# Patient Record
Sex: FEMALE | Race: WHITE | NOT HISPANIC OR LATINO | URBAN - METROPOLITAN AREA
[De-identification: names, ages, dates, MRNs, and addresses within clinical notes are randomized per-mention and may not be internally consistent; named-entity substitution may affect disease eponyms.]

---

## 2017-04-07 ENCOUNTER — OUTPATIENT (OUTPATIENT)
Dept: OUTPATIENT SERVICES | Facility: HOSPITAL | Age: 72
LOS: 1 days | Discharge: HOME | End: 2017-04-07

## 2017-06-27 DIAGNOSIS — E78.5 HYPERLIPIDEMIA, UNSPECIFIED: ICD-10-CM

## 2017-06-27 DIAGNOSIS — R94.31 ABNORMAL ELECTROCARDIOGRAM [ECG] [EKG]: ICD-10-CM

## 2017-06-27 DIAGNOSIS — I25.10 ATHEROSCLEROTIC HEART DISEASE OF NATIVE CORONARY ARTERY WITHOUT ANGINA PECTORIS: ICD-10-CM

## 2017-10-03 ENCOUNTER — OUTPATIENT (OUTPATIENT)
Dept: OUTPATIENT SERVICES | Facility: HOSPITAL | Age: 72
LOS: 1 days | Discharge: HOME | End: 2017-10-03

## 2017-10-03 DIAGNOSIS — R94.31 ABNORMAL ELECTROCARDIOGRAM [ECG] [EKG]: ICD-10-CM

## 2017-10-03 DIAGNOSIS — I25.10 ATHEROSCLEROTIC HEART DISEASE OF NATIVE CORONARY ARTERY WITHOUT ANGINA PECTORIS: ICD-10-CM

## 2017-10-03 DIAGNOSIS — R45.89 OTHER SYMPTOMS AND SIGNS INVOLVING EMOTIONAL STATE: ICD-10-CM

## 2017-10-03 DIAGNOSIS — E78.5 HYPERLIPIDEMIA, UNSPECIFIED: ICD-10-CM

## 2018-06-05 ENCOUNTER — OUTPATIENT (OUTPATIENT)
Dept: OUTPATIENT SERVICES | Facility: HOSPITAL | Age: 73
LOS: 1 days | Discharge: HOME | End: 2018-06-05

## 2018-06-05 DIAGNOSIS — E78.5 HYPERLIPIDEMIA, UNSPECIFIED: ICD-10-CM

## 2018-06-05 DIAGNOSIS — E11.9 TYPE 2 DIABETES MELLITUS WITHOUT COMPLICATIONS: ICD-10-CM

## 2018-06-05 DIAGNOSIS — I25.10 ATHEROSCLEROTIC HEART DISEASE OF NATIVE CORONARY ARTERY WITHOUT ANGINA PECTORIS: ICD-10-CM

## 2018-06-05 DIAGNOSIS — R94.31 ABNORMAL ELECTROCARDIOGRAM [ECG] [EKG]: ICD-10-CM

## 2018-10-30 ENCOUNTER — OUTPATIENT (OUTPATIENT)
Dept: OUTPATIENT SERVICES | Facility: HOSPITAL | Age: 73
LOS: 1 days | Discharge: HOME | End: 2018-10-30

## 2018-10-30 DIAGNOSIS — R05 COUGH: ICD-10-CM

## 2018-10-30 DIAGNOSIS — I25.10 ATHEROSCLEROTIC HEART DISEASE OF NATIVE CORONARY ARTERY WITHOUT ANGINA PECTORIS: ICD-10-CM

## 2018-10-30 DIAGNOSIS — R94.31 ABNORMAL ELECTROCARDIOGRAM [ECG] [EKG]: ICD-10-CM

## 2018-10-30 DIAGNOSIS — E78.5 HYPERLIPIDEMIA, UNSPECIFIED: ICD-10-CM

## 2018-11-12 NOTE — ASU PATIENT PROFILE, ADULT - PMH
CAD (coronary artery disease)    GERD (gastroesophageal reflux disease)    Hyperlipidemia    MI (myocardial infarction)

## 2018-11-13 ENCOUNTER — INPATIENT (INPATIENT)
Facility: HOSPITAL | Age: 73
LOS: 0 days | Discharge: HOME | End: 2018-11-14
Attending: INTERNAL MEDICINE | Admitting: INTERNAL MEDICINE
Payer: MEDICARE

## 2018-11-13 ENCOUNTER — OUTPATIENT (OUTPATIENT)
Dept: OUTPATIENT SERVICES | Facility: HOSPITAL | Age: 73
LOS: 1 days | Discharge: HOME | End: 2018-11-13

## 2018-11-13 VITALS
WEIGHT: 115.08 LBS | DIASTOLIC BLOOD PRESSURE: 58 MMHG | RESPIRATION RATE: 16 BRPM | HEART RATE: 78 BPM | SYSTOLIC BLOOD PRESSURE: 140 MMHG

## 2018-11-13 RX ORDER — SIMVASTATIN 20 MG/1
40 TABLET, FILM COATED ORAL AT BEDTIME
Qty: 0 | Refills: 0 | Status: DISCONTINUED | OUTPATIENT
Start: 2018-11-13 | End: 2018-11-14

## 2018-11-13 RX ORDER — CLOPIDOGREL BISULFATE 75 MG/1
75 TABLET, FILM COATED ORAL DAILY
Qty: 0 | Refills: 0 | Status: DISCONTINUED | OUTPATIENT
Start: 2018-11-14 | End: 2018-11-14

## 2018-11-13 RX ORDER — ENOXAPARIN SODIUM 100 MG/ML
40 INJECTION SUBCUTANEOUS DAILY
Qty: 0 | Refills: 0 | Status: DISCONTINUED | OUTPATIENT
Start: 2018-11-13 | End: 2018-11-13

## 2018-11-13 RX ORDER — SODIUM CHLORIDE 9 MG/ML
1000 INJECTION INTRAMUSCULAR; INTRAVENOUS; SUBCUTANEOUS
Qty: 0 | Refills: 0 | Status: DISCONTINUED | OUTPATIENT
Start: 2018-11-13 | End: 2018-11-14

## 2018-11-13 RX ORDER — FOLIC ACID 0.8 MG
1 TABLET ORAL DAILY
Qty: 0 | Refills: 0 | Status: DISCONTINUED | OUTPATIENT
Start: 2018-11-13 | End: 2018-11-14

## 2018-11-13 RX ORDER — ENOXAPARIN SODIUM 100 MG/ML
40 INJECTION SUBCUTANEOUS DAILY
Qty: 0 | Refills: 0 | Status: DISCONTINUED | OUTPATIENT
Start: 2018-11-14 | End: 2018-11-14

## 2018-11-13 RX ORDER — ASPIRIN/CALCIUM CARB/MAGNESIUM 324 MG
81 TABLET ORAL DAILY
Qty: 0 | Refills: 0 | Status: DISCONTINUED | OUTPATIENT
Start: 2018-11-14 | End: 2018-11-14

## 2018-11-13 RX ORDER — FAMOTIDINE 10 MG/ML
20 INJECTION INTRAVENOUS DAILY
Qty: 0 | Refills: 0 | Status: DISCONTINUED | OUTPATIENT
Start: 2018-11-13 | End: 2018-11-14

## 2018-11-13 RX ORDER — ACETAMINOPHEN 500 MG
650 TABLET ORAL ONCE
Qty: 0 | Refills: 0 | Status: COMPLETED | OUTPATIENT
Start: 2018-11-13 | End: 2018-11-13

## 2018-11-13 RX ORDER — METOPROLOL TARTRATE 50 MG
25 TABLET ORAL DAILY
Qty: 0 | Refills: 0 | Status: DISCONTINUED | OUTPATIENT
Start: 2018-11-13 | End: 2018-11-14

## 2018-11-13 RX ORDER — PANTOPRAZOLE SODIUM 20 MG/1
40 TABLET, DELAYED RELEASE ORAL
Qty: 0 | Refills: 0 | Status: DISCONTINUED | OUTPATIENT
Start: 2018-11-13 | End: 2018-11-14

## 2018-11-13 RX ORDER — ALPRAZOLAM 0.25 MG
1 TABLET ORAL
Qty: 0 | Refills: 0 | Status: DISCONTINUED | OUTPATIENT
Start: 2018-11-13 | End: 2018-11-14

## 2018-11-13 RX ADMIN — PANTOPRAZOLE SODIUM 40 MILLIGRAM(S): 20 TABLET, DELAYED RELEASE ORAL at 18:17

## 2018-11-13 RX ADMIN — Medication 650 MILLIGRAM(S): at 19:28

## 2018-11-13 RX ADMIN — FAMOTIDINE 20 MILLIGRAM(S): 10 INJECTION INTRAVENOUS at 18:17

## 2018-11-13 RX ADMIN — Medication 25 MILLIGRAM(S): at 18:17

## 2018-11-13 RX ADMIN — SIMVASTATIN 40 MILLIGRAM(S): 20 TABLET, FILM COATED ORAL at 22:19

## 2018-11-13 RX ADMIN — SODIUM CHLORIDE 100 MILLILITER(S): 9 INJECTION INTRAMUSCULAR; INTRAVENOUS; SUBCUTANEOUS at 18:19

## 2018-11-13 RX ADMIN — Medication 650 MILLIGRAM(S): at 19:58

## 2018-11-13 RX ADMIN — Medication 1 MILLIGRAM(S): at 18:17

## 2018-11-13 NOTE — H&P CARDIOLOGY - EXTREMITIES COMMENTS
-fem stop on R groin, pt complaining of some tenderness at that site -fem stop on R groin, pt complaining of some tenderness at that site  -rheumatoid nodules on both upper extremities in the hands  -ulnar deviation

## 2018-11-13 NOTE — H&P CARDIOLOGY - HISTORY OF PRESENT ILLNESS
73yF PMHx of CAD s/p PCi to RCA, htn, gerd, dld presents fopr elective cardiac cathaterization s/p positive nuclear stress test performed in Dr. Suárez's office. 73yF PMHx of CAD s/p PCI x1 (16 yrs ago), HTN, GERD, DLD, RA (gets remicade infusion every 8 wks, on methotrexate), presenting after a positive outpatient stress test. Pt's  states that for the past couple of months he noticed that she was getting more and more tired, especially on exertion, which would go away when she would lie down. However, pt denies any over chest pressure/pain, no palpitations, no radiation into the neck or jawline. No associated nausea/vomiting. She has a LHC done today via the right groin and it was found that she had EF 55%, ostial 90% lesion, RCA 70% lesion, and LAD 70%. An area of defect noticed on echo matched with the RCA lesion, so she had GIANNA to pRCA. Her LAD lesion will also need to be fixed, but it was not done today due to her comorbidities. She is being admitted for observation.     Cardiologist: Dr Oseguera  Rheum: Dr Sally Franco

## 2018-11-13 NOTE — H&P CARDIOLOGY - PMH
CAD (coronary artery disease)    GERD (gastroesophageal reflux disease)    Hyperlipidemia    MI (myocardial infarction)    Rheumatoid arthritis

## 2018-11-13 NOTE — CHART NOTE - NSCHARTNOTEFT_GEN_A_CORE
PRE-OP DIAGNOSIS: CAD , + NM stress test in infero septal , stable angina class lll    PROCEDURE: [facility] with coronary angiography    Physician: Dr Suárez  Assistant: DR krish Martinez    ANESTHESIA TYPE:  [  ]General Anesthesia  [ x ] Sedation  [  ] Local/Regional    ESTIMATED BLOOD LOSS:    10   mL    CONDITION  [  ] Critical  [  ] Serious  [  ]Fair  [  x]Good      SPECIMENS REMOVED (IF APPLICABLE):      IV CONTRAST:     140        mL    FINDINGS    Left Heart Catheterization:  LVEF%:  55 %  LVEDP: low  [ ] Normal Coronary Arteries  [ ] Luminal Irregularities  [ ] Non-obstructive CAD    ACCESS:    [ ] right radial artery  [x ] right femoral artery : close with angioseal    LEFT HEART CATHETERIZATION                                    Left main  no disease    LAD:  prox/mid 70% LAD lesion at the bifurcation with D1                       Diag: small vessel , ostial 90 %  lesion    Left Circumflex: mild diffuse atherosclerosis    Right Coronary Artery: 70% lesion prox RCA before the mid RCA stent , patent stent  RPDA no disease      INTERVENTION  IMPLANTS: GIANNA was inserted in prox RCA        POST-OP DIAGNOSIS    CAD with patent prior stent , pt had 70% lesion in prox RCA that correlates with NM stress test findings ( infero septal).  a GIANNA was placed with excellent result.  pt has a 70% prox/mid LAD lesion , needs to be fixed , however as a staged PCI, given her mutiple comorbidities( pt has RA on methothrexate and prednisone).    PLAN OF CARE  [ ] D/C Home today  [ ]  D/C in AM  [ ] Return to In-patient bed  [x ] Admit for observation  [ ] Return for staged procedure:  [ ] CT Surgery consult called  [ ]  Continue DAPT, B-blocker & Statin therapy : pt should take ASA/PLAvix

## 2018-11-13 NOTE — H&P CARDIOLOGY - FAMILY HISTORY
Mother  Still living? Unknown  Family history of heart attack, Age at diagnosis: Age Unknown     Father  Still living? Unknown  Family history of heart disease, Age at diagnosis: Age Unknown

## 2018-11-13 NOTE — H&P CARDIOLOGY - COMMENTS
1. CAD s/p PCI: C w/ GIANNA to pRCA, entry via R groin  -c/w asa, plavix, statin, toprol  -pt will need second intervention in the future to fix the LAD lesion  -monitor for any signs of infection, bleeding  -c/w IVF x10hrs, trend bmp for monitor for any GIDEON  2. HTN: c/w toprol  3. GERD: c/w protonix, pepcid  4. RA: gets remicade infusions q8wks, methotrexate weekly, c/w folic acid  5. DVT PPx: lovenox

## 2018-11-14 ENCOUNTER — TRANSCRIPTION ENCOUNTER (OUTPATIENT)
Age: 73
End: 2018-11-14

## 2018-11-14 VITALS
DIASTOLIC BLOOD PRESSURE: 53 MMHG | SYSTOLIC BLOOD PRESSURE: 101 MMHG | HEART RATE: 62 BPM | TEMPERATURE: 98 F | RESPIRATION RATE: 18 BRPM

## 2018-11-14 LAB
ANION GAP SERPL CALC-SCNC: 12 MMOL/L — SIGNIFICANT CHANGE UP (ref 7–14)
ANION GAP SERPL CALC-SCNC: 15 MMOL/L — HIGH (ref 7–14)
BASOPHILS # BLD AUTO: 0.03 K/UL — SIGNIFICANT CHANGE UP (ref 0–0.2)
BASOPHILS NFR BLD AUTO: 0.4 % — SIGNIFICANT CHANGE UP (ref 0–1)
BUN SERPL-MCNC: 10 MG/DL — SIGNIFICANT CHANGE UP (ref 10–20)
BUN SERPL-MCNC: 10 MG/DL — SIGNIFICANT CHANGE UP (ref 10–20)
CALCIUM SERPL-MCNC: 8.3 MG/DL — LOW (ref 8.5–10.1)
CALCIUM SERPL-MCNC: 8.7 MG/DL — SIGNIFICANT CHANGE UP (ref 8.5–10.1)
CHLORIDE SERPL-SCNC: 103 MMOL/L — SIGNIFICANT CHANGE UP (ref 98–110)
CHLORIDE SERPL-SCNC: 105 MMOL/L — SIGNIFICANT CHANGE UP (ref 98–110)
CO2 SERPL-SCNC: 22 MMOL/L — SIGNIFICANT CHANGE UP (ref 17–32)
CO2 SERPL-SCNC: 23 MMOL/L — SIGNIFICANT CHANGE UP (ref 17–32)
CREAT SERPL-MCNC: 0.6 MG/DL — LOW (ref 0.7–1.5)
CREAT SERPL-MCNC: 0.7 MG/DL — SIGNIFICANT CHANGE UP (ref 0.7–1.5)
EOSINOPHIL # BLD AUTO: 0.21 K/UL — SIGNIFICANT CHANGE UP (ref 0–0.7)
EOSINOPHIL NFR BLD AUTO: 2.9 % — SIGNIFICANT CHANGE UP (ref 0–8)
GLUCOSE SERPL-MCNC: 79 MG/DL — SIGNIFICANT CHANGE UP (ref 70–99)
GLUCOSE SERPL-MCNC: 82 MG/DL — SIGNIFICANT CHANGE UP (ref 70–99)
HCT VFR BLD CALC: 31.7 % — LOW (ref 37–47)
HCT VFR BLD CALC: 31.9 % — LOW (ref 37–47)
HGB BLD-MCNC: 10.4 G/DL — LOW (ref 12–16)
HGB BLD-MCNC: 10.5 G/DL — LOW (ref 12–16)
IMM GRANULOCYTES NFR BLD AUTO: 0.3 % — SIGNIFICANT CHANGE UP (ref 0.1–0.3)
LYMPHOCYTES # BLD AUTO: 1.2 K/UL — SIGNIFICANT CHANGE UP (ref 1.2–3.4)
LYMPHOCYTES # BLD AUTO: 16.8 % — LOW (ref 20.5–51.1)
MCHC RBC-ENTMCNC: 31.2 PG — HIGH (ref 27–31)
MCHC RBC-ENTMCNC: 31.5 PG — HIGH (ref 27–31)
MCHC RBC-ENTMCNC: 32.8 G/DL — SIGNIFICANT CHANGE UP (ref 32–37)
MCHC RBC-ENTMCNC: 32.9 G/DL — SIGNIFICANT CHANGE UP (ref 32–37)
MCV RBC AUTO: 95.2 FL — SIGNIFICANT CHANGE UP (ref 81–99)
MCV RBC AUTO: 95.8 FL — SIGNIFICANT CHANGE UP (ref 81–99)
MONOCYTES # BLD AUTO: 0.68 K/UL — HIGH (ref 0.1–0.6)
MONOCYTES NFR BLD AUTO: 9.5 % — HIGH (ref 1.7–9.3)
NEUTROPHILS # BLD AUTO: 5.01 K/UL — SIGNIFICANT CHANGE UP (ref 1.4–6.5)
NEUTROPHILS NFR BLD AUTO: 70.1 % — SIGNIFICANT CHANGE UP (ref 42.2–75.2)
NRBC # BLD: 0 /100 WBCS — SIGNIFICANT CHANGE UP (ref 0–0)
NRBC # BLD: 0 /100 WBCS — SIGNIFICANT CHANGE UP (ref 0–0)
PLATELET # BLD AUTO: 197 K/UL — SIGNIFICANT CHANGE UP (ref 130–400)
PLATELET # BLD AUTO: 203 K/UL — SIGNIFICANT CHANGE UP (ref 130–400)
POTASSIUM SERPL-MCNC: 4 MMOL/L — SIGNIFICANT CHANGE UP (ref 3.5–5)
POTASSIUM SERPL-MCNC: 4.3 MMOL/L — SIGNIFICANT CHANGE UP (ref 3.5–5)
POTASSIUM SERPL-SCNC: 4 MMOL/L — SIGNIFICANT CHANGE UP (ref 3.5–5)
POTASSIUM SERPL-SCNC: 4.3 MMOL/L — SIGNIFICANT CHANGE UP (ref 3.5–5)
RBC # BLD: 3.33 M/UL — LOW (ref 4.2–5.4)
RBC # BLD: 3.33 M/UL — LOW (ref 4.2–5.4)
RBC # FLD: 13.2 % — SIGNIFICANT CHANGE UP (ref 11.5–14.5)
RBC # FLD: 13.2 % — SIGNIFICANT CHANGE UP (ref 11.5–14.5)
SODIUM SERPL-SCNC: 140 MMOL/L — SIGNIFICANT CHANGE UP (ref 135–146)
SODIUM SERPL-SCNC: 140 MMOL/L — SIGNIFICANT CHANGE UP (ref 135–146)
WBC # BLD: 7.15 K/UL — SIGNIFICANT CHANGE UP (ref 4.8–10.8)
WBC # BLD: 7.49 K/UL — SIGNIFICANT CHANGE UP (ref 4.8–10.8)
WBC # FLD AUTO: 7.15 K/UL — SIGNIFICANT CHANGE UP (ref 4.8–10.8)
WBC # FLD AUTO: 7.49 K/UL — SIGNIFICANT CHANGE UP (ref 4.8–10.8)

## 2018-11-14 PROCEDURE — 93926 LOWER EXTREMITY STUDY: CPT | Mod: 26

## 2018-11-14 RX ORDER — ATORVASTATIN CALCIUM 80 MG/1
1 TABLET, FILM COATED ORAL
Qty: 30 | Refills: 0
Start: 2018-11-14 | End: 2018-12-13

## 2018-11-14 RX ORDER — CLOPIDOGREL BISULFATE 75 MG/1
1 TABLET, FILM COATED ORAL
Qty: 0 | Refills: 0 | COMMUNITY

## 2018-11-14 RX ORDER — ASPIRIN/CALCIUM CARB/MAGNESIUM 324 MG
1 TABLET ORAL
Qty: 30 | Refills: 0
Start: 2018-11-14 | End: 2018-12-13

## 2018-11-14 RX ORDER — ACETAMINOPHEN 500 MG
650 TABLET ORAL EVERY 6 HOURS
Qty: 0 | Refills: 0 | Status: DISCONTINUED | OUTPATIENT
Start: 2018-11-14 | End: 2018-11-14

## 2018-11-14 RX ORDER — SIMVASTATIN 20 MG/1
1 TABLET, FILM COATED ORAL
Qty: 0 | Refills: 0 | COMMUNITY

## 2018-11-14 RX ORDER — CLOPIDOGREL BISULFATE 75 MG/1
1 TABLET, FILM COATED ORAL
Qty: 30 | Refills: 0
Start: 2018-11-14 | End: 2018-12-13

## 2018-11-14 RX ADMIN — Medication 650 MILLIGRAM(S): at 18:44

## 2018-11-14 RX ADMIN — Medication 81 MILLIGRAM(S): at 11:51

## 2018-11-14 RX ADMIN — CLOPIDOGREL BISULFATE 75 MILLIGRAM(S): 75 TABLET, FILM COATED ORAL at 11:51

## 2018-11-14 RX ADMIN — ENOXAPARIN SODIUM 40 MILLIGRAM(S): 100 INJECTION SUBCUTANEOUS at 11:51

## 2018-11-14 RX ADMIN — Medication 650 MILLIGRAM(S): at 09:18

## 2018-11-14 RX ADMIN — Medication 650 MILLIGRAM(S): at 18:14

## 2018-11-14 RX ADMIN — Medication 650 MILLIGRAM(S): at 08:48

## 2018-11-14 RX ADMIN — FAMOTIDINE 20 MILLIGRAM(S): 10 INJECTION INTRAVENOUS at 11:50

## 2018-11-14 RX ADMIN — PANTOPRAZOLE SODIUM 40 MILLIGRAM(S): 20 TABLET, DELAYED RELEASE ORAL at 06:48

## 2018-11-14 RX ADMIN — Medication 25 MILLIGRAM(S): at 06:48

## 2018-11-14 RX ADMIN — Medication 1 MILLIGRAM(S): at 11:50

## 2018-11-14 NOTE — PROGRESS NOTE ADULT - ASSESSMENT
agree with above fu vascular  Will assess for psuedo.   HGB is stable 10.6 and 10.4 on 2 subsequnt draws .  No further draws needed.

## 2018-11-14 NOTE — DISCHARGE NOTE ADULT - PATIENT PORTAL LINK FT
You can access the [facility] Patient Portal, offered by [facility], by registering with the following website: [email or web address]

## 2018-11-14 NOTE — DISCHARGE NOTE ADULT - PLAN OF CARE
Optimized, stable You were admitted for elective cardiac catheretization with Dr. Suárez where a stent was placed. Continue DAPT( Aspirin 81mg Daily, Plavix 75 mg PO Daily),  B-Blocker, Statin Therapy. Patient given 30 day supply of (Aspirin 81 mg PO daily + Plavix 75 PO daily) mg to be take at home. Pt given instructions by cardiology on importance of taking antiplatelet medication or risk acute stent thrombosis/death. Pt agreeing to take antiplatelet medications. Post cath instructions, access site care and activity restrictions reviewed with patient by cardiology.    patient to return to hospital if experience chest pain, shortness breath, dizziness and site bleeding. Aggressive risk factor modification, diet counseling, smoking cessation discussed with patient.

## 2018-11-14 NOTE — PROGRESS NOTE ADULT - SUBJECTIVE AND OBJECTIVE BOX
ABHINAV DREW  73y Female    CHIEF COMPLAINT:    Patient is a 73y old  Female who presents with a chief complaint of s/p Cardiac Cath (14 Nov 2018 14:32)      INTERVAL HPI/OVERNIGHT EVENTS:    Patient seen and examined.    ROS: All other systems are negative.    Vital Signs:    T(F): 98.5 (11-14-18 @ 14:29), Max: 99.5 (11-13-18 @ 20:22)  HR: 62 (11-14-18 @ 14:29) (62 - 152)  BP: 101/53 (11-14-18 @ 14:29) (101/53 - 137/53)  RR: 18 (11-14-18 @ 14:29) (18 - 19)  SpO2: 100% (11-14-18 @ 07:07) (96% - 100%)  I&O's Summary    13 Nov 2018 07:01  -  14 Nov 2018 07:00  --------------------------------------------------------  IN: 0 mL / OUT: 1900 mL / NET: -1900 mL    14 Nov 2018 07:01  -  14 Nov 2018 16:08  --------------------------------------------------------  IN: 0 mL / OUT: 800 mL / NET: -800 mL      Daily     Daily   CAPILLARY BLOOD GLUCOSE          PHYSICAL EXAM:    GENERAL:  NAD  SKIN: No rashes or lesions  HENT: Atrumatic. Normocephalic. PERRL. Moist membranes.  NECK: Supple, No JVD. No lymphadenopathy.  PULMONARY: CTA B/L. No wheezing. No rales  CVS: Normal S1, S2. Rate and Rythm are regular. No murmurs.  ABDOMEN/GI: Soft, Nontender, Nondistended; BS present  EXTREMITIES: Peripheral pulses intact. No edema B/L LE.  NEUROLOGIC:  No motor or sensory deficit.  PSYCH: Alert & oriented x 3    Consultant(s) Notes Reviewed:  [x ] YES  [ ] NO  Care Discussed with Consultants/Other Providers [ x] YES  [ ] NO    EKG reviewed  Telemetry reviewed    LABS:                        10.5   7.15  )-----------( 203      ( 14 Nov 2018 07:24 )             31.9     11-14    140  |  105  |  10  ----------------------------<  82  4.3   |  23  |  0.7    Ca    8.7      14 Nov 2018 07:24                RADIOLOGY & ADDITIONAL TESTS:      Imaging or report Personally Reviewed:  [ ] YES  [ ] NO    Medications:  Standing  aspirin  chewable 81 milliGRAM(s) Oral daily  clopidogrel Tablet 75 milliGRAM(s) Oral daily  enoxaparin Injectable 40 milliGRAM(s) SubCutaneous daily  famotidine    Tablet 20 milliGRAM(s) Oral daily  folic acid 1 milliGRAM(s) Oral daily  metoprolol succinate ER 25 milliGRAM(s) Oral daily  pantoprazole    Tablet 40 milliGRAM(s) Oral before breakfast  simvastatin 40 milliGRAM(s) Oral at bedtime  sodium chloride 0.9%. 1000 milliLiter(s) IV Continuous <Continuous>    PRN Meds  acetaminophen   Tablet .. 650 milliGRAM(s) Oral every 6 hours PRN  ALPRAZolam 1 milliGRAM(s) Oral two times a day PRN      Case discussed with resident    Care discussed with pt/family ABHINAV DREW  73y Female    CHIEF COMPLAINT:    Patient is a 73y old  Female who presents with a chief complaint of s/p Cardiac Cath (14 Nov 2018 14:32)      INTERVAL HPI/OVERNIGHT EVENTS:    Patient seen and examined. No cp. No sob. C/O some discomfort in Rt. groin. No difficulty in walking.    ROS: All other systems are negative.    Vital Signs:    T(F): 98.5 (11-14-18 @ 14:29), Max: 99.5 (11-13-18 @ 20:22)  HR: 62 (11-14-18 @ 14:29) (62 - 152)  BP: 101/53 (11-14-18 @ 14:29) (101/53 - 137/53)  RR: 18 (11-14-18 @ 14:29) (18 - 19)  SpO2: 100% (11-14-18 @ 07:07) (96% - 100%)  I&O's Summary    13 Nov 2018 07:01  -  14 Nov 2018 07:00  --------------------------------------------------------  IN: 0 mL / OUT: 1900 mL / NET: -1900 mL    14 Nov 2018 07:01  -  14 Nov 2018 16:08  --------------------------------------------------------  IN: 0 mL / OUT: 800 mL / NET: -800 mL      Daily     Daily   CAPILLARY BLOOD GLUCOSE          PHYSICAL EXAM:    GENERAL:  NAD  SKIN: No rashes or lesions  HENT: Atrumatic. Normocephalic. PERRL. Moist membranes.  NECK: Supple, No JVD. No lymphadenopathy.  PULMONARY: CTA B/L. No wheezing. No rales  CVS: Normal S1, S2. Rate and Rythm are regular. No murmurs.  ABDOMEN/GI: Soft, Nontender, Nondistended; BS present  EXTREMITIES: Peripheral pulses intact. No edema B/L LE.  NEUROLOGIC:  No motor or sensory deficit.  PSYCH: Alert & oriented x 3    Consultant(s) Notes Reviewed:  [x ] YES  [ ] NO  Care Discussed with Consultants/Other Providers [ x] YES  [ ] NO    EKG reviewed  Telemetry reviewed    LABS:                        10.5   7.15  )-----------( 203      ( 14 Nov 2018 07:24 )             31.9   Hemoglobin: 10.5 g/dL (11-14 @ 07:24)  Hemoglobin: 10.4 g/dL (11-14 @ 01:11)    11-14    140  |  105  |  10  ----------------------------<  82  4.3   |  23  |  0.7    Ca    8.7      14 Nov 2018 07:24                RADIOLOGY & ADDITIONAL TESTS:      Imaging or report Personally Reviewed:  [ ] YES  [ ] NO    Medications:  Standing  aspirin  chewable 81 milliGRAM(s) Oral daily  clopidogrel Tablet 75 milliGRAM(s) Oral daily  enoxaparin Injectable 40 milliGRAM(s) SubCutaneous daily  famotidine    Tablet 20 milliGRAM(s) Oral daily  folic acid 1 milliGRAM(s) Oral daily  metoprolol succinate ER 25 milliGRAM(s) Oral daily  pantoprazole    Tablet 40 milliGRAM(s) Oral before breakfast  simvastatin 40 milliGRAM(s) Oral at bedtime  sodium chloride 0.9%. 1000 milliLiter(s) IV Continuous <Continuous>    PRN Meds  acetaminophen   Tablet .. 650 milliGRAM(s) Oral every 6 hours PRN  ALPRAZolam 1 milliGRAM(s) Oral two times a day PRN      Case discussed with resident    Care discussed with pt/family

## 2018-11-14 NOTE — DISCHARGE NOTE ADULT - INSTRUCTIONS
DASH diet - please restrict sodium to less than 2g a day, restrict fats and cholesterol in your diet.

## 2018-11-14 NOTE — DISCHARGE NOTE ADULT - CARE PROVIDER_API CALL
Willis Suárez), Cardiovascular Disease; Interventional Cardiology  [address] [place] [address]  Phone: [phone]  Fax: [phone]

## 2018-11-14 NOTE — PROGRESS NOTE ADULT - SUBJECTIVE AND OBJECTIVE BOX
CARDIAC CATH NP NOTE    S/P PCI of pRCA  via right Femoral access.      Patient tolerated procedure well  Denies chest pain, shortness of breath or dizziness post cath  no TELE events overnight     ALLERGIES    MEDICATIONS  acetaminophen   Tablet .. 650 milliGRAM(s) Oral every 6 hours PRN  ALPRAZolam 1 milliGRAM(s) Oral two times a day PRN  aspirin  chewable 81 milliGRAM(s) Oral daily  clopidogrel Tablet 75 milliGRAM(s) Oral daily  enoxaparin Injectable 40 milliGRAM(s) SubCutaneous daily  famotidine    Tablet 20 milliGRAM(s) Oral daily  folic acid 1 milliGRAM(s) Oral daily  metoprolol succinate ER 25 milliGRAM(s) Oral daily  pantoprazole    Tablet 40 milliGRAM(s) Oral before breakfast  simvastatin 40 milliGRAM(s) Oral at bedtime  sodium chloride 0.9%. 1000 milliLiter(s) IV Continuous <Continuous>    OBJECTIVE    VITAL SIGNS  Vital Signs Last 24 Hrs  T(C): 36.7 (2018 05:44), Max: 37.5 (2018 20:22)  T(F): 98 (2018 05:44), Max: 99.5 (2018 20:22)  HR: 64 (2018 05:44) (62 - 152)  BP: 113/55 (2018 05:44) (104/62 - 137/53)  BP(mean): --  RR: 18 (2018 05:44) (18 - 19)  SpO2: 100% (2018 07:07) (96% - 100%)      PHYSICAL EXAM    PHYSICAL EXAM:             CONSTITUTIONAL: Well-developed; well-nourished; in no acute distress.   	SKIN: warm, dry  	HEAD: Normocephalic; atraumatic.  	EYES: PERRL, EOMI, no conjunctival erythema.  	ENT: No nasal discharge, airway clear, mucous membranes moist.  	NECK: Supple; non tender.  	CARD: +S1, S2 no murmurs, gallops, or rubs. Regular rate and rhythm.    	RESP: No wheezes, rales or rhonchi. CTA B/L  	ABD: soft ntnd, + BS.  	EXT: moves all extremities,  no clubbing, cyanosis or edema.   	NEURO: Alert and oriented x3, no focal deficits.          PSYCH: Cooperative, appropriate.          EXTREMITY:             Right Groin:  Dressing removed, hematoma noted and marked, pain upon palpation.             VASCULAR:  +2 Rad / +2PTs / + 2DPs    EKG                                                                                                     no changes    LABORATORY VALUES                        10.5   7.15  )-----------( 203      ( 2018 07:24 )             31.9   Complete Blood Count + Automated Diff in AM (18 @ 07:24)    WBC Count: 7.15 K/uL    RBC Count: 3.33 M/uL    Hemoglobin: 10.5 g/dL    Hematocrit: 31.9 %    Mean Cell Volume: 95.8 fL    Mean Cell Hemoglobin: 31.5 pg    Mean Cell Hemoglobin Conc: 32.9 g/dL    Red Cell Distrib Width: 13.2 %    Platelet Count - Automated: 203 K/uL    Auto Neutrophil #: 5.01 K/uL    Auto Lymphocyte #: 1.20 K/uL    Auto Monocyte #: 0.68 K/uL    Auto Eosinophil #: 0.21 K/uL    Auto Basophil #: 0.03 K/uL    Auto Neutrophil %: 70.1: Differential percentages must be correlated with absolute numbers for  clinical significance. %    Auto Lymphocyte %: 16.8 %    Auto Monocyte %: 9.5 %    Auto Eosinophil %: 2.9 %    Auto Basophil %: 0.4 %    Auto Immature Granulocyte %: 0.3 %    Basic Metabolic Panel in AM (18 @ 07:24)    Sodium, Serum: 140 mmol/L    Potassium, Serum: 4.3 mmol/L    Chloride, Serum: 105 mmol/L    Carbon Dioxide, Serum: 23 mmol/L    Anion Gap, Serum: 12 mmol/L    Blood Urea Nitrogen, Serum: 10 mg/dL    Creatinine, Serum: 0.7 mg/dL    Glucose, Serum: 82 mg/dL    Calcium, Total Serum: 8.7 mg/dL    eGFR if Non : 87: Interpretative comment  The units for eGFR are ml/min/1.73m2 (normalized body surface area). The  eGFR is calculated from a serum creatinine using the CKD-EPI equation.  Other variables required for calculation are race, age and sex. Among  patients with chronic kidney disease (CKD), the eGFR is useful in  determining the stage of disease according to KDOQI CKD classification.  All eGFR results are reported numerically with the following  interpretation.          GFR                    With                 Without     (ml/min/1.73 m2)    Kidney Damage       Kidney Damage        >= 90                    Stage 1                     Normal        60-89                    Stage 2                     Decreased GFR        30-59     Stage 3                     Stage 3        15-29                    Stage 4                     Stage 4        < 15                      Stage 5                     Stage 5  Each stage of CKD assumes that the associated GFR level has been in  effect for at least 3 months. Determination of stages one and two (with  eGFR > 59 ml/min/m2) requires estimation of kidney damage for at least 3  months as defined by structural or functional abnormalities.  Limitations: All estimates of GFR will be less accurate for patients at  extremes of muscle mass (including but not limited to frail elderly,  critically ill, or cancer patients), those with unusual diets, and those  with conditions associated with reduced secretion or extrarenal  elimination of creatinine. The eGFR equation is not recommended for use  in patients with unstable creatinine levels. mL/min/1.73M2    eGFR if African American: 101 mL/min/1.73M2      ASSESSMENT & PLAN    A/P:  I discussed the case with Interventional Cardiologist Dr. Kwon & recommend the followin.  CAD/PCI of p RCA          	     Continue DAPT( Aspirin 81mg Daily, Plavix 75 mg PO Daily),  B-Blocker, Statin Therapy                   Patient given 30 day supply of (Aspirin 81 mg PO daily + Plavix 75 PO daily) mg to be take at home.                   Pt given instructions on importance of taking antiplatelet medication or risk acute stent thrombosis/death.                   Pt agreeing to take antiplatelet medications.                    Post cath instructions, access site care and activity restrictions reviewed with patient.                    Discussed with patient to return to hospital if experience chest pain, shortness breath, dizziness and site bleeding.                   Aggressive risk factor modification, diet counseling, smoking cessation discussed with patient.                       F/U right groin arterial duplex result.                    Hold Lovenox until results of duplex available.                   Maintain bedrest for today.                   Maintain Shah to gravity and d/c when patient will be ready to ambulate.                    F/U with Dr. Kwon.

## 2018-11-14 NOTE — DISCHARGE NOTE ADULT - CARE PLAN
Principal Discharge DX:	Coronary artery disease of native artery of native heart with stable angina pectoris  Goal:	Optimized, stable  Assessment and plan of treatment:	You were admitted for elective cardiac catheretization with Dr. Suárez where a stent was placed. Continue DAPT( Aspirin 81mg Daily, Plavix 75 mg PO Daily),  B-Blocker, Statin Therapy. Patient given 30 day supply of (Aspirin 81 mg PO daily + Plavix 75 PO daily) mg to be take at home. Pt given instructions by cardiology on importance of taking antiplatelet medication or risk acute stent thrombosis/death. Pt agreeing to take antiplatelet medications. Post cath instructions, access site care and activity restrictions reviewed with patient by cardiology.    patient to return to hospital if experience chest pain, shortness breath, dizziness and site bleeding. Aggressive risk factor modification, diet counseling, smoking cessation discussed with patient.

## 2018-11-14 NOTE — PROGRESS NOTE ADULT - ASSESSMENT
73yF PMHx of CAD s/p PCI x1 (16 yrs ago), HTN, GERD, DLD, RA (gets remicade infusion every 8 wks, on methotrexate), presenting after a positive outpatient stress test. She had a LHC done yesterdday and it was found that she had EF 55%, ostial 90% lesion, RCA 70% lesion, and LAD 70%. An area of defect noticed on echo matched with the RCA lesion, so she had GIANNA to pRCA. Her LAD lesion will also need to be fixed. As pt developed groin hematoma post cath, she was admitted for observation.    1.	2 vessel CAD S/P RCA stent  2.	HTN/DL  3.	RA           PLAN:    · 73yF PMHx of CAD s/p PCI x1 (16 yrs ago), HTN, GERD, DLD, RA (gets remicade infusion every 8 wks, on methotrexate), presenting after a positive outpatient stress test. She had a LHC done yesterdday and it was found that she had EF 55%, ostial 90% lesion, RCA 70% lesion, and LAD 70%. An area of defect noticed on echo matched with the RCA lesion, so she had GIANNA to pRCA. Her LAD lesion will also need to be fixed. As pt developed groin hematoma post cath, she was admitted for observation.    1.	2 vessel CAD S/P RCA stent  2.	HTN/DL  3.	RA  4.	Rt. groin hematoma           PLAN:    ·	Card F/U note  ·	Prelim report of Rt. groin doppler shows no pseudoaneurysm  ·	Cont DAPT and metoprolol  ·	D/C simvastatin. Start Lipitor 80 mg po qhs.  ·	If the official report of arteriolar doppler is negative for aneurysm, can D/C the pt home  ·	    * Med rec reviewed. Plan of care D/W the pt and her  on bedside. Time spent 36 minutes.

## 2018-11-14 NOTE — PROGRESS NOTE ADULT - SUBJECTIVE AND OBJECTIVE BOX
ABHINAV RDEW 73y Female  MRN#: [id number]   CODE STATUS:________      SUBJECTIVE  Patient is a 73y old Female who presents with a chief complaint of Currently admitted to medicine with the primary diagnosis of Hospital course has been complicated by _______.   Today is hospital day 1d, and this morning she is _________ and reports ________ overnight events.     Present Today:           Shah Catheter ()No/ ()Yes? Indication:          Central Line ()No/ ()Yes? Indication:          IV Fluids ()No/ ()Yes? Type:  Rate:  Indication:      OBJECTIVE  PAST MEDICAL & SURGICAL HISTORY  Rheumatoid arthritis  GERD (gastroesophageal reflux disease)  MI (myocardial infarction)  Hyperlipidemia  CAD (coronary artery disease)    ALLERGIES:  Zocor (Joint Pain)    MEDICATIONS:  STANDING MEDICATIONS  aspirin  chewable 81 milliGRAM(s) Oral daily  clopidogrel Tablet 75 milliGRAM(s) Oral daily  enoxaparin Injectable 40 milliGRAM(s) SubCutaneous daily  famotidine    Tablet 20 milliGRAM(s) Oral daily  folic acid 1 milliGRAM(s) Oral daily  metoprolol succinate ER 25 milliGRAM(s) Oral daily  pantoprazole    Tablet 40 milliGRAM(s) Oral before breakfast  simvastatin 40 milliGRAM(s) Oral at bedtime  sodium chloride 0.9%. 1000 milliLiter(s) IV Continuous <Continuous>    PRN MEDICATIONS  acetaminophen   Tablet .. 650 milliGRAM(s) Oral every 6 hours PRN  ALPRAZolam 1 milliGRAM(s) Oral two times a day PRN      VITAL SIGNS: Last 24 Hours  T(C): 36.7 (14 Nov 2018 05:44), Max: 37.5 (13 Nov 2018 20:22)  T(F): 98 (14 Nov 2018 05:44), Max: 99.5 (13 Nov 2018 20:22)  HR: 64 (14 Nov 2018 05:44) (62 - 152)  BP: 113/55 (14 Nov 2018 05:44) (104/62 - 140/58)  BP(mean): --  RR: 18 (14 Nov 2018 05:44) (16 - 19)  SpO2: 100% (14 Nov 2018 07:07) (96% - 100%)    LABS:                        10.5   7.15  )-----------( 203      ( 14 Nov 2018 07:24 )             31.9     11-14    140  |  105  |  10  ----------------------------<  82  4.3   |  23  |  0.7    Ca    8.7      14 Nov 2018 07:24                    RADIOLOGY:      PHYSICAL EXAM:    GENERAL: NAD, well-developed, AAOx3  HEENT:  Atraumatic, Normocephalic. EOMI, PERRLA, conjunctiva and sclera clear, No JVD  PULMONARY: Clear to auscultation bilaterally; No wheeze  CARDIOVASCULAR: Regular rate and rhythm; No murmurs, rubs, or gallops  GASTROINTESTINAL: Soft, Nontender, Nondistended; Bowel sounds present  MUSCULOSKELETAL:  2+ Peripheral Pulses, No clubbing, cyanosis, or edema  NEUROLOGY: non-focal  SKIN: No rashes or lesions      ADMISSION SUMMARY  Patient is a 73y old Female who presents with a chief complaint of Currently admitted to medicine with the primary diagnosis of Hospital course has been complicated by hematoma.   PMHx of CAD s/p PCI x1 (16 yrs ago), HTN, GERD, DLD, RA (gets remicade infusion every 8 wks, on methotrexate), presenting after a positive outpatient stress test. Pt's  states that for the past couple of months he noticed that she was getting more and more tired, especially on exertion, which would go away when she would lie down. However, pt denies any over chest pressure/pain, no palpitations, no radiation into the neck or jawline. No associated nausea/vomiting. She has a LHC done today via the right groin and it was found that she had EF 55%, ostial 90% lesion, RCA 70% lesion, and LAD 70%. An area of defect noticed on echo matched with the RCA lesion, so she had GIANNA to pRCA. Her LAD lesion will also need to be fixed, but it was not done today due to her comorbidities. She is being admitted for observation of a R groin HEMATOMA that developed. She had out-pt H/H 12, now fallen to 10. Needs     Cardiologist: Dr Oseguera  Rheum: Dr Sally Franco    ASSESSMENT & PLAN    1. 43175/R06.00      2.    3. Rheumatoid arthritis  GERD (gastroesophageal reflux disease)  MI (myocardial infarction)  Hyperlipidemia  CAD (coronary artery disease)        Present today:  ( ) Congestive Heart Failure, Yes? ( )Acute / ( )Acute on Chronic / ( )Chronic  :  ( )Systolic / ( )Diastolic               Plan:  ( ) Complicated Pneumonia, Type?  ( )Parapneumonic effusion / ( )Abscess / ( ) Multilobar / ( )Other               Plan:  ( ) Morbid Obesity, Yes? BMI:               Plan:  ( ) Functional Quadriplegia               Plan:  ( ) Encephalopathy               Plan:    ( ) Discussion with patient and/or family regarding goals of care  ( ) Discussed Case and Plan with Medical Attending, Name:      # Planned Disposition: ________ ABHINAV DREW 73y Female  MRN#: [id number]   CODE STATUS:____full____      SUBJECTIVE  Patient is a 73y old Female who presents with a chief complaint of Currently admitted to medicine with the primary diagnosis of Hospital course has been complicated by hematoma.   Today is hospital day 1d, and this morning she is feeling well and reports no overnight events.     Here for duplex and monitoring of hematoma in R groin.       OBJECTIVE  PAST MEDICAL & SURGICAL HISTORY  Rheumatoid arthritis  GERD (gastroesophageal reflux disease)  MI (myocardial infarction)  Hyperlipidemia  CAD (coronary artery disease)    ALLERGIES:  Zocor (Joint Pain)    MEDICATIONS:  STANDING MEDICATIONS  aspirin  chewable 81 milliGRAM(s) Oral daily  clopidogrel Tablet 75 milliGRAM(s) Oral daily  enoxaparin Injectable 40 milliGRAM(s) SubCutaneous daily  famotidine    Tablet 20 milliGRAM(s) Oral daily  folic acid 1 milliGRAM(s) Oral daily  metoprolol succinate ER 25 milliGRAM(s) Oral daily  pantoprazole    Tablet 40 milliGRAM(s) Oral before breakfast  simvastatin 40 milliGRAM(s) Oral at bedtime  sodium chloride 0.9%. 1000 milliLiter(s) IV Continuous <Continuous>    PRN MEDICATIONS  acetaminophen   Tablet .. 650 milliGRAM(s) Oral every 6 hours PRN  ALPRAZolam 1 milliGRAM(s) Oral two times a day PRN      VITAL SIGNS: Last 24 Hours  T(C): 36.7 (14 Nov 2018 05:44), Max: 37.5 (13 Nov 2018 20:22)  T(F): 98 (14 Nov 2018 05:44), Max: 99.5 (13 Nov 2018 20:22)  HR: 64 (14 Nov 2018 05:44) (62 - 152)  BP: 113/55 (14 Nov 2018 05:44) (104/62 - 140/58)  BP(mean): --  RR: 18 (14 Nov 2018 05:44) (16 - 19)  SpO2: 100% (14 Nov 2018 07:07) (96% - 100%)    LABS:                        10.5   7.15  )-----------( 203      ( 14 Nov 2018 07:24 )             31.9     11-14    140  |  105  |  10  ----------------------------<  82  4.3   |  23  |  0.7    Ca    8.7      14 Nov 2018 07:24    RADIOLOGY:      PHYSICAL EXAM:    GENERAL: NAD, well-developed, AAOx3  HEENT:  Atraumatic, Normocephalic. EOMI, PERRLA, conjunctiva and sclera clear, No JVD  PULMONARY: Clear to auscultation bilaterally; No wheeze  CARDIOVASCULAR: Regular rate and rhythm; No murmurs, rubs, or gallops  GASTROINTESTINAL: Soft, Nontender, Nondistended; Bowel sounds present  MUSCULOSKELETAL:  2+ Peripheral Pulses, No clubbing, cyanosis, or edema  NEUROLOGY: non-focal  SKIN: No rashes or lesions      ADMISSION SUMMARY  Patient is a 73y old Female who presents with a chief complaint of Currently admitted to medicine with the primary diagnosis of Hospital course has been complicated by hematoma.   PMHx of CAD s/p PCI x1 (16 yrs ago), HTN, GERD, DLD, RA (gets remicade infusion every 8 wks, on methotrexate), presenting after a positive outpatient stress test. Pt's  states that for the past couple of months he noticed that she was getting more and more tired, especially on exertion, which would go away when she would lie down. However, pt denies any over chest pressure/pain, no palpitations, no radiation into the neck or jawline. No associated nausea/vomiting. She has a LHC done today via the right groin and it was found that she had EF 55%, ostial 90% lesion, RCA 70% lesion, and LAD 70%. An area of defect noticed on echo matched with the RCA lesion, so she had GIANNA to pRCA. Her LAD lesion will also need to be fixed, but it was not done today due to her comorbidities. She is being admitted for observation of a R groin HEMATOMA that developed. She had out-pt H/H 12, now fallen to 10. Needs duplex as per cardio.    Cardiologist: Dr Oseguera  Rheum: Dr Sally Franco    ASSESSMENT & PLAN  #CAD s/p PCI  - LHC w/ GIANNA to pRCA, entry via R groin  - c/w asa, plavix, statin, toprol  - pt will need second intervention in the future to fix the LAD lesion  - c/w IVF x10hrs, trend bmp for monitor for any GIDEON  - monitor R groin lesion  - f/u R groin duplex  - f/u 4PM H/H, stable x2 @ 10.4  - d/c hall TOV    #HTN  - c/w toprol  #GERD  - c/w protonix, pepcid  # RA  - gets remicade infusions q8wks, methotrexate weekly, c/w folic acid    #MISC  - DVT PPx: lovenox.  - GI: PPI  - diet: DASH  - activity as tolerated  - from home  - full code    ( ) Discussion with patient and/or family regarding goals of care  (x) Discussed Case and Plan with Medical Attending, Name: Shanizeke      # Planned Disposition: ________ ABHINAV DREW 73y Female  MRN#: [id number]   CODE STATUS:____full____      SUBJECTIVE  Patient is a 73y old Female who presents with a chief complaint of Currently admitted to medicine with the primary diagnosis of Hospital course has been complicated by hematoma.   Today is hospital day 1d, and this morning she is feeling well and reports no overnight events.     Here for duplex and monitoring of "hematoma" in R groin.       OBJECTIVE  PAST MEDICAL & SURGICAL HISTORY  Rheumatoid arthritis  GERD (gastroesophageal reflux disease)  MI (myocardial infarction)  Hyperlipidemia  CAD (coronary artery disease)    ALLERGIES:  Zocor (Joint Pain)    MEDICATIONS:  STANDING MEDICATIONS  aspirin  chewable 81 milliGRAM(s) Oral daily  clopidogrel Tablet 75 milliGRAM(s) Oral daily  enoxaparin Injectable 40 milliGRAM(s) SubCutaneous daily  famotidine    Tablet 20 milliGRAM(s) Oral daily  folic acid 1 milliGRAM(s) Oral daily  metoprolol succinate ER 25 milliGRAM(s) Oral daily  pantoprazole    Tablet 40 milliGRAM(s) Oral before breakfast  simvastatin 40 milliGRAM(s) Oral at bedtime  sodium chloride 0.9%. 1000 milliLiter(s) IV Continuous <Continuous>    PRN MEDICATIONS  acetaminophen   Tablet .. 650 milliGRAM(s) Oral every 6 hours PRN  ALPRAZolam 1 milliGRAM(s) Oral two times a day PRN      VITAL SIGNS: Last 24 Hours  T(C): 36.7 (14 Nov 2018 05:44), Max: 37.5 (13 Nov 2018 20:22)  T(F): 98 (14 Nov 2018 05:44), Max: 99.5 (13 Nov 2018 20:22)  HR: 64 (14 Nov 2018 05:44) (62 - 152)  BP: 113/55 (14 Nov 2018 05:44) (104/62 - 140/58)  BP(mean): --  RR: 18 (14 Nov 2018 05:44) (16 - 19)  SpO2: 100% (14 Nov 2018 07:07) (96% - 100%)    LABS:                        10.5   7.15  )-----------( 203      ( 14 Nov 2018 07:24 )             31.9     11-14    140  |  105  |  10  ----------------------------<  82  4.3   |  23  |  0.7    Ca    8.7      14 Nov 2018 07:24    RADIOLOGY:      PHYSICAL EXAM:    GENERAL: NAD, well-developed, AAOx3  HEENT:  Atraumatic, Normocephalic. EOMI, PERRLA, conjunctiva and sclera clear, No JVD  PULMONARY: Clear to auscultation bilaterally; No wheeze  CARDIOVASCULAR: Regular rate and rhythm; No murmurs, rubs, or gallops  GASTROINTESTINAL: Soft, Nontender, Nondistended; Bowel sounds present  MUSCULOSKELETAL:  2+ Peripheral Pulses, No clubbing, cyanosis, or edema  NEUROLOGY: non-focal  SKIN: No rashes or lesions      ADMISSION SUMMARY  Patient is a 73y old Female who presents with a chief complaint of Currently admitted to medicine with the primary diagnosis of Hospital course has been complicated by hematoma.   PMHx of CAD s/p PCI x1 (16 yrs ago), HTN, GERD, DLD, RA (gets remicade infusion every 8 wks, on methotrexate), presenting after a positive outpatient stress test. Pt's  states that for the past couple of months he noticed that she was getting more and more tired, especially on exertion, which would go away when she would lie down. However, pt denies any over chest pressure/pain, no palpitations, no radiation into the neck or jawline. No associated nausea/vomiting. She has a LHC done today via the right groin and it was found that she had EF 55%, ostial 90% lesion, RCA 70% lesion, and LAD 70%. An area of defect noticed on echo matched with the RCA lesion, so she had GIANNA to pRCA. Her LAD lesion will also need to be fixed, but it was not done today due to her comorbidities. She is being admitted for observation of a R groin HEMATOMA that developed. She had out-pt H/H 12, now fallen to 10. Needs duplex as per cardio.    Cardiologist: Dr Oseguera  Rheum: Dr Sally Franco    ASSESSMENT & PLAN  #CAD s/p PCI  - LHC w/ GIANNA to pRCA, entry via R groin  - c/w asa, plavix, statin, toprol  - pt will need second intervention in the future to fix the LAD lesion  - c/w IVF x10hrs, trend bmp for monitor for any GIDEON  - monitor R groin lesion  - f/u R groin duplex  - f/u 4PM H/H, stable x2 @ 10.4  - d/c hall TOV    #HTN  - c/w toprol  #GERD  - c/w protonix, pepcid  # RA  - gets remicade infusions q8wks, methotrexate weekly, c/w folic acid    #MISC  - DVT PPx: lovenox.  - GI: PPI  - diet: DASH  - activity as tolerated  - from home  - full code    ( ) Discussion with patient and/or family regarding goals of care  (x) Discussed Case and Plan with Medical Attending, Name: Ana      # Planned Disposition: ________ ABHINAV DREW 73y Female  MRN#: [id number]   CODE STATUS:____full____      SUBJECTIVE  Patient is a 73y old Female who presents with a chief complaint of Currently admitted to medicine with the primary diagnosis of Hospital course has been complicated by hematoma.   Today is hospital day 1d, and this morning she is feeling well and reports no overnight events.     Here for duplex and monitoring of "hematoma" in R groin.       OBJECTIVE  PAST MEDICAL & SURGICAL HISTORY  Rheumatoid arthritis  GERD (gastroesophageal reflux disease)  MI (myocardial infarction)  Hyperlipidemia  CAD (coronary artery disease)    ALLERGIES:  Zocor (Joint Pain)    MEDICATIONS:  STANDING MEDICATIONS  aspirin  chewable 81 milliGRAM(s) Oral daily  clopidogrel Tablet 75 milliGRAM(s) Oral daily  enoxaparin Injectable 40 milliGRAM(s) SubCutaneous daily  famotidine    Tablet 20 milliGRAM(s) Oral daily  folic acid 1 milliGRAM(s) Oral daily  metoprolol succinate ER 25 milliGRAM(s) Oral daily  pantoprazole    Tablet 40 milliGRAM(s) Oral before breakfast  simvastatin 40 milliGRAM(s) Oral at bedtime  sodium chloride 0.9%. 1000 milliLiter(s) IV Continuous <Continuous>    PRN MEDICATIONS  acetaminophen   Tablet .. 650 milliGRAM(s) Oral every 6 hours PRN  ALPRAZolam 1 milliGRAM(s) Oral two times a day PRN      VITAL SIGNS: Last 24 Hours  T(C): 36.7 (14 Nov 2018 05:44), Max: 37.5 (13 Nov 2018 20:22)  T(F): 98 (14 Nov 2018 05:44), Max: 99.5 (13 Nov 2018 20:22)  HR: 64 (14 Nov 2018 05:44) (62 - 152)  BP: 113/55 (14 Nov 2018 05:44) (104/62 - 140/58)  BP(mean): --  RR: 18 (14 Nov 2018 05:44) (16 - 19)  SpO2: 100% (14 Nov 2018 07:07) (96% - 100%)    LABS:                        10.5   7.15  )-----------( 203      ( 14 Nov 2018 07:24 )             31.9     11-14    140  |  105  |  10  ----------------------------<  82  4.3   |  23  |  0.7    Ca    8.7      14 Nov 2018 07:24    RADIOLOGY:      PHYSICAL EXAM:    GENERAL: NAD, elderly lady lying comfortably in bed, AAOx3  HEENT:  Atraumatic, Normocephalic. EOMI, PERRLA, conjunctiva and sclera clear, no conjunctival pallor, No JVD  PULMONARY: Clear to auscultation bilaterally; No wheeze or crackles  CARDIOVASCULAR: Regular rate and rhythm; No murmurs; no events on monitor  GASTROINTESTINAL: Soft, Nontender, Nondistended; Bowel sounds present  MUSCULOSKELETAL:  2+ Peripheral Pulses, No peripheral edema; rheumatoid changes in hands noted  NEUROLOGY: non-focal  SKIN: large inner R thigh patch of eccymosis, soft, mildly tender to palpation, incision site of cath clean - no active bleeding or signs of mass/hematoma under it, within borders drawn before admit      ADMISSION SUMMARY  Patient is a 73y old Female who presents with a chief complaint of Currently admitted to medicine with the primary diagnosis of Hospital course has been complicated by hematoma.   PMHx of CAD s/p PCI x1 (16 yrs ago), HTN, GERD, DLD, RA (gets remicade infusion every 8 wks, on methotrexate), presenting after a positive outpatient stress test. Pt's  states that for the past couple of months he noticed that she was getting more and more tired, especially on exertion, which would go away when she would lie down. However, pt denies any over chest pressure/pain, no palpitations, no radiation into the neck or jawline. No associated nausea/vomiting. She has a LHC done today via the right groin and it was found that she had EF 55%, ostial 90% lesion, RCA 70% lesion, and LAD 70%. An area of defect noticed on echo matched with the RCA lesion, so she had GIANNA to pRCA. Her LAD lesion will also need to be fixed, but it was not done today due to her comorbidities. She is being admitted for observation of a R groin HEMATOMA that developed. She had out-pt H/H 12, now fallen to 10. Needs duplex as per cardio.    Cardiologist: Dr Oseguera  Rheum: Dr Sally Franco    ASSESSMENT & PLAN  #CAD s/p PCI  - LHC w/ GIANNA to pRCA, entry via R groin  - c/w asa, plavix, statin, toprol  - pt will need second intervention in the future to fix the LAD lesion  - c/w IVF x10hrs, trend bmp for monitor for any GIDEON  - monitor R groin lesion  - f/u R groin duplex  - f/u 4PM H/H, stable x2 @ 10.4  - d/c DAMIR hall    #HTN  - c/w toprol  #GERD  - c/w protonix, pepcid  # RA  - gets remicade infusions q8wks, methotrexate weekly, c/w folic acid    #MISC  - DVT PPx: lovenox.  - GI: PPI  - diet: DASH  - activity as tolerated  - from home  - full code    (x) Discussion with patient and/or family regarding goals of care  (x) Discussed Case and Plan with Medical Attending, Name: Ana

## 2018-11-14 NOTE — DISCHARGE NOTE ADULT - HOSPITAL COURSE
Patient is a 73y old Female who presents with a chief complaint of Currently admitted to medicine with the primary diagnosis of Hospital course has been complicated by hematoma.   PMHx of CAD s/p PCI x1 (16 yrs ago), HTN, GERD, DLD, RA (gets remicade infusion every 8 wks, on methotrexate), presenting after a positive outpatient stress test. Pt's  states that for the past couple of months he noticed that she was getting more and more tired, especially on exertion, which would go away when she would lie down. However, pt denies any over chest pressure/pain, no palpitations, no radiation into the neck or jawline. No associated nausea/vomiting. She has a LHC done today via the right groin and it was found that she had EF 55%, ostial 90% lesion, RCA 70% lesion, and LAD 70%. An area of defect noticed on echo matched with the RCA lesion, so she had GIANNA to pRCA. Her LAD lesion will also need to be fixed, but it was not done today due to her comorbidities. She is being admitted for observation of a R groin HEMATOMA that developed. She had out-pt H/H 12, now fallen to 10. Needs duplex as per cardio. Vascular duplex done.    Cardiologist: Dr Oseguera  Rheum: Dr Sally Franco

## 2018-11-14 NOTE — DISCHARGE NOTE ADULT - ADDITIONAL INSTRUCTIONS
Please continue to take your medications as prescribed. See Dr. Suárez in 1-2 weeks of discharge or sooner if develop worsening symptoms.

## 2018-11-14 NOTE — DISCHARGE NOTE ADULT - MEDICATION SUMMARY - MEDICATIONS TO TAKE
I will START or STAY ON the medications listed below when I get home from the hospital:    aspirin 81 mg oral tablet, chewable  -- 1 tab(s) by mouth once a day  -- Indication: For CAD (coronary artery disease)    atorvastatin 80 mg oral tablet  -- 1 tab(s) by mouth once a day (at bedtime)   -- Avoid grapefruit and grapefruit juice while taking this medication.  Do not take this drug if you are pregnant.  It is very important that you take or use this exactly as directed.  Do not skip doses or discontinue unless directed by your doctor.  Obtain medical advice before taking any non-prescription drugs as some may affect the action of this medication.  Take with food or milk.    -- Indication: For CAD (coronary artery disease)    methotrexate 2.5 mg oral tablet  -- 6 tab(s) by mouth once a week  -- Indication: For Rheumatoid arthritis    Plavix 75 mg oral tablet  -- 1 tab(s) by mouth once a day  -- Indication: For CAD (coronary artery disease)    Xanax 1 mg oral tablet  -- 1 tab(s) by mouth 3 times a day, As Needed  -- Indication: For Anxiety    Toprol-XL 25 mg oral tablet, extended release  -- 1 tab(s) by mouth once a day  -- Indication: For CAD (coronary artery disease)    famotidine 40 mg oral tablet  -- 1 tab(s) by mouth once a day (at bedtime)  -- Indication: For GERD (gastroesophageal reflux disease)    Remicade 100 mg intravenous injection  -- Indication: For Rheumatoid arthritis    pantoprazole 40 mg oral delayed release tablet  -- 1 tab(s) by mouth once a day  -- Indication: For GERD (gastroesophageal reflux disease)    folic acid 1 mg oral tablet  -- 1 tab(s) by mouth once a day  -- Indication: For Supplement    Vitamin D2 50,000 intl units (1.25 mg) oral capsule  -- 1 cap(s) by mouth once a week  -- Indication: For Supplement

## 2018-11-27 DIAGNOSIS — I25.118 ATHEROSCLEROTIC HEART DISEASE OF NATIVE CORONARY ARTERY WITH OTHER FORMS OF ANGINA PECTORIS: ICD-10-CM

## 2018-11-27 DIAGNOSIS — Z79.899 OTHER LONG TERM (CURRENT) DRUG THERAPY: ICD-10-CM

## 2018-11-27 DIAGNOSIS — F17.210 NICOTINE DEPENDENCE, CIGARETTES, UNCOMPLICATED: ICD-10-CM

## 2018-11-27 DIAGNOSIS — Z79.02 LONG TERM (CURRENT) USE OF ANTITHROMBOTICS/ANTIPLATELETS: ICD-10-CM

## 2018-11-27 DIAGNOSIS — I25.2 OLD MYOCARDIAL INFARCTION: ICD-10-CM
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
## 2018-11-27 DIAGNOSIS — E78.5 HYPERLIPIDEMIA, UNSPECIFIED: ICD-10-CM

## 2018-11-27 DIAGNOSIS — K21.9 GASTRO-ESOPHAGEAL REFLUX DISEASE WITHOUT ESOPHAGITIS: ICD-10-CM

## 2018-11-27 DIAGNOSIS — I10 ESSENTIAL (PRIMARY) HYPERTENSION: ICD-10-CM

## 2018-11-27 DIAGNOSIS — L76.32 POSTPROCEDURAL HEMATOMA OF SKIN AND SUBCUTANEOUS TISSUE FOLLOWING OTHER PROCEDURE: ICD-10-CM

## 2018-11-27 DIAGNOSIS — M06.9 RHEUMATOID ARTHRITIS, UNSPECIFIED: ICD-10-CM

## 2018-12-18 PROBLEM — I25.10 ATHEROSCLEROTIC HEART DISEASE OF NATIVE CORONARY ARTERY WITHOUT ANGINA PECTORIS: Chronic | Status: ACTIVE | Noted: 2018-11-12

## 2018-12-18 PROBLEM — K21.9 GASTRO-ESOPHAGEAL REFLUX DISEASE WITHOUT ESOPHAGITIS: Chronic | Status: ACTIVE | Noted: 2018-11-12

## 2018-12-18 PROBLEM — I21.9 ACUTE MYOCARDIAL INFARCTION, UNSPECIFIED: Chronic | Status: ACTIVE | Noted: 2018-11-12

## 2018-12-18 PROBLEM — E78.5 HYPERLIPIDEMIA, UNSPECIFIED: Chronic | Status: ACTIVE | Noted: 2018-11-12

## 2018-12-18 PROBLEM — M06.9 RHEUMATOID ARTHRITIS, UNSPECIFIED: Chronic | Status: ACTIVE | Noted: 2018-11-13

## 2018-12-18 PROBLEM — Z00.00 ENCOUNTER FOR PREVENTIVE HEALTH EXAMINATION: Status: ACTIVE | Noted: 2018-12-18

## 2018-12-27 ENCOUNTER — APPOINTMENT (OUTPATIENT)
Dept: OBGYN | Facility: CLINIC | Age: 73
End: 2018-12-27
Payer: MEDICARE

## 2018-12-27 PROCEDURE — G0101: CPT

## 2018-12-31 ENCOUNTER — OUTPATIENT (OUTPATIENT)
Dept: OUTPATIENT SERVICES | Facility: HOSPITAL | Age: 73
LOS: 1 days | Discharge: HOME | End: 2018-12-31

## 2018-12-31 DIAGNOSIS — Z12.31 ENCOUNTER FOR SCREENING MAMMOGRAM FOR MALIGNANT NEOPLASM OF BREAST: ICD-10-CM

## 2019-01-01 ENCOUNTER — TRANSCRIPTION ENCOUNTER (OUTPATIENT)
Age: 74
End: 2019-01-01

## 2019-01-01 ENCOUNTER — OUTPATIENT (OUTPATIENT)
Dept: OUTPATIENT SERVICES | Facility: HOSPITAL | Age: 74
LOS: 1 days | Discharge: HOME | End: 2019-01-01

## 2019-01-01 ENCOUNTER — INPATIENT (INPATIENT)
Facility: HOSPITAL | Age: 74
LOS: 0 days | Discharge: HOME | End: 2019-08-01
Attending: INTERNAL MEDICINE | Admitting: INTERNAL MEDICINE
Payer: MEDICARE

## 2019-01-01 ENCOUNTER — EMERGENCY (EMERGENCY)
Facility: HOSPITAL | Age: 74
LOS: 0 days | Discharge: HOME | End: 2019-07-12
Attending: EMERGENCY MEDICINE | Admitting: EMERGENCY MEDICINE
Payer: MEDICARE

## 2019-01-01 ENCOUNTER — INPATIENT (INPATIENT)
Facility: HOSPITAL | Age: 74
LOS: 2 days | Discharge: HOME | End: 2019-05-13
Attending: HOSPITALIST | Admitting: HOSPITALIST
Payer: MEDICARE

## 2019-01-01 ENCOUNTER — INPATIENT (INPATIENT)
Facility: HOSPITAL | Age: 74
LOS: 1 days | Discharge: HOME | End: 2019-06-28
Attending: INTERNAL MEDICINE | Admitting: INTERNAL MEDICINE
Payer: MEDICARE

## 2019-01-01 VITALS
HEART RATE: 80 BPM | SYSTOLIC BLOOD PRESSURE: 123 MMHG | DIASTOLIC BLOOD PRESSURE: 72 MMHG | RESPIRATION RATE: 18 BRPM | TEMPERATURE: 97 F

## 2019-01-01 VITALS
RESPIRATION RATE: 20 BRPM | WEIGHT: 108.91 LBS | SYSTOLIC BLOOD PRESSURE: 141 MMHG | OXYGEN SATURATION: 97 % | HEART RATE: 82 BPM | DIASTOLIC BLOOD PRESSURE: 72 MMHG | TEMPERATURE: 98 F

## 2019-01-01 VITALS
DIASTOLIC BLOOD PRESSURE: 67 MMHG | HEART RATE: 59 BPM | TEMPERATURE: 97 F | RESPIRATION RATE: 18 BRPM | SYSTOLIC BLOOD PRESSURE: 147 MMHG | WEIGHT: 115.08 LBS

## 2019-01-01 VITALS
RESPIRATION RATE: 18 BRPM | WEIGHT: 114.86 LBS | DIASTOLIC BLOOD PRESSURE: 79 MMHG | SYSTOLIC BLOOD PRESSURE: 168 MMHG | HEIGHT: 62 IN | HEART RATE: 88 BPM | TEMPERATURE: 97 F

## 2019-01-01 VITALS
OXYGEN SATURATION: 95 % | SYSTOLIC BLOOD PRESSURE: 136 MMHG | TEMPERATURE: 97 F | RESPIRATION RATE: 18 BRPM | HEART RATE: 73 BPM | DIASTOLIC BLOOD PRESSURE: 65 MMHG

## 2019-01-01 VITALS
RESPIRATION RATE: 20 BRPM | HEART RATE: 74 BPM | DIASTOLIC BLOOD PRESSURE: 65 MMHG | SYSTOLIC BLOOD PRESSURE: 138 MMHG | OXYGEN SATURATION: 96 % | TEMPERATURE: 97 F

## 2019-01-01 VITALS
SYSTOLIC BLOOD PRESSURE: 172 MMHG | RESPIRATION RATE: 16 BRPM | HEART RATE: 97 BPM | DIASTOLIC BLOOD PRESSURE: 92 MMHG | WEIGHT: 194.01 LBS | TEMPERATURE: 96 F

## 2019-01-01 VITALS
HEART RATE: 76 BPM | TEMPERATURE: 98 F | SYSTOLIC BLOOD PRESSURE: 100 MMHG | DIASTOLIC BLOOD PRESSURE: 53 MMHG | RESPIRATION RATE: 18 BRPM

## 2019-01-01 DIAGNOSIS — I25.2 OLD MYOCARDIAL INFARCTION: ICD-10-CM

## 2019-01-01 DIAGNOSIS — E78.5 HYPERLIPIDEMIA, UNSPECIFIED: ICD-10-CM

## 2019-01-01 DIAGNOSIS — I25.10 ATHEROSCLEROTIC HEART DISEASE OF NATIVE CORONARY ARTERY WITHOUT ANGINA PECTORIS: ICD-10-CM

## 2019-01-01 DIAGNOSIS — J44.0 CHRONIC OBSTRUCTIVE PULMONARY DISEASE WITH (ACUTE) LOWER RESPIRATORY INFECTION: ICD-10-CM

## 2019-01-01 DIAGNOSIS — M06.9 RHEUMATOID ARTHRITIS, UNSPECIFIED: ICD-10-CM

## 2019-01-01 DIAGNOSIS — I25.119 ATHEROSCLEROTIC HEART DISEASE OF NATIVE CORONARY ARTERY WITH UNSPECIFIED ANGINA PECTORIS: ICD-10-CM

## 2019-01-01 DIAGNOSIS — R20.2 PARESTHESIA OF SKIN: ICD-10-CM

## 2019-01-01 DIAGNOSIS — R51 HEADACHE: ICD-10-CM

## 2019-01-01 DIAGNOSIS — Z98.890 OTHER SPECIFIED POSTPROCEDURAL STATES: Chronic | ICD-10-CM

## 2019-01-01 DIAGNOSIS — Z88.8 ALLERGY STATUS TO OTHER DRUGS, MEDICAMENTS AND BIOLOGICAL SUBSTANCES STATUS: ICD-10-CM

## 2019-01-01 DIAGNOSIS — K21.9 GASTRO-ESOPHAGEAL REFLUX DISEASE WITHOUT ESOPHAGITIS: ICD-10-CM

## 2019-01-01 DIAGNOSIS — I25.118 ATHEROSCLEROTIC HEART DISEASE OF NATIVE CORONARY ARTERY WITH OTHER FORMS OF ANGINA PECTORIS: ICD-10-CM

## 2019-01-01 DIAGNOSIS — R94.31 ABNORMAL ELECTROCARDIOGRAM [ECG] [EKG]: ICD-10-CM

## 2019-01-01 DIAGNOSIS — I20.8 OTHER FORMS OF ANGINA PECTORIS: ICD-10-CM

## 2019-01-01 DIAGNOSIS — J43.9 EMPHYSEMA, UNSPECIFIED: ICD-10-CM

## 2019-01-01 DIAGNOSIS — E53.8 DEFICIENCY OF OTHER SPECIFIED B GROUP VITAMINS: ICD-10-CM

## 2019-01-01 DIAGNOSIS — I25.110 ATHEROSCLEROTIC HEART DISEASE OF NATIVE CORONARY ARTERY WITH UNSTABLE ANGINA PECTORIS: ICD-10-CM

## 2019-01-01 DIAGNOSIS — R05 COUGH: ICD-10-CM

## 2019-01-01 DIAGNOSIS — Z87.891 PERSONAL HISTORY OF NICOTINE DEPENDENCE: ICD-10-CM

## 2019-01-01 DIAGNOSIS — Z79.82 LONG TERM (CURRENT) USE OF ASPIRIN: ICD-10-CM

## 2019-01-01 DIAGNOSIS — Z95.5 PRESENCE OF CORONARY ANGIOPLASTY IMPLANT AND GRAFT: ICD-10-CM
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
## 2019-01-01 DIAGNOSIS — T82.855A STENOSIS OF CORONARY ARTERY STENT, INITIAL ENCOUNTER: ICD-10-CM

## 2019-01-01 DIAGNOSIS — I65.29 OCCLUSION AND STENOSIS OF UNSPECIFIED CAROTID ARTERY: ICD-10-CM

## 2019-01-01 DIAGNOSIS — Z98.61 CORONARY ANGIOPLASTY STATUS: ICD-10-CM

## 2019-01-01 DIAGNOSIS — F17.210 NICOTINE DEPENDENCE, CIGARETTES, UNCOMPLICATED: ICD-10-CM

## 2019-01-01 DIAGNOSIS — Z88.8 ALLERGY STATUS TO OTHER DRUGS, MEDICAMENTS AND BIOLOGICAL SUBSTANCES: ICD-10-CM
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
## 2019-01-01 DIAGNOSIS — J15.6 PNEUMONIA DUE TO OTHER GRAM-NEGATIVE BACTERIA: ICD-10-CM

## 2019-01-01 DIAGNOSIS — Z82.49 FAMILY HISTORY OF ISCHEMIC HEART DISEASE AND OTHER DISEASES OF THE CIRCULATORY SYSTEM: ICD-10-CM

## 2019-01-01 DIAGNOSIS — J44.1 CHRONIC OBSTRUCTIVE PULMONARY DISEASE WITH (ACUTE) EXACERBATION: ICD-10-CM

## 2019-01-01 DIAGNOSIS — E78.00 PURE HYPERCHOLESTEROLEMIA, UNSPECIFIED: ICD-10-CM

## 2019-01-01 DIAGNOSIS — Z88.1 ALLERGY STATUS TO OTHER ANTIBIOTIC AGENTS STATUS: ICD-10-CM

## 2019-01-01 DIAGNOSIS — I10 ESSENTIAL (PRIMARY) HYPERTENSION: ICD-10-CM

## 2019-01-01 DIAGNOSIS — R07.9 CHEST PAIN, UNSPECIFIED: ICD-10-CM

## 2019-01-01 DIAGNOSIS — J44.9 CHRONIC OBSTRUCTIVE PULMONARY DISEASE, UNSPECIFIED: ICD-10-CM

## 2019-01-01 LAB
A1AT SERPL-MCNC: 146 MG/DL — SIGNIFICANT CHANGE UP (ref 90–200)
ALBUMIN SERPL ELPH-MCNC: 3.6 G/DL — SIGNIFICANT CHANGE UP (ref 3.5–5.2)
ALBUMIN SERPL ELPH-MCNC: 3.6 G/DL — SIGNIFICANT CHANGE UP (ref 3.5–5.2)
ALBUMIN SERPL ELPH-MCNC: 3.7 G/DL — SIGNIFICANT CHANGE UP (ref 3.5–5.2)
ALBUMIN SERPL ELPH-MCNC: 4.1 G/DL — SIGNIFICANT CHANGE UP (ref 3.5–5.2)
ALBUMIN SERPL ELPH-MCNC: 4.1 G/DL — SIGNIFICANT CHANGE UP (ref 3.5–5.2)
ALP SERPL-CCNC: 85 U/L — SIGNIFICANT CHANGE UP (ref 30–115)
ALP SERPL-CCNC: 87 U/L — SIGNIFICANT CHANGE UP (ref 30–115)
ALP SERPL-CCNC: 91 U/L — SIGNIFICANT CHANGE UP (ref 30–115)
ALP SERPL-CCNC: 93 U/L — SIGNIFICANT CHANGE UP (ref 30–115)
ALP SERPL-CCNC: 97 U/L — SIGNIFICANT CHANGE UP (ref 30–115)
ALT FLD-CCNC: 7 U/L — SIGNIFICANT CHANGE UP (ref 0–41)
ALT FLD-CCNC: 7 U/L — SIGNIFICANT CHANGE UP (ref 0–41)
ALT FLD-CCNC: 8 U/L — SIGNIFICANT CHANGE UP (ref 0–41)
ALT FLD-CCNC: 9 U/L — SIGNIFICANT CHANGE UP (ref 0–41)
ALT FLD-CCNC: 9 U/L — SIGNIFICANT CHANGE UP (ref 0–41)
ANION GAP SERPL CALC-SCNC: 10 MMOL/L — SIGNIFICANT CHANGE UP (ref 7–14)
ANION GAP SERPL CALC-SCNC: 11 MMOL/L — SIGNIFICANT CHANGE UP (ref 7–14)
ANION GAP SERPL CALC-SCNC: 11 MMOL/L — SIGNIFICANT CHANGE UP (ref 7–14)
ANION GAP SERPL CALC-SCNC: 12 MMOL/L — SIGNIFICANT CHANGE UP (ref 7–14)
ANION GAP SERPL CALC-SCNC: 13 MMOL/L — SIGNIFICANT CHANGE UP (ref 7–14)
ANION GAP SERPL CALC-SCNC: 13 MMOL/L — SIGNIFICANT CHANGE UP (ref 7–14)
ANION GAP SERPL CALC-SCNC: 14 MMOL/L — SIGNIFICANT CHANGE UP (ref 7–14)
ANION GAP SERPL CALC-SCNC: 14 MMOL/L — SIGNIFICANT CHANGE UP (ref 7–14)
ANION GAP SERPL CALC-SCNC: 15 MMOL/L — HIGH (ref 7–14)
APPEARANCE UR: ABNORMAL
AST SERPL-CCNC: 15 U/L — SIGNIFICANT CHANGE UP (ref 0–41)
AST SERPL-CCNC: 16 U/L — SIGNIFICANT CHANGE UP (ref 0–41)
AST SERPL-CCNC: 17 U/L — SIGNIFICANT CHANGE UP (ref 0–41)
AST SERPL-CCNC: 20 U/L — SIGNIFICANT CHANGE UP (ref 0–41)
AST SERPL-CCNC: 21 U/L — SIGNIFICANT CHANGE UP (ref 0–41)
BASE EXCESS BLDV CALC-SCNC: 2.2 MMOL/L — HIGH (ref -2–2)
BASOPHILS # BLD AUTO: 0.03 K/UL — SIGNIFICANT CHANGE UP (ref 0–0.2)
BASOPHILS # BLD AUTO: 0.04 K/UL — SIGNIFICANT CHANGE UP (ref 0–0.2)
BASOPHILS # BLD AUTO: 0.04 K/UL — SIGNIFICANT CHANGE UP (ref 0–0.2)
BASOPHILS # BLD AUTO: 0.05 K/UL — SIGNIFICANT CHANGE UP (ref 0–0.2)
BASOPHILS # BLD AUTO: 0.05 K/UL — SIGNIFICANT CHANGE UP (ref 0–0.2)
BASOPHILS NFR BLD AUTO: 0.3 % — SIGNIFICANT CHANGE UP (ref 0–1)
BASOPHILS NFR BLD AUTO: 0.4 % — SIGNIFICANT CHANGE UP (ref 0–1)
BASOPHILS NFR BLD AUTO: 0.5 % — SIGNIFICANT CHANGE UP (ref 0–1)
BASOPHILS NFR BLD AUTO: 0.5 % — SIGNIFICANT CHANGE UP (ref 0–1)
BASOPHILS NFR BLD AUTO: 0.6 % — SIGNIFICANT CHANGE UP (ref 0–1)
BILIRUB SERPL-MCNC: 0.3 MG/DL — SIGNIFICANT CHANGE UP (ref 0.2–1.2)
BILIRUB SERPL-MCNC: 0.4 MG/DL — SIGNIFICANT CHANGE UP (ref 0.2–1.2)
BILIRUB SERPL-MCNC: 0.4 MG/DL — SIGNIFICANT CHANGE UP (ref 0.2–1.2)
BILIRUB UR-MCNC: NEGATIVE — SIGNIFICANT CHANGE UP
BUN SERPL-MCNC: 10 MG/DL — SIGNIFICANT CHANGE UP (ref 10–20)
BUN SERPL-MCNC: 11 MG/DL — SIGNIFICANT CHANGE UP (ref 10–20)
BUN SERPL-MCNC: 11 MG/DL — SIGNIFICANT CHANGE UP (ref 10–20)
BUN SERPL-MCNC: 12 MG/DL — SIGNIFICANT CHANGE UP (ref 10–20)
BUN SERPL-MCNC: 14 MG/DL — SIGNIFICANT CHANGE UP (ref 10–20)
BUN SERPL-MCNC: 8 MG/DL — LOW (ref 10–20)
BUN SERPL-MCNC: 9 MG/DL — LOW (ref 10–20)
CA-I SERPL-SCNC: 1.22 MMOL/L — SIGNIFICANT CHANGE UP (ref 1.12–1.3)
CALCIUM SERPL-MCNC: 8.5 MG/DL — SIGNIFICANT CHANGE UP (ref 8.5–10.1)
CALCIUM SERPL-MCNC: 8.6 MG/DL — SIGNIFICANT CHANGE UP (ref 8.5–10.1)
CALCIUM SERPL-MCNC: 8.7 MG/DL — SIGNIFICANT CHANGE UP (ref 8.5–10.1)
CALCIUM SERPL-MCNC: 8.8 MG/DL — SIGNIFICANT CHANGE UP (ref 8.5–10.1)
CALCIUM SERPL-MCNC: 8.9 MG/DL — SIGNIFICANT CHANGE UP (ref 8.5–10.1)
CALCIUM SERPL-MCNC: 9 MG/DL — SIGNIFICANT CHANGE UP (ref 8.5–10.1)
CALCIUM SERPL-MCNC: 9.2 MG/DL — SIGNIFICANT CHANGE UP (ref 8.5–10.1)
CALCIUM SERPL-MCNC: 9.4 MG/DL — SIGNIFICANT CHANGE UP (ref 8.5–10.1)
CALCIUM SERPL-MCNC: 9.5 MG/DL — SIGNIFICANT CHANGE UP (ref 8.5–10.1)
CHLORIDE SERPL-SCNC: 100 MMOL/L — SIGNIFICANT CHANGE UP (ref 98–110)
CHLORIDE SERPL-SCNC: 101 MMOL/L — SIGNIFICANT CHANGE UP (ref 98–110)
CHLORIDE SERPL-SCNC: 103 MMOL/L — SIGNIFICANT CHANGE UP (ref 98–110)
CHLORIDE SERPL-SCNC: 103 MMOL/L — SIGNIFICANT CHANGE UP (ref 98–110)
CHLORIDE SERPL-SCNC: 104 MMOL/L — SIGNIFICANT CHANGE UP (ref 98–110)
CHLORIDE SERPL-SCNC: 104 MMOL/L — SIGNIFICANT CHANGE UP (ref 98–110)
CHLORIDE SERPL-SCNC: 105 MMOL/L — SIGNIFICANT CHANGE UP (ref 98–110)
CHLORIDE SERPL-SCNC: 105 MMOL/L — SIGNIFICANT CHANGE UP (ref 98–110)
CHLORIDE SERPL-SCNC: 108 MMOL/L — SIGNIFICANT CHANGE UP (ref 98–110)
CK MB CFR SERPL CALC: 1.8 NG/ML — SIGNIFICANT CHANGE UP (ref 0.6–6.3)
CK SERPL-CCNC: 65 U/L — SIGNIFICANT CHANGE UP (ref 0–225)
CO2 SERPL-SCNC: 21 MMOL/L — SIGNIFICANT CHANGE UP (ref 17–32)
CO2 SERPL-SCNC: 24 MMOL/L — SIGNIFICANT CHANGE UP (ref 17–32)
CO2 SERPL-SCNC: 25 MMOL/L — SIGNIFICANT CHANGE UP (ref 17–32)
CO2 SERPL-SCNC: 26 MMOL/L — SIGNIFICANT CHANGE UP (ref 17–32)
CO2 SERPL-SCNC: 26 MMOL/L — SIGNIFICANT CHANGE UP (ref 17–32)
COLOR SPEC: YELLOW — SIGNIFICANT CHANGE UP
CREAT SERPL-MCNC: 0.6 MG/DL — LOW (ref 0.7–1.5)
CREAT SERPL-MCNC: 0.7 MG/DL — SIGNIFICANT CHANGE UP (ref 0.7–1.5)
CREAT SERPL-MCNC: 0.8 MG/DL — SIGNIFICANT CHANGE UP (ref 0.7–1.5)
CREAT SERPL-MCNC: 0.8 MG/DL — SIGNIFICANT CHANGE UP (ref 0.7–1.5)
DIFF PNL FLD: NEGATIVE — SIGNIFICANT CHANGE UP
EOSINOPHIL # BLD AUTO: 0.05 K/UL — SIGNIFICANT CHANGE UP (ref 0–0.7)
EOSINOPHIL # BLD AUTO: 0.23 K/UL — SIGNIFICANT CHANGE UP (ref 0–0.7)
EOSINOPHIL # BLD AUTO: 0.39 K/UL — SIGNIFICANT CHANGE UP (ref 0–0.7)
EOSINOPHIL # BLD AUTO: 0.4 K/UL — SIGNIFICANT CHANGE UP (ref 0–0.7)
EOSINOPHIL # BLD AUTO: 0.4 K/UL — SIGNIFICANT CHANGE UP (ref 0–0.7)
EOSINOPHIL NFR BLD AUTO: 0.5 % — SIGNIFICANT CHANGE UP (ref 0–8)
EOSINOPHIL NFR BLD AUTO: 2.4 % — SIGNIFICANT CHANGE UP (ref 0–8)
EOSINOPHIL NFR BLD AUTO: 4.1 % — SIGNIFICANT CHANGE UP (ref 0–8)
EOSINOPHIL NFR BLD AUTO: 4.9 % — SIGNIFICANT CHANGE UP (ref 0–8)
EOSINOPHIL NFR BLD AUTO: 5 % — SIGNIFICANT CHANGE UP (ref 0–8)
FLU A RESULT: NEGATIVE — SIGNIFICANT CHANGE UP
FLU A RESULT: NEGATIVE — SIGNIFICANT CHANGE UP
FLUAV AG NPH QL: NEGATIVE — SIGNIFICANT CHANGE UP
FLUBV AG NPH QL: NEGATIVE — SIGNIFICANT CHANGE UP
GAS PNL BLDV: 139 MMOL/L — SIGNIFICANT CHANGE UP (ref 136–145)
GAS PNL BLDV: SIGNIFICANT CHANGE UP
GLUCOSE SERPL-MCNC: 110 MG/DL — HIGH (ref 70–99)
GLUCOSE SERPL-MCNC: 79 MG/DL — SIGNIFICANT CHANGE UP (ref 70–99)
GLUCOSE SERPL-MCNC: 84 MG/DL — SIGNIFICANT CHANGE UP (ref 70–99)
GLUCOSE SERPL-MCNC: 84 MG/DL — SIGNIFICANT CHANGE UP (ref 70–99)
GLUCOSE SERPL-MCNC: 88 MG/DL — SIGNIFICANT CHANGE UP (ref 70–99)
GLUCOSE SERPL-MCNC: 88 MG/DL — SIGNIFICANT CHANGE UP (ref 70–99)
GLUCOSE SERPL-MCNC: 89 MG/DL — SIGNIFICANT CHANGE UP (ref 70–99)
GLUCOSE SERPL-MCNC: 94 MG/DL — SIGNIFICANT CHANGE UP (ref 70–99)
GLUCOSE SERPL-MCNC: 98 MG/DL — SIGNIFICANT CHANGE UP (ref 70–99)
GLUCOSE UR QL: NEGATIVE MG/DL — SIGNIFICANT CHANGE UP
HCO3 BLDV-SCNC: 26 MMOL/L — SIGNIFICANT CHANGE UP (ref 22–29)
HCT VFR BLD CALC: 34.2 % — LOW (ref 37–47)
HCT VFR BLD CALC: 34.3 % — LOW (ref 37–47)
HCT VFR BLD CALC: 34.6 % — LOW (ref 37–47)
HCT VFR BLD CALC: 35.1 % — LOW (ref 37–47)
HCT VFR BLD CALC: 35.5 % — LOW (ref 37–47)
HCT VFR BLD CALC: 35.5 % — LOW (ref 37–47)
HCT VFR BLD CALC: 36.3 % — LOW (ref 37–47)
HCT VFR BLD CALC: 36.9 % — LOW (ref 37–47)
HCT VFR BLD CALC: 37.2 % — SIGNIFICANT CHANGE UP (ref 37–47)
HCT VFR BLD CALC: 37.2 % — SIGNIFICANT CHANGE UP (ref 37–47)
HCT VFR BLDA CALC: 37.9 % — SIGNIFICANT CHANGE UP (ref 34–44)
HCV AB S/CO SERPL IA: 0.11 S/CO — SIGNIFICANT CHANGE UP (ref 0–0.99)
HCV AB SERPL-IMP: SIGNIFICANT CHANGE UP
HGB BLD CALC-MCNC: 12.4 G/DL — LOW (ref 14–18)
HGB BLD-MCNC: 11.2 G/DL — LOW (ref 12–16)
HGB BLD-MCNC: 11.5 G/DL — LOW (ref 12–16)
HGB BLD-MCNC: 11.6 G/DL — LOW (ref 12–16)
HGB BLD-MCNC: 11.6 G/DL — LOW (ref 12–16)
HGB BLD-MCNC: 11.7 G/DL — LOW (ref 12–16)
HGB BLD-MCNC: 11.9 G/DL — LOW (ref 12–16)
HGB BLD-MCNC: 12.1 G/DL — SIGNIFICANT CHANGE UP (ref 12–16)
HGB BLD-MCNC: 12.2 G/DL — SIGNIFICANT CHANGE UP (ref 12–16)
HGB BLD-MCNC: 12.2 G/DL — SIGNIFICANT CHANGE UP (ref 12–16)
HGB BLD-MCNC: 12.3 G/DL — SIGNIFICANT CHANGE UP (ref 12–16)
IMM GRANULOCYTES NFR BLD AUTO: 0.4 % — HIGH (ref 0.1–0.3)
IMM GRANULOCYTES NFR BLD AUTO: 0.4 % — HIGH (ref 0.1–0.3)
IMM GRANULOCYTES NFR BLD AUTO: 0.5 % — HIGH (ref 0.1–0.3)
INR BLD: 1.04 RATIO — SIGNIFICANT CHANGE UP (ref 0.65–1.3)
KETONES UR-MCNC: NEGATIVE — SIGNIFICANT CHANGE UP
LACTATE BLDV-MCNC: 0.5 MMOL/L — SIGNIFICANT CHANGE UP (ref 0.5–1.6)
LACTATE SERPL-SCNC: 1.1 MMOL/L — SIGNIFICANT CHANGE UP (ref 0.5–2.2)
LACTATE SERPL-SCNC: 1.4 MMOL/L — SIGNIFICANT CHANGE UP (ref 0.5–2.2)
LEUKOCYTE ESTERASE UR-ACNC: NEGATIVE — SIGNIFICANT CHANGE UP
LYMPHOCYTES # BLD AUTO: 0.61 K/UL — LOW (ref 1.2–3.4)
LYMPHOCYTES # BLD AUTO: 1.03 K/UL — LOW (ref 1.2–3.4)
LYMPHOCYTES # BLD AUTO: 1.07 K/UL — LOW (ref 1.2–3.4)
LYMPHOCYTES # BLD AUTO: 1.26 K/UL — SIGNIFICANT CHANGE UP (ref 1.2–3.4)
LYMPHOCYTES # BLD AUTO: 1.68 K/UL — SIGNIFICANT CHANGE UP (ref 1.2–3.4)
LYMPHOCYTES # BLD AUTO: 11.1 % — LOW (ref 20.5–51.1)
LYMPHOCYTES # BLD AUTO: 12.8 % — LOW (ref 20.5–51.1)
LYMPHOCYTES # BLD AUTO: 13.2 % — LOW (ref 20.5–51.1)
LYMPHOCYTES # BLD AUTO: 21 % — SIGNIFICANT CHANGE UP (ref 20.5–51.1)
LYMPHOCYTES # BLD AUTO: 6.2 % — LOW (ref 20.5–51.1)
MAGNESIUM SERPL-MCNC: 2 MG/DL — SIGNIFICANT CHANGE UP (ref 1.8–2.4)
MAGNESIUM SERPL-MCNC: 2 MG/DL — SIGNIFICANT CHANGE UP (ref 1.8–2.4)
MAGNESIUM SERPL-MCNC: 2.1 MG/DL — SIGNIFICANT CHANGE UP (ref 1.8–2.4)
MAGNESIUM SERPL-MCNC: 2.2 MG/DL — SIGNIFICANT CHANGE UP (ref 1.8–2.4)
MCHC RBC-ENTMCNC: 31.1 PG — HIGH (ref 27–31)
MCHC RBC-ENTMCNC: 31.4 PG — HIGH (ref 27–31)
MCHC RBC-ENTMCNC: 31.4 PG — HIGH (ref 27–31)
MCHC RBC-ENTMCNC: 31.6 PG — HIGH (ref 27–31)
MCHC RBC-ENTMCNC: 31.6 PG — HIGH (ref 27–31)
MCHC RBC-ENTMCNC: 31.7 PG — HIGH (ref 27–31)
MCHC RBC-ENTMCNC: 31.7 PG — HIGH (ref 27–31)
MCHC RBC-ENTMCNC: 31.8 PG — HIGH (ref 27–31)
MCHC RBC-ENTMCNC: 31.9 PG — HIGH (ref 27–31)
MCHC RBC-ENTMCNC: 32 PG — HIGH (ref 27–31)
MCHC RBC-ENTMCNC: 32.7 G/DL — SIGNIFICANT CHANGE UP (ref 32–37)
MCHC RBC-ENTMCNC: 32.8 G/DL — SIGNIFICANT CHANGE UP (ref 32–37)
MCHC RBC-ENTMCNC: 33 G/DL — SIGNIFICANT CHANGE UP (ref 32–37)
MCHC RBC-ENTMCNC: 33 G/DL — SIGNIFICANT CHANGE UP (ref 32–37)
MCHC RBC-ENTMCNC: 33.1 G/DL — SIGNIFICANT CHANGE UP (ref 32–37)
MCHC RBC-ENTMCNC: 33.1 G/DL — SIGNIFICANT CHANGE UP (ref 32–37)
MCHC RBC-ENTMCNC: 33.3 G/DL — SIGNIFICANT CHANGE UP (ref 32–37)
MCHC RBC-ENTMCNC: 33.5 G/DL — SIGNIFICANT CHANGE UP (ref 32–37)
MCV RBC AUTO: 94.2 FL — SIGNIFICANT CHANGE UP (ref 81–99)
MCV RBC AUTO: 94.5 FL — SIGNIFICANT CHANGE UP (ref 81–99)
MCV RBC AUTO: 94.8 FL — SIGNIFICANT CHANGE UP (ref 81–99)
MCV RBC AUTO: 95 FL — SIGNIFICANT CHANGE UP (ref 81–99)
MCV RBC AUTO: 95.1 FL — SIGNIFICANT CHANGE UP (ref 81–99)
MCV RBC AUTO: 95.6 FL — SIGNIFICANT CHANGE UP (ref 81–99)
MCV RBC AUTO: 95.6 FL — SIGNIFICANT CHANGE UP (ref 81–99)
MCV RBC AUTO: 95.9 FL — SIGNIFICANT CHANGE UP (ref 81–99)
MCV RBC AUTO: 96 FL — SIGNIFICANT CHANGE UP (ref 81–99)
MCV RBC AUTO: 96.7 FL — SIGNIFICANT CHANGE UP (ref 81–99)
MONOCYTES # BLD AUTO: 0.35 K/UL — SIGNIFICANT CHANGE UP (ref 0.1–0.6)
MONOCYTES # BLD AUTO: 0.65 K/UL — HIGH (ref 0.1–0.6)
MONOCYTES # BLD AUTO: 0.84 K/UL — HIGH (ref 0.1–0.6)
MONOCYTES # BLD AUTO: 0.89 K/UL — HIGH (ref 0.1–0.6)
MONOCYTES # BLD AUTO: 1 K/UL — HIGH (ref 0.1–0.6)
MONOCYTES NFR BLD AUTO: 12.5 % — HIGH (ref 1.7–9.3)
MONOCYTES NFR BLD AUTO: 3.6 % — SIGNIFICANT CHANGE UP (ref 1.7–9.3)
MONOCYTES NFR BLD AUTO: 8.1 % — SIGNIFICANT CHANGE UP (ref 1.7–9.3)
MONOCYTES NFR BLD AUTO: 8.8 % — SIGNIFICANT CHANGE UP (ref 1.7–9.3)
MONOCYTES NFR BLD AUTO: 9.2 % — SIGNIFICANT CHANGE UP (ref 1.7–9.3)
MRSA PCR RESULT.: NEGATIVE — SIGNIFICANT CHANGE UP
NEUTROPHILS # BLD AUTO: 4.85 K/UL — SIGNIFICANT CHANGE UP (ref 1.4–6.5)
NEUTROPHILS # BLD AUTO: 5.91 K/UL — SIGNIFICANT CHANGE UP (ref 1.4–6.5)
NEUTROPHILS # BLD AUTO: 7.16 K/UL — HIGH (ref 1.4–6.5)
NEUTROPHILS # BLD AUTO: 7.22 K/UL — HIGH (ref 1.4–6.5)
NEUTROPHILS # BLD AUTO: 8.74 K/UL — HIGH (ref 1.4–6.5)
NEUTROPHILS NFR BLD AUTO: 60.5 % — SIGNIFICANT CHANGE UP (ref 42.2–75.2)
NEUTROPHILS NFR BLD AUTO: 73.2 % — SIGNIFICANT CHANGE UP (ref 42.2–75.2)
NEUTROPHILS NFR BLD AUTO: 74.6 % — SIGNIFICANT CHANGE UP (ref 42.2–75.2)
NEUTROPHILS NFR BLD AUTO: 74.8 % — SIGNIFICANT CHANGE UP (ref 42.2–75.2)
NEUTROPHILS NFR BLD AUTO: 88.9 % — HIGH (ref 42.2–75.2)
NITRITE UR-MCNC: NEGATIVE — SIGNIFICANT CHANGE UP
NRBC # BLD: 0 /100 WBCS — SIGNIFICANT CHANGE UP (ref 0–0)
NT-PROBNP SERPL-SCNC: 327 PG/ML — HIGH (ref 0–300)
PCO2 BLDV: 39 MMHG — LOW (ref 41–51)
PH BLDV: 7.44 — HIGH (ref 7.26–7.43)
PH UR: 7 — SIGNIFICANT CHANGE UP (ref 5–8)
PLATELET # BLD AUTO: 223 K/UL — SIGNIFICANT CHANGE UP (ref 130–400)
PLATELET # BLD AUTO: 226 K/UL — SIGNIFICANT CHANGE UP (ref 130–400)
PLATELET # BLD AUTO: 234 K/UL — SIGNIFICANT CHANGE UP (ref 130–400)
PLATELET # BLD AUTO: 235 K/UL — SIGNIFICANT CHANGE UP (ref 130–400)
PLATELET # BLD AUTO: 237 K/UL — SIGNIFICANT CHANGE UP (ref 130–400)
PLATELET # BLD AUTO: 240 K/UL — SIGNIFICANT CHANGE UP (ref 130–400)
PLATELET # BLD AUTO: 249 K/UL — SIGNIFICANT CHANGE UP (ref 130–400)
PLATELET # BLD AUTO: 252 K/UL — SIGNIFICANT CHANGE UP (ref 130–400)
PLATELET # BLD AUTO: 254 K/UL — SIGNIFICANT CHANGE UP (ref 130–400)
PLATELET # BLD AUTO: 262 K/UL — SIGNIFICANT CHANGE UP (ref 130–400)
PO2 BLDV: 54 MMHG — HIGH (ref 20–40)
POTASSIUM BLDV-SCNC: 3.9 MMOL/L — SIGNIFICANT CHANGE UP (ref 3.3–5.6)
POTASSIUM SERPL-MCNC: 3.7 MMOL/L — SIGNIFICANT CHANGE UP (ref 3.5–5)
POTASSIUM SERPL-MCNC: 3.7 MMOL/L — SIGNIFICANT CHANGE UP (ref 3.5–5)
POTASSIUM SERPL-MCNC: 3.8 MMOL/L — SIGNIFICANT CHANGE UP (ref 3.5–5)
POTASSIUM SERPL-MCNC: 3.9 MMOL/L — SIGNIFICANT CHANGE UP (ref 3.5–5)
POTASSIUM SERPL-MCNC: 4 MMOL/L — SIGNIFICANT CHANGE UP (ref 3.5–5)
POTASSIUM SERPL-MCNC: 4.1 MMOL/L — SIGNIFICANT CHANGE UP (ref 3.5–5)
POTASSIUM SERPL-MCNC: 4.3 MMOL/L — SIGNIFICANT CHANGE UP (ref 3.5–5)
POTASSIUM SERPL-MCNC: 4.7 MMOL/L — SIGNIFICANT CHANGE UP (ref 3.5–5)
POTASSIUM SERPL-MCNC: 4.7 MMOL/L — SIGNIFICANT CHANGE UP (ref 3.5–5)
POTASSIUM SERPL-SCNC: 3.7 MMOL/L — SIGNIFICANT CHANGE UP (ref 3.5–5)
POTASSIUM SERPL-SCNC: 3.7 MMOL/L — SIGNIFICANT CHANGE UP (ref 3.5–5)
POTASSIUM SERPL-SCNC: 3.8 MMOL/L — SIGNIFICANT CHANGE UP (ref 3.5–5)
POTASSIUM SERPL-SCNC: 3.9 MMOL/L — SIGNIFICANT CHANGE UP (ref 3.5–5)
POTASSIUM SERPL-SCNC: 4 MMOL/L — SIGNIFICANT CHANGE UP (ref 3.5–5)
POTASSIUM SERPL-SCNC: 4.1 MMOL/L — SIGNIFICANT CHANGE UP (ref 3.5–5)
POTASSIUM SERPL-SCNC: 4.3 MMOL/L — SIGNIFICANT CHANGE UP (ref 3.5–5)
POTASSIUM SERPL-SCNC: 4.7 MMOL/L — SIGNIFICANT CHANGE UP (ref 3.5–5)
POTASSIUM SERPL-SCNC: 4.7 MMOL/L — SIGNIFICANT CHANGE UP (ref 3.5–5)
PROT SERPL-MCNC: 6.2 G/DL — SIGNIFICANT CHANGE UP (ref 6–8)
PROT SERPL-MCNC: 6.2 G/DL — SIGNIFICANT CHANGE UP (ref 6–8)
PROT SERPL-MCNC: 6.7 G/DL — SIGNIFICANT CHANGE UP (ref 6–8)
PROT SERPL-MCNC: 7 G/DL — SIGNIFICANT CHANGE UP (ref 6–8)
PROT SERPL-MCNC: 7.2 G/DL — SIGNIFICANT CHANGE UP (ref 6–8)
PROT UR-MCNC: NEGATIVE MG/DL — SIGNIFICANT CHANGE UP
PROTHROM AB SERPL-ACNC: 11.9 SEC — SIGNIFICANT CHANGE UP (ref 9.95–12.87)
RAPID RVP RESULT: SIGNIFICANT CHANGE UP
RBC # BLD: 3.6 M/UL — LOW (ref 4.2–5.4)
RBC # BLD: 3.62 M/UL — LOW (ref 4.2–5.4)
RBC # BLD: 3.66 M/UL — LOW (ref 4.2–5.4)
RBC # BLD: 3.67 M/UL — LOW (ref 4.2–5.4)
RBC # BLD: 3.67 M/UL — LOW (ref 4.2–5.4)
RBC # BLD: 3.77 M/UL — LOW (ref 4.2–5.4)
RBC # BLD: 3.78 M/UL — LOW (ref 4.2–5.4)
RBC # BLD: 3.88 M/UL — LOW (ref 4.2–5.4)
RBC # BLD: 3.88 M/UL — LOW (ref 4.2–5.4)
RBC # BLD: 3.89 M/UL — LOW (ref 4.2–5.4)
RBC # FLD: 13.2 % — SIGNIFICANT CHANGE UP (ref 11.5–14.5)
RBC # FLD: 13.3 % — SIGNIFICANT CHANGE UP (ref 11.5–14.5)
RBC # FLD: 13.3 % — SIGNIFICANT CHANGE UP (ref 11.5–14.5)
RBC # FLD: 13.4 % — SIGNIFICANT CHANGE UP (ref 11.5–14.5)
RBC # FLD: 13.8 % — SIGNIFICANT CHANGE UP (ref 11.5–14.5)
RBC # FLD: 13.8 % — SIGNIFICANT CHANGE UP (ref 11.5–14.5)
RBC # FLD: 13.9 % — SIGNIFICANT CHANGE UP (ref 11.5–14.5)
RBC # FLD: 14.2 % — SIGNIFICANT CHANGE UP (ref 11.5–14.5)
RBC # FLD: 14.2 % — SIGNIFICANT CHANGE UP (ref 11.5–14.5)
RBC # FLD: 14.4 % — SIGNIFICANT CHANGE UP (ref 11.5–14.5)
RSV RESULT: NEGATIVE — SIGNIFICANT CHANGE UP
RSV RNA RESP QL NAA+PROBE: NEGATIVE — SIGNIFICANT CHANGE UP
SAO2 % BLDV: 88 % — SIGNIFICANT CHANGE UP
SODIUM SERPL-SCNC: 139 MMOL/L — SIGNIFICANT CHANGE UP (ref 135–146)
SODIUM SERPL-SCNC: 140 MMOL/L — SIGNIFICANT CHANGE UP (ref 135–146)
SODIUM SERPL-SCNC: 142 MMOL/L — SIGNIFICANT CHANGE UP (ref 135–146)
SODIUM SERPL-SCNC: 142 MMOL/L — SIGNIFICANT CHANGE UP (ref 135–146)
SODIUM SERPL-SCNC: 143 MMOL/L — SIGNIFICANT CHANGE UP (ref 135–146)
SODIUM SERPL-SCNC: 143 MMOL/L — SIGNIFICANT CHANGE UP (ref 135–146)
SP GR SPEC: <=1.005 — SIGNIFICANT CHANGE UP (ref 1.01–1.03)
TROPONIN T SERPL-MCNC: <0.01 NG/ML — SIGNIFICANT CHANGE UP
UROBILINOGEN FLD QL: 0.2 MG/DL — SIGNIFICANT CHANGE UP (ref 0.2–0.2)
WBC # BLD: 10.93 K/UL — HIGH (ref 4.8–10.8)
WBC # BLD: 13.6 K/UL — HIGH (ref 4.8–10.8)
WBC # BLD: 8.01 K/UL — SIGNIFICANT CHANGE UP (ref 4.8–10.8)
WBC # BLD: 8.06 K/UL — SIGNIFICANT CHANGE UP (ref 4.8–10.8)
WBC # BLD: 8.14 K/UL — SIGNIFICANT CHANGE UP (ref 4.8–10.8)
WBC # BLD: 9.25 K/UL — SIGNIFICANT CHANGE UP (ref 4.8–10.8)
WBC # BLD: 9.57 K/UL — SIGNIFICANT CHANGE UP (ref 4.8–10.8)
WBC # BLD: 9.6 K/UL — SIGNIFICANT CHANGE UP (ref 4.8–10.8)
WBC # BLD: 9.68 K/UL — SIGNIFICANT CHANGE UP (ref 4.8–10.8)
WBC # BLD: 9.83 K/UL — SIGNIFICANT CHANGE UP (ref 4.8–10.8)
WBC # FLD AUTO: 10.93 K/UL — HIGH (ref 4.8–10.8)
WBC # FLD AUTO: 13.6 K/UL — HIGH (ref 4.8–10.8)
WBC # FLD AUTO: 8.01 K/UL — SIGNIFICANT CHANGE UP (ref 4.8–10.8)
WBC # FLD AUTO: 8.06 K/UL — SIGNIFICANT CHANGE UP (ref 4.8–10.8)
WBC # FLD AUTO: 8.14 K/UL — SIGNIFICANT CHANGE UP (ref 4.8–10.8)
WBC # FLD AUTO: 9.25 K/UL — SIGNIFICANT CHANGE UP (ref 4.8–10.8)
WBC # FLD AUTO: 9.57 K/UL — SIGNIFICANT CHANGE UP (ref 4.8–10.8)
WBC # FLD AUTO: 9.6 K/UL — SIGNIFICANT CHANGE UP (ref 4.8–10.8)
WBC # FLD AUTO: 9.68 K/UL — SIGNIFICANT CHANGE UP (ref 4.8–10.8)
WBC # FLD AUTO: 9.83 K/UL — SIGNIFICANT CHANGE UP (ref 4.8–10.8)

## 2019-01-01 PROCEDURE — 93010 ELECTROCARDIOGRAM REPORT: CPT

## 2019-01-01 PROCEDURE — 99285 EMERGENCY DEPT VISIT HI MDM: CPT

## 2019-01-01 PROCEDURE — 99223 1ST HOSP IP/OBS HIGH 75: CPT | Mod: AI

## 2019-01-01 PROCEDURE — 71046 X-RAY EXAM CHEST 2 VIEWS: CPT | Mod: 26

## 2019-01-01 PROCEDURE — 99217: CPT | Mod: GC

## 2019-01-01 PROCEDURE — 99239 HOSP IP/OBS DSCHRG MGMT >30: CPT

## 2019-01-01 PROCEDURE — 99218: CPT | Mod: GC

## 2019-01-01 PROCEDURE — 71250 CT THORAX DX C-: CPT | Mod: 26

## 2019-01-01 PROCEDURE — 93010 ELECTROCARDIOGRAM REPORT: CPT | Mod: 77

## 2019-01-01 RX ORDER — ATORVASTATIN CALCIUM 80 MG/1
80 TABLET, FILM COATED ORAL AT BEDTIME
Refills: 0 | Status: DISCONTINUED | OUTPATIENT
Start: 2019-01-01 | End: 2019-01-01

## 2019-01-01 RX ORDER — LACTULOSE 10 G/15ML
10 SOLUTION ORAL DAILY
Refills: 0 | Status: DISCONTINUED | OUTPATIENT
Start: 2019-01-01 | End: 2019-01-01

## 2019-01-01 RX ORDER — PANTOPRAZOLE SODIUM 20 MG/1
40 TABLET, DELAYED RELEASE ORAL
Refills: 0 | Status: DISCONTINUED | OUTPATIENT
Start: 2019-01-01 | End: 2019-01-01

## 2019-01-01 RX ORDER — ACETAMINOPHEN 500 MG
650 TABLET ORAL EVERY 6 HOURS
Refills: 0 | Status: DISCONTINUED | OUTPATIENT
Start: 2019-01-01 | End: 2019-01-01

## 2019-01-01 RX ORDER — ATORVASTATIN CALCIUM 80 MG/1
1 TABLET, FILM COATED ORAL
Qty: 30 | Refills: 0
Start: 2019-01-01 | End: 2019-01-01

## 2019-01-01 RX ORDER — AZITHROMYCIN 500 MG/1
500 TABLET, FILM COATED ORAL EVERY 24 HOURS
Refills: 0 | Status: DISCONTINUED | OUTPATIENT
Start: 2019-01-01 | End: 2019-01-01

## 2019-01-01 RX ORDER — ERGOCALCIFEROL 1.25 MG/1
50000 CAPSULE ORAL
Refills: 0 | Status: DISCONTINUED | OUTPATIENT
Start: 2019-01-01 | End: 2019-01-01

## 2019-01-01 RX ORDER — ALBUTEROL 90 UG/1
2.5 AEROSOL, METERED ORAL ONCE
Refills: 0 | Status: COMPLETED | OUTPATIENT
Start: 2019-01-01 | End: 2019-01-01

## 2019-01-01 RX ORDER — AZITHROMYCIN 500 MG/1
TABLET, FILM COATED ORAL
Refills: 0 | Status: DISCONTINUED | OUTPATIENT
Start: 2019-01-01 | End: 2019-01-01

## 2019-01-01 RX ORDER — ALPRAZOLAM 0.25 MG
1 TABLET ORAL THREE TIMES A DAY
Refills: 0 | Status: DISCONTINUED | OUTPATIENT
Start: 2019-01-01 | End: 2019-01-01

## 2019-01-01 RX ORDER — ACETAMINOPHEN 500 MG
650 TABLET ORAL ONCE
Refills: 0 | Status: COMPLETED | OUTPATIENT
Start: 2019-01-01 | End: 2019-01-01

## 2019-01-01 RX ORDER — ENOXAPARIN SODIUM 100 MG/ML
40 INJECTION SUBCUTANEOUS AT BEDTIME
Refills: 0 | Status: DISCONTINUED | OUTPATIENT
Start: 2019-01-01 | End: 2019-01-01

## 2019-01-01 RX ORDER — BUDESONIDE AND FORMOTEROL FUMARATE DIHYDRATE 160; 4.5 UG/1; UG/1
2 AEROSOL RESPIRATORY (INHALATION)
Qty: 1 | Refills: 0
Start: 2019-01-01 | End: 2019-01-01

## 2019-01-01 RX ORDER — SIMVASTATIN 20 MG/1
1 TABLET, FILM COATED ORAL
Qty: 30 | Refills: 0
Start: 2019-01-01 | End: 2019-01-01

## 2019-01-01 RX ORDER — TICAGRELOR 90 MG/1
1 TABLET ORAL
Qty: 60 | Refills: 0
Start: 2019-01-01 | End: 2019-01-01

## 2019-01-01 RX ORDER — CLOPIDOGREL BISULFATE 75 MG/1
75 TABLET, FILM COATED ORAL DAILY
Refills: 0 | Status: DISCONTINUED | OUTPATIENT
Start: 2019-01-01 | End: 2019-01-01

## 2019-01-01 RX ORDER — PANTOPRAZOLE SODIUM 20 MG/1
40 TABLET, DELAYED RELEASE ORAL ONCE
Refills: 0 | Status: COMPLETED | OUTPATIENT
Start: 2019-01-01 | End: 2019-01-01

## 2019-01-01 RX ORDER — IPRATROPIUM/ALBUTEROL SULFATE 18-103MCG
3 AEROSOL WITH ADAPTER (GRAM) INHALATION
Qty: 500 | Refills: 0
Start: 2019-01-01 | End: 2019-01-01

## 2019-01-01 RX ORDER — AZITHROMYCIN 500 MG/1
500 TABLET, FILM COATED ORAL ONCE
Refills: 0 | Status: COMPLETED | OUTPATIENT
Start: 2019-01-01 | End: 2019-01-01

## 2019-01-01 RX ORDER — METHOTREXATE 2.5 MG/1
6 TABLET ORAL
Qty: 0 | Refills: 0 | DISCHARGE

## 2019-01-01 RX ORDER — ENOXAPARIN SODIUM 100 MG/ML
40 INJECTION SUBCUTANEOUS EVERY 24 HOURS
Refills: 0 | Status: DISCONTINUED | OUTPATIENT
Start: 2019-01-01 | End: 2019-01-01

## 2019-01-01 RX ORDER — FAMOTIDINE 10 MG/ML
20 INJECTION INTRAVENOUS DAILY
Refills: 0 | Status: DISCONTINUED | OUTPATIENT
Start: 2019-01-01 | End: 2019-01-01

## 2019-01-01 RX ORDER — CEFTRIAXONE 500 MG/1
1 INJECTION, POWDER, FOR SOLUTION INTRAMUSCULAR; INTRAVENOUS EVERY 24 HOURS
Refills: 0 | Status: DISCONTINUED | OUTPATIENT
Start: 2019-01-01 | End: 2019-01-01

## 2019-01-01 RX ORDER — FOLIC ACID 0.8 MG
1 TABLET ORAL DAILY
Refills: 0 | Status: DISCONTINUED | OUTPATIENT
Start: 2019-01-01 | End: 2019-01-01

## 2019-01-01 RX ORDER — ATORVASTATIN CALCIUM 80 MG/1
1 TABLET, FILM COATED ORAL
Qty: 0 | Refills: 0 | DISCHARGE
Start: 2019-01-01

## 2019-01-01 RX ORDER — ASPIRIN/CALCIUM CARB/MAGNESIUM 324 MG
81 TABLET ORAL ONCE
Refills: 0 | Status: COMPLETED | OUTPATIENT
Start: 2019-01-01 | End: 2019-01-01

## 2019-01-01 RX ORDER — ASPIRIN/CALCIUM CARB/MAGNESIUM 324 MG
325 TABLET ORAL ONCE
Refills: 0 | Status: COMPLETED | OUTPATIENT
Start: 2019-01-01 | End: 2019-01-01

## 2019-01-01 RX ORDER — TICAGRELOR 90 MG/1
90 TABLET ORAL EVERY 12 HOURS
Refills: 0 | Status: DISCONTINUED | OUTPATIENT
Start: 2019-01-01 | End: 2019-01-01

## 2019-01-01 RX ORDER — TICAGRELOR 90 MG/1
180 TABLET ORAL EVERY 12 HOURS
Refills: 0 | Status: DISCONTINUED | OUTPATIENT
Start: 2019-01-01 | End: 2019-01-01

## 2019-01-01 RX ORDER — TICAGRELOR 90 MG/1
90 TABLET ORAL ONCE
Refills: 0 | Status: COMPLETED | OUTPATIENT
Start: 2019-01-01 | End: 2019-01-01

## 2019-01-01 RX ORDER — BUDESONIDE AND FORMOTEROL FUMARATE DIHYDRATE 160; 4.5 UG/1; UG/1
2 AEROSOL RESPIRATORY (INHALATION)
Refills: 0 | Status: DISCONTINUED | OUTPATIENT
Start: 2019-01-01 | End: 2019-01-01

## 2019-01-01 RX ORDER — METOPROLOL TARTRATE 50 MG
25 TABLET ORAL DAILY
Refills: 0 | Status: DISCONTINUED | OUTPATIENT
Start: 2019-01-01 | End: 2019-01-01

## 2019-01-01 RX ORDER — ASPIRIN/CALCIUM CARB/MAGNESIUM 324 MG
1 TABLET ORAL
Qty: 0 | Refills: 0 | DISCHARGE
Start: 2019-01-01

## 2019-01-01 RX ORDER — TIOTROPIUM BROMIDE 18 UG/1
1 CAPSULE ORAL; RESPIRATORY (INHALATION)
Qty: 30 | Refills: 0
Start: 2019-01-01 | End: 2019-01-01

## 2019-01-01 RX ORDER — ENOXAPARIN SODIUM 100 MG/ML
40 INJECTION SUBCUTANEOUS DAILY
Refills: 0 | Status: DISCONTINUED | OUTPATIENT
Start: 2019-01-01 | End: 2019-01-01

## 2019-01-01 RX ORDER — IPRATROPIUM/ALBUTEROL SULFATE 18-103MCG
3 AEROSOL WITH ADAPTER (GRAM) INHALATION EVERY 6 HOURS
Refills: 0 | Status: DISCONTINUED | OUTPATIENT
Start: 2019-01-01 | End: 2019-01-01

## 2019-01-01 RX ORDER — FOLIC ACID 0.8 MG
1 TABLET ORAL ONCE
Refills: 0 | Status: COMPLETED | OUTPATIENT
Start: 2019-01-01 | End: 2019-01-01

## 2019-01-01 RX ORDER — ASPIRIN/CALCIUM CARB/MAGNESIUM 324 MG
81 TABLET ORAL DAILY
Refills: 0 | Status: DISCONTINUED | OUTPATIENT
Start: 2019-01-01 | End: 2019-01-01

## 2019-01-01 RX ORDER — METOPROLOL TARTRATE 50 MG
25 TABLET ORAL ONCE
Refills: 0 | Status: COMPLETED | OUTPATIENT
Start: 2019-01-01 | End: 2019-01-01

## 2019-01-01 RX ORDER — TIOTROPIUM BROMIDE 18 UG/1
1 CAPSULE ORAL; RESPIRATORY (INHALATION) DAILY
Refills: 0 | Status: DISCONTINUED | OUTPATIENT
Start: 2019-01-01 | End: 2019-01-01

## 2019-01-01 RX ORDER — SODIUM CHLORIDE 9 MG/ML
450 INJECTION INTRAMUSCULAR; INTRAVENOUS; SUBCUTANEOUS
Refills: 0 | Status: DISCONTINUED | OUTPATIENT
Start: 2019-01-01 | End: 2019-01-01

## 2019-01-01 RX ORDER — SODIUM CHLORIDE 9 MG/ML
1000 INJECTION INTRAMUSCULAR; INTRAVENOUS; SUBCUTANEOUS
Refills: 0 | Status: DISCONTINUED | OUTPATIENT
Start: 2019-01-01 | End: 2019-01-01

## 2019-01-01 RX ORDER — AZITHROMYCIN 500 MG/1
250 TABLET, FILM COATED ORAL DAILY
Refills: 0 | Status: DISCONTINUED | OUTPATIENT
Start: 2019-01-01 | End: 2019-01-01

## 2019-01-01 RX ORDER — ASPIRIN/CALCIUM CARB/MAGNESIUM 324 MG
324 TABLET ORAL ONCE
Refills: 0 | Status: COMPLETED | OUTPATIENT
Start: 2019-01-01 | End: 2019-01-01

## 2019-01-01 RX ORDER — CEFTRIAXONE 500 MG/1
1 INJECTION, POWDER, FOR SOLUTION INTRAMUSCULAR; INTRAVENOUS ONCE
Refills: 0 | Status: COMPLETED | OUTPATIENT
Start: 2019-01-01 | End: 2019-01-01

## 2019-01-01 RX ORDER — TICAGRELOR 90 MG/1
180 TABLET ORAL ONCE
Refills: 0 | Status: COMPLETED | OUTPATIENT
Start: 2019-01-01 | End: 2019-01-01

## 2019-01-01 RX ORDER — CEFTRIAXONE 500 MG/1
INJECTION, POWDER, FOR SOLUTION INTRAMUSCULAR; INTRAVENOUS
Refills: 0 | Status: DISCONTINUED | OUTPATIENT
Start: 2019-01-01 | End: 2019-01-01

## 2019-01-01 RX ORDER — AZITHROMYCIN 500 MG/1
1 TABLET, FILM COATED ORAL
Qty: 1 | Refills: 0
Start: 2019-01-01 | End: 2019-01-01

## 2019-01-01 RX ORDER — IPRATROPIUM/ALBUTEROL SULFATE 18-103MCG
3 AEROSOL WITH ADAPTER (GRAM) INHALATION ONCE
Refills: 0 | Status: COMPLETED | OUTPATIENT
Start: 2019-01-01 | End: 2019-01-01

## 2019-01-01 RX ORDER — CLOPIDOGREL BISULFATE 75 MG/1
1 TABLET, FILM COATED ORAL
Qty: 0 | Refills: 0 | DISCHARGE
Start: 2019-01-01

## 2019-01-01 RX ORDER — CHLORHEXIDINE GLUCONATE 213 G/1000ML
1 SOLUTION TOPICAL
Refills: 0 | Status: DISCONTINUED | OUTPATIENT
Start: 2019-01-01 | End: 2019-01-01

## 2019-01-01 RX ADMIN — ALBUTEROL 2.5 MILLIGRAM(S): 90 AEROSOL, METERED ORAL at 21:13

## 2019-01-01 RX ADMIN — TICAGRELOR 90 MILLIGRAM(S): 90 TABLET ORAL at 21:02

## 2019-01-01 RX ADMIN — AZITHROMYCIN 255 MILLIGRAM(S): 500 TABLET, FILM COATED ORAL at 17:08

## 2019-01-01 RX ADMIN — Medication 25 MILLIGRAM(S): at 05:24

## 2019-01-01 RX ADMIN — Medication 650 MILLIGRAM(S): at 04:26

## 2019-01-01 RX ADMIN — SODIUM CHLORIDE 75 MILLILITER(S): 9 INJECTION INTRAMUSCULAR; INTRAVENOUS; SUBCUTANEOUS at 17:20

## 2019-01-01 RX ADMIN — Medication 650 MILLIGRAM(S): at 18:59

## 2019-01-01 RX ADMIN — Medication 1 MILLIGRAM(S): at 11:25

## 2019-01-01 RX ADMIN — Medication 3 MILLILITER(S): at 14:21

## 2019-01-01 RX ADMIN — Medication 325 MILLIGRAM(S): at 17:08

## 2019-01-01 RX ADMIN — ATORVASTATIN CALCIUM 80 MILLIGRAM(S): 80 TABLET, FILM COATED ORAL at 21:36

## 2019-01-01 RX ADMIN — Medication 325 MILLIGRAM(S): at 11:28

## 2019-01-01 RX ADMIN — Medication 3 MILLILITER(S): at 09:50

## 2019-01-01 RX ADMIN — TICAGRELOR 180 MILLIGRAM(S): 90 TABLET ORAL at 12:21

## 2019-01-01 RX ADMIN — CLOPIDOGREL BISULFATE 75 MILLIGRAM(S): 75 TABLET, FILM COATED ORAL at 12:24

## 2019-01-01 RX ADMIN — ENOXAPARIN SODIUM 40 MILLIGRAM(S): 100 INJECTION SUBCUTANEOUS at 18:25

## 2019-01-01 RX ADMIN — BUDESONIDE AND FORMOTEROL FUMARATE DIHYDRATE 2 PUFF(S): 160; 4.5 AEROSOL RESPIRATORY (INHALATION) at 08:28

## 2019-01-01 RX ADMIN — AZITHROMYCIN 250 MILLIGRAM(S): 500 TABLET, FILM COATED ORAL at 14:15

## 2019-01-01 RX ADMIN — Medication 81 MILLIGRAM(S): at 11:25

## 2019-01-01 RX ADMIN — Medication 81 MILLIGRAM(S): at 12:40

## 2019-01-01 RX ADMIN — TICAGRELOR 90 MILLIGRAM(S): 90 TABLET ORAL at 06:15

## 2019-01-01 RX ADMIN — PANTOPRAZOLE SODIUM 40 MILLIGRAM(S): 20 TABLET, DELAYED RELEASE ORAL at 06:48

## 2019-01-01 RX ADMIN — ATORVASTATIN CALCIUM 80 MILLIGRAM(S): 80 TABLET, FILM COATED ORAL at 21:14

## 2019-01-01 RX ADMIN — ATORVASTATIN CALCIUM 80 MILLIGRAM(S): 80 TABLET, FILM COATED ORAL at 21:17

## 2019-01-01 RX ADMIN — PANTOPRAZOLE SODIUM 40 MILLIGRAM(S): 20 TABLET, DELAYED RELEASE ORAL at 06:19

## 2019-01-01 RX ADMIN — PANTOPRAZOLE SODIUM 40 MILLIGRAM(S): 20 TABLET, DELAYED RELEASE ORAL at 12:23

## 2019-01-01 RX ADMIN — Medication 81 MILLIGRAM(S): at 11:47

## 2019-01-01 RX ADMIN — Medication 650 MILLIGRAM(S): at 19:36

## 2019-01-01 RX ADMIN — PANTOPRAZOLE SODIUM 40 MILLIGRAM(S): 20 TABLET, DELAYED RELEASE ORAL at 06:14

## 2019-01-01 RX ADMIN — Medication 650 MILLIGRAM(S): at 10:44

## 2019-01-01 RX ADMIN — Medication 81 MILLIGRAM(S): at 12:21

## 2019-01-01 RX ADMIN — CEFTRIAXONE 100 GRAM(S): 500 INJECTION, POWDER, FOR SOLUTION INTRAMUSCULAR; INTRAVENOUS at 17:24

## 2019-01-01 RX ADMIN — TICAGRELOR 90 MILLIGRAM(S): 90 TABLET ORAL at 12:19

## 2019-01-01 RX ADMIN — CLOPIDOGREL BISULFATE 75 MILLIGRAM(S): 75 TABLET, FILM COATED ORAL at 18:29

## 2019-01-01 RX ADMIN — Medication 81 MILLIGRAM(S): at 18:30

## 2019-01-01 RX ADMIN — Medication 81 MILLIGRAM(S): at 10:47

## 2019-01-01 RX ADMIN — Medication 650 MILLIGRAM(S): at 19:26

## 2019-01-01 RX ADMIN — Medication 650 MILLIGRAM(S): at 13:25

## 2019-01-01 RX ADMIN — Medication 3 MILLILITER(S): at 13:20

## 2019-01-01 RX ADMIN — PANTOPRAZOLE SODIUM 40 MILLIGRAM(S): 20 TABLET, DELAYED RELEASE ORAL at 05:33

## 2019-01-01 RX ADMIN — Medication 3 MILLILITER(S): at 07:36

## 2019-01-01 RX ADMIN — TIOTROPIUM BROMIDE 1 CAPSULE(S): 18 CAPSULE ORAL; RESPIRATORY (INHALATION) at 07:37

## 2019-01-01 RX ADMIN — CLOPIDOGREL BISULFATE 75 MILLIGRAM(S): 75 TABLET, FILM COATED ORAL at 18:30

## 2019-01-01 RX ADMIN — Medication 25 MILLIGRAM(S): at 12:23

## 2019-01-01 RX ADMIN — ENOXAPARIN SODIUM 40 MILLIGRAM(S): 100 INJECTION SUBCUTANEOUS at 17:39

## 2019-01-01 RX ADMIN — Medication 650 MILLIGRAM(S): at 19:56

## 2019-01-01 RX ADMIN — Medication 3 MILLILITER(S): at 20:38

## 2019-01-01 RX ADMIN — CLOPIDOGREL BISULFATE 75 MILLIGRAM(S): 75 TABLET, FILM COATED ORAL at 12:40

## 2019-01-01 RX ADMIN — CLOPIDOGREL BISULFATE 75 MILLIGRAM(S): 75 TABLET, FILM COATED ORAL at 11:47

## 2019-01-01 RX ADMIN — Medication 1 MILLIGRAM(S): at 12:21

## 2019-01-01 RX ADMIN — Medication 650 MILLIGRAM(S): at 12:55

## 2019-01-01 RX ADMIN — Medication 650 MILLIGRAM(S): at 10:14

## 2019-01-01 RX ADMIN — Medication 81 MILLIGRAM(S): at 12:24

## 2019-01-01 RX ADMIN — CLOPIDOGREL BISULFATE 75 MILLIGRAM(S): 75 TABLET, FILM COATED ORAL at 12:19

## 2019-01-01 RX ADMIN — Medication 25 MILLIGRAM(S): at 06:48

## 2019-01-01 RX ADMIN — Medication 1 MILLIGRAM(S): at 12:19

## 2019-01-01 RX ADMIN — CHLORHEXIDINE GLUCONATE 1 APPLICATION(S): 213 SOLUTION TOPICAL at 06:17

## 2019-01-01 RX ADMIN — Medication 10 MILLIGRAM(S): at 12:22

## 2019-01-01 RX ADMIN — AZITHROMYCIN 255 MILLIGRAM(S): 500 TABLET, FILM COATED ORAL at 17:38

## 2019-01-01 RX ADMIN — PANTOPRAZOLE SODIUM 40 MILLIGRAM(S): 20 TABLET, DELAYED RELEASE ORAL at 06:17

## 2019-01-01 RX ADMIN — Medication 324 MILLIGRAM(S): at 15:29

## 2019-01-01 RX ADMIN — Medication 10 MILLIGRAM(S): at 06:15

## 2019-01-01 RX ADMIN — CEFTRIAXONE 100 GRAM(S): 500 INJECTION, POWDER, FOR SOLUTION INTRAMUSCULAR; INTRAVENOUS at 17:38

## 2019-01-01 RX ADMIN — Medication 25 MILLIGRAM(S): at 06:17

## 2019-01-01 RX ADMIN — ATORVASTATIN CALCIUM 80 MILLIGRAM(S): 80 TABLET, FILM COATED ORAL at 22:14

## 2019-01-01 RX ADMIN — Medication 10 MILLIGRAM(S): at 06:48

## 2019-01-01 RX ADMIN — Medication 25 MILLIGRAM(S): at 06:14

## 2019-01-01 RX ADMIN — Medication 81 MILLIGRAM(S): at 12:19

## 2019-01-01 RX ADMIN — ATORVASTATIN CALCIUM 80 MILLIGRAM(S): 80 TABLET, FILM COATED ORAL at 23:34

## 2019-01-01 RX ADMIN — AZITHROMYCIN 255 MILLIGRAM(S): 500 TABLET, FILM COATED ORAL at 17:24

## 2019-01-01 RX ADMIN — Medication 1 MILLIGRAM(S): at 12:20

## 2019-01-01 RX ADMIN — TICAGRELOR 90 MILLIGRAM(S): 90 TABLET ORAL at 21:49

## 2019-01-01 RX ADMIN — FAMOTIDINE 20 MILLIGRAM(S): 10 INJECTION INTRAVENOUS at 11:25

## 2019-01-01 RX ADMIN — BUDESONIDE AND FORMOTEROL FUMARATE DIHYDRATE 2 PUFF(S): 160; 4.5 AEROSOL RESPIRATORY (INHALATION) at 21:17

## 2019-01-01 RX ADMIN — CLOPIDOGREL BISULFATE 75 MILLIGRAM(S): 75 TABLET, FILM COATED ORAL at 10:47

## 2019-01-01 RX ADMIN — Medication 25 MILLIGRAM(S): at 18:29

## 2019-01-01 RX ADMIN — Medication 3 MILLILITER(S): at 20:14

## 2019-01-01 RX ADMIN — CHLORHEXIDINE GLUCONATE 1 APPLICATION(S): 213 SOLUTION TOPICAL at 05:24

## 2019-01-01 RX ADMIN — ENOXAPARIN SODIUM 40 MILLIGRAM(S): 100 INJECTION SUBCUTANEOUS at 17:57

## 2019-01-01 RX ADMIN — Medication 25 MILLIGRAM(S): at 05:44

## 2019-01-01 RX ADMIN — Medication 650 MILLIGRAM(S): at 04:56

## 2019-01-01 RX ADMIN — PANTOPRAZOLE SODIUM 40 MILLIGRAM(S): 20 TABLET, DELAYED RELEASE ORAL at 05:24

## 2019-01-01 RX ADMIN — BUDESONIDE AND FORMOTEROL FUMARATE DIHYDRATE 2 PUFF(S): 160; 4.5 AEROSOL RESPIRATORY (INHALATION) at 21:15

## 2019-01-01 RX ADMIN — Medication 650 MILLIGRAM(S): at 19:29

## 2019-01-01 RX ADMIN — Medication 3 MILLILITER(S): at 14:38

## 2019-01-01 RX ADMIN — CHLORHEXIDINE GLUCONATE 1 APPLICATION(S): 213 SOLUTION TOPICAL at 05:33

## 2019-01-01 RX ADMIN — TICAGRELOR 90 MILLIGRAM(S): 90 TABLET ORAL at 05:53

## 2019-01-01 RX ADMIN — CEFTRIAXONE 100 GRAM(S): 500 INJECTION, POWDER, FOR SOLUTION INTRAMUSCULAR; INTRAVENOUS at 18:24

## 2019-01-01 RX ADMIN — AZITHROMYCIN 500 MILLIGRAM(S): 500 TABLET, FILM COATED ORAL at 18:30

## 2019-01-01 RX ADMIN — CLOPIDOGREL BISULFATE 75 MILLIGRAM(S): 75 TABLET, FILM COATED ORAL at 21:49

## 2019-01-01 RX ADMIN — Medication 1 MILLIGRAM(S): at 12:40

## 2019-01-01 RX ADMIN — Medication 25 MILLIGRAM(S): at 05:33

## 2019-01-01 RX ADMIN — Medication 1 MILLIGRAM(S): at 18:29

## 2019-01-01 RX ADMIN — Medication 1 MILLIGRAM(S): at 11:47

## 2019-01-01 RX ADMIN — Medication 650 MILLIGRAM(S): at 20:20

## 2019-01-01 RX ADMIN — ENOXAPARIN SODIUM 40 MILLIGRAM(S): 100 INJECTION SUBCUTANEOUS at 21:14

## 2019-01-01 RX ADMIN — Medication 10 MILLIGRAM(S): at 05:44

## 2019-01-01 RX ADMIN — LACTULOSE 10 GRAM(S): 10 SOLUTION ORAL at 05:47

## 2019-01-31 ENCOUNTER — EMERGENCY (EMERGENCY)
Facility: HOSPITAL | Age: 74
LOS: 0 days | Discharge: HOME | End: 2019-01-31
Admitting: HOSPITALIST

## 2019-01-31 ENCOUNTER — INPATIENT (INPATIENT)
Facility: HOSPITAL | Age: 74
LOS: 0 days | Discharge: HOME | End: 2019-02-01
Attending: HOSPITALIST | Admitting: HOSPITALIST

## 2019-01-31 VITALS
DIASTOLIC BLOOD PRESSURE: 84 MMHG | SYSTOLIC BLOOD PRESSURE: 165 MMHG | RESPIRATION RATE: 20 BRPM | TEMPERATURE: 98 F | HEART RATE: 74 BPM | OXYGEN SATURATION: 95 %

## 2019-01-31 DIAGNOSIS — M06.9 RHEUMATOID ARTHRITIS, UNSPECIFIED: ICD-10-CM

## 2019-01-31 DIAGNOSIS — I25.10 ATHEROSCLEROTIC HEART DISEASE OF NATIVE CORONARY ARTERY WITHOUT ANGINA PECTORIS: ICD-10-CM

## 2019-01-31 DIAGNOSIS — L03.211 CELLULITIS OF FACE: ICD-10-CM

## 2019-01-31 DIAGNOSIS — R53.1 WEAKNESS: ICD-10-CM

## 2019-01-31 DIAGNOSIS — E78.5 HYPERLIPIDEMIA, UNSPECIFIED: ICD-10-CM

## 2019-01-31 DIAGNOSIS — K21.9 GASTRO-ESOPHAGEAL REFLUX DISEASE WITHOUT ESOPHAGITIS: ICD-10-CM

## 2019-01-31 DIAGNOSIS — Z95.5 PRESENCE OF CORONARY ANGIOPLASTY IMPLANT AND GRAFT: ICD-10-CM

## 2019-01-31 LAB
ALBUMIN SERPL ELPH-MCNC: 4.6 G/DL — SIGNIFICANT CHANGE UP (ref 3.5–5.2)
ALP SERPL-CCNC: 92 U/L — SIGNIFICANT CHANGE UP (ref 30–115)
ALT FLD-CCNC: 9 U/L — SIGNIFICANT CHANGE UP (ref 0–41)
ANION GAP SERPL CALC-SCNC: 16 MMOL/L — HIGH (ref 7–14)
AST SERPL-CCNC: 17 U/L — SIGNIFICANT CHANGE UP (ref 0–41)
BASOPHILS # BLD AUTO: 0.03 K/UL — SIGNIFICANT CHANGE UP (ref 0–0.2)
BASOPHILS NFR BLD AUTO: 0.4 % — SIGNIFICANT CHANGE UP (ref 0–1)
BILIRUB SERPL-MCNC: <0.2 MG/DL — SIGNIFICANT CHANGE UP (ref 0.2–1.2)
BUN SERPL-MCNC: 13 MG/DL — SIGNIFICANT CHANGE UP (ref 10–20)
CALCIUM SERPL-MCNC: 9.4 MG/DL — SIGNIFICANT CHANGE UP (ref 8.5–10.1)
CHLORIDE SERPL-SCNC: 98 MMOL/L — SIGNIFICANT CHANGE UP (ref 98–110)
CO2 SERPL-SCNC: 26 MMOL/L — SIGNIFICANT CHANGE UP (ref 17–32)
CREAT SERPL-MCNC: 0.7 MG/DL — SIGNIFICANT CHANGE UP (ref 0.7–1.5)
EOSINOPHIL # BLD AUTO: 0.25 K/UL — SIGNIFICANT CHANGE UP (ref 0–0.7)
EOSINOPHIL NFR BLD AUTO: 3.1 % — SIGNIFICANT CHANGE UP (ref 0–8)
GLUCOSE SERPL-MCNC: 73 MG/DL — SIGNIFICANT CHANGE UP (ref 70–99)
HCT VFR BLD CALC: 38.5 % — SIGNIFICANT CHANGE UP (ref 37–47)
HGB BLD-MCNC: 13 G/DL — SIGNIFICANT CHANGE UP (ref 12–16)
IMM GRANULOCYTES NFR BLD AUTO: 0.4 % — HIGH (ref 0.1–0.3)
LACTATE SERPL-SCNC: 0.8 MMOL/L — SIGNIFICANT CHANGE UP (ref 0.5–2.2)
LYMPHOCYTES # BLD AUTO: 1.75 K/UL — SIGNIFICANT CHANGE UP (ref 1.2–3.4)
LYMPHOCYTES # BLD AUTO: 21.8 % — SIGNIFICANT CHANGE UP (ref 20.5–51.1)
MCHC RBC-ENTMCNC: 32.5 PG — HIGH (ref 27–31)
MCHC RBC-ENTMCNC: 33.8 G/DL — SIGNIFICANT CHANGE UP (ref 32–37)
MCV RBC AUTO: 96.3 FL — SIGNIFICANT CHANGE UP (ref 81–99)
MONOCYTES # BLD AUTO: 0.76 K/UL — HIGH (ref 0.1–0.6)
MONOCYTES NFR BLD AUTO: 9.5 % — HIGH (ref 1.7–9.3)
NEUTROPHILS # BLD AUTO: 5.22 K/UL — SIGNIFICANT CHANGE UP (ref 1.4–6.5)
NEUTROPHILS NFR BLD AUTO: 64.8 % — SIGNIFICANT CHANGE UP (ref 42.2–75.2)
NRBC # BLD: 0 /100 WBCS — SIGNIFICANT CHANGE UP (ref 0–0)
PLATELET # BLD AUTO: 222 K/UL — SIGNIFICANT CHANGE UP (ref 130–400)
POTASSIUM SERPL-MCNC: 3.7 MMOL/L — SIGNIFICANT CHANGE UP (ref 3.5–5)
POTASSIUM SERPL-SCNC: 3.7 MMOL/L — SIGNIFICANT CHANGE UP (ref 3.5–5)
PROT SERPL-MCNC: 7.6 G/DL — SIGNIFICANT CHANGE UP (ref 6–8)
RBC # BLD: 4 M/UL — LOW (ref 4.2–5.4)
RBC # FLD: 13.6 % — SIGNIFICANT CHANGE UP (ref 11.5–14.5)
SODIUM SERPL-SCNC: 140 MMOL/L — SIGNIFICANT CHANGE UP (ref 135–146)
WBC # BLD: 8.04 K/UL — SIGNIFICANT CHANGE UP (ref 4.8–10.8)
WBC # FLD AUTO: 8.04 K/UL — SIGNIFICANT CHANGE UP (ref 4.8–10.8)

## 2019-01-31 RX ORDER — SODIUM CHLORIDE 9 MG/ML
1000 INJECTION, SOLUTION INTRAVENOUS ONCE
Qty: 0 | Refills: 0 | Status: COMPLETED | OUTPATIENT
Start: 2019-01-31 | End: 2019-01-31

## 2019-01-31 RX ORDER — AMPICILLIN SODIUM AND SULBACTAM SODIUM 250; 125 MG/ML; MG/ML
3 INJECTION, POWDER, FOR SUSPENSION INTRAMUSCULAR; INTRAVENOUS EVERY 6 HOURS
Qty: 0 | Refills: 0 | Status: DISCONTINUED | OUTPATIENT
Start: 2019-01-31 | End: 2019-02-01

## 2019-01-31 RX ADMIN — SODIUM CHLORIDE 1000 MILLILITER(S): 9 INJECTION, SOLUTION INTRAVENOUS at 20:49

## 2019-01-31 RX ADMIN — AMPICILLIN SODIUM AND SULBACTAM SODIUM 200 GRAM(S): 250; 125 INJECTION, POWDER, FOR SUSPENSION INTRAMUSCULAR; INTRAVENOUS at 22:00

## 2019-01-31 NOTE — ED PROVIDER NOTE - ATTENDING CONTRIBUTION TO CARE
This is a 73yF RA on remicaid p/w R facial cellulitis, sent by rheumatologist for IV abx.  Pt has hx similar cellulitis, starting at R nasal philtrum, that extended further on face with PO abx and md was concerned that she would need IV abx as well this time given her immunomodulation.  Pt denies fevers but admits to myalgias, generalized weakness, chills and generally feeling unwell.    PMH: RA  Meds: includes remicaid  All to zocor  nonsmoker, lives at home, has 4 children    VS notable for /84  CONSTITUTIONAL: well developed; well nourished; well appearing in no acute distress  HEAD: normocephalic; atraumatic  EYES: no conjunctival injection, no scleral icterus  ENT: no nasal discharge or epistaxis, erythematous skin at base of R nares and philtrum of lip w/ dry/irritated skin  M: MMM, no drooling or stridor  NECK: supple; non tender. + full passive ROM in all directions  CARD: S1, S2 normal; no murmurs, gallops, or rubs. Regular rate and rhythm  RESP: no wheezes, rales or rhonchi. Good air movement bilaterally without significant accessory muscle use  ABD: soft; non-distended; non-tender. No rebound, no guarding, no pulsatile abdominal mass  EXT: moving all extremities spontaneously, normal ROM. No clubbing, cyanosis or edema  SKIN: warm and dry, no lesions noted  NEURO: alert, oriented, CN II-XII grossly intact, motor and sensory grossly intact, speech nonslurred, no focal deficits. GCS 15  PSYCH: calm, cooperative, appropriate, good eye contact, logical thought process, no apparent danger to self or others    facial cellulitis in the setting of immunomodulation, w/ hx of sig facial cellulitis that started with similar presentation  (pt likely getting cellulitis of her R nares because she keeps rubbing her nose, likely causing microabrasions that are higher risk for infection)  labs  IV abx  adm

## 2019-01-31 NOTE — ED PROVIDER NOTE - PHYSICAL EXAMINATION
CONSTITUTIONAL: WA / WN / NAD  HEAD: NCAT + eryethma above upper lip   EYES: PERRL; EOMI;   ENT: Normal pharynx; mucous membranes pink/moist, no erythema.  NECK: Supple; no meningeal signs  CARD: RRR; nl S1/S2; no M/R/G.  RESP: Respiratory rate and effort are normal; breath sounds clear and equal bilaterally.  ABD: Soft, NT ND   MSK/EXT: No gross deformities; full range of motion.  NEURO: AAOx3  PSYCH: Memory Intact, Normal Affect

## 2019-01-31 NOTE — ED ADULT NURSE NOTE - OBJECTIVE STATEMENT
pt is a 72 yo female co weakness and cellulitis per Rheumatologist. pt states has had redness to nostrils for 3 days a/w burning worsened with touch, states has had a runny nose for 2 weeks. c/o generalized weakness/pain/aches/ha for 2 days, and bloody nose 2 days ago with large clots. denies n/v/d/diz/cp/fever/trauma. lung sounds clear,

## 2019-01-31 NOTE — ED PROVIDER NOTE - OBJECTIVE STATEMENT
73 year old female with a pmh of rheumatoid arthritis on ramicaid, cad s/p stent , gerd, hld, presents here for a cellulitis. Patient states she' has cellulitis of her face in the past. Patient noticed for several days she has been feeling weak with chills and noticed the redness of her face. Patient went to see her rheumatologist yesterday and was prescribed amoxicillin 1g bid. Patient states rheumatologist recommended she be admitted for IV antibiotics. Patient denies cough n/v abdominal pain.

## 2019-01-31 NOTE — ED PROVIDER NOTE - MEDICAL DECISION MAKING DETAILS
73yF RA on remicaid p/w facial cellulitis. Pt well appearing, comfortable, hemodynamically stable (actually hypertensive) and in no resp distress.  Given immunomodulation w/ hx of extensive facial cellulitis that started similarly, will adm for IV abx.

## 2019-01-31 NOTE — ED ADULT TRIAGE NOTE - CHIEF COMPLAINT QUOTE
cellulitis of her nose going on for a couple of days also complains of weakness and body aches, hx of RA

## 2019-01-31 NOTE — ED ADULT NURSE NOTE - ED STAT RN HANDOFF DETAILS
pt resting comfortably, denies discomfort, no distress noted at this time. IV intact , safety maintained.

## 2019-01-31 NOTE — ED PROVIDER NOTE - NS ED ROS FT
Constitutional: See HPI.  Eyes: No visual changes  ENMT: + rhinorrhea   Cardiac: No cp  Respiratory: No cough   GI: No nausea, vomiting, diarrhea or abdominal pain.  MS: No myalgia, muscle weakness, joint pain or back pain.  Neuro: No headache   Skin: see hpi

## 2019-01-31 NOTE — ED PROVIDER NOTE - CARE PLAN
Principal Discharge DX:	Facial cellulitis  Secondary Diagnosis:	Rheumatoid arthritis, involving unspecified site, unspecified rheumatoid factor presence

## 2019-02-01 ENCOUNTER — TRANSCRIPTION ENCOUNTER (OUTPATIENT)
Age: 74
End: 2019-02-01

## 2019-02-01 VITALS
HEART RATE: 76 BPM | TEMPERATURE: 97 F | SYSTOLIC BLOOD PRESSURE: 165 MMHG | RESPIRATION RATE: 17 BRPM | DIASTOLIC BLOOD PRESSURE: 75 MMHG

## 2019-02-01 DIAGNOSIS — Z98.890 OTHER SPECIFIED POSTPROCEDURAL STATES: Chronic | ICD-10-CM

## 2019-02-01 LAB
ANION GAP SERPL CALC-SCNC: 15 MMOL/L — HIGH (ref 7–14)
BUN SERPL-MCNC: 10 MG/DL — SIGNIFICANT CHANGE UP (ref 10–20)
CALCIUM SERPL-MCNC: 8.7 MG/DL — SIGNIFICANT CHANGE UP (ref 8.5–10.1)
CHLORIDE SERPL-SCNC: 102 MMOL/L — SIGNIFICANT CHANGE UP (ref 98–110)
CO2 SERPL-SCNC: 24 MMOL/L — SIGNIFICANT CHANGE UP (ref 17–32)
CREAT SERPL-MCNC: 0.6 MG/DL — LOW (ref 0.7–1.5)
GLUCOSE SERPL-MCNC: 74 MG/DL — SIGNIFICANT CHANGE UP (ref 70–99)
HCT VFR BLD CALC: 35.4 % — LOW (ref 37–47)
HGB BLD-MCNC: 11.8 G/DL — LOW (ref 12–16)
MCHC RBC-ENTMCNC: 31.9 PG — HIGH (ref 27–31)
MCHC RBC-ENTMCNC: 33.3 G/DL — SIGNIFICANT CHANGE UP (ref 32–37)
MCV RBC AUTO: 95.7 FL — SIGNIFICANT CHANGE UP (ref 81–99)
NRBC # BLD: 0 /100 WBCS — SIGNIFICANT CHANGE UP (ref 0–0)
PLATELET # BLD AUTO: 197 K/UL — SIGNIFICANT CHANGE UP (ref 130–400)
POTASSIUM SERPL-MCNC: 4 MMOL/L — SIGNIFICANT CHANGE UP (ref 3.5–5)
POTASSIUM SERPL-SCNC: 4 MMOL/L — SIGNIFICANT CHANGE UP (ref 3.5–5)
RBC # BLD: 3.7 M/UL — LOW (ref 4.2–5.4)
RBC # FLD: 13.5 % — SIGNIFICANT CHANGE UP (ref 11.5–14.5)
SODIUM SERPL-SCNC: 141 MMOL/L — SIGNIFICANT CHANGE UP (ref 135–146)
WBC # BLD: 7.58 K/UL — SIGNIFICANT CHANGE UP (ref 4.8–10.8)
WBC # FLD AUTO: 7.58 K/UL — SIGNIFICANT CHANGE UP (ref 4.8–10.8)

## 2019-02-01 RX ORDER — INFLIXIMAB-DYYB 120 MG/ML
0 INJECTION SUBCUTANEOUS
Qty: 0 | Refills: 0 | COMMUNITY

## 2019-02-01 RX ORDER — METOPROLOL TARTRATE 50 MG
25 TABLET ORAL DAILY
Qty: 0 | Refills: 0 | Status: DISCONTINUED | OUTPATIENT
Start: 2019-02-01 | End: 2019-02-01

## 2019-02-01 RX ORDER — ALPRAZOLAM 0.25 MG
1 TABLET ORAL THREE TIMES A DAY
Qty: 0 | Refills: 0 | Status: DISCONTINUED | OUTPATIENT
Start: 2019-02-01 | End: 2019-02-01

## 2019-02-01 RX ORDER — FAMOTIDINE 10 MG/ML
40 INJECTION INTRAVENOUS AT BEDTIME
Qty: 0 | Refills: 0 | Status: DISCONTINUED | OUTPATIENT
Start: 2019-02-01 | End: 2019-02-01

## 2019-02-01 RX ORDER — CHLORHEXIDINE GLUCONATE 213 G/1000ML
1 SOLUTION TOPICAL
Qty: 0 | Refills: 0 | Status: DISCONTINUED | OUTPATIENT
Start: 2019-02-01 | End: 2019-02-01

## 2019-02-01 RX ORDER — ATORVASTATIN CALCIUM 80 MG/1
80 TABLET, FILM COATED ORAL AT BEDTIME
Qty: 0 | Refills: 0 | Status: DISCONTINUED | OUTPATIENT
Start: 2019-02-01 | End: 2019-02-01

## 2019-02-01 RX ORDER — PANTOPRAZOLE SODIUM 20 MG/1
40 TABLET, DELAYED RELEASE ORAL
Qty: 0 | Refills: 0 | Status: DISCONTINUED | OUTPATIENT
Start: 2019-02-01 | End: 2019-02-01

## 2019-02-01 RX ORDER — METHOTREXATE 2.5 MG/1
15 TABLET ORAL
Qty: 0 | Refills: 0 | Status: CANCELLED | OUTPATIENT
Start: 2019-02-03 | End: 2019-02-01

## 2019-02-01 RX ORDER — FOLIC ACID 0.8 MG
1 TABLET ORAL DAILY
Qty: 0 | Refills: 0 | Status: DISCONTINUED | OUTPATIENT
Start: 2019-02-01 | End: 2019-02-01

## 2019-02-01 RX ORDER — ASPIRIN/CALCIUM CARB/MAGNESIUM 324 MG
81 TABLET ORAL DAILY
Qty: 0 | Refills: 0 | Status: DISCONTINUED | OUTPATIENT
Start: 2019-02-01 | End: 2019-02-01

## 2019-02-01 RX ORDER — ENOXAPARIN SODIUM 100 MG/ML
40 INJECTION SUBCUTANEOUS EVERY 24 HOURS
Qty: 0 | Refills: 0 | Status: DISCONTINUED | OUTPATIENT
Start: 2019-02-01 | End: 2019-02-01

## 2019-02-01 RX ORDER — ACETAMINOPHEN 500 MG
650 TABLET ORAL ONCE
Qty: 0 | Refills: 0 | Status: COMPLETED | OUTPATIENT
Start: 2019-02-01 | End: 2019-02-01

## 2019-02-01 RX ORDER — CLOPIDOGREL BISULFATE 75 MG/1
75 TABLET, FILM COATED ORAL DAILY
Qty: 0 | Refills: 0 | Status: DISCONTINUED | OUTPATIENT
Start: 2019-02-01 | End: 2019-02-01

## 2019-02-01 RX ADMIN — Medication 650 MILLIGRAM(S): at 16:40

## 2019-02-01 RX ADMIN — Medication 650 MILLIGRAM(S): at 16:03

## 2019-02-01 RX ADMIN — Medication 81 MILLIGRAM(S): at 11:24

## 2019-02-01 RX ADMIN — Medication 1 MILLIGRAM(S): at 11:24

## 2019-02-01 RX ADMIN — CLOPIDOGREL BISULFATE 75 MILLIGRAM(S): 75 TABLET, FILM COATED ORAL at 11:24

## 2019-02-01 RX ADMIN — PANTOPRAZOLE SODIUM 40 MILLIGRAM(S): 20 TABLET, DELAYED RELEASE ORAL at 05:17

## 2019-02-01 RX ADMIN — AMPICILLIN SODIUM AND SULBACTAM SODIUM 200 GRAM(S): 250; 125 INJECTION, POWDER, FOR SUSPENSION INTRAMUSCULAR; INTRAVENOUS at 05:13

## 2019-02-01 RX ADMIN — Medication 100 MILLIGRAM(S): at 05:48

## 2019-02-01 RX ADMIN — ENOXAPARIN SODIUM 40 MILLIGRAM(S): 100 INJECTION SUBCUTANEOUS at 05:13

## 2019-02-01 RX ADMIN — Medication 25 MILLIGRAM(S): at 05:13

## 2019-02-01 NOTE — DISCHARGE NOTE ADULT - CARE PLAN
Principal Discharge DX:	Facial cellulitis  Goal:	Medical management  Assessment and plan of treatment:	You were hospitalized for a skin infection of your upper lip. You were treated with IV antibiotics, and transitioned to oral antibiotics. Please continue to take your oral antibiotics: Augmentin 875mg twice a day for 7 days, and follow up with your primary care doctor within 1 week of discharge.  Secondary Diagnosis:	Rheumatoid arthritis  Goal:	Medical Management  Assessment and plan of treatment:	You are currently taking remicade for your rheumatoid arthritis which his an immunosuppressant medication. Please stop taking this medication until you have completed your antibiotic course, and follow up with your rheumatologist before re-starting this medication.

## 2019-02-01 NOTE — DISCHARGE NOTE ADULT - HOSPITAL COURSE
Ms. Goss is a 73y old Female  with PMHx of CAD s/p PCI x2 , HTN, GERD, DLD, RA (gets remicade infusion every 8 wks last in dec, on methotrexate), presenting with upper lip cellulitis since past 3 days . she mentions that she saw redness on upper lip which has progressed for past 3days along with subjective fever and chills nuit no drainage or skin ulcers. Pt saw her rheumatologist yesterday who prescribed her amoxicillin 1gm and today she requested her to be admitted for iv abx (Rheum: Dr Sally Franco). Upon admission, pt started on IV clindamycin, and pt to be discharged on oral augmentin 875mg  BID for 7 days. Pt to follow up with primary care doctor and rheumatologist within 1 week of discharge.

## 2019-02-01 NOTE — H&P ADULT - ATTENDING COMMENTS
Patient was evaluated and examined by bedside independently,  c/o pain above upper lip, with mild redness, tolerating diet well.  no fever  All labs, radiology studies, VS was reviewed  GENERAL: NAD, AAOX3, patient is laying comfortably in bed  HEENT: AT, NC, PERRLA, SUPPLE, NO JVD, NO CB  LUNG: CTA B/L  CVS: normal S1, S2, RRR, NO M/G/R  ABDOMEN: soft, bowel sounds present, normoactive in all 4 quadrants, non-tender, non-distended  EXT: no E/C/C, positive PP b/l extremities  NEURO: no acute focal neurological deficits  SKIN: mild redness above upper lip/ superficial nostrils area    I have reviewed medical plan outlined by Medical resident.  -acute cellulitis of nasal philtrum - started on Augmentin tx. for 7 days course, patient was observed over night and remains stable.   -patient anticipated for d/c home today  -h/o RA- stable, resumed on home regimen tx.  -for chronic medical conditions, resume on home regimen tx.  -possible folic acid deficiency- on daily folate tx.  -d/c plan: patient anticipated for d/c home today

## 2019-02-01 NOTE — DISCHARGE NOTE ADULT - MEDICATION SUMMARY - MEDICATIONS TO TAKE
I will START or STAY ON the medications listed below when I get home from the hospital:    aspirin 81 mg oral tablet, chewable  -- 1 tab(s) by mouth once a day  -- Indication: For Stroke prevention    atorvastatin 80 mg oral tablet  -- 1 tab(s) by mouth once a day (at bedtime)   -- Avoid grapefruit and grapefruit juice while taking this medication.  Do not take this drug if you are pregnant.  It is very important that you take or use this exactly as directed.  Do not skip doses or discontinue unless directed by your doctor.  Obtain medical advice before taking any non-prescription drugs as some may affect the action of this medication.  Take with food or milk.    -- Indication: For Hyperlipidemia    methotrexate 2.5 mg oral tablet  -- 6 tab(s) by mouth once a week  -- Indication: For Rheumatoid arthritis    Plavix 75 mg oral tablet  -- 1 tab(s) by mouth once a day  -- Indication: For Stroke prevention    Xanax 1 mg oral tablet  -- 1 tab(s) by mouth 3 times a day, As Needed  -- Indication: For Anxiety    Toprol-XL 25 mg oral tablet, extended release  -- 1 tab(s) by mouth once a day  -- Indication: For Hypertension    famotidine 40 mg oral tablet  -- 1 tab(s) by mouth once a day (at bedtime)  -- Indication: For GERD    Remicade 100 mg intravenous injection  -- Indication: For Rheumatoid arthritis, involving unspecified site, unspecified rheumatoid factor presence    pantoprazole 40 mg oral delayed release tablet  -- 1 tab(s) by mouth once a day  -- Indication: For GERD    folic acid 1 mg oral tablet  -- 1 tab(s) by mouth once a day  -- Indication: For Supplement    Vitamin D2 50,000 intl units (1.25 mg) oral capsule  -- 1 cap(s) by mouth once a week  -- Indication: For Supplement I will START or STAY ON the medications listed below when I get home from the hospital:    aspirin 81 mg oral tablet, chewable  -- 1 tab(s) by mouth once a day  -- Indication: For Stroke prevention    atorvastatin 80 mg oral tablet  -- 1 tab(s) by mouth once a day (at bedtime)   -- Avoid grapefruit and grapefruit juice while taking this medication.  Do not take this drug if you are pregnant.  It is very important that you take or use this exactly as directed.  Do not skip doses or discontinue unless directed by your doctor.  Obtain medical advice before taking any non-prescription drugs as some may affect the action of this medication.  Take with food or milk.    -- Indication: For Hyperlipidemia    methotrexate 2.5 mg oral tablet  -- 6 tab(s) by mouth once a week  -- Indication: For Rheumatoid arthritis    Plavix 75 mg oral tablet  -- 1 tab(s) by mouth once a day  -- Indication: For Stroke prevention    Xanax 1 mg oral tablet  -- 1 tab(s) by mouth 3 times a day, As Needed  -- Indication: For Anxiety    Toprol-XL 25 mg oral tablet, extended release  -- 1 tab(s) by mouth once a day  -- Indication: For Hypertension    famotidine 40 mg oral tablet  -- 1 tab(s) by mouth once a day (at bedtime)  -- Indication: For GERD    Augmentin 875 mg-125 mg oral tablet  -- 1 tab(s) by mouth 2 times a day for 7 days, and then STOP.   -- Finish all this medication unless otherwise directed by prescriber.  Take with food or milk.    -- Indication: For Cellulitis    pantoprazole 40 mg oral delayed release tablet  -- 1 tab(s) by mouth once a day  -- Indication: For GERD    folic acid 1 mg oral tablet  -- 1 tab(s) by mouth once a day  -- Indication: For Supplement    Vitamin D2 50,000 intl units (1.25 mg) oral capsule  -- 1 cap(s) by mouth once a week  -- Indication: For Supplement

## 2019-02-01 NOTE — DISCHARGE NOTE ADULT - ADDITIONAL INSTRUCTIONS
Please continue to take your oral antibiotics: Augmentin 875mg twice a day for 7 days, and follow up with your primary care doctor within 1 week of discharge. Please stop taking Remicade until you have completed your antibiotic course, and follow up with your rheumatologist before re-starting this medication.

## 2019-02-01 NOTE — PROGRESS NOTE ADULT - ASSESSMENT
73y old Female  with PMHx of CAD s/p PCI x2 , HTN, GERD, DLD, RA (gets remicade infusion every 8 wks last in dec, on methotrexate), presenting with upper lip cellulitis     #cellulitis   -c/w clindamycin iv --> pt medically stable for discharge; pt did not have adequate trial of oral antibiotics and thus will discharge on oral augmentin 875mg x 7 days  -discontinued remicade on discharge until pt completes antibiotic course --> pt to f/u with outpatient rheumatologist before re-starting  monitor for progression    #CAD s/p PCI  - c/w asa, plavix, statin, toprol    #HTN  - c/w toprol    #GERD  - c/w protonix, pepcid    # RA  - gets remicade infusions q8wks, methotrexate weekly, c/w folic acid    #MISC  - DVT PPx: lovenox.  - diet: DASH  - activity as tolerated  - from home  - full code

## 2019-02-01 NOTE — H&P ADULT - NSHPPHYSICALEXAM_GEN_ALL_CORE
T(C): 36.3 (02-01-19 @ 01:04), Max: 36.6 (01-31-19 @ 18:48)  HR: 74 (02-01-19 @ 01:04) (68 - 74)  BP: 141/70 (02-01-19 @ 01:04) (141/70 - 165/84)  RR: 17 (02-01-19 @ 01:04) (17 - 20)  SpO2: 95% (02-01-19 @ 00:11) (95% - 95%)      CONSTITUTIONAL: Well-developed; well-nourished; in no acute distress.  SKIN: +erythema of upper lip on rt side but no warmth  HEAD: Normocephalic; atraumatic.  NECK: Supple; non tender.  No lymphadenopathy.  CARD: S1, S2 normal; . Regular rate and rhythm.  RESP: No wheezes, rales or rhonchi.  ABD:  soft; non-distended; non-tender  EXT: Normal ROM. No clubbing, cyanosis or edema.  NEURO: Alert, oriented. Grossly unremarkable. No focal deficits.  PSYCH: Cooperative, appropriate.

## 2019-02-01 NOTE — DISCHARGE NOTE ADULT - CARE PROVIDERS DIRECT ADDRESSES
[email or web address]

## 2019-02-01 NOTE — H&P ADULT - HISTORY OF PRESENT ILLNESS
73y old Female  with PMHx of CAD s/p PCI x2 , HTN, GERD, DLD, RA (gets remicade infusion every 8 wks last in dec, on methotrexate), presenting with upper lip cellulitis since past 3 days . she mentions that she saw redness on upper lip which has progressed for past 3days along with subjective fever and chills nuit no drainage or skin ulcers. zoe saw her rheumatologist yesterday who prescribed her amoxicillin 1gm and today she requested her to be admitted for iv abx     Rheum: Dr Sally Franco

## 2019-02-01 NOTE — H&P ADULT - ASSESSMENT
73y old Female  with PMHx of CAD s/p PCI x2 , HTN, GERD, DLD, RA (gets remicade infusion every 8 wks last in dec, on methotrexate), presenting with upper lip cellulitis     #cellulitis   start clindamycin iv and can send home on oral   monitor for progression    #CAD s/p PCI  - c/w asa, plavix, statin, toprol    #HTN  - c/w toprol    #GERD  - c/w protonix, pepcid    # RA  - gets remicade infusions q8wks, methotrexate weekly, c/w folic acid    #MISC  - DVT PPx: lovenox.  - diet: DASH  - activity as tolerated  - from home  - full code

## 2019-02-01 NOTE — DISCHARGE NOTE ADULT - CARE PROVIDER_API CALL
Harrison De Los Santos)  Geriatric Medicine; Internal Medicine  [address] [place] [address]  Phone: [phone]  Fax: [phone] Harrison De Los Santos)  Geriatric Medicine; Internal Medicine  [address] [place] [address]  Phone: [phone]  Fax: [phone]    Sally Frnaco)  Medicine  [address] [place] [address]  Phone: [phone]  Fax: [phone]

## 2019-02-01 NOTE — DISCHARGE NOTE ADULT - MEDICATION SUMMARY - MEDICATIONS TO STOP TAKING
I will STOP taking the medications listed below when I get home from the hospital:  None I will STOP taking the medications listed below when I get home from the hospital:    Remicade 100 mg intravenous injection

## 2019-02-01 NOTE — DISCHARGE NOTE ADULT - PATIENT PORTAL LINK FT
You can access the [facility] Portal, offered by [facility], by registering with the following website: [email or web address]

## 2019-02-01 NOTE — DISCHARGE NOTE ADULT - PLAN OF CARE
Medical management You were hospitalized for a skin infection of your upper lip. You were treated with IV antibiotics, and transitioned to oral antibiotics. Please continue to take your oral antibiotics: Augmentin 875mg twice a day for 7 days, and follow up with your primary care doctor within 1 week of discharge. Medical Management You are currently taking remicade for your rheumatoid arthritis which his an immunosuppressant medication. Please stop taking this medication until you have completed your antibiotic course, and follow up with your rheumatologist before re-starting this medication.

## 2019-02-01 NOTE — H&P ADULT - NSHPLABSRESULTS_GEN_ALL_CORE
13.0   8.04  )-----------( 222      ( 31 Jan 2019 21:30 )             38.5       01-31    140  |  98  |  13  ----------------------------<  73  3.7   |  26  |  0.7    Ca    9.4      31 Jan 2019 21:30    TPro  7.6  /  Alb  4.6  /  TBili  <0.2  /  DBili  x   /  AST  17  /  ALT  9   /  AlkPhos  92  01-31                      Lactate Trend  01-31 @ 21:30 Lactate:0.8             CAPILLARY BLOOD GLUCOSE        < from: 12 Lead ECG (01.31.19 @ 21:06) >     Normal sinus rhythm  Normal ECG    < end of copied text >

## 2019-02-01 NOTE — PROGRESS NOTE ADULT - SUBJECTIVE AND OBJECTIVE BOX
SUBJECTIVE:    Patient is a 73y old Female who presents with a chief complaint of facial cellulitis (01 Feb 2019 10:17)    Currently admitted to medicine with the primary diagnosis of Facial cellulitis     Today is hospital day 1d. This morning she is resting comfortably in bed and reports no new issues or overnight events.     PAST MEDICAL & SURGICAL HISTORY  Rheumatoid arthritis  GERD (gastroesophageal reflux disease)  MI (myocardial infarction)  Hyperlipidemia  CAD (coronary artery disease)  History of surgery: stent placement    SOCIAL HISTORY:  Negative for smoking/alcohol/drug use.     ALLERGIES:  Zocor (Joint Pain)    MEDICATIONS:  STANDING MEDICATIONS  ampicillin/sulbactam  IVPB 3 Gram(s) IV Intermittent every 6 hours  aspirin  chewable 81 milliGRAM(s) Oral daily  atorvastatin 80 milliGRAM(s) Oral at bedtime  chlorhexidine 4% Liquid 1 Application(s) Topical <User Schedule>  clindamycin IVPB 600 milliGRAM(s) IV Intermittent every 8 hours  clopidogrel Tablet 75 milliGRAM(s) Oral daily  enoxaparin Injectable 40 milliGRAM(s) SubCutaneous every 24 hours  famotidine    Tablet 40 milliGRAM(s) Oral at bedtime  folic acid 1 milliGRAM(s) Oral daily  metoprolol succinate ER 25 milliGRAM(s) Oral daily  pantoprazole    Tablet 40 milliGRAM(s) Oral before breakfast    PRN MEDICATIONS  ALPRAZolam 1 milliGRAM(s) Oral three times a day PRN    VITALS:   T(F): 97  HR: 76  BP: 165/75  RR: 17  SpO2: 95%    LABS:                        11.8   7.58  )-----------( 197      ( 01 Feb 2019 05:26 )             35.4     02-01    141  |  102  |  10  ----------------------------<  74  4.0   |  24  |  0.6<L>    Ca    8.7      01 Feb 2019 05:26    TPro  7.6  /  Alb  4.6  /  TBili  <0.2  /  DBili  x   /  AST  17  /  ALT  9   /  AlkPhos  92  01-31          Lactate, Blood: 0.8 mmol/L (01-31-19 @ 21:30)          RADIOLOGY:    PHYSICAL EXAM:  GEN: No acute distress, lying comfortably in bed   HEENT: Upper lip erythematous without distinct demarcation, tender to palpation  LUNGS: Clear to auscultation bilaterally, no labored breathing   HEART: S1/S2 present. RRR.   ABD: Soft, non-tender, non-distended. Bowel sounds present.   EXT: Noncyanotic, nonedematous, 2+ peripheral pulses, skin intact   NEURO: AAOX3, CN II-XII grossly intact     Lines/Tubes </u  Shah cathter:

## 2019-02-06 DIAGNOSIS — E78.00 PURE HYPERCHOLESTEROLEMIA, UNSPECIFIED: ICD-10-CM

## 2019-02-06 DIAGNOSIS — I10 ESSENTIAL (PRIMARY) HYPERTENSION: ICD-10-CM

## 2019-02-06 DIAGNOSIS — I25.118 ATHEROSCLEROTIC HEART DISEASE OF NATIVE CORONARY ARTERY WITH OTHER FORMS OF ANGINA PECTORIS: ICD-10-CM

## 2019-02-06 DIAGNOSIS — I20.8 OTHER FORMS OF ANGINA PECTORIS: ICD-10-CM

## 2019-02-06 DIAGNOSIS — F17.210 NICOTINE DEPENDENCE, CIGARETTES, UNCOMPLICATED: ICD-10-CM

## 2019-02-11 DIAGNOSIS — L03.211 CELLULITIS OF FACE: ICD-10-CM

## 2019-02-11 DIAGNOSIS — Z95.5 PRESENCE OF CORONARY ANGIOPLASTY IMPLANT AND GRAFT: ICD-10-CM

## 2019-02-11 DIAGNOSIS — M06.9 RHEUMATOID ARTHRITIS, UNSPECIFIED: ICD-10-CM

## 2019-02-11 DIAGNOSIS — K13.0 DISEASES OF LIPS: ICD-10-CM

## 2019-02-11 DIAGNOSIS — I25.10 ATHEROSCLEROTIC HEART DISEASE OF NATIVE CORONARY ARTERY WITHOUT ANGINA PECTORIS: ICD-10-CM

## 2019-02-11 DIAGNOSIS — E53.8 DEFICIENCY OF OTHER SPECIFIED B GROUP VITAMINS: ICD-10-CM

## 2019-02-11 DIAGNOSIS — E78.5 HYPERLIPIDEMIA, UNSPECIFIED: ICD-10-CM

## 2019-02-11 DIAGNOSIS — K21.9 GASTRO-ESOPHAGEAL REFLUX DISEASE WITHOUT ESOPHAGITIS: ICD-10-CM

## 2019-05-10 NOTE — H&P ADULT - NSICDXFAMILYHX_GEN_ALL_CORE_FT
FAMILY HISTORY:  Father  Still living? Unknown  Family history of heart disease, Age at diagnosis: Age Unknown    Mother  Still living? Unknown  Family history of heart attack, Age at diagnosis: Age Unknown

## 2019-05-10 NOTE — H&P ADULT - ASSESSMENT
73y old Female  with PMHx of CAD s/p PCI x2 , HTN, GERD, DLD, RA (on methotrexate), last hospitalized 2/'19 with upper lip cellulitis and dc'ed with course of augmentin --  presents back to hospital for worsening cough over past few weeks.    Found to have R basilar opacity on XR and being admitted for pna.       PNA  -- o2 as needed  -- check bcx, sputum gs,  flu a/b (+droplet precaution until flu r/o)  -- iv abx:  rocephin + azithromycin      CAD: cw asa/plavix, toprol, statin  Rheumatoid arthritis: cw mtx  GERD: ppi  Hyperlipidemia: cw statin    CHG 4% bath daily and prn  DVT PPX  DISPO 73y old Female  with PMHx of CAD s/p PCI x2 , HTN, GERD, DLD, RA (on methotrexate), last hospitalized 2/'19 with upper lip cellulitis and dc'ed with course of augmentin --  presents back to hospital for worsening cough (productive, green sputum) over past few weeks a/w nausea/extremity paresthesias/chest pain occuring only during her coughing fits.     Found to have R basilar opacity on XR and being admitted for pna.       PNA  -- o2 as needed  -- check bcx, sputum gs,  flu a/b/ rvp  -- iv abx:  rocephin + azithromycin    CP  -- pleuritic and with coughing fits, unlikely cardiac in origin  -- trops: neg x 1, can trend one more time      CAD: cw asa/plavix, toprol, statin  Rheumatoid arthritis: cw mtx  GERD: ppi  Hyperlipidemia: cw statin    CHG 4% bath daily and prn  DVT PPX  DISPO 73y old Female  with PMHx of CAD s/p PCI x2 , HTN, GERD, DLD, RA (on methotrexate), last hospitalized 2/'19 with upper lip cellulitis and dc'ed with course of augmentin --  presents back to hospital for worsening cough (productive, green sputum) over past few weeks a/w nausea/extremity paresthesias/chest pain occuring only during her coughing fits.     Found to have R basilar opacity on XR and being admitted for pna.       Suspected Gram negative pneumonia   -- o2 as needed  -- check bcx, sputum gs,  flu a/b/ rvp  -- iv abx:  rocephin + azithromycin    CP  -- pleuritic and with coughing fits, unlikely cardiac in origin  -- trops: neg x 1, can trend one more time      CAD: cw asa/plavix, toprol, statin  Rheumatoid arthritis: cw mtx  GERD: ppi  Hyperlipidemia: cw statin    CHG 4% bath daily and prn  DVT PPX  DISPO

## 2019-05-10 NOTE — ED PROVIDER NOTE - CLINICAL SUMMARY MEDICAL DECISION MAKING FREE TEXT BOX
72 yo female h/wo ra on remicaid, p/w cough, near syncope and chest pain after cough. CXR with pna. Will give antibiotics and admit.  Also will trend troponins.

## 2019-05-10 NOTE — ED PROVIDER NOTE - OBJECTIVE STATEMENT
72 yo female h/o RA on remicaid has not had since Nov due to intermittent illnesses, smoker now quitting, CAD with stents complicated with staph infection, p/w coughing and near syncope today. Pt notes she has had a cough for weeks but much worse this week and to the point that she almost had syncopal episode this am.  Also felt b/l arm tingling this am with cough and some chest pain today.  +phlegm that clear.  Tried antihistamines with no relief. Called Betty who asked her to come in for eval.

## 2019-05-10 NOTE — ED PROVIDER NOTE - NS ED ROS FT
Review of Systems    Constitutional: (-) fever  Cardiovascular: (-) syncope  Respiratory: refer to hpi  Gastrointestinal: (-) vomiting, (-) diarrhea, (-) abdominal pain  Musculoskeletal: (-) neck pain, (-) back pain, (-) joint pain  Integumentary: (-) rash, (-) edema  Neurological: (-) headache, (-) altered mental status    Except as documented in the HPI, all other systems are negative.

## 2019-05-10 NOTE — ED ADULT NURSE NOTE - NSIMPLEMENTINTERV_GEN_ALL_ED
Implemented All Universal Safety Interventions:  [place] to call system. Call bell, personal items and telephone within reach. Instruct patient to call for assistance. Room bathroom lighting operational. Non-slip footwear when patient is off stretcher. Physically safe environment: no spills, clutter or unnecessary equipment. Stretcher in lowest position, wheels locked, appropriate side rails in place.

## 2019-05-10 NOTE — H&P ADULT - NSICDXPASTMEDICALHX_GEN_ALL_CORE_FT
PAST MEDICAL HISTORY:  CAD (coronary artery disease)     GERD (gastroesophageal reflux disease)     Hyperlipidemia     MI (myocardial infarction)     Rheumatoid arthritis

## 2019-05-10 NOTE — ED PROVIDER NOTE - CARE PLAN
Principal Discharge DX:	Cough  Secondary Diagnosis:	CAD (coronary artery disease)  Secondary Diagnosis:	Pneumonia

## 2019-05-10 NOTE — H&P ADULT - HISTORY OF PRESENT ILLNESS
73y old Female  with PMHx of CAD s/p PCI x2 , HTN, GERD, DLD, RA (on methotrexate), last hospitalized 2/'19 with upper lip cellulitis and dc'ed with course of augmentin --  presents back to hospital for worsening cough over past few weeks. 73y old Female  with PMHx of CAD s/p PCI x2 , HTN, GERD, DLD, RA (on methotrexate), last hospitalized 2/'19 with upper lip cellulitis and dc'ed with course of augmentin --  presents back to hospital for worsening cough over past few weeks.    Pt states she has been coughing for ~2 weeks.  She states her symptoms started initially with runny nose / body aches, and progressed to a worsening produtive cough (occasionally greenish sputum).  Over past week,  she has started to have coughing fits a/w posttussive nausea/tingling of extremities and chest pains.   Her symptoms now are a/w occasional chills (unknown if fevers).     Pt denies any chest pain when she's not coughing, and denies any exertional chest pain / le swelling.  Pt denies orthopnea,  but states if she lays down for prolonged time she feels like phglem gets stuck in her airways and she has to get up to cough it up.  Pt is worried her coughing fits will displace her previous cardiac stents.

## 2019-05-10 NOTE — H&P ADULT - ATTENDING COMMENTS
Patient with hx of RA, CAD s/p recent PCI, HTN, HLD, and former smoker presented with worsening coughing fits associated with presyncope. Patient reported 2 week hx of worsening cough and then today she experienced dizziness and nausea after a coughing fit. She had URI like symptoms prior to the worsening cough. Denies fevers but reports chills.   She reports having RA for 17 years, on Remicade and MTX, however has not received Remicade recently due to infections. She reports compliance with MTX. She believes her RA is poorly controlled. Denies dyspnea on exertion and prior to 2 weeks ago she was feeling fine.     PHYSICAL EXAM:  GENERAL: NAD, well-developed  HEAD:  Atraumatic, Normocephalic  EYES: conjunctiva and sclera clear  NECK: Supple, No JVD  CHEST/LUNG: Clear to auscultation bilaterally; No wheeze  HEART: Regular rate and rhythm; No murmurs, rubs, or gallops  ABDOMEN: Soft, Nontender, Nondistended; Bowel sounds present  EXTREMITIES:  2+ Peripheral Pulses, No clubbing, cyanosis, or edema  PSYCH: AAOx3  NEUROLOGY: non-focal  SKIN: No rashes or lesions     A/P: Patient with RA presented with severe coughing fits associated with presyncope. CXR showing possible pneumonia and fibrotic lung changes. She possibly has superimposed pneumonia after URI vs RA associated ILD.   - For now continue IV Abx, cough suppressant, nebs PRN   - Obtain HiRes CT Chest   - Pulmonary evaluation   - Obtain sputum culture    #Suspected Folate deficiency - c/w folic acid Patient with hx of RA, CAD s/p recent PCI, HTN, HLD, and former smoker presented with worsening coughing fits associated with presyncope. Patient reported 2 week hx of worsening cough and then today she experienced dizziness and nausea after a coughing fit. She had URI like symptoms prior to the worsening cough. Denies fevers but reports chills.   She reports having RA for 17 years, on Remicade and MTX, however has not received Remicade recently due to infections. She reports compliance with MTX. She believes her RA is poorly controlled. Denies dyspnea on exertion and prior to 2 weeks ago she was feeling fine.     PHYSICAL EXAM:  GENERAL: NAD, well-developed  HEAD:  Atraumatic, Normocephalic  EYES: conjunctiva and sclera clear  NECK: Supple, No JVD  CHEST/LUNG: Clear to auscultation bilaterally; No wheeze  HEART: Regular rate and rhythm; No murmurs, rubs, or gallops  ABDOMEN: Soft, Nontender, Nondistended; Bowel sounds present  EXTREMITIES:  2+ Peripheral Pulses, No clubbing, cyanosis, or edema  PSYCH: AAOx3  NEUROLOGY: non-focal  SKIN: No rashes or lesions     A/P: Patient with RA presented with severe coughing fits associated with presyncope. CXR showing possible pneumonia and fibrotic lung changes. She possibly has superimposed pneumonia after URI vs chronic bronchitis vs RA associated ILD.   - For now continue IV Abx, cough suppressant, nebs PRN   - Obtain HiRes CT Chest   - Pulmonary evaluation   - Obtain sputum culture    #Suspected Folate deficiency - c/w folic acid

## 2019-05-10 NOTE — H&P ADULT - NSHPPHYSICALEXAM_GEN_ALL_CORE
Constitutional: non-toxic,  not in acute distress  HEENT: eomi,  wnl  Respiratory:  diminished bs b/l  Cardiovascular:  s1, s2,  regular, no murmurs  Gastrointestinal:  nontender, nondistended, +bowel sounds  Extremities: no edema b/l le  Neurological: nonfocal; wnl, aaox3    ------------------------------------------------------------------     VITAL SIGNS   T(F): 98.3 (05-10 @ 16:22), Max: 98.3 (05-10 @ 16:22)  HR: 64 (05-10 @ 16:22)  BP: 128/60 (05-10 @ 16:22)  RR: 18 (05-10 @ 12:51)  SpO2: 95% (05-10 @ 12:51)

## 2019-05-10 NOTE — ED ADULT NURSE NOTE - OBJECTIVE STATEMENT
Patient c/o worsening cough over 6 weeks, she stated she was trying to self treat with allergy medications (Claritin Zyrtec and Benadryl) but non have worked. cough described as frequent and nonproductive, occasionally will have coughing fits lasting 2-3 minutes and causing her to become light headed. Patient denies CP Abdominal pain N/V and urinary symptoms

## 2019-05-11 NOTE — PROGRESS NOTE ADULT - ASSESSMENT
ABHINAV DREW 73y Female  MRN#: [id number]   CODE STATUS:________      SUBJECTIVE  Patient is a 73y old Female who presents with a chief complaint of COUGH/PNA (10 May 2019 16:34)  Currently admitted to medicine with the primary diagnosis of Cough  Today is hospital day 1d, and this morning she is _________ and reports ________ overnight events.     OBJECTIVE  PAST MEDICAL & SURGICAL HISTORY  Rheumatoid arthritis  GERD (gastroesophageal reflux disease)  MI (myocardial infarction)  Hyperlipidemia  CAD (coronary artery disease)  History of surgery: stent placement    ALLERGIES:  Zocor (Joint Pain)    MEDICATIONS:  STANDING MEDICATIONS  aspirin  chewable 81 milliGRAM(s) Oral daily  atorvastatin 80 milliGRAM(s) Oral at bedtime  azithromycin  IVPB      azithromycin  IVPB 500 milliGRAM(s) IV Intermittent every 24 hours  cefTRIAXone   IVPB 1 Gram(s) IV Intermittent every 24 hours  cefTRIAXone   IVPB      chlorhexidine 4% Liquid 1 Application(s) Topical <User Schedule>  clopidogrel Tablet 75 milliGRAM(s) Oral daily  enoxaparin Injectable 40 milliGRAM(s) SubCutaneous every 24 hours  folic acid 1 milliGRAM(s) Oral daily  metoprolol succinate ER 25 milliGRAM(s) Oral daily  pantoprazole    Tablet 40 milliGRAM(s) Oral before breakfast    PRN MEDICATIONS  ALPRAZolam 1 milliGRAM(s) Oral three times a day PRN      VITAL SIGNS: Last 24 Hours  T(C): 36.3 (11 May 2019 04:30), Max: 36.8 (10 May 2019 16:22)  T(F): 97.3 (11 May 2019 04:30), Max: 98.3 (10 May 2019 16:22)  HR: 84 (11 May 2019 04:30) (63 - 84)  BP: 132/63 (11 May 2019 04:30) (128/60 - 139/65)  BP(mean): --  RR: 18 (11 May 2019 04:30) (18 - 20)  SpO2: 95% (10 May 2019 20:15) (95% - 95%)    LABS:                        12.3   9.83  )-----------( 262      ( 10 May 2019 14:29 )             37.2     05-10    139  |  101  |  12  ----------------------------<  110<H>  4.7   |  24  |  0.7    Ca    9.5      10 May 2019 14:29    TPro  7.0  /  Alb  4.1  /  TBili  0.3  /  DBili  x   /  AST  21  /  ALT  9   /  AlkPhos  93  05-10    Troponin T, Serum: <0.01 ng/mL (05-10-19 @ 21:25)  Lactate, Blood: 1.1 mmol/L (05-10-19 @ 14:29)  Troponin T, Serum: <0.01 ng/mL (05-10-19 @ 14:29)      CARDIAC MARKERS ( 10 May 2019 21:25 )  x     / <0.01 ng/mL / x     / x     / x      CARDIAC MARKERS ( 10 May 2019 14:29 )  x     / <0.01 ng/mL / x     / x     / x        RADIOLOGY:    PHYSICAL EXAM:                Constitutional: non-toxic,  not in acute distress  	HEENT: eomi,  wnl  	Respiratory:  normal air entry bilateral   	Cardiovascular:  s1, s2,  regular, no murmurs  	Gastrointestinal:  nontender, nondistended,  	Extremities: no edema b/l le  	Neurological: nonfocal; wnl, aaox3      ASSESSMENT & PLAN  73y old Female  with PMHx of CAD s/p PCI x2 , HTN, GERD, DLD, RA (on methotrexate), last hospitalized 2/'19 with upper lip cellulitis and dc'ed with course of Augmentin, presents back to hospital for worsening cough (productive, green sputum) over past few weeks. Found to have R basilar opacity on XR and being admitted for pna.       # possible GN PNA  - check bcx, sputum gs,  flu a/b/ rvp  - iv abx:  rocephin + azithromycin        # CAD:   - cw asa/plavix, toprol, statin    # Rheumatoid arthritis:  - cw mtx    #GERD: ppi  Hyperlipidemia: cw statin    CHG 4% bath daily and prn  DVT PPX  DISPO ABHINAV DREW 73y Female  MRN#: [id number]   CODE STATUS: FULL CDOE       SUBJECTIVE  Patient is a 73y old Female who presents with a chief complaint of COUGH/PNA (10 May 2019 16:34)  Currently admitted to medicine with the primary diagnosis of PNA  Today is hospital day 1d, and this morning she is resting comfortably  and reports no overnight events.     OBJECTIVE  PAST MEDICAL & SURGICAL HISTORY  Rheumatoid arthritis  GERD (gastroesophageal reflux disease)  MI (myocardial infarction)  Hyperlipidemia  CAD (coronary artery disease)  History of surgery: stent placement    ALLERGIES:  Zocor (Joint Pain)    MEDICATIONS:  STANDING MEDICATIONS  aspirin  chewable 81 milliGRAM(s) Oral daily  atorvastatin 80 milliGRAM(s) Oral at bedtime  azithromycin  IVPB      azithromycin  IVPB 500 milliGRAM(s) IV Intermittent every 24 hours  cefTRIAXone   IVPB 1 Gram(s) IV Intermittent every 24 hours  cefTRIAXone   IVPB      chlorhexidine 4% Liquid 1 Application(s) Topical <User Schedule>  clopidogrel Tablet 75 milliGRAM(s) Oral daily  enoxaparin Injectable 40 milliGRAM(s) SubCutaneous every 24 hours  folic acid 1 milliGRAM(s) Oral daily  metoprolol succinate ER 25 milliGRAM(s) Oral daily  pantoprazole    Tablet 40 milliGRAM(s) Oral before breakfast    PRN MEDICATIONS  ALPRAZolam 1 milliGRAM(s) Oral three times a day PRN      VITAL SIGNS: Last 24 Hours  T(C): 36.3 (11 May 2019 04:30), Max: 36.8 (10 May 2019 16:22)  T(F): 97.3 (11 May 2019 04:30), Max: 98.3 (10 May 2019 16:22)  HR: 84 (11 May 2019 04:30) (63 - 84)  BP: 132/63 (11 May 2019 04:30) (128/60 - 139/65)  BP(mean): --  RR: 18 (11 May 2019 04:30) (18 - 20)  SpO2: 95% (10 May 2019 20:15) (95% - 95%)    LABS:                        12.3   9.83  )-----------( 262      ( 10 May 2019 14:29 )             37.2     05-10    139  |  101  |  12  ----------------------------<  110<H>  4.7   |  24  |  0.7    Ca    9.5      10 May 2019 14:29    TPro  7.0  /  Alb  4.1  /  TBili  0.3  /  DBili  x   /  AST  21  /  ALT  9   /  AlkPhos  93  05-10    Troponin T, Serum: <0.01 ng/mL (05-10-19 @ 21:25)  Lactate, Blood: 1.1 mmol/L (05-10-19 @ 14:29)  Troponin T, Serum: <0.01 ng/mL (05-10-19 @ 14:29)      CARDIAC MARKERS ( 10 May 2019 21:25 )  x     / <0.01 ng/mL / x     / x     / x      CARDIAC MARKERS ( 10 May 2019 14:29 )  x     / <0.01 ng/mL / x     / x     / x        RADIOLOGY:    PHYSICAL EXAM:                Constitutional: non-toxic,  not in acute distress  	HEENT: eomi,  wnl  	Respiratory:  normal air entry bilateral   	Cardiovascular:  s1, s2,  regular, no murmurs  	Gastrointestinal:  nontender, nondistended,  	Extremities: no edema b/l le  	Neurological: nonfocal; wnl, aaox3      ASSESSMENT & PLAN  73y old Female  with PMHx of CAD s/p PCI x2 , HTN, GERD, DLD, RA (on methotrexate), last hospitalized 2/'19 with upper lip cellulitis and dc'ed with course of Augmentin, presents back to hospital for worsening cough (productive, green sputum) over past few weeks. Found to have R basilar opacity on XR and being admitted for pna.       # possible GN PNA,   - check bcx, sputum gs,  flu a/b/ rvp  - iv abx:  rocephin + azithromycin    # CAD:   - cw asa/plavix, toprol, statin    # Rheumatoid arthritis:  - cw mtx    #GERD: ppi  Hyperlipidemia: cw statin    CHG 4% bath daily and prn  DVT PPX  DISPO ABHINAV DREW 73y Female  MRN#: [id number]   CODE STATUS: FULL CDOE       SUBJECTIVE  Patient is a 73y old Female who presents with a chief complaint of COUGH/PNA (10 May 2019 16:34)  Currently admitted to medicine with the primary diagnosis of PNA  Today is hospital day 1d, and this morning she is resting comfortably  and reports no overnight events.   Overall improving,reports improvement. no other new acute complaints    OBJECTIVE  PAST MEDICAL & SURGICAL HISTORY  Rheumatoid arthritis  GERD (gastroesophageal reflux disease)  MI (myocardial infarction)  Hyperlipidemia  CAD (coronary artery disease)  History of surgery: stent placement    ALLERGIES:  Zocor (Joint Pain)    MEDICATIONS:  STANDING MEDICATIONS  aspirin  chewable 81 milliGRAM(s) Oral daily  atorvastatin 80 milliGRAM(s) Oral at bedtime  azithromycin  IVPB      azithromycin  IVPB 500 milliGRAM(s) IV Intermittent every 24 hours  cefTRIAXone   IVPB 1 Gram(s) IV Intermittent every 24 hours  cefTRIAXone   IVPB      chlorhexidine 4% Liquid 1 Application(s) Topical <User Schedule>  clopidogrel Tablet 75 milliGRAM(s) Oral daily  enoxaparin Injectable 40 milliGRAM(s) SubCutaneous every 24 hours  folic acid 1 milliGRAM(s) Oral daily  metoprolol succinate ER 25 milliGRAM(s) Oral daily  pantoprazole    Tablet 40 milliGRAM(s) Oral before breakfast    PRN MEDICATIONS  ALPRAZolam 1 milliGRAM(s) Oral three times a day PRN      VITAL SIGNS: Last 24 Hours  T(C): 36.3 (11 May 2019 04:30), Max: 36.8 (10 May 2019 16:22)  T(F): 97.3 (11 May 2019 04:30), Max: 98.3 (10 May 2019 16:22)  HR: 84 (11 May 2019 04:30) (63 - 84)  BP: 132/63 (11 May 2019 04:30) (128/60 - 139/65)  BP(mean): --  RR: 18 (11 May 2019 04:30) (18 - 20)  SpO2: 95% (10 May 2019 20:15) (95% - 95%)    LABS:                        12.3   9.83  )-----------( 262      ( 10 May 2019 14:29 )             37.2     05-10    139  |  101  |  12  ----------------------------<  110<H>  4.7   |  24  |  0.7    Ca    9.5      10 May 2019 14:29    TPro  7.0  /  Alb  4.1  /  TBili  0.3  /  DBili  x   /  AST  21  /  ALT  9   /  AlkPhos  93  05-10    Troponin T, Serum: <0.01 ng/mL (05-10-19 @ 21:25)  Lactate, Blood: 1.1 mmol/L (05-10-19 @ 14:29)  Troponin T, Serum: <0.01 ng/mL (05-10-19 @ 14:29)      CARDIAC MARKERS ( 10 May 2019 21:25 )  x     / <0.01 ng/mL / x     / x     / x      CARDIAC MARKERS ( 10 May 2019 14:29 )  x     / <0.01 ng/mL / x     / x     / x        RADIOLOGY:    PHYSICAL EXAM:                Constitutional: non-toxic,  not in acute distress  	HEENT: eomi,  wnl  	Respiratory:  normal air entry bilateral   	Cardiovascular:  s1, s2,  regular, no murmurs  	Gastrointestinal:  nontender, nondistended,  	Extremities: no edema b/l le  	Neurological: nonfocal; wnl, aaox3      ASSESSMENT & PLAN  73y old Female  with PMHx of CAD s/p PCI x2 , HTN, GERD, DLD, RA (on methotrexate), last hospitalized 2/'19 with upper lip cellulitis and dc'ed with course of Augmentin, presents back to hospital for worsening cough (productive, green sputum) over past few weeks. Found to have R basilar opacity on XR and being admitted for pna.     # PNA, cant rule out GNR   - f/u blood and sputum cx  - FLu negative   - MRSA   - c/w current Abx as patient improving     # Lung fibrosis, (MTX vs RA)  - High resolution CT scan   - Pulm eval  - consider holding MTX and reassess as outpatient.     # CAD:   - cw asa/plavix, toprol, statin    # Rheumatoid arthritis:  - cw mtx    #GERD: ppi    #Hyperlipidemia: cw statin    CHG 4% bath daily and prn  DVT PPX  DISPO

## 2019-05-12 NOTE — PROGRESS NOTE ADULT - ASSESSMENT
72 y/o F, hx of RA, CAD s/p recent PCI, HTN, HLD, and former smoker presented with worsening coughing fits associated with presyncop x 2 weeks    She reports having RA for 17 years, on Remicade and MTX, however has not received Remicade recently due to infections. She reports compliance with MTX. She believes her RA is poorly controlled. Denies dyspnea on exertion and prior to 2 weeks ago she was feeling fine.       A/P:   #RA associated ILD / Emphysema   #Possible Pneumonia :  But CT shows no consolidation  PAtient overall improved. complete total 5 days of antibiotics just in case there may be any CAP  Pulm eval.     #RA:   home meds    #Suspected Folate deficiency - c/w folic acid . 72 y/o F, hx of RA, CAD s/p recent PCI, HTN, HLD, and former smoker presented with worsening coughing fits associated with presyncop x 2 weeks    She reports having RA for 17 years, on Remicade and MTX, however has not received Remicade recently due to infections. She reports compliance with MTX. She believes her RA is poorly controlled. Denies dyspnea on exertion and prior to 2 weeks ago she was feeling fine.       A/P:   #RA associated ILD / Emphysema   #Possible Pneumonia :  But CT shows no consolidation  PAtient overall improved. complete total 5 days of antibiotics just in case there may be any CAP  Pulm eval.     #RA:   may resume home meds if pulm agrees    #Suspected Folate deficiency - c/w folic acid . 74 y/o F, hx of RA, CAD s/p recent PCI, HTN, HLD, and former smoker presented with worsening coughing fits associated with presyncop x 2 weeks    She reports having RA for 17 years, on Remicade and MTX, however has not received Remicade recently due to infections. She reports compliance with MTX. She believes her RA is poorly controlled. Denies dyspnea on exertion and prior to 2 weeks ago she was feeling fine.     CT showed hitherto undiagnosed Emphysema      A/P:   #RA associated ILD / Emphysema (New diagnosis)   #Possible Pneumonia :  But CT shows no consolidation  PAtient overall improved. complete total 5 days of antibiotics just in case there may be any CAP  Pulm eval.   spiriva/symbicort  outpatient PFTs  Long term smoker. quit smoking 1 month ago.     #RA:   may resume home meds only if pulm agrees    #Suspected Folate deficiency - c/w folic acid .

## 2019-05-13 NOTE — CONSULT NOTE ADULT - SUBJECTIVE AND OBJECTIVE BOX
Patient is a 73y old  Female who presents with a chief complaint of COUGH/PNA (12 May 2019 18:04)      HPI:  73y old Female  with PMHx of CAD s/p PCI x2 , HTN, GERD, DLD, RA (on methotrexate), last hospitalized 2/'19 with upper lip cellulitis and dc'ed with course of augmentin --  presents back to hospital for worsening cough over past few weeks.    Pt states she has been coughing for ~2 weeks.  She states her symptoms started initially with runny nose / body aches, and progressed to a worsening produtive cough (occasionally greenish sputum).  Over past week,  she has started to have coughing fits a/w posttussive nausea/tingling of extremities and chest pains.   Her symptoms now are a/w occasional chills (unknown if fevers).     Pt denies any chest pain when she's not coughing, and denies any exertional chest pain / le swelling.  Pt denies orthopnea,  but states if she lays down for prolonged time she feels like phglem gets stuck in her airways and she has to get up to cough it up.  Pt is worried her coughing fits will displace her previous cardiac stents. (10 May 2019 16:34)      Allergies    Zocor (Joint Pain)    Intolerances        Home Medications:  famotidine 40 mg oral tablet: 1 tab(s) orally once a day (at bedtime) (01 Feb 2019 02:43)  folic acid 1 mg oral tablet: 1 tab(s) orally once a day (01 Feb 2019 02:43)  methotrexate 2.5 mg oral tablet: 6 tab(s) orally once a week (01 Feb 2019 02:43)  pantoprazole 40 mg oral delayed release tablet: 1 tab(s) orally once a day (01 Feb 2019 02:43)  Toprol-XL 25 mg oral tablet, extended release: 1 tab(s) orally once a day (01 Feb 2019 02:43)  Vitamin D2 50,000 intl units (1.25 mg) oral capsule: 1 cap(s) orally once a week (01 Feb 2019 02:43)  Xanax 1 mg oral tablet: 1 tab(s) orally 3 times a day, As Needed (01 Feb 2019 02:43)      SOCIAL HX:    Smoking            ETOH/illicit drugs             Occupation    PAST MEDICAL & SURGICAL HISTORY:  Rheumatoid arthritis  GERD (gastroesophageal reflux disease)  MI (myocardial infarction)  Hyperlipidemia  CAD (coronary artery disease)  History of surgery: stent placement      FAMILY HISTORY:  Family history of heart disease (Father)  Family history of heart attack (Mother)  .    No cardiovascular or pulmonary family history     Review of System:  See HPI    PHYSICAL EXAM  Vital Signs Last 24 Hrs  T(C): 35.6 (13 May 2019 05:00), Max: 36.6 (12 May 2019 20:24)  T(F): 96.1 (13 May 2019 05:00), Max: 97.8 (12 May 2019 20:24)  HR: 97 (13 May 2019 05:00) (65 - 97)  BP: 172/92 (13 May 2019 05:00) (141/67 - 172/92)  BP(mean): --  RR: 16 (13 May 2019 05:00) (16 - 18)  SpO2: 97% (12 May 2019 22:29) (97% - 97%)    General: In NAD  HEENT: RUSS             Lymphatic system: No LN  appreciated  Lungs: Carl BS  Cardiovascular: Regular  Gastrointestinal: Soft.  + BS   Musculoskeletal: No Clubbing.  Moves all extremities  Skin: Warm.  Intact  Neurological: No motor or sensory deficit       LABS:                          11.7   9.57  )-----------( 240      ( 12 May 2019 10:45 )             35.5                                               05-12    139  |  103  |  8<L>  ----------------------------<  84  4.3   |  25  |  0.7    Ca    8.7      12 May 2019 10:45  Mg     2.0     05-11    TPro  6.2  /  Alb  3.6  /  TBili  0.4  /  DBili  x   /  AST  16  /  ALT  7   /  AlkPhos  85  05-11                                                                                           LIVER FUNCTIONS - ( 11 May 2019 08:55 )  Alb: 3.6 g/dL / Pro: 6.2 g/dL / ALK PHOS: 85 U/L / ALT: 7 U/L / AST: 16 U/L / GGT: x                                                                                              Serum Pro-Brain Natriuretic Peptide: 571 pg/mL (05-10-19 @ 14:29)    < from: CT Chest No Cont (05.11.19 @ 17:02) >    IMPRESSION:    Extensive emphysema with paraseptal predominance, progressed since the   prior exam.    No evidence of pulmonary consolidation.        < end of copied text >        MEDICATIONS  (STANDING):  ALBUTerol/ipratropium for Nebulization 3 milliLiter(s) Nebulizer every 6 hours  aspirin  chewable 81 milliGRAM(s) Oral daily  atorvastatin 80 milliGRAM(s) Oral at bedtime  azithromycin  IVPB      azithromycin  IVPB 500 milliGRAM(s) IV Intermittent every 24 hours  buDESOnide 160 MICROgram(s)/formoterol 4.5 MICROgram(s) Inhaler 2 Puff(s) Inhalation two times a day  cefTRIAXone   IVPB 1 Gram(s) IV Intermittent every 24 hours  cefTRIAXone   IVPB      chlorhexidine 4% Liquid 1 Application(s) Topical <User Schedule>  clopidogrel Tablet 75 milliGRAM(s) Oral daily  enoxaparin Injectable 40 milliGRAM(s) SubCutaneous every 24 hours  folic acid 1 milliGRAM(s) Oral daily  metoprolol succinate ER 25 milliGRAM(s) Oral daily  pantoprazole    Tablet 40 milliGRAM(s) Oral before breakfast  tiotropium 18 MICROgram(s) Capsule 1 Capsule(s) Inhalation daily    MEDICATIONS  (PRN):  acetaminophen   Tablet .. 650 milliGRAM(s) Oral every 6 hours PRN Mild Pain (1 - 3)  ALPRAZolam 1 milliGRAM(s) Oral three times a day PRN ANXIETY  lactulose Syrup 10 Gram(s) Oral daily PRN Constipation Patient is a 73y old  Female who presents with a chief complaint of COUGH/PNA (12 May 2019 18:04)      HPI:    72 yo F active smoker >40PYs, CAD s/p PCI, RA on MTX and low louie prednisone presents to ED with rhinorrhea, producive cough, body aches, shortness of breath x 2 weeks. Admitted for suspected pna and started on abx.    No prior lung disease, says she has appointment with pulmonologist in 1 week. Feels improved overall and feels well enoug to go home.    Allergies    Zocor (Joint Pain)    Intolerances    Home Medications:  famotidine 40 mg oral tablet: 1 tab(s) orally once a day (at bedtime) (01 Feb 2019 02:43)  folic acid 1 mg oral tablet: 1 tab(s) orally once a day (01 Feb 2019 02:43)  methotrexate 2.5 mg oral tablet: 6 tab(s) orally once a week (01 Feb 2019 02:43)  pantoprazole 40 mg oral delayed release tablet: 1 tab(s) orally once a day (01 Feb 2019 02:43)  Toprol-XL 25 mg oral tablet, extended release: 1 tab(s) orally once a day (01 Feb 2019 02:43)  Vitamin D2 50,000 intl units (1.25 mg) oral capsule: 1 cap(s) orally once a week (01 Feb 2019 02:43)  Xanax 1 mg oral tablet: 1 tab(s) orally 3 times a day, As Needed (01 Feb 2019 02:43)      SOCIAL HX:    Smoking reports quit 1 month ago           ETOH/illicit drugs  no               PAST MEDICAL & SURGICAL HISTORY:  Rheumatoid arthritis  GERD (gastroesophageal reflux disease)  MI (myocardial infarction)  Hyperlipidemia  CAD (coronary artery disease)  History of surgery: stent placement      FAMILY HISTORY:  Family history of heart disease (Father)  Family history of heart attack (Mother)  .    No cardiovascular or pulmonary family history     Review of System:  See HPI    PHYSICAL EXAM  Vital Signs Last 24 Hrs  T(C): 35.6 (13 May 2019 05:00), Max: 36.6 (12 May 2019 20:24)  T(F): 96.1 (13 May 2019 05:00), Max: 97.8 (12 May 2019 20:24)  HR: 97 (13 May 2019 05:00) (65 - 97)  BP: 172/92 (13 May 2019 05:00) (141/67 - 172/92)  BP(mean): --  RR: 16 (13 May 2019 05:00) (16 - 18)  SpO2: 97% (12 May 2019 22:29) (97% - 97%)    General: In NAD  HEENT: RUSS             Lymphatic system: No LN  appreciated  Lungs: Carl BS CTA, no wheeze or crackles  Cardiovascular: Regular  Gastrointestinal: Soft.  + BS   Musculoskeletal: No Clubbing.  Moves all extremities  Skin: Warm.  Intact  Neurological: No motor or sensory deficit       LABS:                          11.7   9.57  )-----------( 240      ( 12 May 2019 10:45 )             35.5                                               05-12    139  |  103  |  8<L>  ----------------------------<  84  4.3   |  25  |  0.7    Ca    8.7      12 May 2019 10:45  Mg     2.0     05-11    TPro  6.2  /  Alb  3.6  /  TBili  0.4  /  DBili  x   /  AST  16  /  ALT  7   /  AlkPhos  85  05-11                                                                                           LIVER FUNCTIONS - ( 11 May 2019 08:55 )  Alb: 3.6 g/dL / Pro: 6.2 g/dL / ALK PHOS: 85 U/L / ALT: 7 U/L / AST: 16 U/L / GGT: x                                                                                              Serum Pro-Brain Natriuretic Peptide: 571 pg/mL (05-10-19 @ 14:29)    < from: CT Chest No Cont (05.11.19 @ 17:02) >    IMPRESSION:    Extensive emphysema with paraseptal predominance, progressed since the   prior exam.    No evidence of pulmonary consolidation.        < end of copied text >        MEDICATIONS  (STANDING):  ALBUTerol/ipratropium for Nebulization 3 milliLiter(s) Nebulizer every 6 hours  aspirin  chewable 81 milliGRAM(s) Oral daily  atorvastatin 80 milliGRAM(s) Oral at bedtime  azithromycin  IVPB      azithromycin  IVPB 500 milliGRAM(s) IV Intermittent every 24 hours  buDESOnide 160 MICROgram(s)/formoterol 4.5 MICROgram(s) Inhaler 2 Puff(s) Inhalation two times a day  cefTRIAXone   IVPB 1 Gram(s) IV Intermittent every 24 hours  cefTRIAXone   IVPB      chlorhexidine 4% Liquid 1 Application(s) Topical <User Schedule>  clopidogrel Tablet 75 milliGRAM(s) Oral daily  enoxaparin Injectable 40 milliGRAM(s) SubCutaneous every 24 hours  folic acid 1 milliGRAM(s) Oral daily  metoprolol succinate ER 25 milliGRAM(s) Oral daily  pantoprazole    Tablet 40 milliGRAM(s) Oral before breakfast  tiotropium 18 MICROgram(s) Capsule 1 Capsule(s) Inhalation daily    MEDICATIONS  (PRN):  acetaminophen   Tablet .. 650 milliGRAM(s) Oral every 6 hours PRN Mild Pain (1 - 3)  ALPRAZolam 1 milliGRAM(s) Oral three times a day PRN ANXIETY  lactulose Syrup 10 Gram(s) Oral daily PRN Constipation Patient is a 73y old  Female who presents with a chief complaint of COUGH/SOB (12 May 2019 18:04)      HPI:    74 yo F active smoker >40PYs, CAD s/p PCI, RA on MTX and low dose prednisone presents to ED with rhinorrhea, producive cough, body aches, shortness of breath x 2 weeks. Admitted for suspected pna and started on abx. No prior lung disease, says she has appointment with pulmonologist in 1 week. Feels improved overall and feels well enough to go home. s/p flu neg, reports weight loss, s/p chest CT    Allergies    Zocor (Joint Pain)    Intolerances    Home Medications:  famotidine 40 mg oral tablet: 1 tab(s) orally once a day (at bedtime) (01 Feb 2019 02:43)  folic acid 1 mg oral tablet: 1 tab(s) orally once a day (01 Feb 2019 02:43)  methotrexate 2.5 mg oral tablet: 6 tab(s) orally once a week (01 Feb 2019 02:43)  pantoprazole 40 mg oral delayed release tablet: 1 tab(s) orally once a day (01 Feb 2019 02:43)  Toprol-XL 25 mg oral tablet, extended release: 1 tab(s) orally once a day (01 Feb 2019 02:43)  Vitamin D2 50,000 intl units (1.25 mg) oral capsule: 1 cap(s) orally once a week (01 Feb 2019 02:43)  Xanax 1 mg oral tablet: 1 tab(s) orally 3 times a day, As Needed (01 Feb 2019 02:43)      SOCIAL HX:    Smoking reports quit 1 month ago           ETOH/illicit drugs  no               PAST MEDICAL & SURGICAL HISTORY:  Rheumatoid arthritis  GERD (gastroesophageal reflux disease)  MI (myocardial infarction)  Hyperlipidemia  CAD (coronary artery disease)  History of surgery: stent placement      FAMILY HISTORY:  Family history of heart disease (Father)  Family history of heart attack (Mother)  .    No cardiovascular or pulmonary family history     Review of System:  See HPI    PHYSICAL EXAM  Vital Signs Last 24 Hrs  T(C): 35.6 (13 May 2019 05:00), Max: 36.6 (12 May 2019 20:24)  T(F): 96.1 (13 May 2019 05:00), Max: 97.8 (12 May 2019 20:24)  HR: 97 (13 May 2019 05:00) (65 - 97)  BP: 172/92 (13 May 2019 05:00) (141/67 - 172/92)  RR: 16 (13 May 2019 05:00) (16 - 18)  SpO2: 97% (12 May 2019 22:29) (97% - 97%)    General: In NAD, CACHECTIC  HEENT: RUSS             Lymphatic system: No LN  appreciated  Lungs: Carl BS, dec bs diffusely  Cardiovascular: Regular  Gastrointestinal: Soft.  + BS   Musculoskeletal: No Clubbing.  Moves all extremities  Skin: Warm.  Intact  Neurological: No motor or sensory deficit       LABS:                          11.7   9.57  )-----------( 240      ( 12 May 2019 10:45 )             35.5                                               05-12    139  |  103  |  8<L>  ----------------------------<  84  4.3   |  25  |  0.7    Ca    8.7      12 May 2019 10:45  Mg     2.0     05-11    TPro  6.2  /  Alb  3.6  /  TBili  0.4  /  DBili  x   /  AST  16  /  ALT  7   /  AlkPhos  85  05-11                                                                                           LIVER FUNCTIONS - ( 11 May 2019 08:55 )  Alb: 3.6 g/dL / Pro: 6.2 g/dL / ALK PHOS: 85 U/L / ALT: 7 U/L / AST: 16 U/L / GGT: x                                                                                              Serum Pro-Brain Natriuretic Peptide: 571 pg/mL (05-10-19 @ 14:29)    < from: CT Chest No Cont (05.11.19 @ 17:02) >    IMPRESSION:    Extensive emphysema with paraseptal predominance, progressed since the   prior exam.    No evidence of pulmonary consolidation.        < end of copied text >        MEDICATIONS  (STANDING):  ALBUTerol/ipratropium for Nebulization 3 milliLiter(s) Nebulizer every 6 hours  aspirin  chewable 81 milliGRAM(s) Oral daily  atorvastatin 80 milliGRAM(s) Oral at bedtime  azithromycin  IVPB      azithromycin  IVPB 500 milliGRAM(s) IV Intermittent every 24 hours  buDESOnide 160 MICROgram(s)/formoterol 4.5 MICROgram(s) Inhaler 2 Puff(s) Inhalation two times a day  cefTRIAXone   IVPB 1 Gram(s) IV Intermittent every 24 hours  cefTRIAXone   IVPB      chlorhexidine 4% Liquid 1 Application(s) Topical <User Schedule>  clopidogrel Tablet 75 milliGRAM(s) Oral daily  enoxaparin Injectable 40 milliGRAM(s) SubCutaneous every 24 hours  folic acid 1 milliGRAM(s) Oral daily  metoprolol succinate ER 25 milliGRAM(s) Oral daily  pantoprazole    Tablet 40 milliGRAM(s) Oral before breakfast  tiotropium 18 MICROgram(s) Capsule 1 Capsule(s) Inhalation daily    MEDICATIONS  (PRN):  acetaminophen   Tablet .. 650 milliGRAM(s) Oral every 6 hours PRN Mild Pain (1 - 3)  ALPRAZolam 1 milliGRAM(s) Oral three times a day PRN ANXIETY  lactulose Syrup 10 Gram(s) Oral daily PRN Constipation

## 2019-05-13 NOTE — PROGRESS NOTE ADULT - ASSESSMENT
73y old Female  with PMHx of CAD s/p PCI x2 , HTN, GERD, DLD, RA (on methotrexate), last hospitalized 2/'19 with upper lip cellulitis and dc'ed with course of Augmentin, presents back to hospital for worsening cough (productive, green sputum) over past few weeks. Found to have R basilar opacity on XR and being admitted for pna.       Worsening Cough likely 2/2 worsening Emphysema (newly dx)  - CT chest: Extensive emphysema with paraseptal predominance, progressed since the prior exam. No evidence of pulmonary consolidation.  - Flu and MRSA swab: negative.   - will switch to PO azithromycin 250mg q24h for total of 5 days course.   - c/w symbicort and albuterol prn  - f/u pul     CAD s/p stent   - c/ w asa, Plavix Toprol statin    Rheumatoid arthritis  - OK to c/w methotrexate on discharge as per pulmonary.     GERD  - c/w PPI    HLD  - c/w statin    CHG 4% bath daily and prn  DVT PPX  DISPO: home with Pulmonary follow up outpatient.

## 2019-05-13 NOTE — DISCHARGE NOTE PROVIDER - NSDCCPCAREPLAN_GEN_ALL_CORE_FT
PRINCIPAL DISCHARGE DIAGNOSIS  Diagnosis: Cough  Assessment and Plan of Treatment: Likely secondary to newly dx emphysema.   CT chest reveals extensive emphysema mainly at paraseptal.   Please use the inhalers as prescribed. Please complete the antibiotics as prescribed. Please follow up with Pulmonary outpatient in 1-2 weeks.      SECONDARY DISCHARGE DIAGNOSES  Diagnosis: Rheumatoid arthritis  Assessment and Plan of Treatment: Please contine with current medication (methotrexate) and follow up with PMD and rheumatology.    Diagnosis: GERD (gastroesophageal reflux disease)  Assessment and Plan of Treatment: please take the medications as prescribed.    Diagnosis: CAD (coronary artery disease)  Assessment and Plan of Treatment: Please continue taking ASA and plavix and Atorvastatin as prescribed.

## 2019-05-13 NOTE — CONSULT NOTE ADULT - ASSESSMENT
IMPRESSION:    Viral URI  Probable COPD exacerbation  H/O RA on MTX/prednisone   Active smoker    PLAN:    ·	Prednisone 40mg x 5 days, then back to home dose prednisone for RA  ·	Discharge on symbicort 160mg 2 puff q12h, nebs prn  ·	Needs outpatient pulmonary follow up for PFTs  ·	Azithro x 5 days total  ·	From pulm standpoint can be discharged  ·	DVT ppx IMPRESSION:    Viral URI  Probable COPD exacerbation  No evidence of ILD on CT chest  H/O RA on MTX/prednisone   Active smoker    PLAN:    ·	Prednisone 40mg x 5 days, then back to home dose prednisone for RA  ·	Discharge on symbicort 160mg 2 puff q12h, nebs prn  ·	Needs outpatient pulmonary follow up for PFTs  ·	Azithro x 5 days total  ·	Advised smoking cessation  ·	From pulm standpoint can be discharged  ·	DVT ppx IMPRESSION:    COPD exacerbation/ viral illness reports sick contact  No evidence of ILD on CT chest  H/O RA on MTX/prednisone   Active smoker    PLAN:    ·	Prednisone 40mg x 5 days, then back to home dose prednisone for RA  ·	Discharge on symbicort 160mg 2 puff q12h, nebs prn  ·	Needs outpatient pulmonary follow up for PFTs  ·	Azithro x 5 days total  ·	alpha 1 antitrypsin level  ·	Advised smoking cessation  ·	OP F/UP  ·	DVT ppx

## 2019-05-13 NOTE — PROGRESS NOTE ADULT - SUBJECTIVE AND OBJECTIVE BOX
S : Breathing slowly improving.  Able to walk short distances  saturating ok      All other pertinent ROS negative.      Vital Signs Last 24 Hrs  T(C): 36 (12 May 2019 13:48), Max: 36.3 (12 May 2019 05:31)  T(F): 96.8 (12 May 2019 13:48), Max: 97.4 (12 May 2019 05:31)  HR: 72 (12 May 2019 05:31) (69 - 72)  BP: 145/62 (12 May 2019 05:31) (121/53 - 145/62)  BP(mean): --  RR: 18 (12 May 2019 05:31) (18 - 18)  SpO2: 96% (11 May 2019 19:50) (96% - 96%)  PHYSICAL EXAM:    Constitutional: NAD, awake and alert, well-developed  HEENT: PERR, EOMI, Normal Hearing, MMM  Neck: Soft and supple, No LAD, No JVD  Respiratory: Emphysematous lungs  Cardiovascular: S1 and S2, regular rate and rhythm, no Murmurs, gallops or rubs  Gastrointestinal: Bowel Sounds present, soft, nontender, nondistended, no guarding, no rebound  Extremities: No peripheral edema      MEDICATIONS:  MEDICATIONS  (STANDING):  ALBUTerol/ipratropium for Nebulization 3 milliLiter(s) Nebulizer every 6 hours  aspirin  chewable 81 milliGRAM(s) Oral daily  atorvastatin 80 milliGRAM(s) Oral at bedtime  azithromycin  IVPB      azithromycin  IVPB 500 milliGRAM(s) IV Intermittent every 24 hours  cefTRIAXone   IVPB 1 Gram(s) IV Intermittent every 24 hours  cefTRIAXone   IVPB      chlorhexidine 4% Liquid 1 Application(s) Topical <User Schedule>  clopidogrel Tablet 75 milliGRAM(s) Oral daily  enoxaparin Injectable 40 milliGRAM(s) SubCutaneous every 24 hours  folic acid 1 milliGRAM(s) Oral daily  metoprolol succinate ER 25 milliGRAM(s) Oral daily  pantoprazole    Tablet 40 milliGRAM(s) Oral before breakfast      LABS: All Labs Reviewed:                        11.7   9.57  )-----------( 240      ( 12 May 2019 10:45 )             35.5     05-12    139  |  103  |  8<L>  ----------------------------<  84  4.3   |  25  |  0.7    Ca    8.7      12 May 2019 10:45  Mg     2.0     05-11    TPro  6.2  /  Alb  3.6  /  TBili  0.4  /  DBili  x   /  AST  16  /  ALT  7   /  AlkPhos  85  05-11      CARDIAC MARKERS ( 10 May 2019 21:25 )  x     / <0.01 ng/mL / x     / x     / x          Blood Culture:     Radiology: reviewed
ABHINAV DREW [id number]    Hospitalist Note  72yo F with Past Medical History CAD status post PCI x2, Hypertension, GERD, Hypercholesteremia, Rheumatoid Arthritis (on methotrexate) admitted for a severe cough associated with dyspnea secondary to suspected COPD exacerbation/emphysema.  CT Chest revealed significant interstitial lung disease consistent with emphysema    Overnight events/Updates: The patient still suffers from a chronic cough though her shortness of breath has improved from admission.    Vital Signs Last 24 Hrs  T(C): 35.6 (13 May 2019 05:00), Max: 36.6 (12 May 2019 20:24)  T(F): 96.1 (13 May 2019 05:00), Max: 97.8 (12 May 2019 20:24)  HR: 97 (13 May 2019 05:00) (65 - 97)  BP: 172/92 (13 May 2019 05:00) (141/67 - 172/92)  BP(mean): --  RR: 16 (13 May 2019 05:00) (16 - 18)  SpO2: 97% (12 May 2019 22:29) (97% - 97%)    Physical Examination:  General: AAO x 3  HEENT: PERRLA, EOMI  CV= S1 & S2 appreciated  Lungs=no wheezes or rales  Abdominal Examination= + BS, Soft, NT/ND  Extremity Examination= swan necking x2 digits    ROS: No chest pain, no shortness of breath.  All other systems reviewed and are within normal limits except for the complaints in the HPI.    MEDICATIONS  (STANDING):  ALBUTerol/ipratropium for Nebulization 3 milliLiter(s) Nebulizer every 6 hours  aspirin  chewable 81 milliGRAM(s) Oral daily  atorvastatin 80 milliGRAM(s) Oral at bedtime  azithromycin   Tablet 250 milliGRAM(s) Oral daily  buDESOnide 160 MICROgram(s)/formoterol 4.5 MICROgram(s) Inhaler 2 Puff(s) Inhalation two times a day  chlorhexidine 4% Liquid 1 Application(s) Topical <User Schedule>  clopidogrel Tablet 75 milliGRAM(s) Oral daily  enoxaparin Injectable 40 milliGRAM(s) SubCutaneous every 24 hours  folic acid 1 milliGRAM(s) Oral daily  metoprolol succinate ER 25 milliGRAM(s) Oral daily  pantoprazole    Tablet 40 milliGRAM(s) Oral before breakfast  tiotropium 18 MICROgram(s) Capsule 1 Capsule(s) Inhalation daily    MEDICATIONS  (PRN):  acetaminophen   Tablet .. 650 milliGRAM(s) Oral every 6 hours PRN Mild Pain (1 - 3)  ALPRAZolam 1 milliGRAM(s) Oral three times a day PRN ANXIETY  lactulose Syrup 10 Gram(s) Oral daily PRN Constipation                            11.7   9.57  )-----------( 240      ( 12 May 2019 10:45 )             35.5     05-12    139  |  103  |  8<L>  ----------------------------<  84  4.3   |  25  |  0.7    Ca    8.7      12 May 2019 10:45        Case discussed with housestaff & family  MYLA Huston [id number]
Patient is a 73y old  Female who presents with a chief complaint of COUGH/PNA (13 May 2019 08:47)      Overnight events:   No acute event overnight. Patient reports that she is feeling fine and would like to be discharged.   SO2 97% on RA at rest. Spoke with Pulmonology and reports that the emphysema is unlikely from methotrexate use and can be resumed on discharge.       PAST MEDICAL & SURGICAL HISTORY:  Rheumatoid arthritis  GERD (gastroesophageal reflux disease)  MI (myocardial infarction)  Hyperlipidemia  CAD (coronary artery disease)  History of surgery: stent placement      Allergies  Zocor (Joint Pain)        MEDICATIONS  (STANDING):  ALBUTerol/ipratropium for Nebulization 3 milliLiter(s) Nebulizer every 6 hours  aspirin  chewable 81 milliGRAM(s) Oral daily  atorvastatin 80 milliGRAM(s) Oral at bedtime  azithromycin   Tablet 250 milliGRAM(s) Oral daily  buDESOnide 160 MICROgram(s)/formoterol 4.5 MICROgram(s) Inhaler 2 Puff(s) Inhalation two times a day  chlorhexidine 4% Liquid 1 Application(s) Topical <User Schedule>  clopidogrel Tablet 75 milliGRAM(s) Oral daily  enoxaparin Injectable 40 milliGRAM(s) SubCutaneous every 24 hours  folic acid 1 milliGRAM(s) Oral daily  metoprolol succinate ER 25 milliGRAM(s) Oral daily  pantoprazole    Tablet 40 milliGRAM(s) Oral before breakfast  tiotropium 18 MICROgram(s) Capsule 1 Capsule(s) Inhalation daily    MEDICATIONS  (PRN):  acetaminophen   Tablet .. 650 milliGRAM(s) Oral every 6 hours PRN Mild Pain (1 - 3)  ALPRAZolam 1 milliGRAM(s) Oral three times a day PRN ANXIETY  lactulose Syrup 10 Gram(s) Oral daily PRN Constipation        Vital Signs Last 24 Hrs  T(C): 35.6 (13 May 2019 05:00), Max: 36.6 (12 May 2019 20:24)  T(F): 96.1 (13 May 2019 05:00), Max: 97.8 (12 May 2019 20:24)  HR: 97 (13 May 2019 05:00) (65 - 97)  BP: 172/92 (13 May 2019 05:00) (141/67 - 172/92)  BP(mean): --  RR: 16 (13 May 2019 05:00) (16 - 18)  SpO2: 97% (12 May 2019 22:29) (97% - 97%)  CAPILLARY BLOOD GLUCOSE        I&O's Summary    13 May 2019 07:01  -  13 May 2019 12:11  --------------------------------------------------------  IN: 220 mL / OUT: 1 mL / NET: 219 mL        Physical Exam:    -     General : Not in acute distress.    -      HEENT: PEERLA    -      Cardiac: S1 & S2. No murmur/gallop/rubs.     -      Pulm: clear to auscultate b/l lung field. + decreased breath sound b/l lung fields. No wheeze/rales/crackles.     -      GI: positive BS. No tenderness/rigidity/rebound.     -      Musculoskeletal: no pitting edema.     -      Neuro: A & O3. Non focal.         Labs:                        11.7   9.57  )-----------( 240      ( 12 May 2019 10:45 )             35.5                           05-12    139  |  103  |  8<L>  ----------------------------<  84  4.3   |  25  |  0.7    Ca    8.7      12 May 2019 10:45                                                                Imaging:    ECG:

## 2019-05-13 NOTE — DISCHARGE NOTE PROVIDER - HOSPITAL COURSE
73y old Female  with PMHx of CAD s/p PCI x2 , HTN, GERD, DLD, RA (on methotrexate), last hospitalized 2/'19 with upper lip cellulitis and dc'ed with course of augmentin --  presents back to hospital for worsening cough over past few weeks. Patient was treated for COPD and possible PNA. CT chest reveals extensive emphysema which is newly diagnosed. Pulmonary was consulted for newly diagnosed emphysema and recommended to continue with medical treatment and ok to resume methotrexate for RA. Patient reports feeling better on symbicort and duonebs treatment and a course of abx treatment.

## 2019-05-13 NOTE — DISCHARGE NOTE PROVIDER - CARE PROVIDER_API CALL
Marc Ahumada)  Critical Care Medicine; Internal Medicine; Pulmonary Disease; Sleep Medicine  [address] [place] [address]  Phone: [phone]  Fax: [phone]  Follow Up Time:

## 2019-05-13 NOTE — PROGRESS NOTE ADULT - ASSESSMENT
74yo F with Past Medical History CAD status post PCI x2, Hypertension, GERD, Hypercholesteremia, Rheumatoid Arthritis (on methotrexate) admitted for a severe cough associated with dyspnea secondary to suspected COPD exacerbation/emphysema.    Cough/Dyspnea secondary to acute COPD exacerbation/emphysema: pt reports clinical improvement from admission though still suffers from a residual cough.  Her case was discussed with pulmonology, Dr. Mitch Gannon.  Continue Azithromycin x5d upon discharge.  Continue Symbicort and Spiriva as directed.  Follow-up as outpatient for pulmonary function tests.  Rheumatoid Arthritis: no evidence of disseminated infection; follow-up with Rheumatology as outpatient for scheduled treatment with Enbrel.  Continue Methotrexate  CAD status pots PCI: continue ASA 81mg q24 and Plavix 75mg q24  Hypertension: continue Toprol XL  GI/DVT prophylaxis  Discharge home; time spent = 35 minutes

## 2019-05-13 NOTE — DISCHARGE NOTE NURSING/CASE MANAGEMENT/SOCIAL WORK - NSDCDPATPORTLINK_GEN_ALL_CORE
You can access the [facility] Portal, offered by [facility], by registering with the following website: [email or web address]

## 2019-06-26 NOTE — ED PROVIDER NOTE - NS ED ROS FT
Constitutional: (-) fever  Eyes/ENT: (-) blurry vision, (-) epistaxis  Cardiovascular: (+) chest pain, (-) syncope  Respiratory: (-) cough, (-) shortness of breath  Gastrointestinal: (-) vomiting, (-) diarrhea  Genitourinary: (-) dysuria, (-) hesitancy, (-) frequency   Musculoskeletal: (-) neck pain, (-) back pain, (-) joint pain  Integumentary: (-) rash, (-) edema  Neurological: (-) headache, (-) altered mental status  Allergic/Immunologic: (-) pruritus

## 2019-06-26 NOTE — ED PROVIDER NOTE - CLINICAL SUMMARY MEDICAL DECISION MAKING FREE TEXT BOX
pt with chest pain with h/o cad, may need stenting, labs, ekg, cxr reassuring in ED, cardiology recommends admission and will be seen to decide if nuc stress or cath

## 2019-06-26 NOTE — ED ADULT NURSE NOTE - OBJECTIVE STATEMENT
PT C/O UPPER CHEST AND BILATERAL ARMS TINGLING PAIN INTERMITTENTLY X 1 WEEK. PT ALSO REPORTS DIAPHORESIS. PT SENT BY [facility], HAD 2 STENTS PLACED IN NOVEMBER, NEEDED A 3RD BUT WAS UNABLE TO HAVE IT DONE AT THAT TIME.

## 2019-06-26 NOTE — H&P ADULT - NSHPLABSRESULTS_GEN_ALL_CORE
2 weeks 12.2   9.25  )-----------( 254      ( 26 Jun 2019 15:00 )             36.9             06-26    139  |  100  |  10  ----------------------------<  94  4.7   |  26  |  0.8    Ca    9.4      26 Jun 2019 15:00    TPro  7.2  /  Alb  4.1  /  TBili  0.3  /  DBili  x   /  AST  20  /  ALT  9   /  AlkPhos  97  06-26    LIVER FUNCTIONS - ( 26 Jun 2019 15:00 )  Alb: 4.1 g/dL / Pro: 7.2 g/dL / ALK PHOS: 97 U/L / ALT: 9 U/L / AST: 20 U/L / GGT: x         PT/INR - ( 26 Jun 2019 15:00 )   PT: 11.90 sec;   INR: 1.04 ratio      CARDIAC MARKERS ( 26 Jun 2019 15:00 )  x     / <0.01 ng/mL / x     / x     / x        Urinalysis Basic - ( 26 Jun 2019 15:00 )    Color: Yellow / Appearance: Cloudy / SG: <=1.005 / pH: x  Gluc: x / Ketone: Negative  / Bili: Negative / Urobili: 0.2 mg/dL   Blood: x / Protein: Negative mg/dL / Nitrite: Negative   Leuk Esterase: Negative / RBC: x / WBC x   Sq Epi: x / Non Sq Epi: x / Bacteria: Moderate /HPF 12.2   9.25  )-----------( 254      ( 26 Jun 2019 15:00 )             36.9             06-26    139  |  100  |  10  ----------------------------<  94  4.7   |  26  |  0.8    Ca    9.4      26 Jun 2019 15:00    TPro  7.2  /  Alb  4.1  /  TBili  0.3  /  DBili  x   /  AST  20  /  ALT  9   /  AlkPhos  97  06-26    LIVER FUNCTIONS - ( 26 Jun 2019 15:00 )  Alb: 4.1 g/dL / Pro: 7.2 g/dL / ALK PHOS: 97 U/L / ALT: 9 U/L / AST: 20 U/L / GGT: x         PT/INR - ( 26 Jun 2019 15:00 )   PT: 11.90 sec;   INR: 1.04 ratio      CARDIAC MARKERS ( 26 Jun 2019 15:00 )  x     / <0.01 ng/mL / x     / x     / x        Urinalysis Basic - ( 26 Jun 2019 15:00 )    Color: Yellow / Appearance: Cloudy / SG: <=1.005 / pH: x  Gluc: x / Ketone: Negative  / Bili: Negative / Urobili: 0.2 mg/dL   Blood: x / Protein: Negative mg/dL / Nitrite: Negative   Leuk Esterase: Negative / RBC: x / WBC x   Sq Epi: x / Non Sq Epi: x / Bacteria: Moderate /HPF    12 Lead ECG:   Ventricular Rate 73 BPM    Atrial Rate 73 BPM    P-R Interval 122 ms    QRS Duration 86 ms    Q-T Interval 412 ms    QTC Calculation(Bezet) 453 ms    P Axis 52 degrees    R Axis -2 degrees    T Axis 30 degrees    Diagnosis Line Normal sinus rhythm  RSR' or QR pattern in V1 suggests right ventricular conduction delay  Abnormal ECG

## 2019-06-26 NOTE — PATIENT PROFILE ADULT - NSPROMUTINFOINDIVIDFT_GEN_A_NUR
Chief Complaints and History of Present Illnesses   Patient presents with     Consult For     LLL lacereration     Follow Up For     ED          He presents today for evaluation of right lower eyelid eyelid laceration.  He tripped over the dog and hit his eye on a cabinet early this morning.  He reports that his vision is fine but the eye feels a little bit irritated.  He reports a headache but otherwise is doing well.       Assessment & Plan     Drkae Santiago is a 66 year old male with the following diagnoses:   1. Laceration of skin of eyelid and periocular area    2. Canalicular laceration, right, initial encounter       Right lower eyelid laceration with canalicular laceration, medial canthal dehiscence, and orbital fat prolapse.      PLAN:  Repair in OR today         Attending Physician Attestation:  I have seen and examined this patient .  I have confirmed and edited as necessary the chief complaint(s), history of present illness, review of systems, relevant history, and examination findings as documented by others.  I have personally reviewed the relevant tests, images, and reports as documented above.  I have confirmed and edited as necessary the assessment and plan and agree with this note.    - Al Ac MD 9:06 AM 6/5/2017    Photographs were obtained to document the findings recorded under exam. These will be stored for future reference and comparison. The plan is documented under assessment and plan above.   - Al Ac MD 9:06 AM 6/5/2017      Today with Drake Santiago, I reviewed the indications, risks, benefits, and alternatives of the proposed surgical procedure including, but not limited to, failure obtain the desired result  and need for additional surgery, bleeding, infection, loss of vision, loss of the eye, and the remote possibility of permanent damage to any organ system or death with the use of anesthesia.  I provided multiple opportunities for the questions,  answered all questions to the best of my ability, and confirmed that my answers and my discussion were understood.     - Al Ac MD 9:06 AM 6/5/2017     n/a

## 2019-06-26 NOTE — H&P ADULT - NSHPSOCIALHISTORY_GEN_ALL_CORE
ex-smoker, quit 6 month back,   used to smoke half pack for last 50 yrs     non alcoholi, no drug abuse ex-smoker, quit 6 month back,   used to smoke half pack for last 50 yrs     non alcoholic, no drug abuse

## 2019-06-26 NOTE — H&P ADULT - HISTORY OF PRESENT ILLNESS
72 yo female with PMH of CAD s/p stent most recent on nov 2018, severe RA, GERD, DLD, comes to the ED for Chest pain.  chest pain started this morning at around 8 am, substernal, pressure type, chocking, radiating to bilateral arms associated with sweating.  chest pain happened at rest, no SOB or palpitation, no aggravating or relieving factors.  chest pain resolved in 5-10 min and again recurred in 3-4 hours later which resolved again.  patient  called cardiologist who advised to come to ED.    no Fever, SOB, cough, runny nose, palpitation    Patient was having on/off chest pain for since last 2 week which has increased in frequency. 72 yo female with PMH of CAD s/p stent most recent on nov 2018, severe RA, GERD, DLD, comes to the ED for chest pain, intermittent for past 2 weeks but increasing in frequency over the course of the week. On the day of presentation, pt states that chest pain started this morning at around 8 am, substernal, pressure type, chocking, radiating to bilateral arms associated with sweating. Chest pain happened at rest, no SOB or palpitation, no aggravating or relieving factors. It resolved in 5-10 min and again recurred in 3-4 hours later which resolved again. Patient's  called cardiologist who advised to come to ED.    no Fever, SOB, cough, runny nose, palpitation    Patient was having on/off chest pain for since last 2 week which has increased in frequency.

## 2019-06-26 NOTE — ED PROVIDER NOTE - ATTENDING CONTRIBUTION TO CARE
74 yo f with pmh of cad with stents, htn, presents with 1 month of chest pressure.  pt says recently more frequent.  radiation to the neck.  pt says her daughter called her cardiologist who told her to go to ED.  admits last time had stent placed in nov 2018, she was told that she may need another stent.  no n/v/d, no abd pain, no leg swelling or pain.  exam: nad, ncat, perrl, eomi, mmm, rrr, ctab, abd soft, nt,nd aox3, imp: pt with chest pain with h/o cad, may need stenting, labs, ekg, cxr and contact cardio

## 2019-06-26 NOTE — H&P ADULT - NSHPREVIEWOFSYSTEMS_GEN_ALL_CORE
CONSTITUTIONAL: No weakness, fevers or chills, no weight loss   EYES/ENT: No visual changes;  No vertigo or throat pain   NECK: No pain or stiffness  RESPIRATORY: No cough, wheezing, hemoptysis; No shortness of breath  GASTROINTESTINAL: No abdominal or epigastric pain. No nausea, vomiting, or hematemesis; No diarrhea or constipation. No melena or hematochezia.  GENITOURINARY: No dysuria, frequency or hematuria  NEUROLOGICAL: No numbness or weakness  All other review of systems is negative unless indicated above. CONSTITUTIONAL: No weakness, fevers or chills, no weight loss   EYES/ENT: No visual changes;  No vertigo or throat pain   NECK: No pain or stiffness  RESPIRATORY: h/o emphysema---occasional SOB, not on medication as per patient.  GASTROINTESTINAL: No abdominal or epigastric pain. No nausea, vomiting, or hematemesis; No diarrhea or constipation. No melena or hematochezia.  GENITOURINARY: No dysuria, frequency or hematuria  NEUROLOGICAL: No numbness or weakness  All other review of systems is negative unless indicated above. CONSTITUTIONAL: No weakness, fevers or chills, no weight loss. (+) Diaphoresis with chest pain.  EYES/ENT: No visual changes;  No vertigo or throat pain   NECK: No pain or stiffness  RESPIRATORY: h/o emphysema---occasional SOB, not on medication as per patient.  GASTROINTESTINAL: No abdominal or epigastric pain. No nausea, vomiting, or hematemesis; No diarrhea or constipation. No melena or hematochezia.  GENITOURINARY: No dysuria, frequency or hematuria  NEUROLOGICAL: No numbness or weakness  All other review of systems is negative unless indicated above.

## 2019-06-26 NOTE — H&P ADULT - ATTENDING COMMENTS
Patient is seen and examined independently at bedside. I agree with the resident's note, history and plan as above. Pt admits to be compliant with her medications. Pt currently has 2 stents. Pt states that her first stent was place 17 years ago. Last stent was placed in Nov 2018. Medical record reviewed noted pt is found to have 2 vessel disease in 70% proximal LAD and 70% proximal RCA. Pt had RCA stented at the time but LAD was supposed to be planned as a staged procedure. However, pt denies having further procedure since then.    PHYSICAL EXAM:  CONSTITUTIONAL: NAD, well-groomed, well-developed.  HEAD:  Atraumatic, Normocephalic.  EYES: EOMI, PERRLA, conjunctiva and sclera clear.  ENMT: Moist mucous membranes, No lesions.  NERVOUS SYSTEM:  Alert & Oriented X3, Good concentration; No limb weakness or numbness.  RESPIRATORY: Clear to ascultation bilaterally; No rales, rhonchi, wheezing, or rubs.  CARDIOVASCULAR: Regular rate and rhythm; No murmurs, rubs, or gallops.  GASTROINTESTINAL: Soft, Nontender, Nondistended;   MUSCULOSKELETAL: No joint swelling or tenderness. Deformities in fingers. No neck or back pain.  EXTREMITIES: No clubbing, cyanosis, or edema.  LYMPH: No cervical lymphadenopathy noted.  SKIN: No rashes or lesions.    EKG reviewed, normal sinus rhythm, no acute ischemic changes    # Anginal chest pain  # h/o CAD s/p stentsx2, known remaining 70% lesion in LAD  - rule out ACS  - on telemetry, trend cardiac enzymes  - c/ w aspirin, plavix, and lipitor, metoprolol  - Cardiology evaluation  - NPO for possible cath    # Rheumatoid arthritis, stable symptoms  - c/w home medications prednisone, methotrexate, folic acid  - pt states that she was previously on Remicade and stopped since pt received cardiac stent and also developed pneumonia in Sept 2018    # DLD - c/w lipitor    # COPD, Emphysema - c/w inhalers    All labs, radiologic studies, vitals, orders and medications list reviewed. Patient is seen and examined independently at bedside. I agree with the resident's note, history and plan as above. Pt admits to be compliant with her medications. Pt currently has 2 stents. Pt states that her first stent was place 17 years ago. Last stent was placed in Nov 2018. Medical record reviewed noted pt is found to have 2 vessel disease in 70% proximal LAD and 70% proximal RCA. Pt had RCA stented at the time but LAD was supposed to be planned as a staged procedure. However, pt denies having further procedure since then.    PHYSICAL EXAM:  CONSTITUTIONAL: NAD, well-groomed, well-developed.  HEAD:  Atraumatic, Normocephalic.  EYES: EOMI, PERRLA, conjunctiva and sclera clear.  ENMT: Moist mucous membranes, No lesions.  NERVOUS SYSTEM:  Alert & Oriented X3, Good concentration; No limb weakness or numbness.  RESPIRATORY: Clear to ascultation bilaterally; No rales, rhonchi, wheezing, or rubs.  CARDIOVASCULAR: Regular rate and rhythm; No murmurs, rubs, or gallops.  GASTROINTESTINAL: Soft, Nontender, Nondistended;   MUSCULOSKELETAL: No joint swelling or tenderness. Deformities in fingers. No neck or back pain.  EXTREMITIES: No clubbing, cyanosis, or edema.  LYMPH: No cervical lymphadenopathy noted.  SKIN: No rashes or lesions.    EKG reviewed, normal sinus rhythm, no acute ischemic changes    # Anginal chest pain  # h/o CAD s/p stentsx2, known remaining 70% lesion in LAD  - rule out ACS  - on telemetry, trend cardiac enzymes  - c/ w aspirin, plavix, and lipitor, metoprolol  - Cardiology evaluation  - NPO for possible cath    # Rheumatoid arthritis, stable symptoms  - c/w home medications prednisone, methotrexate, folic acid  - pt states that she was previously on Remicade and stopped since pt received cardiac stent and also developed pneumonia in Sept 2018  - pt states that she takes 3 tabs of 2.5 mg methotrexate every weekend, unable to verify dose of prednisone with patient, will continue with 10 mg daily for now based on prior prescription    # DLD - c/w lipitor    # COPD, Emphysema - c/w inhalers    All labs, radiologic studies, vitals, orders and medications list reviewed.

## 2019-06-26 NOTE — H&P ADULT - NSHPPHYSICALEXAM_GEN_ALL_CORE
PHYSICAL EXAM:      Constitutional: NAD    Respiratory: b/l Dec air entry    Cardiovascular: s1s2M0    Gastrointestinal: BS+, non tender    Extremities: deformity of hand and foot sec to RA    Vascular: palpable pulse    Neurological: intact PHYSICAL EXAM:    Constitutional: NAD    Respiratory: b/l Dec air entry    Cardiovascular: s1s2M0    Gastrointestinal: BS+, non tender    Extremities: deformity of hand and foot sec to RA    Vascular: palpable pulse    Neurological: intact

## 2019-06-26 NOTE — ED PROVIDER NOTE - OBJECTIVE STATEMENT
72 yo female with a pmh of HTN, CAD, 2 cardiac stent presents with chest pressure. pt states this has been present intermittently for over a month and is described as chest pressure to her upper chest and neck. she states this feeling is similar to the symptoms she has preceding her prior stent placement and today the pain worsened so she decided to come into the hospital. her most recent stent placement was in November 2018 when one stent was placed and she was told she would have to return for another stent needed. she denies any other symptoms including fevers, chill, n/v, urinary/bowl changes, abdominal pain, or SOB.

## 2019-06-26 NOTE — H&P ADULT - ASSESSMENT
# rule out ACS:  typical chest pain  known h/o CAD  1st set CE neg---f/u second set at 8 pm  currently chest pain free  cw aspirin, plavix, and lipitor  EKG unchanged from prior  ECHO  cardiology Dr Suárez consult-- informed--waiting for call back  possible cath    # Severe RA:  prednisone, Mtx, tofacitinib, folic acid.  currently no joint pain    #DLD:  cw lipitor    # severe Emphysema:  patient has prescription for symbicort, albuterol Hfa and duonebs, but she is not using any of those meds for months now.  will cw symbicort in house.      Diet:  DASH/TLC    dvt ppx: lovenox    Full code    Medication verified with the pharmacy except MTX, no records on pharmacy since march---please call PMD in am to verify dosage and frequency of MTX. # rule out ACS:  typical chest pain  known h/o CAD  1st set CE neg---f/u second set at 8 pm  currently chest pain free  cw aspirin, plavix, and lipitor, metoprolol  EKG unchanged from prior  ECHO  cardiology Dr Suárez consult-- informed--waiting for call back  possible cath    # Severe RA:  prednisone, Mtx, tofacitinib, folic acid.  currently no joint pain    #DLD:  cw lipitor    # severe Emphysema:  patient has prescription for symbicort, albuterol Hfa and duonebs, but she is not using any of those meds for months now.  will cw symbicort in house.      Diet:  DASH/TLC    dvt ppx: lovenox    Full code    Medication verified with the pharmacy except MTX, no records on pharmacy since march---please call PMD in am to verify dosage and frequency of MTX. # rule out ACS:  typical chest pain  known h/o CAD  1st set CE neg---f/u second set at 8 pm  currently chest pain free  cw aspirin, plavix, and lipitor, metoprolol  EKG unchanged from prior  ECHO  cardiology Dr Suárez consult-- informed--waiting for call back  possible cath    # Severe RA:  prednisone, Mtx, tofacitinib, folic acid.  currently no joint pain    #DLD:  cw lipitor    # severe Emphysema:  patient has prescription for symbicort, albuterol Hfa and duonebs, but she is not using any of those meds for months now.  will cw symbicort in house.    Diet:  DASH/TLC    dvt ppx: lovenox    Full code    Medication verified with the pharmacy except MTX, no records on pharmacy since march---please call PMD in am to verify dosage and frequency of MTX. # rule out ACS:  typical chest pain  known h/o CAD  1st set CE neg---f/u second set at 8 pm  currently chest pain free  cw aspirin, plavix, and lipitor, metoprolol  EKG unchanged from prior  ECHO  cardiology Dr Suárez consult-- informed--waiting for call back  possible cath    # Severe RA:  prednisone, Mtx, tofacitinib, folic acid.  currently no joint pain    #DLD:  cw lipitor    # severe Emphysema:  patient has prescription for symbicort, albuterol Hfa and duonebs, but she is not using any of those meds for months now.  will cw symbicort in house.    Diet:  DASH/TLC    dvt ppx: lovenox    Full code    Medication verified with the pharmacy except MTX, no records on pharmacy since march---please call PMD in am to verify dosage and frequency of MTX and prednisone.

## 2019-06-27 NOTE — PROGRESS NOTE ADULT - SUBJECTIVE AND OBJECTIVE BOX
POST OPERATIVE PROCEDURAL DOCUMENTATION  PRE-OP DIAGNOSIS: CAD    POST-OP DIAGNOSIS: CAD    PROCEDURE:   LEFT HEART CATHERIZATION    Physician: Jose Armando GRANADOS  Assistant:  Albino GRANADOS    ANESTHESIA TYPE:  [x ] Sedation  [x ] Local/Regional  [  ]General Anesthesia    ESTIMATED BLOOD LOSS: < 10 ml         CONDITION  [x ] Good  [   ] Fair  [   ] Serious  [   ] Critical      SPECIMENS REMOVED (IF APPLICABLE):  n/a      IMPLANTS (IF APPLICABLE):   GIANNA to proximal RCA    FINDINGS:    LEFT HEART CATHERIZATION                                    LVEF%: WNL by echo    Coronary circulation: The coronary circulation is right dominant. There was significant 2-vessel coronary artery disease (LAD and RCA). Left main: Normal. LAD: The vessel was medium sized. Proximal LAD: There was a tubular 75 % stenosis at a site with no prior intervention, just before D1. There was HUBERT grade 3 flow through the vessel (brisk flow). Mid LAD: Angiography showed mild atherosclerosis with no flow limiting lesions. Distal LAD: The vessel was tortuous. Angiography showed minor luminal irregularities with no flow limiting lesions. 1st diagonal: The vessel was small to medium sized. There was a discrete 95 % stenosis at a site with no prior intervention, at the ostium of the vessel segment. There was HUBERT grade 3 flow through the vessel (brisk flow). Circumflex: The vessel was medium sized. Angiography showed mild atherosclerosis with no flow limiting lesions. RCA: The vessel was medium sized. Proximal RCA: There was a discrete 99 % stenosis at the site of a prior stent. There was HUBERT grade 2 flow through the vessel (partial perfusion). This is a likely culprit for the patient's clinical presentation. An intervention was performed. Mid RCA: Angiography showed patent stent and no evidence of disease. Distal RCA: Angiography showed no evidence of disease.       PERCUTANEOUS CORONARY INTERVENTIONS:  GIANNA to PRCA  iFR measurement of PLAD    COMPLICATIONS:  none      POST-OP DIAGNOSIS  CAD    PLAN OF CARE      [ ] D/C Home today   [ ]  D/C in AM  [x ] Return to In-patient bed  [ ] Admit for observation  [ ] Return for staged procedure:  [ ] CT Surgery consult called  [x ]  Continue DAPT, B-blocker & Statin therapy  [x ]  Medical Therapy  [x ] Aggressive risk factor modification. The patient should follow a low fat and low calorie diet.  Patient may need staged PCI to PLAD in 4-6 weeks.

## 2019-06-27 NOTE — PROGRESS NOTE ADULT - SUBJECTIVE AND OBJECTIVE BOX
ABHINAV DREW 73y Female  MRN#: [id number]     SUBJECTIVE  Patient is a 73y old Female who presents with a chief complaint of Post Cath   Currently admitted to medicine with the primary diagnosis of Chest pain, unspecified type  Today is hospital day 1d, and this morning she reports no overnight events.       OBJECTIVE  PAST MEDICAL & SURGICAL HISTORY  Rheumatoid arthritis  GERD (gastroesophageal reflux disease)  MI (myocardial infarction)  Hyperlipidemia  CAD (coronary artery disease)  History of surgery: stent placement    ALLERGIES:  Zocor (Joint Pain)    MEDICATIONS:  STANDING MEDICATIONS  aspirin  chewable 81 milliGRAM(s) Oral daily  atorvastatin 80 milliGRAM(s) Oral at bedtime  buDESOnide 160 MICROgram(s)/formoterol 4.5 MICROgram(s) Inhaler 2 Puff(s) Inhalation two times a day  clopidogrel Tablet 75 milliGRAM(s) Oral daily  enoxaparin Injectable 40 milliGRAM(s) SubCutaneous at bedtime  ergocalciferol 92317 Unit(s) Oral every week  folic acid 1 milliGRAM(s) Oral daily  metoprolol succinate ER 25 milliGRAM(s) Oral daily  pantoprazole    Tablet 40 milliGRAM(s) Oral before breakfast  predniSONE   Tablet 10 milliGRAM(s) Oral daily  sodium chloride 0.9%. 450 milliLiter(s) IV Continuous <Continuous>    PRN MEDICATIONS  ALBUTerol/ipratropium for Nebulization 3 milliLiter(s) Nebulizer every 6 hours PRN  ALPRAZolam 1 milliGRAM(s) Oral three times a day PRN      VITAL SIGNS: Last 24 Hours  T(C): 36 (27 Jun 2019 05:15), Max: 36.9 (26 Jun 2019 16:03)  T(F): 96.8 (27 Jun 2019 05:15), Max: 98.4 (26 Jun 2019 16:03)  HR: 64 (27 Jun 2019 05:15) (64 - 70)  BP: 150/67 (27 Jun 2019 05:15) (117/68 - 150/67)  BP(mean): --  RR: 18 (27 Jun 2019 05:15) (18 - 19)  SpO2: 98% (27 Jun 2019 09:19) (95% - 98%)    LABS:                        12.1   8.01  )-----------( 226      ( 27 Jun 2019 05:56 )             36.3     06-27    143  |  105  |  9<L>  ----------------------------<  84  4.0   |  26  |  0.8    Ca    9.2      27 Jun 2019 05:56    TPro  6.7  /  Alb  3.6  /  TBili  0.4  /  DBili  x   /  AST  17  /  ALT  8   /  AlkPhos  91  06-27    PT/INR - ( 26 Jun 2019 15:00 )   PT: 11.90 sec;   INR: 1.04 ratio           Urinalysis Basic - ( 26 Jun 2019 15:00 )    Color: Yellow / Appearance: Cloudy / SG: <=1.005 / pH: x  Gluc: x / Ketone: Negative  / Bili: Negative / Urobili: 0.2 mg/dL   Blood: x / Protein: Negative mg/dL / Nitrite: Negative   Leuk Esterase: Negative / RBC: x / WBC x   Sq Epi: x / Non Sq Epi: x / Bacteria: Moderate /HPF        Troponin T, Serum: <0.01 ng/mL (06-27-19 @ 05:56)  Creatine Kinase, Serum: 65 U/L (06-26-19 @ 21:15)  Troponin T, Serum: <0.01 ng/mL (06-26-19 @ 21:15)      CARDIAC MARKERS ( 27 Jun 2019 05:56 )  x     / <0.01 ng/mL / x     / x     / x      CARDIAC MARKERS ( 26 Jun 2019 21:15 )  x     / <0.01 ng/mL / 65 U/L / x     / 1.8 ng/mL  CARDIAC MARKERS ( 26 Jun 2019 15:00 )  x     / <0.01 ng/mL / x     / x     / x          RADIOLOGY:  < from: 12 Lead ECG (06.26.19 @ 13:57) >  Diagnosis Line Normal sinus rhythm  RSR' or QR pattern in V1 suggests right ventricular conduction delay  Abnormal ECG      < end of copied text >      PHYSICAL EXAM:    GENERAL: NAD, well-developed, AAOx3  HEENT:  Atraumatic, Normocephalic. EOMI, PERRLA, conjunctiva and sclera clear, No JVD  PULMONARY: Clear to auscultation bilaterally; No wheeze  CARDIOVASCULAR: Regular rate and rhythm; No murmurs, rubs, or gallops  GASTROINTESTINAL: Soft, Nontender, Nondistended; Bowel sounds present  MUSCULOSKELETAL:  2+ Peripheral Pulses, No clubbing, cyanosis, or edema  NEUROLOGY: non-focal  SKIN: No rashes or lesions      ADMISSION SUMMARY  Patient is a 73y old Female who presents with a chief complaint of Post Cath   Currently admitted to medicine with the primary diagnosis of Chest pain, unspecified type      ASSESSMENT & PLAN    #Proximal RCA oclusion s/p GIANNA  -Presented with multiple episodes of anginal chest pain for last 2 weeks. Currently no complaints of chest pain  -No EKG changes of ischemia  -CE negative 3 sets  -RCA PCI placed in Nov 2018 for 70% occlusion  -Patient underwent cath today with 99% occlusion in proximal RCA in place of previous stent. GIANNA placed in proximal RCA  -75% occlusion in proximal LAD for which patient may need staged PCI in 4-6 weeks  -c/w aspirin, plavix, and lipitor, metoprolol    # Severe RA:  -c/w prednisone, Methotrexate and folic acid   -currently no joint pain    #DLD:  cw lipitor    # Severe Emphysema:  -Patient was evaluated by pulmonary for worsening emphysema. She can continue symbicort and follow up with pulmonology as outpatient for PFTs    #Diet: DASH/TLC  #dvt ppx: lovenox  #GI ppx: pantoprazole  #Full code

## 2019-06-27 NOTE — CONSULT NOTE ADULT - ASSESSMENT
Impression    R/O ACS  Severe Emphysema  Former Smoker  No evidence of ILD on CT chest  H/O RA on MTX/prednisone     -- pt for cath today. f/u cards recommendations  -- on symbicort for emphysema-- currently asymptomatic.  -- check alpha-1 antitrypsin level  -- needs PFTs as outpt  will discuss with pulmonary team Impression    R/O ACS  Extensive Emphysema  Former Smoker  No evidence of ILD on CT chest  H/O RA on MTX/prednisone     -- pt for cath today. f/u cards recommendations  -- on symbicort for emphysema-- currently asymptomatic.  -- check alpha-1 antitrypsin level  -- needs PFTs as outpt  will discuss with pulmonary team Impression    R/O ACS  Extensive Emphysema  Former Smoker  No evidence of ILD on CT chest  H/O RA on MTX/prednisone     -- pt for cath today. f/u cards recommendations  -- on symbicort for emphysema-- currently asymptomatic and stable. c/w same  -- check alpha-1 antitrypsin level  -- needs PFTs as outpt. please follow up as outpt with pulmonary

## 2019-06-27 NOTE — CONSULT NOTE ADULT - SUBJECTIVE AND OBJECTIVE BOX
HPI    72 yo female with PMHx of CAD s/p stent most recent on 2018, severe RA, GERD, DLD, comes to the ED for chest pain, intermittent for past 2 weeks but increasing in frequency over the course of the week. On the day of presentation, pt states that chest pain started this morning at around 8 am, substernal, pressure type, chocking, radiating to bilateral arms associated with sweating. Chest pain happened at rest, no SOB or palpitation, no aggravating or relieving factors. It resolved in 5-10 min and again recurred in 3-4 hours later which resolved again. Patient's  called cardiologist who advised to come to ED. No Fever, SOB, cough, runny nose, palpitation. Patient was having on/off chest pain for since last 2 week which has increased in frequency. (2019 19:32)      Review of System:  See HPI    Allergies    Zocor (Joint Pain)    Home Medications:  aspirin 81 mg oral tablet, chewable: 1 tab(s) orally once a day (2019 17:33)  atorvastatin 80 mg oral tablet: 1 tab(s) orally once a day (at bedtime) (2019 17:33)  clopidogrel 75 mg oral tablet: 1 tab(s) orally once a day (2019 17:33)  famotidine 40 mg oral tablet: 1 tab(s) orally once a day (at bedtime) (2019 17:33)  folic acid 1 mg oral tablet: 1 tab(s) orally once a day (2019 17:33)  methotrexate 2.5 mg oral tablet: 3 tab(s) orally once a week (2019 02:42)  pantoprazole 40 mg oral delayed release tablet: 1 tab(s) orally once a day (2019 17:33)  Toprol-XL 25 mg oral tablet, extended release: 1 tab(s) orally once a day (2019 17:33)  Vitamin D2 50,000 intl units (1.25 mg) oral capsule: 1 cap(s) orally once a week (2019 17:33)  Xanax 1 mg oral tablet: 1 tab(s) orally 3 times a day, As Needed (2019 17:33)      SOCIAL HX:    Smoking            ETOH/illicit drugs             Occupation    PAST MEDICAL & SURGICAL HISTORY:  Rheumatoid arthritis  GERD (gastroesophageal reflux disease)  MI (myocardial infarction)  Hyperlipidemia  CAD (coronary artery disease)  History of surgery: stent placement      FAMILY HISTORY:  Family history of heart disease (Father): MI at age 84,   Family history of heart attack (Mother): MI at age 60    No cardiovascular or pulmonary family history         PHYSICAL EXAM  Vital Signs Last 24 Hrs  T(C): 36 (2019 05:15), Max: 36.9 (2019 16:03)  T(F): 96.8 (2019 05:15), Max: 98.4 (2019 16:03)  HR: 64 (2019 05:15) (64 - 74)  BP: 150/67 (2019 05:15) (117/68 - 150/67)  BP(mean): 138 (2019 13:48) (138 - 138)  RR: 18 (2019 05:15) (18 - 20)  SpO2: 98% (2019 09:19) (95% - 98%)    General: In NAD  HEENT: RUSS             Lymphatic system: No LN  appreciated  Lungs: Carl BS  Cardiovascular: Regular  Gastrointestinal: Soft.  + BS   Musculoskeletal: No Clubbing.  Moves all extremities  Skin: Warm.  Intact  Neurological: No motor or sensory deficit       LABS:                          12.1   8.01  )-----------( 226      ( 2019 05:56 )             36.3                                               06-27    143  |  105  |  9<L>  ----------------------------<  84  4.0   |  26  |  0.8    Ca    9.2      2019 05:56    TPro  6.7  /  Alb  3.6  /  TBili  0.4  /  DBili  x   /  AST  17  /  ALT  8   /  AlkPhos  91  06-27      PT/INR - ( 2019 15:00 )   PT: 11.90 sec;   INR: 1.04 ratio                                                Urinalysis Basic - ( 2019 15:00 )    Color: Yellow / Appearance: Cloudy / SG: <=1.005 / pH: x  Gluc: x / Ketone: Negative  / Bili: Negative / Urobili: 0.2 mg/dL   Blood: x / Protein: Negative mg/dL / Nitrite: Negative   Leuk Esterase: Negative / RBC: x / WBC x   Sq Epi: x / Non Sq Epi: x / Bacteria: Moderate /HPF        CARDIAC MARKERS ( 2019 05:56 )  x     / <0.01 ng/mL / x     / x     / x      CARDIAC MARKERS ( 2019 21:15 )  x     / <0.01 ng/mL / 65 U/L / x     / 1.8 ng/mL  CARDIAC MARKERS ( 2019 15:00 )  x     / <0.01 ng/mL / x     / x     / x                                                LIVER FUNCTIONS - ( 2019 05:56 )  Alb: 3.6 g/dL / Pro: 6.7 g/dL / ALK PHOS: 91 U/L / ALT: 8 U/L / AST: 17 U/L / GGT: x                                                                                                      ECHO    CXR reviewed :< from: Xray Chest 2 Views PA/Lat (19 @ 15:55) >  No focal pulmonary consolidation.    Stable extensive emphysema.      < end of copied text >      MEDICATIONS  (STANDING):  aspirin  chewable 81 milliGRAM(s) Oral daily  atorvastatin 80 milliGRAM(s) Oral at bedtime  buDESOnide 160 MICROgram(s)/formoterol 4.5 MICROgram(s) Inhaler 2 Puff(s) Inhalation two times a day  clopidogrel Tablet 75 milliGRAM(s) Oral daily  enoxaparin Injectable 40 milliGRAM(s) SubCutaneous at bedtime  ergocalciferol 39624 Unit(s) Oral every week  folic acid 1 milliGRAM(s) Oral daily  metoprolol succinate ER 25 milliGRAM(s) Oral daily  pantoprazole    Tablet 40 milliGRAM(s) Oral before breakfast  predniSONE   Tablet 10 milliGRAM(s) Oral daily    MEDICATIONS  (PRN):  ALBUTerol/ipratropium for Nebulization 3 milliLiter(s) Nebulizer every 6 hours PRN Shortness of Breath and/or Wheezing  ALPRAZolam 1 milliGRAM(s) Oral three times a day PRN Anxiety HPI    74 yo female with PMHx of CAD s/p stent most recent on 2018, severe RA, GERD, DLD, comes to the ED for chest pain, intermittent for past 2 weeks but increasing in frequency over the course of the week. On the day of presentation, pt states that chest pain started this morning at around 8 am, substernal, pressure type, chocking, radiating to bilateral arms associated with sweating. Chest pain happened at rest, no SOB or palpitation, no aggravating or relieving factors. It resolved in 5-10 min and again recurred in 3-4 hours later which resolved again. Patient's  called cardiologist who advised to come to ED. No Fever, SOB, cough, runny nose, palpitation. Patient was having on/off chest pain for since last 2 week which has increased in frequency. (2019 19:32)  Patient reinterviewed at bedside. She states that she has been hospitalized on and off since nov after her last stent , once for cellulitis and next visit for pneumonia (1 month ago). She was found to have extensive emphysema during her last visit and was advised to see pulmonary as outpt but she never followed up. She denies having any difficulty in breathing or cough. Denies any fever/chills/constitutional symptoms.  She quit smoking few months ago.    Review of System:  See HPI    Allergies    Zocor (Joint Pain)    Home Medications:  aspirin 81 mg oral tablet, chewable: 1 tab(s) orally once a day (2019 17:33)  atorvastatin 80 mg oral tablet: 1 tab(s) orally once a day (at bedtime) (2019 17:33)  clopidogrel 75 mg oral tablet: 1 tab(s) orally once a day (2019 17:33)  famotidine 40 mg oral tablet: 1 tab(s) orally once a day (at bedtime) (2019 17:33)  folic acid 1 mg oral tablet: 1 tab(s) orally once a day (2019 17:33)  methotrexate 2.5 mg oral tablet: 3 tab(s) orally once a week (2019 02:42)  pantoprazole 40 mg oral delayed release tablet: 1 tab(s) orally once a day (2019 17:33)  Toprol-XL 25 mg oral tablet, extended release: 1 tab(s) orally once a day (2019 17:33)  Vitamin D2 50,000 intl units (1.25 mg) oral capsule: 1 cap(s) orally once a week (2019 17:33)  Xanax 1 mg oral tablet: 1 tab(s) orally 3 times a day, As Needed (2019 17:33)      SOCIAL HX:    Smoking   : quit smoking few months ago. Used to smoke 1-2 cig/day on and off for many years.         ETOH/illicit drugs    : denied         Occupation : Retired    PAST MEDICAL & SURGICAL HISTORY:  Rheumatoid arthritis  GERD (gastroesophageal reflux disease)  MI (myocardial infarction)  Hyperlipidemia  CAD (coronary artery disease)  History of surgery: stent placement      FAMILY HISTORY:  Family history of heart disease (Father): MI at age 84,   Family history of heart attack (Mother): MI at age 60    No cardiovascular or pulmonary family history         PHYSICAL EXAM  Vital Signs Last 24 Hrs  T(C): 36 (2019 05:15), Max: 36.9 (2019 16:03)  T(F): 96.8 (2019 05:15), Max: 98.4 (2019 16:03)  HR: 64 (2019 05:15) (64 - 74)  BP: 150/67 (2019 05:15) (117/68 - 150/67)  BP(mean): 138 (2019 13:48) (138 - 138)  RR: 18 (2019 05:15) (18 - 20)  SpO2: 98% (2019 09:19) (95% - 98%)    General: In NAD  HEENT: RUSS             Lymphatic system: No LN  appreciated  Lungs: Carl BS  Cardiovascular: Regular  Gastrointestinal: Soft.  + BS   Musculoskeletal: No Clubbing.  Moves all extremities  Skin: Warm.  Intact  Neurological: No motor or sensory deficit       LABS:                          12.1   8.01  )-----------( 226      ( 2019 05:56 )             36.3                                               06-27    143  |  105  |  9<L>  ----------------------------<  84  4.0   |  26  |  0.8    Ca    9.2      2019 05:56    TPro  6.7  /  Alb  3.6  /  TBili  0.4  /  DBili  x   /  AST  17  /  ALT  8   /  AlkPhos  91        PT/INR - ( 2019 15:00 )   PT: 11.90 sec;   INR: 1.04 ratio                                                Urinalysis Basic - ( 2019 15:00 )    Color: Yellow / Appearance: Cloudy / SG: <=1.005 / pH: x  Gluc: x / Ketone: Negative  / Bili: Negative / Urobili: 0.2 mg/dL   Blood: x / Protein: Negative mg/dL / Nitrite: Negative   Leuk Esterase: Negative / RBC: x / WBC x   Sq Epi: x / Non Sq Epi: x / Bacteria: Moderate /HPF        CARDIAC MARKERS ( 2019 05:56 )  x     / <0.01 ng/mL / x     / x     / x      CARDIAC MARKERS ( 2019 21:15 )  x     / <0.01 ng/mL / 65 U/L / x     / 1.8 ng/mL  CARDIAC MARKERS ( 2019 15:00 )  x     / <0.01 ng/mL / x     / x     / x                                                LIVER FUNCTIONS - ( 2019 05:56 )  Alb: 3.6 g/dL / Pro: 6.7 g/dL / ALK PHOS: 91 U/L / ALT: 8 U/L / AST: 17 U/L / GGT: x                                                                                                      ECHO    CXR reviewed :< from: Xray Chest 2 Views PA/Lat (19 @ 15:55) >  No focal pulmonary consolidation.    Stable extensive emphysema.      < end of copied text >      MEDICATIONS  (STANDING):  aspirin  chewable 81 milliGRAM(s) Oral daily  atorvastatin 80 milliGRAM(s) Oral at bedtime  buDESOnide 160 MICROgram(s)/formoterol 4.5 MICROgram(s) Inhaler 2 Puff(s) Inhalation two times a day  clopidogrel Tablet 75 milliGRAM(s) Oral daily  enoxaparin Injectable 40 milliGRAM(s) SubCutaneous at bedtime  ergocalciferol 18935 Unit(s) Oral every week  folic acid 1 milliGRAM(s) Oral daily  metoprolol succinate ER 25 milliGRAM(s) Oral daily  pantoprazole    Tablet 40 milliGRAM(s) Oral before breakfast  predniSONE   Tablet 10 milliGRAM(s) Oral daily    MEDICATIONS  (PRN):  ALBUTerol/ipratropium for Nebulization 3 milliLiter(s) Nebulizer every 6 hours PRN Shortness of Breath and/or Wheezing  ALPRAZolam 1 milliGRAM(s) Oral three times a day PRN Anxiety HPI    72 yo female with PMHx of CAD s/p stent most recent on 2018, severe RA, GERD, DLD, comes to the ED for chest pain, intermittent for past 2 weeks but increasing in frequency over the course of the week. On the day of presentation, pt states that chest pain started this morning at around 8 am, substernal, pressure type, chocking, radiating to bilateral arms associated with sweating. Chest pain happened at rest, no SOB or palpitation, no aggravating or relieving factors. It resolved in 5-10 min and again recurred in 3-4 hours later which resolved again. Patient's  called cardiologist who advised to come to ED. No Fever, SOB, cough, runny nose, palpitation. Patient was having on/off chest pain for since last 2 week which has increased in frequency. (2019 19:32)  Patient reinterviewed at bedside. She states that she has been hospitalized on and off since nov after her last stent , once for cellulitis and next visit for pneumonia (1 month ago). She was found to have extensive emphysema during her last visit and was advised to see pulmonary as outpt but she never followed up. She denies having any difficulty in breathing or cough. Denies any fever/chills/constitutional symptoms.  She quit smoking few months ago.    Review of System:  See HPI    Allergies    Zocor (Joint Pain)    Home Medications:  aspirin 81 mg oral tablet, chewable: 1 tab(s) orally once a day (2019 17:33)  atorvastatin 80 mg oral tablet: 1 tab(s) orally once a day (at bedtime) (2019 17:33)  clopidogrel 75 mg oral tablet: 1 tab(s) orally once a day (2019 17:33)  famotidine 40 mg oral tablet: 1 tab(s) orally once a day (at bedtime) (2019 17:33)  folic acid 1 mg oral tablet: 1 tab(s) orally once a day (2019 17:33)  methotrexate 2.5 mg oral tablet: 3 tab(s) orally once a week (2019 02:42)  pantoprazole 40 mg oral delayed release tablet: 1 tab(s) orally once a day (2019 17:33)  Toprol-XL 25 mg oral tablet, extended release: 1 tab(s) orally once a day (2019 17:33)  Vitamin D2 50,000 intl units (1.25 mg) oral capsule: 1 cap(s) orally once a week (2019 17:33)  Xanax 1 mg oral tablet: 1 tab(s) orally 3 times a day, As Needed (2019 17:33)      SOCIAL HX:    Smoking   : quit smoking few months ago. Used to smoke 1-2 cig/day on and off for many years.         ETOH/illicit drugs    : denied         Occupation : Retired    PAST MEDICAL & SURGICAL HISTORY:  Rheumatoid arthritis  GERD (gastroesophageal reflux disease)  MI (myocardial infarction)  Hyperlipidemia  CAD (coronary artery disease)  History of surgery: stent placement      FAMILY HISTORY:  Family history of heart disease (Father): MI at age 84,   Family history of heart attack (Mother): MI at age 60    No cardiovascular or pulmonary family history         PHYSICAL EXAM  Vital Signs Last 24 Hrs  T(C): 36 (2019 05:15), Max: 36.9 (2019 16:03)  T(F): 96.8 (2019 05:15), Max: 98.4 (2019 16:03)  HR: 64 (2019 05:15) (64 - 74)  BP: 150/67 (2019 05:15) (117/68 - 150/67)  BP(mean): 138 (2019 13:48) (138 - 138)  RR: 18 (2019 05:15) (18 - 20)  SpO2: 98% (2019 09:19) (95% - 98%)    General: In NAD  HEENT: RUSS             Lymphatic system: No LN  appreciated  Lungs: Carl BS  Cardiovascular: Regular  Gastrointestinal: Soft.  + BS   Musculoskeletal: No Clubbing.  Moves all extremities  Skin: Warm.  Intact  Neurological: No motor or sensory deficit       LABS:                          12.1   8.01  )-----------( 226      ( 2019 05:56 )             36.3                                               06-27    143  |  105  |  9<L>  ----------------------------<  84  4.0   |  26  |  0.8    Ca    9.2      2019 05:56    TPro  6.7  /  Alb  3.6  /  TBili  0.4  /  DBili  x   /  AST  17  /  ALT  8   /  AlkPhos  91        PT/INR - ( 2019 15:00 )   PT: 11.90 sec;   INR: 1.04 ratio                                                Urinalysis Basic - ( 2019 15:00 )    Color: Yellow / Appearance: Cloudy / SG: <=1.005 / pH: x  Gluc: x / Ketone: Negative  / Bili: Negative / Urobili: 0.2 mg/dL   Blood: x / Protein: Negative mg/dL / Nitrite: Negative   Leuk Esterase: Negative / RBC: x / WBC x   Sq Epi: x / Non Sq Epi: x / Bacteria: Moderate /HPF        CARDIAC MARKERS ( 2019 05:56 )  x     / <0.01 ng/mL / x     / x     / x      CARDIAC MARKERS ( 2019 21:15 )  x     / <0.01 ng/mL / 65 U/L / x     / 1.8 ng/mL  CARDIAC MARKERS ( 2019 15:00 )  x     / <0.01 ng/mL / x     / x     / x                                                LIVER FUNCTIONS - ( 2019 05:56 )  Alb: 3.6 g/dL / Pro: 6.7 g/dL / ALK PHOS: 91 U/L / ALT: 8 U/L / AST: 17 U/L / GGT: x                                                                                                      < from: CT Chest No Cont (19 @ 17:02) >  Extensive emphysema with paraseptal predominance, progressed since the   prior exam.    < end of copied text >    CXR reviewed :< from: Xray Chest 2 Views PA/Lat (19 @ 15:55) >  No focal pulmonary consolidation.    Stable extensive emphysema.      < end of copied text >      MEDICATIONS  (STANDING):  aspirin  chewable 81 milliGRAM(s) Oral daily  atorvastatin 80 milliGRAM(s) Oral at bedtime  buDESOnide 160 MICROgram(s)/formoterol 4.5 MICROgram(s) Inhaler 2 Puff(s) Inhalation two times a day  clopidogrel Tablet 75 milliGRAM(s) Oral daily  enoxaparin Injectable 40 milliGRAM(s) SubCutaneous at bedtime  ergocalciferol 20933 Unit(s) Oral every week  folic acid 1 milliGRAM(s) Oral daily  metoprolol succinate ER 25 milliGRAM(s) Oral daily  pantoprazole    Tablet 40 milliGRAM(s) Oral before breakfast  predniSONE   Tablet 10 milliGRAM(s) Oral daily    MEDICATIONS  (PRN):  ALBUTerol/ipratropium for Nebulization 3 milliLiter(s) Nebulizer every 6 hours PRN Shortness of Breath and/or Wheezing  ALPRAZolam 1 milliGRAM(s) Oral three times a day PRN Anxiety

## 2019-06-27 NOTE — CONSULT NOTE ADULT - SUBJECTIVE AND OBJECTIVE BOX
Patient is a 73y old  Female who presents with a chief complaint of Chest pain (2019 19:32)      HPI:  72 yo female with PMH of CAD s/p stent most recent on 2018, severe RA, GERD, DLD, COPD, comes to the ED for chest pain, intermittent for past 2 weeks but increasing in frequency over the course of the week. On the day of presentation, pt states that chest pain started yesterdays morning at around 8 am, substernal, pressure type, choking, radiating to bilateral arms associated with sweating. Chest pain happened at rest, no SOB or palpitation, no aggravating or relieving factors. It resolved in 5-10 min and again recurred in 3-4 hours later which resolved again. Patient was told to go to the er.  pt's prior cath had a 70% lad lesion and it was discussed that if pt was having sxs from it then to consider another cardiac cath.  pt was tx for pneumonia 2019.      no Fever, SOB, cough, runny nose, palpitation    Patient was having on/off chest pain for since last 2 week which has increased in frequency. (2019 19:32)      PAST MEDICAL & SURGICAL HISTORY:  Rheumatoid arthritis  GERD (gastroesophageal reflux disease)  MI (myocardial infarction)  Hyperlipidemia  CAD (coronary artery disease)  History of surgery: stent placement        MEDICATIONS  (STANDING):  aspirin  chewable 81 milliGRAM(s) Oral daily  atorvastatin 80 milliGRAM(s) Oral at bedtime  buDESOnide 160 MICROgram(s)/formoterol 4.5 MICROgram(s) Inhaler 2 Puff(s) Inhalation two times a day  clopidogrel Tablet 75 milliGRAM(s) Oral daily  enoxaparin Injectable 40 milliGRAM(s) SubCutaneous at bedtime  ergocalciferol 75655 Unit(s) Oral every week  folic acid 1 milliGRAM(s) Oral daily  metoprolol succinate ER 25 milliGRAM(s) Oral daily  pantoprazole    Tablet 40 milliGRAM(s) Oral before breakfast  predniSONE   Tablet 10 milliGRAM(s) Oral daily    MEDICATIONS  (PRN):  ALBUTerol/ipratropium for Nebulization 3 milliLiter(s) Nebulizer every 6 hours PRN Shortness of Breath and/or Wheezing  ALPRAZolam 1 milliGRAM(s) Oral three times a day PRN Anxiety      FAMILY HISTORY:  Family history of heart disease (Father): MI at age 84,   Family history of heart attack (Mother): MI at age 60      SOCIAL HISTORY:  CIGARETTES: former smoker  ALCOHOL:none  DRUGS:none                      REVIEW OF SYSTEMS:  CONSTITUTIONAL: No distress, Looks stable  NECK: No pain (+) neck or stiffness  RESPIRATORY: No cough, wheezing, shortness of breath  CARDIOVASCULAR: (+) chest pain, SOB, palpitations, leg swelling  GASTROINTESTINAL: No abdominal or epigastric pain. No nausea, vomiting, or hematemesis;  No melena.  NEUROLOGICAL: No dizziness, headaches, memory loss, loss of strength  SKIN: No itching, burning, rashes, or lesions   ENDOCRINE: No heat or cold intolerance  MUSCULOSKELETAL:(+) joint pain, No  swelling; No muscle pain  PSYCHIATRIC: No depression, anxiety, mood swings, or difficulty sleeping  ALLERGY: No hives, itching, rash          Vital Signs Last 24 Hrs  T(C): 36 (2019 05:15), Max: 36.9 (2019 16:03)  T(F): 96.8 (2019 05:15), Max: 98.4 (2019 16:03)  HR: 64 (2019 05:15) (64 - 74)  BP: 150/67 (2019 05:15) (117/68 - 150/67)  BP(mean): 138 (2019 13:48) (138 - 138)  RR: 18 (2019 05:15) (18 - 20)  SpO2: 98% (2019 09:19) (95% - 98%)                      PHYSICAL EXAM:  GENERAL: No distress, well developed  HEAD:  Atraumatic, Normocephalic  NECK: Supple, No JVD, No Bruit of either carotid arteries  NERVOUS SYSTEM:  Alert, Awake, Oriented to time, place, person; Normal memory and speech; Normal motor Strength 5/5 B/L upper and lower extremities  CHEST/LUNG: Normal air entry to lung base bilaterally; No wheeze, crackle, (+) fine rales, no rhonchi  HEART: Regular heart beat, S1, A2, P2, No S3, No S4, No gallop, No murmur  ABDOMEN: Soft, Non tender, Non distended; Bowel sounds present  EXTREMITIES:  2+ Peripheral Pulses, No clubbing, No edema  SKIN: No rashes or lesions    TELEMETRY:  nsr  ECG:  < from: 12 Lead ECG (19 @ 13:57) >  Diagnosis Line Normal sinus rhythm  RSR' or QR pattern in V1 suggests right ventricular conduction delay  Abnormal ECG    < end of copied text >  CXRAY < from: Xray Chest 2 Views PA/Lat (19 @ 15:55) >  Impression:      No focal pulmonary consolidation.    Stable extensive emphysema.    < end of copied text >    I&O's Detail      LABS:                        12.1   8.01  )-----------( 226      ( 2019 05:56 )             36.3         143  |  105  |  9<L>  ----------------------------<  84  4.0   |  26  |  0.8    Ca    9.2      2019 05:56    TPro  6.7  /  Alb  3.6  /  TBili  0.4  /  DBili  x   /  AST  17  /  ALT  8   /  AlkPhos  91      CARDIAC MARKERS ( 2019 05:56 )  x     / <0.01 ng/mL / x     / x     / x      CARDIAC MARKERS ( 2019 21:15 )  x     / <0.01 ng/mL / 65 U/L / x     / 1.8 ng/mL  CARDIAC MARKERS ( 2019 15:00 )  x     / <0.01 ng/mL / x     / x     / x          PT/INR - ( 2019 15:00 )   PT: 11.90 sec;   INR: 1.04 ratio           Urinalysis Basic - ( 2019 15:00 )    Color: Yellow / Appearance: Cloudy / SG: <=1.005 / pH: x  Gluc: x / Ketone: Negative  / Bili: Negative / Urobili: 0.2 mg/dL   Blood: x / Protein: Negative mg/dL / Nitrite: Negative   Leuk Esterase: Negative / RBC: x / WBC x   Sq Epi: x / Non Sq Epi: x / Bacteria: Moderate /HPF      I&O's Summary      RADIOLOGY & ADDITIONAL STUDIES:

## 2019-06-28 NOTE — PROGRESS NOTE ADULT - PROBLEM SELECTOR PLAN 1
pt s/p henry to [facility] prox site of prior stroke and significant lad lesion by ffr to be stented in 4 weeks  switch plaavix to brillinta  cont asa 81 mgs  cont home statin and metoprolol  repeat ekg this am and if unchanged then consider d/c home after brilinta is approved by insurance.  discussed with alphonso Puentes and Dr. Suárez

## 2019-06-28 NOTE — DISCHARGE NOTE PROVIDER - HOSPITAL COURSE
74 yo female with PMH of CAD s/p stent most recent on nov 2018, severe RA, GERD, DLD, comes to the ED for chest pain, intermittent for past 2 weeks but increasing in frequency over the course of the week. On the day of presentation, pt states that chest pain started this morning at around 8 am, substernal, pressure type, chocking, radiating to bilateral arms associated with sweating. Chest pain happened at rest, no SOB or palpitation, no aggravating or relieving factors. It resolved in 5-10 min and again recurred in 3-4 hours later which resolved again. Patient's  called cardiologist who advised to come to ED.        no Fever, SOB, cough, runny nose, palpitation        Patient was having on/off chest pain for since last 2 week which has increased in frequency.        EKG reviewed, normal sinus rhythm, no acute ischemic changes        # Anginal chest pain    # h/o CAD s/p stentsx2, known remaining 70% lesion in LAD    -Cardiac cath showed 99% restenosis of previous stent + large occlusions at other sites which were not addressed at this time        # Rheumatoid arthritis, stable symptoms    - c/w home medications prednisone, methotrexate, folic acid    - pt states that she was previously on Remicade and stopped since pt received cardiac stent and also developed pneumonia in Sept 2018    - pt states that she takes 3 tabs of 2.5 mg methotrexate every weekend, unable to verify dose of prednisone with patient, will continue with 10 mg daily for now based on prior prescription        # DLD - c/w lipitor        # COPD, Emphysema - c/w inhalers. Follow up in pulmonary clinic         Patient complained of persistent Chest Pain. EKG no new changes and trops WNL. Cath cath showed 99% restenosis of previous stent + large occlusions at other sites which were not addressed at this time. Will need to follow up in a few weeks to address those. Also seen by pulmonary for emphysema. Needs to follow up in pulmonary clinic.            You have a history of COPD. In the hospital you were treated for an exacerbation of this condition. Continue using your home inhalers upon discharge. Continue using supplemental oxygen as needed. Follow-up with your primary care doctor and pulmonologist within one week of discharge for further monitoring of this condition 74 yo female with PMH of CAD s/p stent most recent on nov 2018, severe RA, GERD, DLD, comes to the ED for chest pain, intermittent for past 2 weeks but increasing in frequency over the course of the week. On the day of presentation, pt states that chest pain started this morning at around 8 am, substernal, pressure type, chocking, radiating to bilateral arms associated with sweating. Chest pain happened at rest, no SOB or palpitation, no aggravating or relieving factors. It resolved in 5-10 min and again recurred in 3-4 hours later which resolved again. Patient's  called cardiologist who advised to come to ED.        no Fever, SOB, cough, runny nose, palpitation        Patient was having on/off chest pain for since last 2 week which has increased in frequency.        EKG reviewed, normal sinus rhythm, no acute ischemic changes        # Anginal chest pain    # h/o CAD s/p stentsx2, known remaining 70% lesion in LAD    -Cardiac cath showed 99% restenosis of previous stent + large occlusions at other sites which were not addressed at this time        # Rheumatoid arthritis, stable symptoms    - c/w home medications prednisone, methotrexate, folic acid    - pt states that she was previously on Remicade and stopped since pt received cardiac stent and also developed pneumonia in Sept 2018    - pt states that she takes 3 tabs of 2.5 mg methotrexate every weekend, unable to verify dose of prednisone with patient, will continue with 10 mg daily for now based on prior prescription        # DLD - c/w lipitor        # COPD, Emphysema - c/w inhalers. Follow up in pulmonary clinic         Patient complained of persistent Chest Pain. EKG no new changes and trops WNL. Cath cath showed 99% restenosis of previous stent + large occlusions at other sites which were not addressed at this time. Will need to follow up in a few weeks to address those. Also seen by pulmonary for emphysema. Needs to follow up in pulmonary clinic.            You have a history of COPD. In the hospital you were treated for an exacerbation of this condition. Continue using your home inhalers upon discharge. Continue using supplemental oxygen as needed. Follow-up with your primary care doctor and pulmonologist within one week of discharge for further monitoring of this condition        34 minutes spent on discharge planning

## 2019-06-28 NOTE — PROGRESS NOTE ADULT - SUBJECTIVE AND OBJECTIVE BOX
Cardiology Follow up    ABHINAV DREW   73yFemale  PAST MEDICAL & SURGICAL HISTORY:  Rheumatoid arthritis  GERD (gastroesophageal reflux disease)  MI (myocardial infarction)  Hyperlipidemia  CAD (coronary artery disease)  History of surgery: stent placement    Allergies    Zocor (Joint Pain)    Intolerances        Patient without complaints. Pt ambulated without issues/symptoms  Denies CP, SOB, palpitations, or dizziness  No events on telemetry overnight    Vital Signs Last 24 Hrs  T(C): 36.2 (28 Jun 2019 05:54), Max: 36.2 (27 Jun 2019 17:20)  T(F): 97.1 (28 Jun 2019 05:54), Max: 97.2 (27 Jun 2019 17:20)  HR: 64 (28 Jun 2019 05:54) (58 - 64)  BP: 130/65 (28 Jun 2019 05:54) (127/58 - 138/67)  BP(mean): --  RR: 18 (28 Jun 2019 05:54) (18 - 18)  SpO2: 97% (27 Jun 2019 19:30) (96% - 98%)Allergies    REVIEW OF SYSTEMS:    CONSTITUTIONAL: No weakness, fevers or chills  EYES/ENT: No visual changes;  No vertigo or throat pain   NECK: No pain or stiffness  RESPIRATORY: No cough, wheezing, hemoptysis; No shortness of breath  CARDIOVASCULAR: No chest pain or palpitations  GASTROINTESTINAL: No abdominal or epigastric pain. No nausea, vomiting, or hematemesis; No diarrhea or constipation. No melena or hematochezia.  GENITOURINARY: No dysuria, frequency or hematuria  NEUROLOGICAL: No numbness or weakness  SKIN: No itching, rashes        NAD, appears well  S1S2, no murmurs, no JVD  CTA B/L, no wheeze, no rales  SNT +BS  Ext:    Right Groin:  NO hematoma or bleeding,  NO bruit; C/D/I , + pulses, no s/s of infx  Pulses:  +Rad/ +PTs /+DPs/ same as baseline  A&Ox 3    EKG       P                                                                                                          · Note Type	Event Note  brief cath report	      PRE-OP DIAGNOSIS: CAD , + NM stress test in infero septal , stable angina class lll    PROCEDURE: [facility] with coronary angiography    Physician: Dr Suárez  Assistant: DR krish Martinez    ANESTHESIA TYPE:  [  ]General Anesthesia  [ x ] Sedation  [  ] Local/Regional    ESTIMATED BLOOD LOSS:    10   mL    CONDITION  [  ] Critical  [  ] Serious  [  ]Fair  [  x]Good      SPECIMENS REMOVED (IF APPLICABLE):      IV CONTRAST:     140        mL    FINDINGS    Left Heart Catheterization:  LVEF%:  55 %  LVEDP: low  [ ] Normal Coronary Arteries  [ ] Luminal Irregularities  [ ] Non-obstructive CAD    ACCESS:    [ ] right radial artery  [x ] right femoral artery : close with angioseal    LEFT HEART CATHETERIZATION                                    Left main  no disease    LAD:  prox/mid 70% LAD lesion at the bifurcation with D1                       Diag: small vessel , ostial 90 %  lesion    Left Circumflex: mild diffuse atherosclerosis    Right Coronary Artery: 70% lesion prox RCA before the mid RCA stent , patent stent  RPDA no disease      INTERVENTION  IMPLANTS: GIANNA was inserted in prox RCA        POST-OP DIAGNOSIS    CAD with patent prior stent , pt had 70% lesion in prox RCA that correlates with NM stress test findings ( infero septal).  a GIANNA was placed with excellent result.  pt has a 70% prox/mid LAD lesion , needs to be fixed , however as a staged PCI, given her mutiple comorbidities( pt has RA on methothrexate and prednisone).    PLAN OF CARE  [ ] D/C Home today  [ ]  D/C in AM  [ ] Return to In-patient bed  [x ] Admit for observation  [ ] Return for staged procedure:  [ ] CT Surgery consult called  [ ]  Continue DAPT, B-blocker & Statin therapy : pt should take ASA/PLAvix.      Electronic Signatures:  Lisa Martinez)  (Signed 13-Nov-2018 12:58)  	Authored: Note Type, Note  Willis Suárez)  (Signed 13-Nov-2018 13:34)  	Co-Signer: Note Type, Note      Last Updated: 13-Nov-2018 13:34 by Willis Suárez)      LABS                        12.2   8.14  )-----------( 223      ( 28 Jun 2019 06:45 )             37.2     06-28    142  |  104  |  11  ----------------------------<  88  3.7   |  25  |  0.7    Ca    8.8      28 Jun 2019 06:45  Mg     2.2     06-28    TPro  6.7  /  Alb  3.6  /  TBili  0.4  /  DBili  x   /  AST  17  /  ALT  8   /  AlkPhos  91  06-27    CARDIAC MARKERS ( 27 Jun 2019 05:56 )  x     / <0.01 ng/mL / x     / x     / x      CARDIAC MARKERS ( 26 Jun 2019 21:15 )  x     / <0.01 ng/mL / 65 U/L / x     / 1.8 ng/mL  CARDIAC MARKERS ( 26 Jun 2019 15:00 )  x     / <0.01 ng/mL / x     / x     / x          Magnesium, Serum: 2.2 mg/dL [1.8 - 2.4] (06-28-19 @ 06:45)  LIVER FUNCTIONS - ( 27 Jun 2019 05:56 )  Alb: 3.6 g/dL / Pro: 6.7 g/dL / ALK PHOS: 91 U/L / ALT: 8 U/L / AST: 17 U/L / GGT: x             A/P:  I discussed the case with Interventional Cardiologist Dr. Suárez & recommends the following:    S/P PCI pRCA/ 2V CAD(RCA and LAD)  	     Continue DAPT(asa 81mg daily, plavix 75mg daily),  B-Blocker, Statin Therapy                   30 day supply of DAPT given by RN for home use only                   oob-, ambulate                    Pt given instructions on importance of taking antiplatelet medication or risk acute stent thrombosis/death                   Post cath instructions, access site care and activity restrictions reviewed with patient                     Discussed with patient to return to hospital if experience chest pain, shortness breath, dizziness and site bleeding                   Aggressive risk factor modification,  diet counseling, smoking cessation discussed with patient                       Can discharge patient from cardiac standpoint if ambulating without symptoms and access site stable                    Follow up with Cardiology Dr. Oseguera in one week.  Instructed to call and make an appointment

## 2019-06-28 NOTE — CHART NOTE - NSCHARTNOTEFT_GEN_A_CORE
CARDIOLOGY NP;    pt switched from plavix to brilinta as per Dr. Oseguera . Brilinta coverage confirmed , $35 copay, pt aware and agreeable. d/w resident, d/c plavix and continue brilinta 90mg q12. discharge pt home on home dose of zocor as per Dr. Oseguera , pt been on zocor at home and tolerating. pt instructed to stop plavix and continue brilinta 90mg q12 and asa 81mg daily, verbalizes understanding.

## 2019-06-28 NOTE — DISCHARGE NOTE PROVIDER - NSDCCPCAREPLAN_GEN_ALL_CORE_FT
PRINCIPAL DISCHARGE DIAGNOSIS  Diagnosis: Coronary artery stenosis  Assessment and Plan of Treatment: You have been diagnosed with Coronary artery stenosis while in the hospital. This condition develops when part of the heart muscle doesn't get enough oxygen. A stent was placed to restore blood flow. You also have other parts of your heart that may not be getting enough oxygen and NEED to follow up with your cardiologist  Follow the below lifestyle change recommendations :   - Avoid smoking and do not let anyone smoke in your house. If you find it difficult to quit ask for help or contact your doctor for a referral to counselling groups   - Avoid fatty food and stay away from fast food restaurant. Excessive consumption of fatty food can increase your cholesterol level which is a risk factorfor cardiovascular disease   - If you already have hypertension, take your blood pressure medication as prescribed.   - Check your blood pressure every day at home. If your blood pressure is persistently high, call your doctor as your medication may need to be re-adjusted or new medication may need to be added   - Consult your doctor about any change in your exercise plan    - Take your blood thinners and cholesterol medication as prescribed. Do not run out on your medication and always contact your doctor if you need refills   - Call 911 or report to the emergency department if you have chest pain or pressure sensation in your chest, especially if the pain goes to your neck, jaw, back or arms or it is accompanied by sweating, nausea and vomiting    - Contact your doctor if you have worsening shortness of breath, during activity or at rest, or if you feel dizzy, if you feel that your hertbeat is very fast very slow or not steady   - Any changes in your chest pain may mean that your disease is getting worse : These changes include : chest pain that is getting stronger, that is occuring more frequently, that lasts longer, tht occurs when you are not active or medicines do not improve your symptoms like they used to

## 2019-06-28 NOTE — DISCHARGE NOTE PROVIDER - CARE PROVIDER_API CALL
Marc Ahumada)  Critical Care Medicine; Internal Medicine; Pulmonary Disease; Sleep Medicine  [address] [place] [address], [place] [address]  Phone: [phone]  Fax: [phone]  Follow Up Time:     Jason Mcarthur)  Cardiovascular Disease; Internal Medicine  [address] [place] [address], [place] [address]  Phone: [phone]  Fax: [phone]  Follow Up Time:

## 2019-06-28 NOTE — DISCHARGE NOTE NURSING/CASE MANAGEMENT/SOCIAL WORK - NSDCPEWEB_GEN_ALL_CORE
[facility] for Tobacco Control website --- [email or web address] --- [email or web address]

## 2019-06-28 NOTE — PROGRESS NOTE ADULT - ATTENDING COMMENTS
#Progress Note Handoff:  Pending (specify):  Consults_________, Tests________, Test Results_______, Other___d/c today______  Family discussion:d/w pt at bedside  Disposition: Home_x_/SNF___/Other________/Unknown at this time________

## 2019-06-28 NOTE — PROGRESS NOTE ADULT - SUBJECTIVE AND OBJECTIVE BOX
Subjective:pt feels well with no cp nor sob.       MEDICATIONS  (STANDING):  aspirin  chewable 81 milliGRAM(s) Oral daily  atorvastatin 80 milliGRAM(s) Oral at bedtime  buDESOnide 160 MICROgram(s)/formoterol 4.5 MICROgram(s) Inhaler 2 Puff(s) Inhalation two times a day  clopidogrel Tablet 75 milliGRAM(s) Oral daily  enoxaparin Injectable 40 milliGRAM(s) SubCutaneous at bedtime  ergocalciferol 71040 Unit(s) Oral every week  folic acid 1 milliGRAM(s) Oral daily  metoprolol succinate ER 25 milliGRAM(s) Oral daily  pantoprazole    Tablet 40 milliGRAM(s) Oral before breakfast  predniSONE   Tablet 10 milliGRAM(s) Oral daily  sodium chloride 0.9%. 450 milliLiter(s) (75 mL/Hr) IV Continuous <Continuous>    MEDICATIONS  (PRN):  ALBUTerol/ipratropium for Nebulization 3 milliLiter(s) Nebulizer every 6 hours PRN Shortness of Breath and/or Wheezing  ALPRAZolam 1 milliGRAM(s) Oral three times a day PRN Anxiety            Vital Signs Last 24 Hrs  T(C): 36.2 (28 Jun 2019 05:54), Max: 36.2 (27 Jun 2019 17:20)  T(F): 97.1 (28 Jun 2019 05:54), Max: 97.2 (27 Jun 2019 17:20)  HR: 64 (28 Jun 2019 05:54) (58 - 64)  BP: 130/65 (28 Jun 2019 05:54) (127/58 - 138/67)  BP(mean): --  RR: 18 (28 Jun 2019 05:54) (18 - 18)  SpO2: 97% (27 Jun 2019 19:30) (96% - 97%)             REVIEW OF SYSTEMS:  CONSTITUTIONAL: no fever, no chills, no diaphoresis  CARDIOLOGY: no chest pain, no SOB, no palpitation, no diaphoresis, no faint   RESPIRATORY: no dyspnea, no wheeze, no orthopnea, no PND   NEUROLOGICAL: no dizziness, headache, focal deficits to report.  GI: no abdominal pain, no dyspepsia, no nausea, no vomiting, no diarrhea.    HEENT: no congestion, no nasal bleeding  SKIN: (+) ecchymosis             PHYSICAL EXAM:  · CONSTITUTIONAL: Looks stable  . NECK: Supple, no JVD, no bruit on either carotid side   · RESPIRATORY: Normal air entry to lung base, no wheeze, no crackle, no wet rales  · CARDIOVASCULAR: Normal S1, A2, P2, no murmur, no click, regular rate,  no rub,  · EXTREMITIES: No cyanosis, no clubbing, no edema  · VASCULAR: Pulses are regular, equal, bilateral in upper and lower extremities  	  TELEMETRY:nsr    ECG:pending      LABS:                        12.2   8.14  )-----------( 223      ( 28 Jun 2019 06:45 )             37.2     06-28    142  |  104  |  11  ----------------------------<  88  3.7   |  25  |  0.7    Ca    8.8      28 Jun 2019 06:45  Mg     2.2     06-28    TPro  6.7  /  Alb  3.6  /  TBili  0.4  /  DBili  x   /  AST  17  /  ALT  8   /  AlkPhos  91  06-27    CARDIAC MARKERS ( 27 Jun 2019 05:56 )  x     / <0.01 ng/mL / x     / x     / x      CARDIAC MARKERS ( 26 Jun 2019 21:15 )  x     / <0.01 ng/mL / 65 U/L / x     / 1.8 ng/mL  CARDIAC MARKERS ( 26 Jun 2019 15:00 )  x     / <0.01 ng/mL / x     / x     / x          PT/INR - ( 26 Jun 2019 15:00 )   PT: 11.90 sec;   INR: 1.04 ratio             I&O's Summary    27 Jun 2019 07:01  -  28 Jun 2019 07:00  --------------------------------------------------------  IN: 60 mL / OUT: 750 mL / NET: -690 mL    28 Jun 2019 07:01  -  28 Jun 2019 10:00  --------------------------------------------------------  IN: 210 mL / OUT: 0 mL / NET: 210 mL      BNP  RADIOLOGY & ADDITIONAL STUDIES:    IMPRESSION AND PLAN:

## 2019-06-28 NOTE — PROGRESS NOTE ADULT - SUBJECTIVE AND OBJECTIVE BOX
ABHINAV DREW 73y Female  MRN#: [id number]     SUBJECTIVE  Patient is a 73y old Female who presents with a chief complaint of Chest pain (28 Jun 2019 10:54)  Today is hospital day 2d, and this morning she is lying in bed without distress.   No acute overnight events. Patient is eager to go home.     OBJECTIVE  PAST MEDICAL & SURGICAL HISTORY  Rheumatoid arthritis  GERD (gastroesophageal reflux disease)  MI (myocardial infarction)  Hyperlipidemia  CAD (coronary artery disease)  History of surgery: stent placement    ALLERGIES:  Zocor (Joint Pain)    MEDICATIONS:  STANDING MEDICATIONS  aspirin  chewable 81 milliGRAM(s) Oral daily  atorvastatin 80 milliGRAM(s) Oral at bedtime  buDESOnide 160 MICROgram(s)/formoterol 4.5 MICROgram(s) Inhaler 2 Puff(s) Inhalation two times a day  enoxaparin Injectable 40 milliGRAM(s) SubCutaneous at bedtime  ergocalciferol 59576 Unit(s) Oral every week  folic acid 1 milliGRAM(s) Oral daily  metoprolol succinate ER 25 milliGRAM(s) Oral daily  pantoprazole    Tablet 40 milliGRAM(s) Oral before breakfast  predniSONE   Tablet 10 milliGRAM(s) Oral daily  sodium chloride 0.9%. 450 milliLiter(s) IV Continuous <Continuous>    PRN MEDICATIONS  ALBUTerol/ipratropium for Nebulization 3 milliLiter(s) Nebulizer every 6 hours PRN  ALPRAZolam 1 milliGRAM(s) Oral three times a day PRN    HOME MEDICATIONS  Home Medications:  aspirin 81 mg oral tablet, chewable: 1 tab(s) orally once a day (26 Jun 2019 17:33)  famotidine 40 mg oral tablet: 1 tab(s) orally once a day (at bedtime) (26 Jun 2019 17:33)  folic acid 1 mg oral tablet: 1 tab(s) orally once a day (26 Jun 2019 17:33)  methotrexate 2.5 mg oral tablet: 3 tab(s) orally once a week (27 Jun 2019 02:42)  pantoprazole 40 mg oral delayed release tablet: 1 tab(s) orally once a day (26 Jun 2019 17:33)  predniSONE 10 mg oral tablet: 1 tab(s) orally once a day (28 Jun 2019 11:23)  Toprol-XL 25 mg oral tablet, extended release: 1 tab(s) orally once a day (26 Jun 2019 17:33)  Vitamin D2 50,000 intl units (1.25 mg) oral capsule: 1 cap(s) orally once a week (26 Jun 2019 17:33)  Xanax 1 mg oral tablet: 1 tab(s) orally 3 times a day, As Needed (26 Jun 2019 17:33)      VITAL SIGNS: Last 24 Hours  T(C): 36.4 (28 Jun 2019 14:14), Max: 36.4 (28 Jun 2019 14:14)  T(F): 97.6 (28 Jun 2019 14:14), Max: 97.6 (28 Jun 2019 14:14)  HR: 76 (28 Jun 2019 14:14) (58 - 76)  BP: 100/53 (28 Jun 2019 14:14) (100/53 - 138/67)  BP(mean): --  RR: 18 (28 Jun 2019 14:14) (18 - 18)  SpO2: 97% (27 Jun 2019 19:30) (96% - 97%)    06-27-19 @ 07:01  -  06-28-19 @ 07:00  --------------------------------------------------------  IN: 60 mL / OUT: 750 mL / NET: -690 mL    06-28-19 @ 07:01  -  06-28-19 @ 15:12  --------------------------------------------------------  IN: 210 mL / OUT: 0 mL / NET: 210 mL        LABS:                        12.2   8.14  )-----------( 223      ( 28 Jun 2019 06:45 )             37.2     06-28    142  |  104  |  11  ----------------------------<  88  3.7   |  25  |  0.7    Ca    8.8      28 Jun 2019 06:45  Mg     2.2     06-28    TPro  6.7  /  Alb  3.6  /  TBili  0.4  /  DBili  x   /  AST  17  /  ALT  8   /  AlkPhos  91  06-27    LIVER FUNCTIONS - ( 27 Jun 2019 05:56 )  Alb: 3.6 g/dL / Pro: 6.7 g/dL / ALK PHOS: 91 U/L / ALT: 8 U/L / AST: 17 U/L / GGT: x                     CARDIAC MARKERS ( 27 Jun 2019 05:56 )  x     / <0.01 ng/mL / x     / x     / x      CARDIAC MARKERS ( 26 Jun 2019 21:15 )  x     / <0.01 ng/mL / 65 U/L / x     / 1.8 ng/mL      CAPILLARY BLOOD GLUCOSE          RADIOLOGY:    PHYSICAL EXAM:  PHYSICAL EXAM:  GENERAL: NAD, AAO x 4, 73y F  HEAD:  Atraumatic, Normocephalic  EYES: conjunctiva and sclera white  NECK: Supple, No JVD  CHEST/LUNG: Clear to auscultation bilaterally; No wheeze; No crackles; No accessory muscles used  HEART: Regular rate and rhythm; No murmurs;   ABDOMEN: Soft, Nontender, Nondistended; Bowel sounds present; No guarding  EXTREMITIES:   No cyanosis or edema      ADMISSION SUMMARY  Patient is a 73y old Female who presents with a chief complaint of Chest pain (28 Jun 2019 10:54) ABHINAV DREW 73y Female  MRN#: [id number]     SUBJECTIVE  Patient is a 73y old Female who presents with a chief complaint of Chest pain (28 Jun 2019 10:54)  Today is hospital day 2d, and this morning she is lying in bed without distress.   No acute overnight events. Patient is eager to go home.   no cp, sob, abd pain, fever    OBJECTIVE  PAST MEDICAL & SURGICAL HISTORY  Rheumatoid arthritis  GERD (gastroesophageal reflux disease)  MI (myocardial infarction)  Hyperlipidemia  CAD (coronary artery disease)  History of surgery: stent placement    ALLERGIES:  Zocor (Joint Pain)    MEDICATIONS:  STANDING MEDICATIONS  aspirin  chewable 81 milliGRAM(s) Oral daily  atorvastatin 80 milliGRAM(s) Oral at bedtime  buDESOnide 160 MICROgram(s)/formoterol 4.5 MICROgram(s) Inhaler 2 Puff(s) Inhalation two times a day  enoxaparin Injectable 40 milliGRAM(s) SubCutaneous at bedtime  ergocalciferol 44584 Unit(s) Oral every week  folic acid 1 milliGRAM(s) Oral daily  metoprolol succinate ER 25 milliGRAM(s) Oral daily  pantoprazole    Tablet 40 milliGRAM(s) Oral before breakfast  predniSONE   Tablet 10 milliGRAM(s) Oral daily  sodium chloride 0.9%. 450 milliLiter(s) IV Continuous <Continuous>    PRN MEDICATIONS  ALBUTerol/ipratropium for Nebulization 3 milliLiter(s) Nebulizer every 6 hours PRN  ALPRAZolam 1 milliGRAM(s) Oral three times a day PRN    HOME MEDICATIONS  Home Medications:  aspirin 81 mg oral tablet, chewable: 1 tab(s) orally once a day (26 Jun 2019 17:33)  famotidine 40 mg oral tablet: 1 tab(s) orally once a day (at bedtime) (26 Jun 2019 17:33)  folic acid 1 mg oral tablet: 1 tab(s) orally once a day (26 Jun 2019 17:33)  methotrexate 2.5 mg oral tablet: 3 tab(s) orally once a week (27 Jun 2019 02:42)  pantoprazole 40 mg oral delayed release tablet: 1 tab(s) orally once a day (26 Jun 2019 17:33)  predniSONE 10 mg oral tablet: 1 tab(s) orally once a day (28 Jun 2019 11:23)  Toprol-XL 25 mg oral tablet, extended release: 1 tab(s) orally once a day (26 Jun 2019 17:33)  Vitamin D2 50,000 intl units (1.25 mg) oral capsule: 1 cap(s) orally once a week (26 Jun 2019 17:33)  Xanax 1 mg oral tablet: 1 tab(s) orally 3 times a day, As Needed (26 Jun 2019 17:33)      VITAL SIGNS: Last 24 Hours  T(C): 36.4 (28 Jun 2019 14:14), Max: 36.4 (28 Jun 2019 14:14)  T(F): 97.6 (28 Jun 2019 14:14), Max: 97.6 (28 Jun 2019 14:14)  HR: 76 (28 Jun 2019 14:14) (58 - 76)  BP: 100/53 (28 Jun 2019 14:14) (100/53 - 138/67)  BP(mean): --  RR: 18 (28 Jun 2019 14:14) (18 - 18)  SpO2: 97% (27 Jun 2019 19:30) (96% - 97%)    06-27-19 @ 07:01  -  06-28-19 @ 07:00  --------------------------------------------------------  IN: 60 mL / OUT: 750 mL / NET: -690 mL    06-28-19 @ 07:01  -  06-28-19 @ 15:12  --------------------------------------------------------  IN: 210 mL / OUT: 0 mL / NET: 210 mL        LABS:                        12.2   8.14  )-----------( 223      ( 28 Jun 2019 06:45 )             37.2     06-28    142  |  104  |  11  ----------------------------<  88  3.7   |  25  |  0.7    Ca    8.8      28 Jun 2019 06:45  Mg     2.2     06-28    TPro  6.7  /  Alb  3.6  /  TBili  0.4  /  DBili  x   /  AST  17  /  ALT  8   /  AlkPhos  91  06-27    LIVER FUNCTIONS - ( 27 Jun 2019 05:56 )  Alb: 3.6 g/dL / Pro: 6.7 g/dL / ALK PHOS: 91 U/L / ALT: 8 U/L / AST: 17 U/L / GGT: x                     CARDIAC MARKERS ( 27 Jun 2019 05:56 )  x     / <0.01 ng/mL / x     / x     / x      CARDIAC MARKERS ( 26 Jun 2019 21:15 )  x     / <0.01 ng/mL / 65 U/L / x     / 1.8 ng/mL      CAPILLARY BLOOD GLUCOSE          RADIOLOGY:    PHYSICAL EXAM:  PHYSICAL EXAM:  GENERAL: NAD, AAO x 4, 73y F  HEAD:  Atraumatic, Normocephalic  EYES: conjunctiva and sclera white  NECK: Supple, No JVD  CHEST/LUNG: Clear to auscultation bilaterally; No wheeze; No crackles; No accessory muscles used  HEART: Regular rate and rhythm; No murmurs;   ABDOMEN: Soft, Nontender, Nondistended; Bowel sounds present; No guarding  EXTREMITIES:   No cyanosis or edema      ADMISSION SUMMARY  Patient is a 73y old Female who presents with a chief complaint of Chest pain (28 Jun 2019 10:54)

## 2019-06-28 NOTE — DISCHARGE NOTE NURSING/CASE MANAGEMENT/SOCIAL WORK - NSDCPEEMAIL_GEN_ALL_CORE
[facility] for Tobacco Control email [email or web address]

## 2019-06-28 NOTE — PROGRESS NOTE ADULT - ASSESSMENT
72 yo female with PMH of CAD s/p stent most recent on nov 2018, severe RA, GERD, DLD, comes to the ED for chest pain, no EKG change CE negative. Pain still persistent. Found to have restenosis of previously placed stent. Stent is replaced and symptoms improved.    #Proximal RCA oclusion s/p GIANNA  -Presented with multiple episodes of anginal chest pain for last 2 weeks. Currently no complaints of chest pain  -No EKG changes of ischemia  -CE negative 3 sets  -RCA PCI placed in Nov 2018 for 70% occlusion  -Patient underwent cath today with 99% occlusion in proximal RCA in place of previous stent. GIANNA placed in proximal RCA  -75% occlusion in proximal LAD for which patient may need staged PCI in 4-6 weeks  -c/w aspirin, birlinta, and zocor, metoprolol    # Severe RA:  -c/w prednisone, Methotrexate and folic acid   -currently no joint pain    #DLD:  zocor  # Severe Emphysema:  -Patient was evaluated by pulmonary for worsening emphysema. She can continue Symbicort and follow up with pulmonology as outpatient for PFTs    #Diet: DASH/TLC  #dvt ppx: lovenox  #GI ppx: pantoprazole  #Full code

## 2019-06-28 NOTE — DISCHARGE NOTE NURSING/CASE MANAGEMENT/SOCIAL WORK - NSDCDPATPORTLINK_GEN_ALL_CORE
You can access the [facility] Portal, offered by [facility], by registering with the following website: [email or web address]

## 2019-07-11 NOTE — ED PROVIDER NOTE - PROGRESS NOTE DETAILS
Updated patient about lab result. Spoke with cards fellow. Will evaluate Updated patient about lab result. Spoke with cards fellow. Will evaluate and provide recs Spoke with patient, feels better. Spoke with Dr. Dunlap want patient in obs overnight w/ repeat cardiac enzymes and ekg. Spoke with Obs PA about plan and accepted

## 2019-07-11 NOTE — ED ADULT TRIAGE NOTE - CHIEF COMPLAINT QUOTE
pt s/p cardiac stent 6/2019. Pt scheduled for 3rd stent next week. Pt c/o headache and b/l arm numbness and tingling for few days

## 2019-07-11 NOTE — ED ADULT NURSE REASSESSMENT NOTE - NS ED NURSE REASSESS COMMENT FT1
Patient resting in bed, alert and oriented x4, no acute distress observed, vital signs stable, heart monitor maintained, personal items/call light within reach, IV intact and patent, safety measures in place, will continue to monitor.

## 2019-07-11 NOTE — ED CDU PROVIDER INITIAL DAY NOTE - ATTENDING CONTRIBUTION TO CARE
Seen  with PA agree with above, lungs- clear, abdomen- soft no tenderness to any region, neuro- non focal s1s2 no gallops or murmurs, full workup in progress will continue to re-evaluate

## 2019-07-11 NOTE — ED ADULT NURSE NOTE - NSIMPLEMENTINTERV_GEN_ALL_ED
Implemented All Fall with Harm Risk Interventions:  [place] to call system. Call bell, personal items and telephone within reach. Instruct patient to call for assistance. Room bathroom lighting operational. Non-slip footwear when patient is off stretcher. Physically safe environment: no spills, clutter or unnecessary equipment. Stretcher in lowest position, wheels locked, appropriate side rails in place. Provide visual cue, wrist band, yellow gown, etc. Monitor gait and stability. Monitor for mental status changes and reorient to person, place, and time. Review medications for side effects contributing to fall risk. Reinforce activity limits and safety measures with patient and family. Provide visual clues: red socks.

## 2019-07-11 NOTE — ED PROVIDER NOTE - OBJECTIVE STATEMENT
73F pmhx CAD s/p PCI on DAPT, rheumatoid arthritis, GERD, DLD presents with headache and parasthesias. Started this morning at 8am. Headache is frontal bilateral last about half hour intermittant. Paresthesia begin at mid humorous and go down to fingers on both arms bilaterally. No weakness able to ambulate without assistance. No speech changes no facial droop. No chest pain, no SOB, no fevers, chills, nausea, vomiting, diaphoresis.

## 2019-07-11 NOTE — ED ADULT NURSE NOTE - OBJECTIVE STATEMENT
Pt c/o of "sensation" felt in midsternal chest radiating down arms. Pt concerned as she is due for cardiac stent next week. Pt also c/o of lethargy.

## 2019-07-11 NOTE — ED CDU PROVIDER INITIAL DAY NOTE - PROGRESS NOTE DETAILS
Dr. newberry was contacted wants to keep patient n OBs till she improves and will see her in am. Repeat trops and ekg in mean time.

## 2019-07-11 NOTE — ED PROVIDER NOTE - PSH
Patient Education  · Naproxen Gastro-resistant tablet     · Naproxen Oral suspension     · Naproxen Oral tablet     · Naproxen Oral tablet, biphasic release     · Naproxen Oral tablet, extended-release, Naproxen Oral tablet, extended-release     · Naproxen Sodium Oral tablet     · Naproxen Sodium Oral tablet, extended-release   Naproxen Oral tablet  What is this medicine?  NAPROXEN (na PROX en) is a non-steroidal anti-inflammatory drug (NSAID). It is used to reduce swelling and to treat pain. This medicine may be used for dental pain, headache, or painful monthly periods. It is also used for painful joint and muscular problems such as arthritis, tendinitis, bursitis, and gout.  This medicine may be used for other purposes; ask your health care provider or pharmacist if you have questions.  What should I tell my health care provider before I take this medicine?  They need to know if you have any of these conditions:  · asthma  · cigarette smoker  · drink more than 3 alcohol containing drinks a day  · heart disease or circulation problems such as heart failure or leg edema (fluid retention)  · high blood pressure  · kidney disease  · liver disease  · stomach bleeding or ulcers  · an unusual or allergic reaction to naproxen, aspirin, other NSAIDs, other medicines, foods, dyes, or preservatives  · pregnant or trying to get pregnant  · breast-feeding  How should I use this medicine?  Take this medicine by mouth with a glass of water. Follow the directions on the prescription label. Take it with food if your stomach gets upset. Try to not lie down for at least 10 minutes after you take it. Take your medicine at regular intervals. Do not take your medicine more often than directed. Long-term, continuous use may increase the risk of heart attack or stroke.  A special MedGuide will be given to you by the pharmacist with each prescription and refill. Be sure to read this information carefully each time.  Talk to your  pediatrician regarding the use of this medicine in children. Special care may be needed.  Overdosage: If you think you have taken too much of this medicine contact a poison control center or emergency room at once.  NOTE: This medicine is only for you. Do not share this medicine with others.  What if I miss a dose?  If you miss a dose, take it as soon as you can. If it is almost time for your next dose, take only that dose. Do not take double or extra doses.  What may interact with this medicine?  · alcohol  · aspirin  · cidofovir  · diuretics  · lithium  · methotrexate  · other drugs for inflammation like ketorolac or prednisone  · pemetrexed  · probenecid  · warfarin  This list may not describe all possible interactions. Give your health care provider a list of all the medicines, herbs, non-prescription drugs, or dietary supplements you use. Also tell them if you smoke, drink alcohol, or use illegal drugs. Some items may interact with your medicine.  What should I watch for while using this medicine?  Tell your doctor or health care professional if your pain does not get better. Talk to your doctor before taking another medicine for pain. Do not treat yourself.  This medicine does not prevent heart attack or stroke. In fact, this medicine may increase the chance of a heart attack or stroke. The chance may increase with longer use of this medicine and in people who have heart disease. If you take aspirin to prevent heart attack or stroke, talk with your doctor or health care professional.  Do not take other medicines that contain aspirin, ibuprofen, or naproxen with this medicine. Side effects such as stomach upset, nausea, or ulcers may be more likely to occur. Many medicines available without a prescription should not be taken with this medicine.  This medicine can cause ulcers and bleeding in the stomach and intestines at any time during treatment. Do not smoke cigarettes or drink alcohol. These increase  irritation to your stomach and can make it more susceptible to damage from this medicine. Ulcers and bleeding can happen without warning symptoms and can cause death.  You may get drowsy or dizzy. Do not drive, use machinery, or do anything that needs mental alertness until you know how this medicine affects you. Do not stand or sit up quickly, especially if you are an older patient. This reduces the risk of dizzy or fainting spells.  This medicine can cause you to bleed more easily. Try to avoid damage to your teeth and gums when you brush or floss your teeth.  What side effects may I notice from receiving this medicine?  Side effects that you should report to your doctor or health care professional as soon as possible:  · black or bloody stools, blood in the urine or vomit  · blurred vision  · chest pain  · difficulty breathing or wheezing  · nausea or vomiting  · severe stomach pain  · skin rash, skin redness, blistering or peeling skin, hives, or itching  · slurred speech or weakness on one side of the body  · swelling of eyelids, throat, lips  · unexplained weight gain or swelling  · unusually weak or tired  · yellowing of eyes or skin  Side effects that usually do not require medical attention (report to your doctor or health care professional if they continue or are bothersome):  · constipation  · headache  · heartburn  This list may not describe all possible side effects. Call your doctor for medical advice about side effects. You may report side effects to FDA at 4-015-FDA-6441.  Where should I keep my medicine?  Keep out of the reach of children.  Store at room temperature between 15 and 30 degrees C (59 and 86 degrees F). Keep container tightly closed. Throw away any unused medicine after the expiration date.  NOTE:This sheet is a summary. It may not cover all possible information. If you have questions about this medicine, talk to your doctor, pharmacist, or health care provider. Copyright© 2011 [employer]    MEDICATION: CYCLOBENZAPRINE    Cyclobenzaprine (brand: Flexeril) is a sedative and muscle relaxant medication. It is intended to be used along with rest, heat or ice packs, and other measures to relieve acute muscle spasm.  DIRECTIONS FOR USE:  Cyclobenzaprine  may be taken on an empty stomach or with food. Take the medicine at regular intervals. If the label says every 8 hours, this means 3 times per day. Doses don't have to be exactly 8 hours apart, but you should take 3 doses a day. This medicine should not be taken daily for more than 3 weeks without consulting your doctor.   WHAT TO WATCH FOR:  POSSIBLE SIDE EFFECTS: Drowsiness, dizziness (Take it less often and contact your doctor if symptoms persist). Dry mouth (Chew gum or suck on hard candy). Constipation (Contact your doctor). Difficulty passing urine; seizure (Stop the medicine and contact your doctor).  MEDICAL CONDITIONS: Before starting this medicine, be sure your doctor knows if you have any of the following conditions:  · Pregnancy or breastfeeding, sleep apnea  · Glaucoma, prostate enlargement, seizure disorder, asthma, or lung disease  DRUG INTERACTIONS: Before starting this medicine, be sure your doctor knows if you are taking any of the following drugs:  · MAO inhibitors within the last 14 days [Nardil (phenelzine), Parnate (tranylcypromine)];  · Tricyclic antidepressants [amitriptyline (Elavil), doxepin (Sinequan), imipramine (Tofranil), and others],  · Phenothiazines: Thorazine, Haldol, Mellaril, Compazine, and others  · Antihistamines Benadryl, Vistaril, Atarax and others; Bentyl, Donnatal, Librax, Pro-Banthine,  · Cystospaz, Cogentin, Artane, Combivent, or Atrovent inhalers; anti-Parkinson drugs  WARNINGS:  · Do not drive, ride a bicycle, or operate dangerous equipment while taking this medicine until you know how it will affect you.  · May cause excess drowsiness when taken with alcohol, muscle relaxant, sedative, or pain  medicine. Use with caution.  · May cause thickened mucus and worsening of asthma, COPD, or emphysema. Use with caution.  [NOTE: This information topic may not include all directions, precautions, medical conditions, drug/food interactions and warnings for this drug. Check with your doctor, nurse, or pharmacist for any questions that you may have.]  © 0009-5545 [employer], [address] [place] [address], [place] [address]. All rights reserved. This information is not intended as a substitute for professional medical care. Always follow your healthcare professional's instructions.     History of surgery  stent placement

## 2019-07-11 NOTE — ED PROVIDER NOTE - PHYSICAL EXAMINATION
Gen: NAD, non-toxic, conversational  Eyes: PERRLA, EOMI   HENT: Normocephalic, atraumatic. External ears normal, no rhinorrhea, moist mucous membranes.   CV: RRR, no M/R/G  Resp: CTAB, non-labored, speaking without difficulty on room air  Abd: soft, non tender, non rigid, no guarding or rebound tenderness  Back: No CVAT bilaterally, no midline ttp  Skin: dry, wwp   Neuro: AOx3, speech is fluent and appropriate  Psych: affect euthymic Gen: NAD, non-toxic, conversational  Eyes: PERRLA, EOMI   HENT: Normocephalic, atraumatic. External ears normal, no rhinorrhea, moist mucous membranes.  NECK: full passive ROM, no gross deformities noted  CV: RRR, no M/R/G  Resp: CTAB, non-labored, speaking without difficulty on room air  Abd: soft, non tender, non rigid, no guarding or rebound tenderness  Back: No CVAT bilaterally, no midline ttp  Skin: dry, wwp, no rashes/lesions noted  Neuro: AOx3, speech is fluent and appropriate  Psych: affect euthymic, calm if mildly anxious, cooperative, appropriate

## 2019-07-11 NOTE — ED PROVIDER NOTE - NS ED ROS FT
CONSTITUTIONAL - No fever, No diaphoresis, No weight change  SKIN - No rash  HEMATOLOGIC - No abnormal bleeding or bruising  EYES - No eye pain, No blurred vision  ENT - No change in hearing, No sore throat, No neck pain, No rhinorrhea, No ear pain  RESPIRATORY - No shortness of breath, No cough  CARDIAC -No chest pain, No palpitations  GI - No abdominal pain, No nausea, No vomiting, No diarrhea, No constipation, No bright red blood per rectum or melena. No flank pain  - No dysuria, frequency, hematuria.   ENDO - No polydypsia, No polyuria, No heat/cold intolerance  MUSCULOSKELETAL - No joint paint, No swelling, No back pain  NEUROLOGIC -  No focal weakness No dizziness  All other systems negative, unless specified in HPI CONSTITUTIONAL - No fever, No diaphoresis, No weight change  SKIN - No rash  HEMATOLOGIC - No abnormal bleeding or bruising  EYES - No eye pain, No blurred vision  ENT - No change in hearing, No sore throat, No neck pain, No rhinorrhea, No ear pain  RESPIRATORY - No shortness of breath, No cough  CARDIAC -No chest pain, No palpitations  GI - No abdominal pain, No nausea, No vomiting, No diarrhea, No constipation, No bright red blood per rectum or melena. No flank pain  - No dysuria, frequency, hematuria.   ENDO - No polydypsia, No polyuria, No heat/cold intolerance  MUSCULOSKELETAL - No joint paint, No swelling, No back pain  NEUROLOGIC -  No focal weakness No dizziness  PSYCH - no SI/HI/AVH  All other systems negative, unless specified in HPI

## 2019-07-11 NOTE — ED PROVIDER NOTE - CARE PLAN
Principal Discharge DX:	ACS (acute coronary syndrome) Principal Discharge DX:	Paresthesias  Secondary Diagnosis:	Coronary artery disease with other form of angina pectoris

## 2019-07-11 NOTE — ED CDU PROVIDER INITIAL DAY NOTE - OBJECTIVE STATEMENT
pt is 73y female w. pmhx of CAD s/p PCI placed by Dr. monson, RA, HLD, GERD comes to the ed c/o H/A and paresthesias. pt state she doesn't feel right since 8a this morning. pt has frontal H/A  w, associated paresthesias In her hands starting middle of her forearms. pt denies fever, chills, no CP, vomiting, abdominal pain.

## 2019-07-11 NOTE — ED PROVIDER NOTE - ATTENDING CONTRIBUTION TO CARE
73yF CAD s/p stent c/b stent thrombosis (follows with Dr. Montes) p/w neck pressure and b/l arm paresthesias similar to prior presentations.  No CP or SOB.  Is very concerned about her heart because she knows she has unstented disease and is awaiting scheduled PCI/intervention.  Says these sx are c/w her usual presentation for CAD requiring intervention.    VS notable for borderline /72  CONSTITUTIONAL: well developed; well nourished, thin elderly woman, well appearing in no acute distress  HEAD: normocephalic; atraumatic  EYES: no conjunctival injection, no scleral icterus  ENT: no nasal discharge; airway clear.  NECK: supple; non tender. + full passive ROM in all directions  CARD: S1, S2 normal; no murmurs, gallops, or rubs. Regular rate and rhythm  RESP: no wheezes, rales or rhonchi. Good air movement bilaterally without significant accessory muscle use  ABD: soft; non-distended; non-tender. No rebound, no guarding, no pulsatile abdominal mass  EXT: moving all extremities spontaneously, normal ROM. No clubbing, cyanosis or edema  SKIN: warm and dry, no lesions noted  NEURO: alert, oriented, CN II-XII grossly intact, motor and sensory grossly intact, speech nonslurred, stable gait, no focal deficits. GCS 15  PSYCH: calm, cooperative, appropriate, good eye contact, logical thought process, no apparent danger to self or others    EKG  CXR  labs  reassess

## 2019-07-12 NOTE — ED CDU PROVIDER DISPOSITION NOTE - CLINICAL COURSE
Patient was sent to [facility] for further evaluation of headaches, left upper extremety parasthesias and chest pains, Has remained in no distress and with stable vitals, ekgs times two no evidence of ischemic changes, enzymes times two normal, Patient was seen by cardiology Yaw Glynn  and patient was cleared to be discharged by cardiology and will see patient in office

## 2019-07-12 NOTE — ED ADULT NURSE REASSESSMENT NOTE - NS ED NURSE REASSESS COMMENT FT1
Pt assessed A/O times 4 VS stable on cardiac monitor remain stable C.O headache comfort provide safety precaution on progress assistance provide ,ED attending made aware Verbal order po Tylenol is given , breakfast tray provide on going nursing observation .

## 2019-07-12 NOTE — ED CDU PROVIDER SUBSEQUENT DAY NOTE - PROGRESS NOTE DETAILS
trops negative x2, pt. remains on cardiac monitor. pt. to be evaluated by dr. Oseguera in the morning. Patient comfortable, awaiting cardiac consult Spoke with Dr. Couch regarding patient Patient seen by Dr. Claudio, cleared for d/c

## 2019-07-12 NOTE — ED ADULT NURSE REASSESSMENT NOTE - NS ED NURSE REASSESS COMMENT FT1
Patient sleeping in bed, no acute distress observed, vital signs stable, heart monitor maintained, safety measures in place, will continue to monitor.

## 2019-07-12 NOTE — ED ADULT NURSE REASSESSMENT NOTE - NS ED NURSE REASSESS COMMENT FT1
Patient sleeping in bed, no acute distress observed, heart monitor maintained, vital signs stable, safety measures in place, will continue to monitor.

## 2019-07-12 NOTE — ED ADULT NURSE REASSESSMENT NOTE - NS ED NURSE REASSESS COMMENT FT1
Pt reassessed A.O times 4 Vs stable report filling slight better is seen evaluate by ED attending and cardiology attending clear to go home ready to be D.C with family members .

## 2019-07-12 NOTE — ED CDU PROVIDER DISPOSITION NOTE - CARE PROVIDER_API CALL
Jason Mcarthur)  Cardiovascular Disease; Internal Medicine  [address] [place] [address]  Phone: [phone]  Fax: [phone]  Follow Up Time:

## 2019-07-31 NOTE — CHART NOTE - NSCHARTNOTEFT_GEN_A_CORE
PRE-OP DIAGNOSIS: Known CAD s/p RCA stent. Returning for a staged-PCI to LAD.    PROCEDURE:  [x] [facility] with coronary angiography                           [ ] [facility]  Physician: Dr Suárez  Assistant: Chris    ANESTHESIA TYPE:  [  ]General Anesthesia  [x] Sedation  [x] Local/Regional    ESTIMATED BLOOD LOSS:    10   mL    CONDITION  [  ] Critical  [  ] Serious  [  ]Fair  [ x]Good      SPECIMENS REMOVED (IF APPLICABLE):      IV CONTRAST:       140      mL      IMPLANTS (IF APPLICABLE)    ACCESS:    [ ] right radial artery  [x] right femoral artery    LEFT HEART CATHETERIZATION:                                    Coronary circulation: The coronary circulation is right dominant. There was 2-vessel coronary artery disease (LAD and RCA). Left main: Normal. LAD: The vessel was medium sized and calcified. Angiography showed the vessel to wrap around the cardiac apex. Proximal LAD: There was a diffuse 85 % stenosis at a site with no prior intervention, just before D1. Mid LAD: Angiography showed minor luminal irregularities with no flow limiting lesions. Distal LAD: The vessel was tortuous. Angiography showed minor luminal irregularities with no flow limiting lesions. 1st diagonal: The vessel was small to medium sized. There was a tubular 95 % stenosis at a site with no prior intervention, at the ostium of the vessel segment. Circumflex: The vessel was medium sized. Angiography showed mild atherosclerosis with no flow limiting lesions. 1st obtuse marginal: The vessel was medium sized and tortuous. Angiography showed minor luminal irregularities with no flow limiting lesions. RCA: The vessel was medium sized. Ostial RCA: There was a discrete 30 % stenosis. There was HUBERT grade 3 flow through the vessel (brisk flow). Proximal RCA: Patent stent. Mid RCA: Angiography showed mild atherosclerosis with no flow limiting lesions. Right PDA: Angiography showed mild atherosclerosis with no flow limiting lesions.     POST-OP DIAGNOSIS    Significant proximal LAD stenosis s/p GIANNA    PLAN OF CARE  [ ] D/C Home today  [ ]  D/C in AM  [ ] Return to In-patient bed  [x] Admit for observation  [ ] Return for staged procedure:  [ ] CT Surgery consult called  [x]  Continue DAPT, B-blocker & Statin therapy

## 2019-08-01 NOTE — DISCHARGE NOTE PROVIDER - CARE PROVIDER_API CALL
Willis Suárez)  Cardiovascular Disease; Interventional Cardiology  [address] [place] [address]  Phone: [phone]  Fax: [phone]  Follow Up Time:

## 2019-08-01 NOTE — DISCHARGE NOTE PROVIDER - NSDCCPCAREPLAN_GEN_ALL_CORE_FT
PRINCIPAL DISCHARGE DIAGNOSIS  Diagnosis: CAD in native artery  Assessment and Plan of Treatment: s/p PCI to prox LAD GIANNA x1

## 2019-08-01 NOTE — DISCHARGE NOTE PROVIDER - NSDCFUADDINST_GEN_ALL_CORE_FT
No heavy lifting > 10 lbs x 1 week,  no bath/ swimming pool x 1 week, no driving x 1 day, may shower.

## 2019-08-01 NOTE — DISCHARGE NOTE PROVIDER - HOSPITAL COURSE
ABHINAV DREW     73y Female    PAST MEDICAL & SURGICAL HISTORY:    Rheumatoid arthritis    GERD (gastroesophageal reflux disease)    MI (myocardial infarction)    Hyperlipidemia    CAD (coronary artery disease)    History of surgery: stent placement        HPI:    73 female with CAD, GERD and RA presenting for elective C. Today will be a staged PCI to LAD.    Cardiac Cath 7/31/2019 s/p PCI p LAD GIANNA x1 via right groin. Right Groin: Dressing removed, access site soft, no hematoma, no pain, + pulses, no numbness, no sign of infection.    Patient started on Atorvastatin and educated to report symptoms to Cardiologist, f/u instructions explained, Patient has Aspirin and Brilinta at home. Patient ambulated without symptoms, ECG revievew, no ischemic changes.

## 2019-08-01 NOTE — PROGRESS NOTE ADULT - SUBJECTIVE AND OBJECTIVE BOX
Cardiology Follow up    ABHINAV DREW   73y Female  PAST MEDICAL & SURGICAL HISTORY:  Rheumatoid arthritis  GERD (gastroesophageal reflux disease)  MI (myocardial infarction)  Hyperlipidemia  CAD (coronary artery disease)  History of surgery: stent placement    HPI:  73 female with CAD, GERD and RA presenting for elective [facility]. Today will be a staged PCI to LAD. (31 Jul 2019 13:09)    Allergies    Zocor (Joint Pain)    Patient without complaints. Pt ambulated without issues/symptoms  Denies CP, SOB, palpitations, or dizziness  No events on telemetry overnight    Vital Signs Last 24 Hrs  T(C): 35.9 (01 Aug 2019 05:44), Max: 36 (31 Jul 2019 21:07)  T(F): 96.6 (01 Aug 2019 05:44), Max: 96.8 (31 Jul 2019 21:07)  HR: 59 (01 Aug 2019 05:44) (59 - 88)  BP: 147/67 (01 Aug 2019 05:44) (109/68 - 168/79)  BP(mean): --  RR: 18 (01 Aug 2019 05:44) (18 - 18)  SpO2: 97% (31 Jul 2019 19:42) (97% - 97%)    MEDICATIONS  (STANDING):  aspirin  chewable 81 milliGRAM(s) Oral daily  atorvastatin 80 milliGRAM(s) Oral at bedtime  famotidine    Tablet 20 milliGRAM(s) Oral daily  metoprolol succinate ER 25 milliGRAM(s) Oral daily  pantoprazole    Tablet 40 milliGRAM(s) Oral before breakfast  predniSONE   Tablet 10 milliGRAM(s) Oral daily  sodium chloride 0.9%. 1000 milliLiter(s) (60 mL/Hr) IV Continuous <Continuous>  ticagrelor 90 milliGRAM(s) Oral every 12 hours    MEDICATIONS  (PRN):  acetaminophen   Tablet .. 650 milliGRAM(s) Oral once PRN Moderate Pain (4 - 6)  ALPRAZolam 1 milliGRAM(s) Oral three times a day PRN agitation      REVIEW OF SYSTEMS:          CONSTITUTIONAL: No weakness, fevers or chills          EYES/ENT: No visual changes;  No vertigo or throat pain           NECK: No pain or stiffness          RESPIRATORY: No cough, wheezing, hemoptysis          CARDIOVASCULAR: no pain, no [place], no palpitations           GASTROINTESTINAL: No abdominal or epigastric pain. No nausea, vomiting, or hematemesis;           GENITOURINARY: No dysuria, frequency or hematuria          NEUROLOGICAL: No numbness or weakness          SKIN: No itching, rashes    PHYSICAL EXAM:           CONSTITUTIONAL: Well-developed; well-nourished; in no acute distress  	SKIN: warm, dry  	HEAD: Normocephalic; atraumatic  	EYES: PERRL.  	ENT: No nasal discharge, airway clear, mucous membranes moist  	NECK: Supple; non tender.  	CARD: +S1, +S2, no murmurs, gallops, or rubs. Regular rate and rhythm    	RESP: No wheezes, rales or rhonchi. CTA B/L  	ABD: soft ntnd, + BS x 4 quadrants  	EXT: moves all extremities,  no clubbing, cyanosis or edema  	NEURO: Alert and oriented x3, no focal deficits          PSYCH: Cooperative, appropriate          VASCULAR:  +2 Rad / +2PTs / + 2DPs          EXTREMITY:             Right Groin:  Dressing removed, access site soft, no hematoma, no pain, + pulses, no numbness, no sign of infection  	            ECG:   < from: 12 Lead ECG (08.01.19 @ 07:33) >  Ventricular Rate 65 BPM    Atrial Rate 65 BPM    P-R Interval 124 ms    QRS Duration 94 ms    Q-T Interval 446 ms    QTC Calculation(Bezet) 463 ms    P Axis 48 degrees    R Axis -4 degrees    T Axis 33 degrees    Diagnosis Line Normal sinus rhythm  Normal ECG    Confirmed by Oli Guido ([id number]) on 8/1/2019 9:00:41 AM    LABS:                        11.9   9.60  )-----------( 235      ( 01 Aug 2019 09:23 )             35.5     08-01    143  |  108  |  8<L>  ----------------------------<  98  3.9   |  24  |  0.7    Ca    9.0      01 Aug 2019 09:23  Mg     2.0     08-01    TPro  6.2  /  Alb  3.7  /  TBili  0.3  /  DBili  x   /  AST  15  /  ALT  7   /  AlkPhos  87  08-01    Magnesium, Serum: 2.0 mg/dL [1.8 - 2.4] (08-01-19 @ 05:58)  LIVER FUNCTIONS - ( 01 Aug 2019 05:58 )  Alb: 3.7 g/dL / Pro: 6.2 g/dL / ALK PHOS: 87 U/L / ALT: 7 U/L / AST: 15 U/L / GGT: x             A/P:  I discussed the case with Cardiologist Dr. Suárez and recommend the following:    s/p PCI p LAD GIANNA x1    	     Continue DAPT ( Brilinta 90 mg PO q12 hr and Aspirin 81 mg PO daily),  B-Blocker, Statin Therapy - patient educated on symptoms to report to cardiologist                    Patient has ( Brilinta 90 mg PO q12 hr and Aspirin 81 mg PO daily ) at home, and prescription sent to pharmacy for Brilinta and Statin                   Patient agreeing to take DAPT for at least one year or as directed by cardiologist                    Pt given instructions on importance of taking antiplatelet medication or risk acute stent thrombosis/death                   Post cath instructions, access site care and activity restrictions reviewed with patient                     Discussed with patient to return to hospital if experience chest pain, shortness breath, dizziness and site bleeding                   Aggressive risk factor modification, diet counseling, smoking cessation discussed with patient                       Can discharge patient from cardiac standpoint                   Follow up with Cardiology Dr. Suárez in one week.  Instructed to call and make an appointment

## 2019-08-28 NOTE — ED PROVIDER NOTE - CLINICAL SUMMARY MEDICAL DECISION MAKING FREE TEXT BOX
Diflucan 150 mg x 1.   If no relief, be re evaluated 73yF CAD w/ known unstented disease (planned for rahat this summer) p/w paresthesias and neck pressure similar to prior cardiac equivalent chest pain.   EKG, CXR, labs reassuring, but given similarity of sx to prior unstable angina, d/w pt's cardiologist - will place in obs for serial trop and reassessment.

## 2019-11-12 NOTE — ED ADULT NURSE NOTE - PAIN RATING/NUMBER SCALE (0-10): REST
"Informed patient, pt voiced understanding.    However, pt states \"I steel feel like my body still getting adjusted to the insulin.\"  Patient states she has good and bad days, yesterday was a good day, her blood sugars were pretty well controlled.  However patient states that \"if my blood sugars are close to 80 or 90 I don't take the Humalog.\"    Patient continue to say that she does not feel right some days, but she will go and talk to her PCP and see if there is anything else that is causing the symptoms of nausea and extreme fatigue.  Patient states she will let us know, and maybe come and talk to Dr. March about possibly adjusting the Humalog.  Patient will keep in touch and let us know how she is doing.  " 4

## 2020-01-01 ENCOUNTER — TRANSCRIPTION ENCOUNTER (OUTPATIENT)
Age: 75
End: 2020-01-01

## 2020-01-01 ENCOUNTER — INPATIENT (INPATIENT)
Facility: HOSPITAL | Age: 75
LOS: 3 days | Discharge: ANOTHER HOSPITAL | End: 2020-04-17
Attending: HOSPITALIST | Admitting: HOSPITALIST
Payer: MEDICARE

## 2020-01-01 ENCOUNTER — INPATIENT (INPATIENT)
Facility: HOSPITAL | Age: 75
LOS: 15 days | End: 2020-05-03
Attending: INTERNAL MEDICINE | Admitting: INTERNAL MEDICINE
Payer: MEDICARE

## 2020-01-01 VITALS — OXYGEN SATURATION: 96 % | HEART RATE: 114 BPM | RESPIRATION RATE: 29 BRPM

## 2020-01-01 VITALS
WEIGHT: 108.03 LBS | RESPIRATION RATE: 19 BRPM | HEART RATE: 94 BPM | SYSTOLIC BLOOD PRESSURE: 123 MMHG | TEMPERATURE: 100 F | OXYGEN SATURATION: 96 % | DIASTOLIC BLOOD PRESSURE: 57 MMHG

## 2020-01-01 VITALS
HEIGHT: 60 IN | OXYGEN SATURATION: 81 % | TEMPERATURE: 100 F | HEART RATE: 105 BPM | SYSTOLIC BLOOD PRESSURE: 120 MMHG | DIASTOLIC BLOOD PRESSURE: 75 MMHG

## 2020-01-01 VITALS — OXYGEN SATURATION: 91 %

## 2020-01-01 DIAGNOSIS — U07.1 COVID-19: ICD-10-CM

## 2020-01-01 DIAGNOSIS — E87.5 HYPERKALEMIA: ICD-10-CM

## 2020-01-01 DIAGNOSIS — E87.0 HYPEROSMOLALITY AND HYPERNATREMIA: ICD-10-CM

## 2020-01-01 DIAGNOSIS — Z88.8 ALLERGY STATUS TO OTHER DRUGS, MEDICAMENTS AND BIOLOGICAL SUBSTANCES STATUS: ICD-10-CM

## 2020-01-01 DIAGNOSIS — K21.9 GASTRO-ESOPHAGEAL REFLUX DISEASE WITHOUT ESOPHAGITIS: ICD-10-CM

## 2020-01-01 DIAGNOSIS — R19.7 DIARRHEA, UNSPECIFIED: ICD-10-CM

## 2020-01-01 DIAGNOSIS — J80 ACUTE RESPIRATORY DISTRESS SYNDROME: ICD-10-CM

## 2020-01-01 DIAGNOSIS — B96.20 UNSPECIFIED ESCHERICHIA COLI [E. COLI] AS THE CAUSE OF DISEASES CLASSIFIED ELSEWHERE: ICD-10-CM

## 2020-01-01 DIAGNOSIS — E78.5 HYPERLIPIDEMIA, UNSPECIFIED: ICD-10-CM

## 2020-01-01 DIAGNOSIS — N39.0 URINARY TRACT INFECTION, SITE NOT SPECIFIED: ICD-10-CM

## 2020-01-01 DIAGNOSIS — J12.89 OTHER VIRAL PNEUMONIA: ICD-10-CM

## 2020-01-01 DIAGNOSIS — I25.2 OLD MYOCARDIAL INFARCTION: ICD-10-CM

## 2020-01-01 DIAGNOSIS — F32.9 MAJOR DEPRESSIVE DISORDER, SINGLE EPISODE, UNSPECIFIED: ICD-10-CM

## 2020-01-01 DIAGNOSIS — E55.9 VITAMIN D DEFICIENCY, UNSPECIFIED: ICD-10-CM

## 2020-01-01 DIAGNOSIS — Z87.440 PERSONAL HISTORY OF URINARY (TRACT) INFECTIONS: ICD-10-CM
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
## 2020-01-01 DIAGNOSIS — Z79.82 LONG TERM (CURRENT) USE OF ASPIRIN: ICD-10-CM

## 2020-01-01 DIAGNOSIS — I95.1 ORTHOSTATIC HYPOTENSION: ICD-10-CM

## 2020-01-01 DIAGNOSIS — G92 TOXIC ENCEPHALOPATHY: ICD-10-CM

## 2020-01-01 DIAGNOSIS — B96.5 PSEUDOMONAS (AERUGINOSA) (MALLEI) (PSEUDOMALLEI) AS THE CAUSE OF DISEASES CLASSIFIED ELSEWHERE: ICD-10-CM

## 2020-01-01 DIAGNOSIS — Z98.890 OTHER SPECIFIED POSTPROCEDURAL STATES: Chronic | ICD-10-CM

## 2020-01-01 DIAGNOSIS — K92.2 GASTROINTESTINAL HEMORRHAGE, UNSPECIFIED: ICD-10-CM

## 2020-01-01 DIAGNOSIS — E53.8 DEFICIENCY OF OTHER SPECIFIED B GROUP VITAMINS: ICD-10-CM

## 2020-01-01 DIAGNOSIS — I25.10 ATHEROSCLEROTIC HEART DISEASE OF NATIVE CORONARY ARTERY WITHOUT ANGINA PECTORIS: ICD-10-CM

## 2020-01-01 DIAGNOSIS — I63.89 OTHER CEREBRAL INFARCTION: ICD-10-CM

## 2020-01-01 DIAGNOSIS — M06.9 RHEUMATOID ARTHRITIS, UNSPECIFIED: ICD-10-CM

## 2020-01-01 DIAGNOSIS — R32 UNSPECIFIED URINARY INCONTINENCE: ICD-10-CM

## 2020-01-01 DIAGNOSIS — R82.71 BACTERIURIA: ICD-10-CM

## 2020-01-01 DIAGNOSIS — R55 SYNCOPE AND COLLAPSE: ICD-10-CM
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
## 2020-01-01 DIAGNOSIS — R56.9 UNSPECIFIED CONVULSIONS: ICD-10-CM

## 2020-01-01 DIAGNOSIS — F41.9 ANXIETY DISORDER, UNSPECIFIED: ICD-10-CM

## 2020-01-01 DIAGNOSIS — Z16.29 RESISTANCE TO OTHER SINGLE SPECIFIED ANTIBIOTIC: ICD-10-CM

## 2020-01-01 DIAGNOSIS — I60.9 NONTRAUMATIC SUBARACHNOID HEMORRHAGE, UNSPECIFIED: ICD-10-CM

## 2020-01-01 DIAGNOSIS — Z79.52 LONG TERM (CURRENT) USE OF SYSTEMIC STEROIDS: ICD-10-CM

## 2020-01-01 DIAGNOSIS — Z79.899 OTHER LONG TERM (CURRENT) DRUG THERAPY: ICD-10-CM

## 2020-01-01 DIAGNOSIS — E86.0 DEHYDRATION: ICD-10-CM

## 2020-01-01 DIAGNOSIS — N17.9 ACUTE KIDNEY FAILURE, UNSPECIFIED: ICD-10-CM

## 2020-01-01 DIAGNOSIS — Z82.49 FAMILY HISTORY OF ISCHEMIC HEART DISEASE AND OTHER DISEASES OF THE CIRCULATORY SYSTEM: ICD-10-CM

## 2020-01-01 DIAGNOSIS — D50.9 IRON DEFICIENCY ANEMIA, UNSPECIFIED: ICD-10-CM

## 2020-01-01 DIAGNOSIS — A41.89 OTHER SPECIFIED SEPSIS: ICD-10-CM

## 2020-01-01 DIAGNOSIS — Z95.5 PRESENCE OF CORONARY ANGIOPLASTY IMPLANT AND GRAFT: ICD-10-CM

## 2020-01-01 LAB
-  AMIKACIN: SIGNIFICANT CHANGE UP
-  AMIKACIN: SIGNIFICANT CHANGE UP
-  AMPICILLIN/SULBACTAM: SIGNIFICANT CHANGE UP
-  AMPICILLIN: SIGNIFICANT CHANGE UP
-  AZTREONAM: SIGNIFICANT CHANGE UP
-  AZTREONAM: SIGNIFICANT CHANGE UP
-  CANDIDA ALBICANS: SIGNIFICANT CHANGE UP
-  CEFAZOLIN: SIGNIFICANT CHANGE UP
-  CEFEPIME: SIGNIFICANT CHANGE UP
-  CEFEPIME: SIGNIFICANT CHANGE UP
-  CEFOXITIN: SIGNIFICANT CHANGE UP
-  CEFTAZIDIME: SIGNIFICANT CHANGE UP
-  CEFTRIAXONE: SIGNIFICANT CHANGE UP
-  CIPROFLOXACIN: SIGNIFICANT CHANGE UP
-  CIPROFLOXACIN: SIGNIFICANT CHANGE UP
-  ERTAPENEM: SIGNIFICANT CHANGE UP
-  FLUCONAZOLE: SIGNIFICANT CHANGE UP
-  GENTAMICIN: SIGNIFICANT CHANGE UP
-  GENTAMICIN: SIGNIFICANT CHANGE UP
-  IMIPENEM: SIGNIFICANT CHANGE UP
-  IMIPENEM: SIGNIFICANT CHANGE UP
-  INTERPRETATION: SIGNIFICANT CHANGE UP
-  LEVOFLOXACIN: SIGNIFICANT CHANGE UP
-  LEVOFLOXACIN: SIGNIFICANT CHANGE UP
-  MEROPENEM: SIGNIFICANT CHANGE UP
-  MEROPENEM: SIGNIFICANT CHANGE UP
-  NITROFURANTOIN: SIGNIFICANT CHANGE UP
-  PIPERACILLIN/TAZOBACTAM: SIGNIFICANT CHANGE UP
-  PIPERACILLIN/TAZOBACTAM: SIGNIFICANT CHANGE UP
-  TIGECYCLINE: SIGNIFICANT CHANGE UP
-  TOBRAMYCIN: SIGNIFICANT CHANGE UP
-  TOBRAMYCIN: SIGNIFICANT CHANGE UP
-  TRIMETHOPRIM/SULFAMETHOXAZOLE: SIGNIFICANT CHANGE UP
A1C WITH ESTIMATED AVERAGE GLUCOSE RESULT: 6.5 % — HIGH (ref 4–5.6)
ALBUMIN SERPL ELPH-MCNC: 1.5 G/DL — LOW (ref 3.5–5.2)
ALBUMIN SERPL ELPH-MCNC: 1.5 G/DL — LOW (ref 3.5–5.2)
ALBUMIN SERPL ELPH-MCNC: 1.7 G/DL — LOW (ref 3.5–5.2)
ALBUMIN SERPL ELPH-MCNC: 1.8 G/DL — LOW (ref 3.5–5.2)
ALBUMIN SERPL ELPH-MCNC: 1.9 G/DL — LOW (ref 3.5–5.2)
ALBUMIN SERPL ELPH-MCNC: 1.9 G/DL — LOW (ref 3.5–5.2)
ALBUMIN SERPL ELPH-MCNC: 2 G/DL — LOW (ref 3.5–5.2)
ALBUMIN SERPL ELPH-MCNC: 2.2 G/DL — LOW (ref 3.5–5.2)
ALBUMIN SERPL ELPH-MCNC: 2.3 G/DL — LOW (ref 3.5–5.2)
ALBUMIN SERPL ELPH-MCNC: 2.3 G/DL — LOW (ref 3.5–5.2)
ALBUMIN SERPL ELPH-MCNC: 2.4 G/DL — LOW (ref 3.5–5.2)
ALBUMIN SERPL ELPH-MCNC: 2.4 G/DL — LOW (ref 3.5–5.2)
ALBUMIN SERPL ELPH-MCNC: 2.5 G/DL — LOW (ref 3.5–5.2)
ALBUMIN SERPL ELPH-MCNC: 2.6 G/DL — LOW (ref 3.5–5.2)
ALBUMIN SERPL ELPH-MCNC: 2.8 G/DL — LOW (ref 3.5–5.2)
ALBUMIN SERPL ELPH-MCNC: 2.9 G/DL — LOW (ref 3.5–5.2)
ALBUMIN SERPL ELPH-MCNC: 3.1 G/DL — LOW (ref 3.5–5.2)
ALBUMIN SERPL ELPH-MCNC: 3.7 G/DL — SIGNIFICANT CHANGE UP (ref 3.5–5.2)
ALBUMIN SERPL ELPH-MCNC: 3.7 G/DL — SIGNIFICANT CHANGE UP (ref 3.5–5.2)
ALBUMIN SERPL ELPH-MCNC: 3.9 G/DL — SIGNIFICANT CHANGE UP (ref 3.5–5.2)
ALBUMIN SERPL ELPH-MCNC: 4.4 G/DL — SIGNIFICANT CHANGE UP (ref 3.5–5.2)
ALP SERPL-CCNC: 101 U/L — SIGNIFICANT CHANGE UP (ref 30–115)
ALP SERPL-CCNC: 138 U/L — HIGH (ref 30–115)
ALP SERPL-CCNC: 175 U/L — HIGH (ref 30–115)
ALP SERPL-CCNC: 194 U/L — HIGH (ref 30–115)
ALP SERPL-CCNC: 212 U/L — HIGH (ref 30–115)
ALP SERPL-CCNC: 49 U/L — SIGNIFICANT CHANGE UP (ref 30–115)
ALP SERPL-CCNC: 52 U/L — SIGNIFICANT CHANGE UP (ref 30–115)
ALP SERPL-CCNC: 55 U/L — SIGNIFICANT CHANGE UP (ref 30–115)
ALP SERPL-CCNC: 57 U/L — SIGNIFICANT CHANGE UP (ref 30–115)
ALP SERPL-CCNC: 57 U/L — SIGNIFICANT CHANGE UP (ref 30–115)
ALP SERPL-CCNC: 61 U/L — SIGNIFICANT CHANGE UP (ref 30–115)
ALP SERPL-CCNC: 64 U/L — SIGNIFICANT CHANGE UP (ref 30–115)
ALP SERPL-CCNC: 65 U/L — SIGNIFICANT CHANGE UP (ref 30–115)
ALP SERPL-CCNC: 67 U/L — SIGNIFICANT CHANGE UP (ref 30–115)
ALP SERPL-CCNC: 71 U/L — SIGNIFICANT CHANGE UP (ref 30–115)
ALP SERPL-CCNC: 86 U/L — SIGNIFICANT CHANGE UP (ref 30–115)
ALP SERPL-CCNC: 87 U/L — SIGNIFICANT CHANGE UP (ref 30–115)
ALP SERPL-CCNC: 87 U/L — SIGNIFICANT CHANGE UP (ref 30–115)
ALP SERPL-CCNC: 89 U/L — SIGNIFICANT CHANGE UP (ref 30–115)
ALP SERPL-CCNC: 96 U/L — SIGNIFICANT CHANGE UP (ref 30–115)
ALP SERPL-CCNC: 98 U/L — SIGNIFICANT CHANGE UP (ref 30–115)
ALT FLD-CCNC: 10 U/L — SIGNIFICANT CHANGE UP (ref 0–41)
ALT FLD-CCNC: 100 U/L — HIGH (ref 0–41)
ALT FLD-CCNC: 13 U/L — SIGNIFICANT CHANGE UP (ref 0–41)
ALT FLD-CCNC: 15 U/L — SIGNIFICANT CHANGE UP (ref 0–41)
ALT FLD-CCNC: 15 U/L — SIGNIFICANT CHANGE UP (ref 0–41)
ALT FLD-CCNC: 16 U/L — SIGNIFICANT CHANGE UP (ref 0–41)
ALT FLD-CCNC: 17 U/L — SIGNIFICANT CHANGE UP (ref 0–41)
ALT FLD-CCNC: 17 U/L — SIGNIFICANT CHANGE UP (ref 0–41)
ALT FLD-CCNC: 18 U/L — SIGNIFICANT CHANGE UP (ref 0–41)
ALT FLD-CCNC: 19 U/L — SIGNIFICANT CHANGE UP (ref 0–41)
ALT FLD-CCNC: 19 U/L — SIGNIFICANT CHANGE UP (ref 0–41)
ALT FLD-CCNC: 20 U/L — SIGNIFICANT CHANGE UP (ref 0–41)
ALT FLD-CCNC: 24 U/L — SIGNIFICANT CHANGE UP (ref 0–41)
ALT FLD-CCNC: 56 U/L — HIGH (ref 0–41)
ALT FLD-CCNC: 85 U/L — HIGH (ref 0–41)
ANION GAP SERPL CALC-SCNC: 10 MMOL/L — SIGNIFICANT CHANGE UP (ref 7–14)
ANION GAP SERPL CALC-SCNC: 11 MMOL/L — SIGNIFICANT CHANGE UP (ref 7–14)
ANION GAP SERPL CALC-SCNC: 11 MMOL/L — SIGNIFICANT CHANGE UP (ref 7–14)
ANION GAP SERPL CALC-SCNC: 12 MMOL/L — SIGNIFICANT CHANGE UP (ref 7–14)
ANION GAP SERPL CALC-SCNC: 13 MMOL/L — SIGNIFICANT CHANGE UP (ref 7–14)
ANION GAP SERPL CALC-SCNC: 14 MMOL/L — SIGNIFICANT CHANGE UP (ref 7–14)
ANION GAP SERPL CALC-SCNC: 14 MMOL/L — SIGNIFICANT CHANGE UP (ref 7–14)
ANION GAP SERPL CALC-SCNC: 15 MMOL/L — HIGH (ref 7–14)
ANION GAP SERPL CALC-SCNC: 16 MMOL/L — HIGH (ref 7–14)
ANION GAP SERPL CALC-SCNC: 17 MMOL/L — HIGH (ref 7–14)
ANION GAP SERPL CALC-SCNC: 7 MMOL/L — SIGNIFICANT CHANGE UP (ref 7–14)
ANION GAP SERPL CALC-SCNC: 7 MMOL/L — SIGNIFICANT CHANGE UP (ref 7–14)
ANION GAP SERPL CALC-SCNC: 8 MMOL/L — SIGNIFICANT CHANGE UP (ref 7–14)
ANION GAP SERPL CALC-SCNC: 9 MMOL/L — SIGNIFICANT CHANGE UP (ref 7–14)
ANION GAP SERPL CALC-SCNC: 9 MMOL/L — SIGNIFICANT CHANGE UP (ref 7–14)
ANISOCYTOSIS BLD QL: SLIGHT — SIGNIFICANT CHANGE UP
ANISOCYTOSIS BLD QL: SLIGHT — SIGNIFICANT CHANGE UP
APPEARANCE UR: ABNORMAL
APPEARANCE UR: CLEAR — SIGNIFICANT CHANGE UP
APTT BLD: 26.3 SEC — LOW (ref 27–39.2)
APTT BLD: 26.8 SEC — LOW (ref 27–39.2)
APTT BLD: 26.9 SEC — LOW (ref 27–39.2)
APTT BLD: 27 SEC — SIGNIFICANT CHANGE UP (ref 27–39.2)
APTT BLD: 28 SEC — SIGNIFICANT CHANGE UP (ref 27–39.2)
APTT BLD: 28 SEC — SIGNIFICANT CHANGE UP (ref 27–39.2)
APTT BLD: 28.7 SEC — SIGNIFICANT CHANGE UP (ref 27–39.2)
APTT BLD: 29.5 SEC — SIGNIFICANT CHANGE UP (ref 27–39.2)
APTT BLD: 29.6 SEC — SIGNIFICANT CHANGE UP (ref 27–39.2)
APTT BLD: 29.7 SEC — SIGNIFICANT CHANGE UP (ref 27–39.2)
APTT BLD: 30.8 SEC — SIGNIFICANT CHANGE UP (ref 27–39.2)
APTT BLD: 31.4 SEC — SIGNIFICANT CHANGE UP (ref 27–39.2)
APTT BLD: 32.9 SEC — SIGNIFICANT CHANGE UP (ref 27–39.2)
APTT BLD: 34 SEC — SIGNIFICANT CHANGE UP (ref 27–39.2)
AST SERPL-CCNC: 17 U/L — SIGNIFICANT CHANGE UP (ref 0–41)
AST SERPL-CCNC: 18 U/L — SIGNIFICANT CHANGE UP (ref 0–41)
AST SERPL-CCNC: 20 U/L — SIGNIFICANT CHANGE UP (ref 0–41)
AST SERPL-CCNC: 21 U/L — SIGNIFICANT CHANGE UP (ref 0–41)
AST SERPL-CCNC: 21 U/L — SIGNIFICANT CHANGE UP (ref 0–41)
AST SERPL-CCNC: 22 U/L — SIGNIFICANT CHANGE UP (ref 0–41)
AST SERPL-CCNC: 23 U/L — SIGNIFICANT CHANGE UP (ref 0–41)
AST SERPL-CCNC: 233 U/L — HIGH (ref 0–41)
AST SERPL-CCNC: 29 U/L — SIGNIFICANT CHANGE UP (ref 0–41)
AST SERPL-CCNC: 29 U/L — SIGNIFICANT CHANGE UP (ref 0–41)
AST SERPL-CCNC: 33 U/L — SIGNIFICANT CHANGE UP (ref 0–41)
AST SERPL-CCNC: 34 U/L — SIGNIFICANT CHANGE UP (ref 0–41)
AST SERPL-CCNC: 37 U/L — SIGNIFICANT CHANGE UP (ref 0–41)
AST SERPL-CCNC: 44 U/L — HIGH (ref 0–41)
AST SERPL-CCNC: 479 U/L — HIGH (ref 0–41)
AST SERPL-CCNC: 59 U/L — HIGH (ref 0–41)
AST SERPL-CCNC: 591 U/L — HIGH (ref 0–41)
AST SERPL-CCNC: 65 U/L — HIGH (ref 0–41)
AST SERPL-CCNC: 73 U/L — HIGH (ref 0–41)
AST SERPL-CCNC: 75 U/L — HIGH (ref 0–41)
AST SERPL-CCNC: 76 U/L — HIGH (ref 0–41)
BACTERIA # UR AUTO: ABNORMAL
BACTERIA # UR AUTO: ABNORMAL
BACTERIA # UR AUTO: NEGATIVE — SIGNIFICANT CHANGE UP
BASE EXCESS BLDA CALC-SCNC: -1.1 MMOL/L — SIGNIFICANT CHANGE UP (ref -2–2)
BASE EXCESS BLDA CALC-SCNC: -5.9 MMOL/L — LOW (ref -2–2)
BASE EXCESS BLDA CALC-SCNC: -6.2 MMOL/L — LOW (ref -2–2)
BASE EXCESS BLDA CALC-SCNC: -8.4 MMOL/L — LOW (ref -2–2)
BASE EXCESS BLDA CALC-SCNC: 1.1 MMOL/L — SIGNIFICANT CHANGE UP (ref -2–2)
BASE EXCESS BLDA CALC-SCNC: 12.2 MMOL/L — HIGH (ref -2–2)
BASE EXCESS BLDA CALC-SCNC: 2.7 MMOL/L — HIGH (ref -2–2)
BASE EXCESS BLDA CALC-SCNC: 3.9 MMOL/L — HIGH (ref -2–2)
BASE EXCESS BLDA CALC-SCNC: 7.7 MMOL/L — HIGH (ref -2–2)
BASE EXCESS BLDA CALC-SCNC: 9.7 MMOL/L — HIGH (ref -2–2)
BASOPHILS # BLD AUTO: 0 K/UL — SIGNIFICANT CHANGE UP (ref 0–0.2)
BASOPHILS # BLD AUTO: 0 K/UL — SIGNIFICANT CHANGE UP (ref 0–0.2)
BASOPHILS # BLD AUTO: 0.01 K/UL — SIGNIFICANT CHANGE UP (ref 0–0.2)
BASOPHILS # BLD AUTO: 0.01 K/UL — SIGNIFICANT CHANGE UP (ref 0–0.2)
BASOPHILS # BLD AUTO: 0.02 K/UL — SIGNIFICANT CHANGE UP (ref 0–0.2)
BASOPHILS # BLD AUTO: 0.03 K/UL — SIGNIFICANT CHANGE UP (ref 0–0.2)
BASOPHILS # BLD AUTO: 0.04 K/UL — SIGNIFICANT CHANGE UP (ref 0–0.2)
BASOPHILS # BLD AUTO: 0.04 K/UL — SIGNIFICANT CHANGE UP (ref 0–0.2)
BASOPHILS # BLD AUTO: 0.06 K/UL — SIGNIFICANT CHANGE UP (ref 0–0.2)
BASOPHILS # BLD AUTO: 0.07 K/UL — SIGNIFICANT CHANGE UP (ref 0–0.2)
BASOPHILS # BLD AUTO: 0.07 K/UL — SIGNIFICANT CHANGE UP (ref 0–0.2)
BASOPHILS NFR BLD AUTO: 0 % — SIGNIFICANT CHANGE UP (ref 0–1)
BASOPHILS NFR BLD AUTO: 0.1 % — SIGNIFICANT CHANGE UP (ref 0–1)
BASOPHILS NFR BLD AUTO: 0.2 % — SIGNIFICANT CHANGE UP (ref 0–1)
BASOPHILS NFR BLD AUTO: 0.3 % — SIGNIFICANT CHANGE UP (ref 0–1)
BILIRUB SERPL-MCNC: 0.2 MG/DL — SIGNIFICANT CHANGE UP (ref 0.2–1.2)
BILIRUB SERPL-MCNC: 0.3 MG/DL — SIGNIFICANT CHANGE UP (ref 0.2–1.2)
BILIRUB SERPL-MCNC: 0.4 MG/DL — SIGNIFICANT CHANGE UP (ref 0.2–1.2)
BILIRUB SERPL-MCNC: 0.4 MG/DL — SIGNIFICANT CHANGE UP (ref 0.2–1.2)
BILIRUB SERPL-MCNC: 0.5 MG/DL — SIGNIFICANT CHANGE UP (ref 0.2–1.2)
BILIRUB SERPL-MCNC: 0.6 MG/DL — SIGNIFICANT CHANGE UP (ref 0.2–1.2)
BILIRUB SERPL-MCNC: <0.2 MG/DL — SIGNIFICANT CHANGE UP (ref 0.2–1.2)
BILIRUB SERPL-MCNC: <0.2 MG/DL — SIGNIFICANT CHANGE UP (ref 0.2–1.2)
BILIRUB UR-MCNC: NEGATIVE — SIGNIFICANT CHANGE UP
BLD GP AB SCN SERPL QL: SIGNIFICANT CHANGE UP
BLD GP AB SCN SERPL QL: SIGNIFICANT CHANGE UP
BUN SERPL-MCNC: 104 MG/DL — CRITICAL HIGH (ref 10–20)
BUN SERPL-MCNC: 105 MG/DL — CRITICAL HIGH (ref 10–20)
BUN SERPL-MCNC: 109 MG/DL — CRITICAL HIGH (ref 10–20)
BUN SERPL-MCNC: 111 MG/DL — CRITICAL HIGH (ref 10–20)
BUN SERPL-MCNC: 122 MG/DL — CRITICAL HIGH (ref 10–20)
BUN SERPL-MCNC: 14 MG/DL — SIGNIFICANT CHANGE UP (ref 10–20)
BUN SERPL-MCNC: 14 MG/DL — SIGNIFICANT CHANGE UP (ref 10–20)
BUN SERPL-MCNC: 16 MG/DL — SIGNIFICANT CHANGE UP (ref 10–20)
BUN SERPL-MCNC: 17 MG/DL — SIGNIFICANT CHANGE UP (ref 10–20)
BUN SERPL-MCNC: 21 MG/DL — HIGH (ref 10–20)
BUN SERPL-MCNC: 22 MG/DL — HIGH (ref 10–20)
BUN SERPL-MCNC: 28 MG/DL — HIGH (ref 10–20)
BUN SERPL-MCNC: 32 MG/DL — HIGH (ref 10–20)
BUN SERPL-MCNC: 32 MG/DL — HIGH (ref 10–20)
BUN SERPL-MCNC: 36 MG/DL — HIGH (ref 10–20)
BUN SERPL-MCNC: 38 MG/DL — HIGH (ref 10–20)
BUN SERPL-MCNC: 38 MG/DL — HIGH (ref 10–20)
BUN SERPL-MCNC: 39 MG/DL — HIGH (ref 10–20)
BUN SERPL-MCNC: 49 MG/DL — HIGH (ref 10–20)
BUN SERPL-MCNC: 63 MG/DL — CRITICAL HIGH (ref 10–20)
BUN SERPL-MCNC: 66 MG/DL — CRITICAL HIGH (ref 10–20)
BUN SERPL-MCNC: 67 MG/DL — CRITICAL HIGH (ref 10–20)
BUN SERPL-MCNC: 78 MG/DL — CRITICAL HIGH (ref 10–20)
BUN SERPL-MCNC: 86 MG/DL — CRITICAL HIGH (ref 10–20)
BUN SERPL-MCNC: 95 MG/DL — CRITICAL HIGH (ref 10–20)
BUN SERPL-MCNC: 96 MG/DL — CRITICAL HIGH (ref 10–20)
C DIFF BY PCR RESULT: NEGATIVE — SIGNIFICANT CHANGE UP
C DIFF TOX GENS STL QL NAA+PROBE: SIGNIFICANT CHANGE UP
CALCIUM SERPL-MCNC: 6 MG/DL — LOW (ref 8.5–10.1)
CALCIUM SERPL-MCNC: 6.4 MG/DL — LOW (ref 8.5–10.1)
CALCIUM SERPL-MCNC: 6.5 MG/DL — LOW (ref 8.5–10.1)
CALCIUM SERPL-MCNC: 6.5 MG/DL — LOW (ref 8.5–10.1)
CALCIUM SERPL-MCNC: 6.7 MG/DL — LOW (ref 8.5–10.1)
CALCIUM SERPL-MCNC: 6.9 MG/DL — LOW (ref 8.5–10.1)
CALCIUM SERPL-MCNC: 6.9 MG/DL — LOW (ref 8.5–10.1)
CALCIUM SERPL-MCNC: 7 MG/DL — LOW (ref 8.5–10.1)
CALCIUM SERPL-MCNC: 7 MG/DL — LOW (ref 8.5–10.1)
CALCIUM SERPL-MCNC: 7.1 MG/DL — LOW (ref 8.5–10.1)
CALCIUM SERPL-MCNC: 7.3 MG/DL — LOW (ref 8.5–10.1)
CALCIUM SERPL-MCNC: 7.4 MG/DL — LOW (ref 8.5–10.1)
CALCIUM SERPL-MCNC: 7.5 MG/DL — LOW (ref 8.5–10.1)
CALCIUM SERPL-MCNC: 7.6 MG/DL — LOW (ref 8.5–10.1)
CALCIUM SERPL-MCNC: 7.7 MG/DL — LOW (ref 8.5–10.1)
CALCIUM SERPL-MCNC: 7.9 MG/DL — LOW (ref 8.5–10.1)
CALCIUM SERPL-MCNC: 8 MG/DL — LOW (ref 8.5–10.1)
CALCIUM SERPL-MCNC: 8.1 MG/DL — LOW (ref 8.5–10.1)
CALCIUM SERPL-MCNC: 8.1 MG/DL — LOW (ref 8.5–10.1)
CALCIUM SERPL-MCNC: 8.3 MG/DL — LOW (ref 8.5–10.1)
CHLORIDE SERPL-SCNC: 101 MMOL/L — SIGNIFICANT CHANGE UP (ref 98–110)
CHLORIDE SERPL-SCNC: 102 MMOL/L — SIGNIFICANT CHANGE UP (ref 98–110)
CHLORIDE SERPL-SCNC: 102 MMOL/L — SIGNIFICANT CHANGE UP (ref 98–110)
CHLORIDE SERPL-SCNC: 104 MMOL/L — SIGNIFICANT CHANGE UP (ref 98–110)
CHLORIDE SERPL-SCNC: 105 MMOL/L — SIGNIFICANT CHANGE UP (ref 98–110)
CHLORIDE SERPL-SCNC: 106 MMOL/L — SIGNIFICANT CHANGE UP (ref 98–110)
CHLORIDE SERPL-SCNC: 107 MMOL/L — SIGNIFICANT CHANGE UP (ref 98–110)
CHLORIDE SERPL-SCNC: 108 MMOL/L — SIGNIFICANT CHANGE UP (ref 98–110)
CHLORIDE SERPL-SCNC: 108 MMOL/L — SIGNIFICANT CHANGE UP (ref 98–110)
CHLORIDE SERPL-SCNC: 83 MMOL/L — LOW (ref 98–110)
CHLORIDE SERPL-SCNC: 83 MMOL/L — LOW (ref 98–110)
CHLORIDE SERPL-SCNC: 84 MMOL/L — LOW (ref 98–110)
CHLORIDE SERPL-SCNC: 87 MMOL/L — LOW (ref 98–110)
CHLORIDE SERPL-SCNC: 88 MMOL/L — LOW (ref 98–110)
CHLORIDE SERPL-SCNC: 90 MMOL/L — LOW (ref 98–110)
CHLORIDE SERPL-SCNC: 93 MMOL/L — LOW (ref 98–110)
CHLORIDE SERPL-SCNC: 93 MMOL/L — LOW (ref 98–110)
CHLORIDE SERPL-SCNC: 96 MMOL/L — LOW (ref 98–110)
CHLORIDE SERPL-SCNC: 97 MMOL/L — LOW (ref 98–110)
CO2 SERPL-SCNC: 19 MMOL/L — SIGNIFICANT CHANGE UP (ref 17–32)
CO2 SERPL-SCNC: 20 MMOL/L — SIGNIFICANT CHANGE UP (ref 17–32)
CO2 SERPL-SCNC: 20 MMOL/L — SIGNIFICANT CHANGE UP (ref 17–32)
CO2 SERPL-SCNC: 21 MMOL/L — SIGNIFICANT CHANGE UP (ref 17–32)
CO2 SERPL-SCNC: 21 MMOL/L — SIGNIFICANT CHANGE UP (ref 17–32)
CO2 SERPL-SCNC: 23 MMOL/L — SIGNIFICANT CHANGE UP (ref 17–32)
CO2 SERPL-SCNC: 24 MMOL/L — SIGNIFICANT CHANGE UP (ref 17–32)
CO2 SERPL-SCNC: 25 MMOL/L — SIGNIFICANT CHANGE UP (ref 17–32)
CO2 SERPL-SCNC: 27 MMOL/L — SIGNIFICANT CHANGE UP (ref 17–32)
CO2 SERPL-SCNC: 27 MMOL/L — SIGNIFICANT CHANGE UP (ref 17–32)
CO2 SERPL-SCNC: 28 MMOL/L — SIGNIFICANT CHANGE UP (ref 17–32)
CO2 SERPL-SCNC: 29 MMOL/L — SIGNIFICANT CHANGE UP (ref 17–32)
CO2 SERPL-SCNC: 30 MMOL/L — SIGNIFICANT CHANGE UP (ref 17–32)
CO2 SERPL-SCNC: 30 MMOL/L — SIGNIFICANT CHANGE UP (ref 17–32)
CO2 SERPL-SCNC: 31 MMOL/L — SIGNIFICANT CHANGE UP (ref 17–32)
CO2 SERPL-SCNC: 32 MMOL/L — SIGNIFICANT CHANGE UP (ref 17–32)
CO2 SERPL-SCNC: 33 MMOL/L — HIGH (ref 17–32)
CO2 SERPL-SCNC: 34 MMOL/L — HIGH (ref 17–32)
CO2 SERPL-SCNC: 36 MMOL/L — HIGH (ref 17–32)
COLOR SPEC: ABNORMAL
COLOR SPEC: SIGNIFICANT CHANGE UP
COLOR SPEC: YELLOW — SIGNIFICANT CHANGE UP
CREAT SERPL-MCNC: 0.6 MG/DL — LOW (ref 0.7–1.5)
CREAT SERPL-MCNC: 0.7 MG/DL — SIGNIFICANT CHANGE UP (ref 0.7–1.5)
CREAT SERPL-MCNC: 0.9 MG/DL — SIGNIFICANT CHANGE UP (ref 0.7–1.5)
CREAT SERPL-MCNC: 1 MG/DL — SIGNIFICANT CHANGE UP (ref 0.7–1.5)
CREAT SERPL-MCNC: 1.1 MG/DL — SIGNIFICANT CHANGE UP (ref 0.7–1.5)
CREAT SERPL-MCNC: 1.2 MG/DL — SIGNIFICANT CHANGE UP (ref 0.7–1.5)
CREAT SERPL-MCNC: 1.2 MG/DL — SIGNIFICANT CHANGE UP (ref 0.7–1.5)
CREAT SERPL-MCNC: 1.3 MG/DL — SIGNIFICANT CHANGE UP (ref 0.7–1.5)
CREAT SERPL-MCNC: 1.3 MG/DL — SIGNIFICANT CHANGE UP (ref 0.7–1.5)
CREAT SERPL-MCNC: 1.4 MG/DL — SIGNIFICANT CHANGE UP (ref 0.7–1.5)
CREAT SERPL-MCNC: 1.7 MG/DL — HIGH (ref 0.7–1.5)
CREAT SERPL-MCNC: 1.8 MG/DL — HIGH (ref 0.7–1.5)
CREAT SERPL-MCNC: 2 MG/DL — HIGH (ref 0.7–1.5)
CREAT SERPL-MCNC: 2 MG/DL — HIGH (ref 0.7–1.5)
CREAT SERPL-MCNC: 2.1 MG/DL — HIGH (ref 0.7–1.5)
CREAT SERPL-MCNC: 2.3 MG/DL — HIGH (ref 0.7–1.5)
CREAT SERPL-MCNC: 3 MG/DL — HIGH (ref 0.7–1.5)
CRP SERPL-MCNC: 17.16 MG/DL — HIGH (ref 0–0.4)
CRP SERPL-MCNC: 18.39 MG/DL — HIGH (ref 0–0.4)
CRP SERPL-MCNC: 4.49 MG/DL — HIGH (ref 0–0.4)
CRP SERPL-MCNC: 4.73 MG/DL — HIGH (ref 0–0.4)
CRP SERPL-MCNC: 6.39 MG/DL — HIGH (ref 0–0.4)
CRP SERPL-MCNC: 6.65 MG/DL — HIGH (ref 0–0.4)
CRP SERPL-MCNC: 8.31 MG/DL — HIGH (ref 0–0.4)
CRP SERPL-MCNC: 8.45 MG/DL — HIGH (ref 0–0.4)
CULTURE RESULTS: SIGNIFICANT CHANGE UP
D DIMER BLD IA.RAPID-MCNC: 2053 NG/ML DDU — HIGH (ref 0–230)
D DIMER BLD IA.RAPID-MCNC: 516 NG/ML DDU — HIGH (ref 0–230)
D DIMER BLD IA.RAPID-MCNC: 949 NG/ML DDU — HIGH (ref 0–230)
DIFF PNL FLD: ABNORMAL
EOSINOPHIL # BLD AUTO: 0 K/UL — SIGNIFICANT CHANGE UP (ref 0–0.7)
EOSINOPHIL # BLD AUTO: 0.01 K/UL — SIGNIFICANT CHANGE UP (ref 0–0.7)
EOSINOPHIL # BLD AUTO: 0.03 K/UL — SIGNIFICANT CHANGE UP (ref 0–0.7)
EOSINOPHIL # BLD AUTO: 0.08 K/UL — SIGNIFICANT CHANGE UP (ref 0–0.7)
EOSINOPHIL NFR BLD AUTO: 0 % — SIGNIFICANT CHANGE UP (ref 0–8)
EOSINOPHIL NFR BLD AUTO: 0.1 % — SIGNIFICANT CHANGE UP (ref 0–8)
EOSINOPHIL NFR BLD AUTO: 0.1 % — SIGNIFICANT CHANGE UP (ref 0–8)
EOSINOPHIL NFR BLD AUTO: 0.3 % — SIGNIFICANT CHANGE UP (ref 0–8)
EPI CELLS # UR: 0 /HPF — SIGNIFICANT CHANGE UP (ref 0–5)
EPI CELLS # UR: 0 /HPF — SIGNIFICANT CHANGE UP (ref 0–5)
EPI CELLS # UR: 1 /HPF — SIGNIFICANT CHANGE UP (ref 0–5)
EPI CELLS # UR: 1 /HPF — SIGNIFICANT CHANGE UP (ref 0–5)
EPI CELLS # UR: 11 /HPF — HIGH (ref 0–5)
EPI CELLS # UR: 14 /HPF — HIGH (ref 0–5)
EPI CELLS # UR: 22 /HPF — HIGH (ref 0–5)
EPI CELLS # UR: 23 /HPF — HIGH (ref 0–5)
EPI CELLS # UR: 27 /HPF — HIGH (ref 0–5)
EPI CELLS # UR: 7 /HPF — HIGH (ref 0–5)
EPI CELLS # UR: 9 /HPF — HIGH (ref 0–5)
EPI CELLS # UR: ABNORMAL /HPF
ERYTHROCYTE [SEDIMENTATION RATE] IN BLOOD: 62 MM/HR — HIGH (ref 0–20)
ESTIMATED AVERAGE GLUCOSE: 140 MG/DL — HIGH (ref 68–114)
FERRITIN SERPL-MCNC: 169 NG/ML — HIGH (ref 15–150)
FERRITIN SERPL-MCNC: 1705 NG/ML — HIGH (ref 15–150)
FUNGITELL: >500 PG/ML — HIGH
GAS PNL BLDA: SIGNIFICANT CHANGE UP
GAS PNL BLDA: SIGNIFICANT CHANGE UP
GIANT PLATELETS BLD QL SMEAR: PRESENT — SIGNIFICANT CHANGE UP
GIANT PLATELETS BLD QL SMEAR: PRESENT — SIGNIFICANT CHANGE UP
GLUCOSE BLDC GLUCOMTR-MCNC: 105 MG/DL — HIGH (ref 70–99)
GLUCOSE BLDC GLUCOMTR-MCNC: 112 MG/DL — HIGH (ref 70–99)
GLUCOSE BLDC GLUCOMTR-MCNC: 117 MG/DL — HIGH (ref 70–99)
GLUCOSE BLDC GLUCOMTR-MCNC: 127 MG/DL — HIGH (ref 70–99)
GLUCOSE BLDC GLUCOMTR-MCNC: 130 MG/DL — HIGH (ref 70–99)
GLUCOSE BLDC GLUCOMTR-MCNC: 132 MG/DL — HIGH (ref 70–99)
GLUCOSE BLDC GLUCOMTR-MCNC: 138 MG/DL — HIGH (ref 70–99)
GLUCOSE BLDC GLUCOMTR-MCNC: 138 MG/DL — HIGH (ref 70–99)
GLUCOSE BLDC GLUCOMTR-MCNC: 139 MG/DL — HIGH (ref 70–99)
GLUCOSE BLDC GLUCOMTR-MCNC: 149 MG/DL — HIGH (ref 70–99)
GLUCOSE BLDC GLUCOMTR-MCNC: 149 MG/DL — HIGH (ref 70–99)
GLUCOSE BLDC GLUCOMTR-MCNC: 155 MG/DL — HIGH (ref 70–99)
GLUCOSE BLDC GLUCOMTR-MCNC: 167 MG/DL — HIGH (ref 70–99)
GLUCOSE BLDC GLUCOMTR-MCNC: 176 MG/DL — HIGH (ref 70–99)
GLUCOSE BLDC GLUCOMTR-MCNC: 188 MG/DL — HIGH (ref 70–99)
GLUCOSE BLDC GLUCOMTR-MCNC: 191 MG/DL — HIGH (ref 70–99)
GLUCOSE BLDC GLUCOMTR-MCNC: 197 MG/DL — HIGH (ref 70–99)
GLUCOSE BLDC GLUCOMTR-MCNC: 198 MG/DL — HIGH (ref 70–99)
GLUCOSE BLDC GLUCOMTR-MCNC: 201 MG/DL — HIGH (ref 70–99)
GLUCOSE BLDC GLUCOMTR-MCNC: 206 MG/DL — HIGH (ref 70–99)
GLUCOSE BLDC GLUCOMTR-MCNC: 210 MG/DL — HIGH (ref 70–99)
GLUCOSE BLDC GLUCOMTR-MCNC: 215 MG/DL — HIGH (ref 70–99)
GLUCOSE BLDC GLUCOMTR-MCNC: 232 MG/DL — HIGH (ref 70–99)
GLUCOSE BLDC GLUCOMTR-MCNC: 235 MG/DL — HIGH (ref 70–99)
GLUCOSE BLDC GLUCOMTR-MCNC: 245 MG/DL — HIGH (ref 70–99)
GLUCOSE BLDC GLUCOMTR-MCNC: 252 MG/DL — HIGH (ref 70–99)
GLUCOSE BLDC GLUCOMTR-MCNC: 270 MG/DL — HIGH (ref 70–99)
GLUCOSE BLDC GLUCOMTR-MCNC: 278 MG/DL — HIGH (ref 70–99)
GLUCOSE BLDC GLUCOMTR-MCNC: 342 MG/DL — HIGH (ref 70–99)
GLUCOSE BLDC GLUCOMTR-MCNC: 53 MG/DL — LOW (ref 70–99)
GLUCOSE BLDC GLUCOMTR-MCNC: 70 MG/DL — SIGNIFICANT CHANGE UP (ref 70–99)
GLUCOSE BLDC GLUCOMTR-MCNC: 75 MG/DL — SIGNIFICANT CHANGE UP (ref 70–99)
GLUCOSE BLDC GLUCOMTR-MCNC: 77 MG/DL — SIGNIFICANT CHANGE UP (ref 70–99)
GLUCOSE BLDC GLUCOMTR-MCNC: 81 MG/DL — SIGNIFICANT CHANGE UP (ref 70–99)
GLUCOSE BLDC GLUCOMTR-MCNC: 97 MG/DL — SIGNIFICANT CHANGE UP (ref 70–99)
GLUCOSE SERPL-MCNC: 101 MG/DL — HIGH (ref 70–99)
GLUCOSE SERPL-MCNC: 110 MG/DL — HIGH (ref 70–99)
GLUCOSE SERPL-MCNC: 111 MG/DL — HIGH (ref 70–99)
GLUCOSE SERPL-MCNC: 130 MG/DL — HIGH (ref 70–99)
GLUCOSE SERPL-MCNC: 133 MG/DL — HIGH (ref 70–99)
GLUCOSE SERPL-MCNC: 140 MG/DL — HIGH (ref 70–99)
GLUCOSE SERPL-MCNC: 150 MG/DL — HIGH (ref 70–99)
GLUCOSE SERPL-MCNC: 153 MG/DL — HIGH (ref 70–99)
GLUCOSE SERPL-MCNC: 164 MG/DL — HIGH (ref 70–99)
GLUCOSE SERPL-MCNC: 168 MG/DL — HIGH (ref 70–99)
GLUCOSE SERPL-MCNC: 191 MG/DL — HIGH (ref 70–99)
GLUCOSE SERPL-MCNC: 202 MG/DL — HIGH (ref 70–99)
GLUCOSE SERPL-MCNC: 203 MG/DL — HIGH (ref 70–99)
GLUCOSE SERPL-MCNC: 204 MG/DL — HIGH (ref 70–99)
GLUCOSE SERPL-MCNC: 222 MG/DL — HIGH (ref 70–99)
GLUCOSE SERPL-MCNC: 226 MG/DL — HIGH (ref 70–99)
GLUCOSE SERPL-MCNC: 232 MG/DL — HIGH (ref 70–99)
GLUCOSE SERPL-MCNC: 272 MG/DL — HIGH (ref 70–99)
GLUCOSE SERPL-MCNC: 273 MG/DL — HIGH (ref 70–99)
GLUCOSE SERPL-MCNC: 285 MG/DL — HIGH (ref 70–99)
GLUCOSE SERPL-MCNC: 78 MG/DL — SIGNIFICANT CHANGE UP (ref 70–99)
GLUCOSE SERPL-MCNC: 81 MG/DL — SIGNIFICANT CHANGE UP (ref 70–99)
GLUCOSE SERPL-MCNC: 84 MG/DL — SIGNIFICANT CHANGE UP (ref 70–99)
GLUCOSE SERPL-MCNC: 97 MG/DL — SIGNIFICANT CHANGE UP (ref 70–99)
GLUCOSE SERPL-MCNC: 97 MG/DL — SIGNIFICANT CHANGE UP (ref 70–99)
GLUCOSE SERPL-MCNC: 98 MG/DL — SIGNIFICANT CHANGE UP (ref 70–99)
GLUCOSE UR QL: ABNORMAL
GLUCOSE UR QL: NEGATIVE MG/DL — SIGNIFICANT CHANGE UP
GLUCOSE UR QL: NEGATIVE — SIGNIFICANT CHANGE UP
GLUCOSE UR QL: SIGNIFICANT CHANGE UP
GLUCOSE UR QL: SIGNIFICANT CHANGE UP
GRAM STN FLD: SIGNIFICANT CHANGE UP
GRAM STN FLD: SIGNIFICANT CHANGE UP
GRAN CASTS # UR COMP ASSIST: SIGNIFICANT CHANGE UP /LPF
GRAN CASTS # UR COMP ASSIST: SIGNIFICANT CHANGE UP /LPF
HCO3 BLDA-SCNC: 20 MMOL/L — LOW (ref 21–29)
HCO3 BLDA-SCNC: 20 MMOL/L — LOW (ref 21–29)
HCO3 BLDA-SCNC: 22 MMOL/L — SIGNIFICANT CHANGE UP (ref 21–29)
HCO3 BLDA-SCNC: 27 MMOL/L — SIGNIFICANT CHANGE UP (ref 21–29)
HCO3 BLDA-SCNC: 29 MMOL/L — SIGNIFICANT CHANGE UP (ref 21–29)
HCO3 BLDA-SCNC: 30 MMOL/L — HIGH (ref 21–29)
HCO3 BLDA-SCNC: 30 MMOL/L — HIGH (ref 21–29)
HCO3 BLDA-SCNC: 34 MMOL/L — HIGH (ref 21–29)
HCO3 BLDA-SCNC: 36 MMOL/L — HIGH (ref 21–29)
HCO3 BLDA-SCNC: 38 MMOL/L — HIGH (ref 21–29)
HCT VFR BLD CALC: 21 % — LOW (ref 37–47)
HCT VFR BLD CALC: 21.1 % — LOW (ref 37–47)
HCT VFR BLD CALC: 21.4 % — LOW (ref 37–47)
HCT VFR BLD CALC: 22.6 % — LOW (ref 37–47)
HCT VFR BLD CALC: 23.1 % — LOW (ref 37–47)
HCT VFR BLD CALC: 24.2 % — LOW (ref 37–47)
HCT VFR BLD CALC: 24.5 % — LOW (ref 37–47)
HCT VFR BLD CALC: 25.4 % — LOW (ref 37–47)
HCT VFR BLD CALC: 25.7 % — LOW (ref 37–47)
HCT VFR BLD CALC: 25.8 % — LOW (ref 37–47)
HCT VFR BLD CALC: 26.3 % — LOW (ref 37–47)
HCT VFR BLD CALC: 26.4 % — LOW (ref 37–47)
HCT VFR BLD CALC: 26.6 % — LOW (ref 37–47)
HCT VFR BLD CALC: 26.7 % — LOW (ref 37–47)
HCT VFR BLD CALC: 27.1 % — LOW (ref 37–47)
HCT VFR BLD CALC: 27.3 % — LOW (ref 37–47)
HCT VFR BLD CALC: 27.5 % — LOW (ref 37–47)
HCT VFR BLD CALC: 28 % — LOW (ref 37–47)
HCT VFR BLD CALC: 28.3 % — LOW (ref 37–47)
HCT VFR BLD CALC: 30.1 % — LOW (ref 37–47)
HCT VFR BLD CALC: 31.3 % — LOW (ref 37–47)
HCT VFR BLD CALC: 33 % — LOW (ref 37–47)
HCT VFR BLD CALC: 34.7 % — LOW (ref 37–47)
HCT VFR BLD CALC: 35.2 % — LOW (ref 37–47)
HCT VFR BLD CALC: 35.3 % — LOW (ref 37–47)
HCT VFR BLD CALC: 36.9 % — LOW (ref 37–47)
HGB BLD-MCNC: 10.2 G/DL — LOW (ref 12–16)
HGB BLD-MCNC: 10.3 G/DL — LOW (ref 12–16)
HGB BLD-MCNC: 11.3 G/DL — LOW (ref 12–16)
HGB BLD-MCNC: 11.6 G/DL — LOW (ref 12–16)
HGB BLD-MCNC: 11.7 G/DL — LOW (ref 12–16)
HGB BLD-MCNC: 12.2 G/DL — SIGNIFICANT CHANGE UP (ref 12–16)
HGB BLD-MCNC: 12.4 G/DL — SIGNIFICANT CHANGE UP (ref 12–16)
HGB BLD-MCNC: 6.7 G/DL — CRITICAL LOW (ref 12–16)
HGB BLD-MCNC: 6.7 G/DL — CRITICAL LOW (ref 12–16)
HGB BLD-MCNC: 7.2 G/DL — LOW (ref 12–16)
HGB BLD-MCNC: 7.3 G/DL — LOW (ref 12–16)
HGB BLD-MCNC: 8.1 G/DL — LOW (ref 12–16)
HGB BLD-MCNC: 8.1 G/DL — LOW (ref 12–16)
HGB BLD-MCNC: 8.3 G/DL — LOW (ref 12–16)
HGB BLD-MCNC: 8.4 G/DL — LOW (ref 12–16)
HGB BLD-MCNC: 8.5 G/DL — LOW (ref 12–16)
HGB BLD-MCNC: 8.5 G/DL — LOW (ref 12–16)
HGB BLD-MCNC: 8.6 G/DL — LOW (ref 12–16)
HGB BLD-MCNC: 8.6 G/DL — LOW (ref 12–16)
HGB BLD-MCNC: 8.7 G/DL — LOW (ref 12–16)
HGB BLD-MCNC: 9 G/DL — LOW (ref 12–16)
HGB BLD-MCNC: 9.2 G/DL — LOW (ref 12–16)
HGB BLD-MCNC: 9.2 G/DL — LOW (ref 12–16)
HGB BLD-MCNC: 9.5 G/DL — LOW (ref 12–16)
HGB BLD-MCNC: 9.8 G/DL — LOW (ref 12–16)
HGB BLD-MCNC: 9.8 G/DL — LOW (ref 12–16)
HOROWITZ INDEX BLDA+IHG-RTO: 100 — SIGNIFICANT CHANGE UP
HOROWITZ INDEX BLDA+IHG-RTO: 60 — SIGNIFICANT CHANGE UP
HYALINE CASTS # UR AUTO: 1 /LPF — SIGNIFICANT CHANGE UP (ref 0–7)
HYALINE CASTS # UR AUTO: 10 /LPF — HIGH (ref 0–7)
HYALINE CASTS # UR AUTO: 13 /LPF — HIGH (ref 0–7)
HYALINE CASTS # UR AUTO: 15 /LPF — HIGH (ref 0–7)
HYALINE CASTS # UR AUTO: 19 /LPF — HIGH (ref 0–7)
HYALINE CASTS # UR AUTO: 2 /LPF — SIGNIFICANT CHANGE UP (ref 0–7)
HYALINE CASTS # UR AUTO: 3 /LPF — SIGNIFICANT CHANGE UP (ref 0–7)
HYALINE CASTS # UR AUTO: 44 /LPF — HIGH (ref 0–7)
HYALINE CASTS # UR AUTO: 48 /LPF — HIGH (ref 0–7)
HYALINE CASTS # UR AUTO: 53 /LPF — HIGH (ref 0–7)
HYALINE CASTS # UR AUTO: 58 /LPF — HIGH (ref 0–7)
HYALINE CASTS # UR AUTO: 6 /LPF — SIGNIFICANT CHANGE UP (ref 0–7)
HYALINE CASTS # UR AUTO: 6 /LPF — SIGNIFICANT CHANGE UP (ref 0–7)
IMM GRANULOCYTES NFR BLD AUTO: 0.5 % — HIGH (ref 0.1–0.3)
IMM GRANULOCYTES NFR BLD AUTO: 0.5 % — HIGH (ref 0.1–0.3)
IMM GRANULOCYTES NFR BLD AUTO: 0.6 % — HIGH (ref 0.1–0.3)
IMM GRANULOCYTES NFR BLD AUTO: 2.2 % — HIGH (ref 0.1–0.3)
IMM GRANULOCYTES NFR BLD AUTO: 2.4 % — HIGH (ref 0.1–0.3)
IMM GRANULOCYTES NFR BLD AUTO: 2.6 % — HIGH (ref 0.1–0.3)
IMM GRANULOCYTES NFR BLD AUTO: 2.8 % — HIGH (ref 0.1–0.3)
IMM GRANULOCYTES NFR BLD AUTO: 3.1 % — HIGH (ref 0.1–0.3)
IMM GRANULOCYTES NFR BLD AUTO: 3.3 % — HIGH (ref 0.1–0.3)
IMM GRANULOCYTES NFR BLD AUTO: 3.5 % — HIGH (ref 0.1–0.3)
IMM GRANULOCYTES NFR BLD AUTO: 3.6 % — HIGH (ref 0.1–0.3)
IMM GRANULOCYTES NFR BLD AUTO: 4.6 % — HIGH (ref 0.1–0.3)
IMM GRANULOCYTES NFR BLD AUTO: 4.8 % — HIGH (ref 0.1–0.3)
IMM GRANULOCYTES NFR BLD AUTO: 5.8 % — HIGH (ref 0.1–0.3)
IMM GRANULOCYTES NFR BLD AUTO: 6.7 % — HIGH (ref 0.1–0.3)
INR BLD: 0.99 RATIO — SIGNIFICANT CHANGE UP (ref 0.65–1.3)
INR BLD: 1.01 RATIO — SIGNIFICANT CHANGE UP (ref 0.65–1.3)
INR BLD: 1.09 RATIO — SIGNIFICANT CHANGE UP (ref 0.65–1.3)
INR BLD: 1.15 RATIO — SIGNIFICANT CHANGE UP (ref 0.65–1.3)
INR BLD: 1.17 RATIO — SIGNIFICANT CHANGE UP (ref 0.65–1.3)
INR BLD: 1.18 RATIO — SIGNIFICANT CHANGE UP (ref 0.65–1.3)
INR BLD: 1.24 RATIO — SIGNIFICANT CHANGE UP (ref 0.65–1.3)
INR BLD: 1.28 RATIO — SIGNIFICANT CHANGE UP (ref 0.65–1.3)
INR BLD: 1.28 RATIO — SIGNIFICANT CHANGE UP (ref 0.65–1.3)
INR BLD: 1.3 RATIO — SIGNIFICANT CHANGE UP (ref 0.65–1.3)
INR BLD: 1.31 RATIO — HIGH (ref 0.65–1.3)
INR BLD: 1.32 RATIO — HIGH (ref 0.65–1.3)
KETONES UR-MCNC: NEGATIVE — SIGNIFICANT CHANGE UP
KETONES UR-MCNC: SIGNIFICANT CHANGE UP
KETONES UR-MCNC: SIGNIFICANT CHANGE UP
LACTATE SERPL-SCNC: 0.9 MMOL/L — SIGNIFICANT CHANGE UP (ref 0.7–2)
LDH SERPL L TO P-CCNC: 614 — HIGH (ref 50–242)
LEUKOCYTE ESTERASE UR-ACNC: ABNORMAL
LEUKOCYTE ESTERASE UR-ACNC: NEGATIVE — SIGNIFICANT CHANGE UP
LYMPHOCYTES # BLD AUTO: 0 % — LOW (ref 20.5–51.1)
LYMPHOCYTES # BLD AUTO: 0 K/UL — LOW (ref 1.2–3.4)
LYMPHOCYTES # BLD AUTO: 0.19 K/UL — LOW (ref 1.2–3.4)
LYMPHOCYTES # BLD AUTO: 0.23 K/UL — LOW (ref 1.2–3.4)
LYMPHOCYTES # BLD AUTO: 0.24 K/UL — LOW (ref 1.2–3.4)
LYMPHOCYTES # BLD AUTO: 0.27 K/UL — LOW (ref 1.2–3.4)
LYMPHOCYTES # BLD AUTO: 0.33 K/UL — LOW (ref 1.2–3.4)
LYMPHOCYTES # BLD AUTO: 0.34 K/UL — LOW (ref 1.2–3.4)
LYMPHOCYTES # BLD AUTO: 0.37 K/UL — LOW (ref 1.2–3.4)
LYMPHOCYTES # BLD AUTO: 0.38 K/UL — LOW (ref 1.2–3.4)
LYMPHOCYTES # BLD AUTO: 0.4 K/UL — LOW (ref 1.2–3.4)
LYMPHOCYTES # BLD AUTO: 0.46 K/UL — LOW (ref 1.2–3.4)
LYMPHOCYTES # BLD AUTO: 0.47 K/UL — LOW (ref 1.2–3.4)
LYMPHOCYTES # BLD AUTO: 0.48 K/UL — LOW (ref 1.2–3.4)
LYMPHOCYTES # BLD AUTO: 0.48 K/UL — LOW (ref 1.2–3.4)
LYMPHOCYTES # BLD AUTO: 0.49 K/UL — LOW (ref 1.2–3.4)
LYMPHOCYTES # BLD AUTO: 0.54 K/UL — LOW (ref 1.2–3.4)
LYMPHOCYTES # BLD AUTO: 0.6 K/UL — LOW (ref 1.2–3.4)
LYMPHOCYTES # BLD AUTO: 0.64 K/UL — LOW (ref 1.2–3.4)
LYMPHOCYTES # BLD AUTO: 0.67 K/UL — LOW (ref 1.2–3.4)
LYMPHOCYTES # BLD AUTO: 1 % — LOW (ref 20.5–51.1)
LYMPHOCYTES # BLD AUTO: 1.1 % — LOW (ref 20.5–51.1)
LYMPHOCYTES # BLD AUTO: 1.2 % — LOW (ref 20.5–51.1)
LYMPHOCYTES # BLD AUTO: 1.4 % — LOW (ref 20.5–51.1)
LYMPHOCYTES # BLD AUTO: 1.6 % — LOW (ref 20.5–51.1)
LYMPHOCYTES # BLD AUTO: 1.7 % — LOW (ref 20.5–51.1)
LYMPHOCYTES # BLD AUTO: 1.7 % — LOW (ref 20.5–51.1)
LYMPHOCYTES # BLD AUTO: 1.8 % — LOW (ref 20.5–51.1)
LYMPHOCYTES # BLD AUTO: 1.9 % — LOW (ref 20.5–51.1)
LYMPHOCYTES # BLD AUTO: 2 % — LOW (ref 20.5–51.1)
LYMPHOCYTES # BLD AUTO: 2.1 % — LOW (ref 20.5–51.1)
LYMPHOCYTES # BLD AUTO: 2.4 % — LOW (ref 20.5–51.1)
LYMPHOCYTES # BLD AUTO: 2.6 % — LOW (ref 20.5–51.1)
LYMPHOCYTES # BLD AUTO: 4.2 % — LOW (ref 20.5–51.1)
LYMPHOCYTES # BLD AUTO: 4.9 % — LOW (ref 20.5–51.1)
LYMPHOCYTES # BLD AUTO: 5.9 % — LOW (ref 20.5–51.1)
MACROCYTES BLD QL: SLIGHT — SIGNIFICANT CHANGE UP
MAGNESIUM SERPL-MCNC: 2.2 MG/DL — SIGNIFICANT CHANGE UP (ref 1.8–2.4)
MAGNESIUM SERPL-MCNC: 2.3 MG/DL — SIGNIFICANT CHANGE UP (ref 1.8–2.4)
MAGNESIUM SERPL-MCNC: 2.4 MG/DL — SIGNIFICANT CHANGE UP (ref 1.8–2.4)
MAGNESIUM SERPL-MCNC: 2.4 MG/DL — SIGNIFICANT CHANGE UP (ref 1.8–2.4)
MAGNESIUM SERPL-MCNC: 2.5 MG/DL — HIGH (ref 1.8–2.4)
MAGNESIUM SERPL-MCNC: 2.6 MG/DL — HIGH (ref 1.8–2.4)
MANUAL SMEAR VERIFICATION: SIGNIFICANT CHANGE UP
MANUAL SMEAR VERIFICATION: SIGNIFICANT CHANGE UP
MCHC RBC-ENTMCNC: 31.3 G/DL — LOW (ref 32–37)
MCHC RBC-ENTMCNC: 31.6 PG — HIGH (ref 27–31)
MCHC RBC-ENTMCNC: 31.7 PG — HIGH (ref 27–31)
MCHC RBC-ENTMCNC: 31.8 G/DL — LOW (ref 32–37)
MCHC RBC-ENTMCNC: 31.9 G/DL — LOW (ref 32–37)
MCHC RBC-ENTMCNC: 31.9 PG — HIGH (ref 27–31)
MCHC RBC-ENTMCNC: 32 G/DL — SIGNIFICANT CHANGE UP (ref 32–37)
MCHC RBC-ENTMCNC: 32.1 G/DL — SIGNIFICANT CHANGE UP (ref 32–37)
MCHC RBC-ENTMCNC: 32.1 PG — HIGH (ref 27–31)
MCHC RBC-ENTMCNC: 32.2 G/DL — SIGNIFICANT CHANGE UP (ref 32–37)
MCHC RBC-ENTMCNC: 32.2 G/DL — SIGNIFICANT CHANGE UP (ref 32–37)
MCHC RBC-ENTMCNC: 32.3 G/DL — SIGNIFICANT CHANGE UP (ref 32–37)
MCHC RBC-ENTMCNC: 32.3 PG — HIGH (ref 27–31)
MCHC RBC-ENTMCNC: 32.4 PG — HIGH (ref 27–31)
MCHC RBC-ENTMCNC: 32.6 G/DL — SIGNIFICANT CHANGE UP (ref 32–37)
MCHC RBC-ENTMCNC: 32.6 G/DL — SIGNIFICANT CHANGE UP (ref 32–37)
MCHC RBC-ENTMCNC: 32.7 PG — HIGH (ref 27–31)
MCHC RBC-ENTMCNC: 32.8 PG — HIGH (ref 27–31)
MCHC RBC-ENTMCNC: 32.8 PG — HIGH (ref 27–31)
MCHC RBC-ENTMCNC: 32.9 G/DL — SIGNIFICANT CHANGE UP (ref 32–37)
MCHC RBC-ENTMCNC: 32.9 PG — HIGH (ref 27–31)
MCHC RBC-ENTMCNC: 33 G/DL — SIGNIFICANT CHANGE UP (ref 32–37)
MCHC RBC-ENTMCNC: 33 PG — HIGH (ref 27–31)
MCHC RBC-ENTMCNC: 33 PG — HIGH (ref 27–31)
MCHC RBC-ENTMCNC: 33.1 G/DL — SIGNIFICANT CHANGE UP (ref 32–37)
MCHC RBC-ENTMCNC: 33.1 PG — HIGH (ref 27–31)
MCHC RBC-ENTMCNC: 33.2 PG — HIGH (ref 27–31)
MCHC RBC-ENTMCNC: 33.3 PG — HIGH (ref 27–31)
MCHC RBC-ENTMCNC: 33.5 G/DL — SIGNIFICANT CHANGE UP (ref 32–37)
MCHC RBC-ENTMCNC: 33.5 PG — HIGH (ref 27–31)
MCHC RBC-ENTMCNC: 33.5 PG — HIGH (ref 27–31)
MCHC RBC-ENTMCNC: 33.7 G/DL — SIGNIFICANT CHANGE UP (ref 32–37)
MCHC RBC-ENTMCNC: 33.7 PG — HIGH (ref 27–31)
MCHC RBC-ENTMCNC: 33.7 PG — HIGH (ref 27–31)
MCHC RBC-ENTMCNC: 33.9 PG — HIGH (ref 27–31)
MCHC RBC-ENTMCNC: 34 PG — HIGH (ref 27–31)
MCHC RBC-ENTMCNC: 34.2 G/DL — SIGNIFICANT CHANGE UP (ref 32–37)
MCHC RBC-ENTMCNC: 34.3 G/DL — SIGNIFICANT CHANGE UP (ref 32–37)
MCHC RBC-ENTMCNC: 35 G/DL — SIGNIFICANT CHANGE UP (ref 32–37)
MCHC RBC-ENTMCNC: 35.1 G/DL — SIGNIFICANT CHANGE UP (ref 32–37)
MCHC RBC-ENTMCNC: 35.9 G/DL — SIGNIFICANT CHANGE UP (ref 32–37)
MCHC RBC-ENTMCNC: 36 G/DL — SIGNIFICANT CHANGE UP (ref 32–37)
MCHC RBC-ENTMCNC: 36.4 G/DL — SIGNIFICANT CHANGE UP (ref 32–37)
MCV RBC AUTO: 100 FL — HIGH (ref 81–99)
MCV RBC AUTO: 100 FL — HIGH (ref 81–99)
MCV RBC AUTO: 100.7 FL — HIGH (ref 81–99)
MCV RBC AUTO: 100.8 FL — HIGH (ref 81–99)
MCV RBC AUTO: 101.1 FL — HIGH (ref 81–99)
MCV RBC AUTO: 102.4 FL — HIGH (ref 81–99)
MCV RBC AUTO: 102.4 FL — HIGH (ref 81–99)
MCV RBC AUTO: 102.8 FL — HIGH (ref 81–99)
MCV RBC AUTO: 103.5 FL — HIGH (ref 81–99)
MCV RBC AUTO: 104.4 FL — HIGH (ref 81–99)
MCV RBC AUTO: 107.1 FL — HIGH (ref 81–99)
MCV RBC AUTO: 90.7 FL — SIGNIFICANT CHANGE UP (ref 81–99)
MCV RBC AUTO: 91.7 FL — SIGNIFICANT CHANGE UP (ref 81–99)
MCV RBC AUTO: 92.5 FL — SIGNIFICANT CHANGE UP (ref 81–99)
MCV RBC AUTO: 92.9 FL — SIGNIFICANT CHANGE UP (ref 81–99)
MCV RBC AUTO: 93.5 FL — SIGNIFICANT CHANGE UP (ref 81–99)
MCV RBC AUTO: 95.4 FL — SIGNIFICANT CHANGE UP (ref 81–99)
MCV RBC AUTO: 96.4 FL — SIGNIFICANT CHANGE UP (ref 81–99)
MCV RBC AUTO: 96.9 FL — SIGNIFICANT CHANGE UP (ref 81–99)
MCV RBC AUTO: 97.3 FL — SIGNIFICANT CHANGE UP (ref 81–99)
MCV RBC AUTO: 97.7 FL — SIGNIFICANT CHANGE UP (ref 81–99)
MCV RBC AUTO: 97.8 FL — SIGNIFICANT CHANGE UP (ref 81–99)
MCV RBC AUTO: 98 FL — SIGNIFICANT CHANGE UP (ref 81–99)
MCV RBC AUTO: 99.2 FL — HIGH (ref 81–99)
MCV RBC AUTO: 99.6 FL — HIGH (ref 81–99)
MCV RBC AUTO: 99.7 FL — HIGH (ref 81–99)
MEGAKAROCYTIC NUCLEI OR CELLS: PRESENT — SIGNIFICANT CHANGE UP
METHOD TYPE: SIGNIFICANT CHANGE UP
MICROCYTES BLD QL: SLIGHT — SIGNIFICANT CHANGE UP
MICROCYTES BLD QL: SLIGHT — SIGNIFICANT CHANGE UP
MONOCYTES # BLD AUTO: 0.29 K/UL — SIGNIFICANT CHANGE UP (ref 0.1–0.6)
MONOCYTES # BLD AUTO: 0.31 K/UL — SIGNIFICANT CHANGE UP (ref 0.1–0.6)
MONOCYTES # BLD AUTO: 0.35 K/UL — SIGNIFICANT CHANGE UP (ref 0.1–0.6)
MONOCYTES # BLD AUTO: 0.41 K/UL — SIGNIFICANT CHANGE UP (ref 0.1–0.6)
MONOCYTES # BLD AUTO: 0.45 K/UL — SIGNIFICANT CHANGE UP (ref 0.1–0.6)
MONOCYTES # BLD AUTO: 0.52 K/UL — SIGNIFICANT CHANGE UP (ref 0.1–0.6)
MONOCYTES # BLD AUTO: 0.57 K/UL — SIGNIFICANT CHANGE UP (ref 0.1–0.6)
MONOCYTES # BLD AUTO: 1.11 K/UL — HIGH (ref 0.1–0.6)
MONOCYTES # BLD AUTO: 1.13 K/UL — HIGH (ref 0.1–0.6)
MONOCYTES # BLD AUTO: 1.22 K/UL — HIGH (ref 0.1–0.6)
MONOCYTES # BLD AUTO: 1.24 K/UL — HIGH (ref 0.1–0.6)
MONOCYTES # BLD AUTO: 1.28 K/UL — HIGH (ref 0.1–0.6)
MONOCYTES # BLD AUTO: 1.29 K/UL — HIGH (ref 0.1–0.6)
MONOCYTES # BLD AUTO: 1.38 K/UL — HIGH (ref 0.1–0.6)
MONOCYTES # BLD AUTO: 1.43 K/UL — HIGH (ref 0.1–0.6)
MONOCYTES # BLD AUTO: 1.61 K/UL — HIGH (ref 0.1–0.6)
MONOCYTES # BLD AUTO: 1.63 K/UL — HIGH (ref 0.1–0.6)
MONOCYTES # BLD AUTO: 1.7 K/UL — HIGH (ref 0.1–0.6)
MONOCYTES # BLD AUTO: 1.79 K/UL — HIGH (ref 0.1–0.6)
MONOCYTES NFR BLD AUTO: 1.4 % — LOW (ref 1.7–9.3)
MONOCYTES NFR BLD AUTO: 1.8 % — SIGNIFICANT CHANGE UP (ref 1.7–9.3)
MONOCYTES NFR BLD AUTO: 2.1 % — SIGNIFICANT CHANGE UP (ref 1.7–9.3)
MONOCYTES NFR BLD AUTO: 2.5 % — SIGNIFICANT CHANGE UP (ref 1.7–9.3)
MONOCYTES NFR BLD AUTO: 2.7 % — SIGNIFICANT CHANGE UP (ref 1.7–9.3)
MONOCYTES NFR BLD AUTO: 3.3 % — SIGNIFICANT CHANGE UP (ref 1.7–9.3)
MONOCYTES NFR BLD AUTO: 3.3 % — SIGNIFICANT CHANGE UP (ref 1.7–9.3)
MONOCYTES NFR BLD AUTO: 3.7 % — SIGNIFICANT CHANGE UP (ref 1.7–9.3)
MONOCYTES NFR BLD AUTO: 4.8 % — SIGNIFICANT CHANGE UP (ref 1.7–9.3)
MONOCYTES NFR BLD AUTO: 4.9 % — SIGNIFICANT CHANGE UP (ref 1.7–9.3)
MONOCYTES NFR BLD AUTO: 5.5 % — SIGNIFICANT CHANGE UP (ref 1.7–9.3)
MONOCYTES NFR BLD AUTO: 5.8 % — SIGNIFICANT CHANGE UP (ref 1.7–9.3)
MONOCYTES NFR BLD AUTO: 5.8 % — SIGNIFICANT CHANGE UP (ref 1.7–9.3)
MONOCYTES NFR BLD AUTO: 5.9 % — SIGNIFICANT CHANGE UP (ref 1.7–9.3)
MONOCYTES NFR BLD AUTO: 6.9 % — SIGNIFICANT CHANGE UP (ref 1.7–9.3)
MONOCYTES NFR BLD AUTO: 7.1 % — SIGNIFICANT CHANGE UP (ref 1.7–9.3)
MONOCYTES NFR BLD AUTO: 8.1 % — SIGNIFICANT CHANGE UP (ref 1.7–9.3)
MONOCYTES NFR BLD AUTO: 8.2 % — SIGNIFICANT CHANGE UP (ref 1.7–9.3)
MONOCYTES NFR BLD AUTO: 8.7 % — SIGNIFICANT CHANGE UP (ref 1.7–9.3)
MRSA PCR RESULT.: NEGATIVE — SIGNIFICANT CHANGE UP
MRSA PCR RESULT.: NEGATIVE — SIGNIFICANT CHANGE UP
MYELOCYTES NFR BLD: 0.9 % — HIGH (ref 0–0)
NEUTROPHILS # BLD AUTO: 13.62 K/UL — HIGH (ref 1.4–6.5)
NEUTROPHILS # BLD AUTO: 14.72 K/UL — HIGH (ref 1.4–6.5)
NEUTROPHILS # BLD AUTO: 15.24 K/UL — HIGH (ref 1.4–6.5)
NEUTROPHILS # BLD AUTO: 15.35 K/UL — HIGH (ref 1.4–6.5)
NEUTROPHILS # BLD AUTO: 16.15 K/UL — HIGH (ref 1.4–6.5)
NEUTROPHILS # BLD AUTO: 16.73 K/UL — HIGH (ref 1.4–6.5)
NEUTROPHILS # BLD AUTO: 16.92 K/UL — HIGH (ref 1.4–6.5)
NEUTROPHILS # BLD AUTO: 17.25 K/UL — HIGH (ref 1.4–6.5)
NEUTROPHILS # BLD AUTO: 17.65 K/UL — HIGH (ref 1.4–6.5)
NEUTROPHILS # BLD AUTO: 19.56 K/UL — HIGH (ref 1.4–6.5)
NEUTROPHILS # BLD AUTO: 20.96 K/UL — HIGH (ref 1.4–6.5)
NEUTROPHILS # BLD AUTO: 23.02 K/UL — HIGH (ref 1.4–6.5)
NEUTROPHILS # BLD AUTO: 26.81 K/UL — HIGH (ref 1.4–6.5)
NEUTROPHILS # BLD AUTO: 27.54 K/UL — HIGH (ref 1.4–6.5)
NEUTROPHILS # BLD AUTO: 29.69 K/UL — HIGH (ref 1.4–6.5)
NEUTROPHILS # BLD AUTO: 35.36 K/UL — HIGH (ref 1.4–6.5)
NEUTROPHILS # BLD AUTO: 36.22 K/UL — HIGH (ref 1.4–6.5)
NEUTROPHILS # BLD AUTO: 7.13 K/UL — HIGH (ref 1.4–6.5)
NEUTROPHILS # BLD AUTO: 8.58 K/UL — HIGH (ref 1.4–6.5)
NEUTROPHILS NFR BLD AUTO: 84.5 % — HIGH (ref 42.2–75.2)
NEUTROPHILS NFR BLD AUTO: 84.9 % — HIGH (ref 42.2–75.2)
NEUTROPHILS NFR BLD AUTO: 85.8 % — HIGH (ref 42.2–75.2)
NEUTROPHILS NFR BLD AUTO: 86.2 % — HIGH (ref 42.2–75.2)
NEUTROPHILS NFR BLD AUTO: 87.9 % — HIGH (ref 42.2–75.2)
NEUTROPHILS NFR BLD AUTO: 88.7 % — HIGH (ref 42.2–75.2)
NEUTROPHILS NFR BLD AUTO: 88.7 % — HIGH (ref 42.2–75.2)
NEUTROPHILS NFR BLD AUTO: 89 % — HIGH (ref 42.2–75.2)
NEUTROPHILS NFR BLD AUTO: 89 % — HIGH (ref 42.2–75.2)
NEUTROPHILS NFR BLD AUTO: 90.5 % — HIGH (ref 42.2–75.2)
NEUTROPHILS NFR BLD AUTO: 90.6 % — HIGH (ref 42.2–75.2)
NEUTROPHILS NFR BLD AUTO: 90.9 % — HIGH (ref 42.2–75.2)
NEUTROPHILS NFR BLD AUTO: 92.6 % — HIGH (ref 42.2–75.2)
NEUTROPHILS NFR BLD AUTO: 92.9 % — HIGH (ref 42.2–75.2)
NEUTROPHILS NFR BLD AUTO: 93.3 % — HIGH (ref 42.2–75.2)
NEUTROPHILS NFR BLD AUTO: 93.3 % — HIGH (ref 42.2–75.2)
NEUTROPHILS NFR BLD AUTO: 93.4 % — HIGH (ref 42.2–75.2)
NEUTROPHILS NFR BLD AUTO: 93.6 % — HIGH (ref 42.2–75.2)
NEUTROPHILS NFR BLD AUTO: 94.2 % — HIGH (ref 42.2–75.2)
NEUTS BAND # BLD: 0.9 % — SIGNIFICANT CHANGE UP (ref 0–6)
NITRITE UR-MCNC: NEGATIVE — SIGNIFICANT CHANGE UP
NRBC # BLD: 0 /100 WBCS — SIGNIFICANT CHANGE UP (ref 0–0)
ORGANISM # SPEC MICROSCOPIC CNT: SIGNIFICANT CHANGE UP
PCO2 BLDA: 43 MMHG — HIGH (ref 38–42)
PCO2 BLDA: 49 MMHG — HIGH (ref 38–42)
PCO2 BLDA: 52 MMHG — HIGH (ref 38–42)
PCO2 BLDA: 55 MMHG — HIGH (ref 38–42)
PCO2 BLDA: 56 MMHG — HIGH (ref 38–42)
PCO2 BLDA: 57 MMHG — HIGH (ref 38–42)
PCO2 BLDA: 61 MMHG — CRITICAL HIGH (ref 38–42)
PCO2 BLDA: 61 MMHG — CRITICAL HIGH (ref 38–42)
PCO2 BLDA: 62 MMHG — CRITICAL HIGH (ref 38–42)
PCO2 BLDA: 64 MMHG — CRITICAL HIGH (ref 38–42)
PH BLDA: 7.21 — LOW (ref 7.38–7.42)
PH BLDA: 7.23 — LOW (ref 7.38–7.42)
PH BLDA: 7.25 — LOW (ref 7.38–7.42)
PH BLDA: 7.26 — LOW (ref 7.38–7.42)
PH BLDA: 7.28 — LOW (ref 7.38–7.42)
PH BLDA: 7.3 — LOW (ref 7.38–7.42)
PH BLDA: 7.34 — LOW (ref 7.38–7.42)
PH BLDA: 7.36 — LOW (ref 7.38–7.42)
PH BLDA: 7.42 — SIGNIFICANT CHANGE UP (ref 7.38–7.42)
PH BLDA: 7.44 — HIGH (ref 7.38–7.42)
PH UR: 5 — SIGNIFICANT CHANGE UP (ref 5–8)
PH UR: 5.5 — SIGNIFICANT CHANGE UP (ref 5–8)
PH UR: 5.5 — SIGNIFICANT CHANGE UP (ref 5–8)
PH UR: 6 — SIGNIFICANT CHANGE UP (ref 5–8)
PH UR: 6.5 — SIGNIFICANT CHANGE UP (ref 5–8)
PHOSPHATE SERPL-MCNC: 1.9 MG/DL — LOW (ref 2.1–4.9)
PHOSPHATE SERPL-MCNC: 2.1 MG/DL — SIGNIFICANT CHANGE UP (ref 2.1–4.9)
PHOSPHATE SERPL-MCNC: 2.2 MG/DL — SIGNIFICANT CHANGE UP (ref 2.1–4.9)
PHOSPHATE SERPL-MCNC: 2.5 MG/DL — SIGNIFICANT CHANGE UP (ref 2.1–4.9)
PHOSPHATE SERPL-MCNC: 2.6 MG/DL — SIGNIFICANT CHANGE UP (ref 2.1–4.9)
PHOSPHATE SERPL-MCNC: 2.6 MG/DL — SIGNIFICANT CHANGE UP (ref 2.1–4.9)
PHOSPHATE SERPL-MCNC: 2.9 MG/DL — SIGNIFICANT CHANGE UP (ref 2.1–4.9)
PHOSPHATE SERPL-MCNC: 3.1 MG/DL — SIGNIFICANT CHANGE UP (ref 2.1–4.9)
PHOSPHATE SERPL-MCNC: 3.5 MG/DL — SIGNIFICANT CHANGE UP (ref 2.1–4.9)
PHOSPHATE SERPL-MCNC: 3.7 MG/DL — SIGNIFICANT CHANGE UP (ref 2.1–4.9)
PHOSPHATE SERPL-MCNC: 5.4 MG/DL — HIGH (ref 2.1–4.9)
PHOSPHATE SERPL-MCNC: 5.4 MG/DL — HIGH (ref 2.1–4.9)
PHOSPHATE SERPL-MCNC: 6 MG/DL — HIGH (ref 2.1–4.9)
PHOSPHATE SERPL-MCNC: 7.5 MG/DL — HIGH (ref 2.1–4.9)
PHOSPHATE SERPL-MCNC: 9.4 MG/DL — HIGH (ref 2.1–4.9)
PLAT MORPH BLD: NORMAL — SIGNIFICANT CHANGE UP
PLAT MORPH BLD: NORMAL — SIGNIFICANT CHANGE UP
PLATELET # BLD AUTO: 101 K/UL — LOW (ref 130–400)
PLATELET # BLD AUTO: 103 K/UL — LOW (ref 130–400)
PLATELET # BLD AUTO: 118 K/UL — LOW (ref 130–400)
PLATELET # BLD AUTO: 124 K/UL — LOW (ref 130–400)
PLATELET # BLD AUTO: 128 K/UL — LOW (ref 130–400)
PLATELET # BLD AUTO: 131 K/UL — SIGNIFICANT CHANGE UP (ref 130–400)
PLATELET # BLD AUTO: 136 K/UL — SIGNIFICANT CHANGE UP (ref 130–400)
PLATELET # BLD AUTO: 137 K/UL — SIGNIFICANT CHANGE UP (ref 130–400)
PLATELET # BLD AUTO: 141 K/UL — SIGNIFICANT CHANGE UP (ref 130–400)
PLATELET # BLD AUTO: 141 K/UL — SIGNIFICANT CHANGE UP (ref 130–400)
PLATELET # BLD AUTO: 142 K/UL — SIGNIFICANT CHANGE UP (ref 130–400)
PLATELET # BLD AUTO: 143 K/UL — SIGNIFICANT CHANGE UP (ref 130–400)
PLATELET # BLD AUTO: 145 K/UL — SIGNIFICANT CHANGE UP (ref 130–400)
PLATELET # BLD AUTO: 152 K/UL — SIGNIFICANT CHANGE UP (ref 130–400)
PLATELET # BLD AUTO: 153 K/UL — SIGNIFICANT CHANGE UP (ref 130–400)
PLATELET # BLD AUTO: 153 K/UL — SIGNIFICANT CHANGE UP (ref 130–400)
PLATELET # BLD AUTO: 163 K/UL — SIGNIFICANT CHANGE UP (ref 130–400)
PLATELET # BLD AUTO: 167 K/UL — SIGNIFICANT CHANGE UP (ref 130–400)
PLATELET # BLD AUTO: 169 K/UL — SIGNIFICANT CHANGE UP (ref 130–400)
PLATELET # BLD AUTO: 178 K/UL — SIGNIFICANT CHANGE UP (ref 130–400)
PLATELET # BLD AUTO: 187 K/UL — SIGNIFICANT CHANGE UP (ref 130–400)
PLATELET # BLD AUTO: 218 K/UL — SIGNIFICANT CHANGE UP (ref 130–400)
PLATELET # BLD AUTO: 228 K/UL — SIGNIFICANT CHANGE UP (ref 130–400)
PLATELET # BLD AUTO: 245 K/UL — SIGNIFICANT CHANGE UP (ref 130–400)
PLATELET # BLD AUTO: 279 K/UL — SIGNIFICANT CHANGE UP (ref 130–400)
PLATELET # BLD AUTO: 93 K/UL — LOW (ref 130–400)
PO2 BLDA: 106 MMHG — HIGH (ref 78–95)
PO2 BLDA: 136 MMHG — HIGH (ref 78–95)
PO2 BLDA: 51 MMHG — LOW (ref 78–95)
PO2 BLDA: 59 MMHG — LOW (ref 78–95)
PO2 BLDA: 65 MMHG — LOW (ref 78–95)
PO2 BLDA: 72 MMHG — LOW (ref 78–95)
PO2 BLDA: 75 MMHG — LOW (ref 78–95)
PO2 BLDA: 84 MMHG — SIGNIFICANT CHANGE UP (ref 78–95)
PO2 BLDA: 93 MMHG — SIGNIFICANT CHANGE UP (ref 78–95)
PO2 BLDA: 99 MMHG — HIGH (ref 78–95)
POIKILOCYTOSIS BLD QL AUTO: SLIGHT — SIGNIFICANT CHANGE UP
POLYCHROMASIA BLD QL SMEAR: SLIGHT — SIGNIFICANT CHANGE UP
POTASSIUM SERPL-MCNC: 3.4 MMOL/L — LOW (ref 3.5–5)
POTASSIUM SERPL-MCNC: 3.4 MMOL/L — LOW (ref 3.5–5)
POTASSIUM SERPL-MCNC: 3.5 MMOL/L — SIGNIFICANT CHANGE UP (ref 3.5–5)
POTASSIUM SERPL-MCNC: 3.6 MMOL/L — SIGNIFICANT CHANGE UP (ref 3.5–5)
POTASSIUM SERPL-MCNC: 3.7 MMOL/L — SIGNIFICANT CHANGE UP (ref 3.5–5)
POTASSIUM SERPL-MCNC: 3.9 MMOL/L — SIGNIFICANT CHANGE UP (ref 3.5–5)
POTASSIUM SERPL-MCNC: 4 MMOL/L — SIGNIFICANT CHANGE UP (ref 3.5–5)
POTASSIUM SERPL-MCNC: 4.2 MMOL/L — SIGNIFICANT CHANGE UP (ref 3.5–5)
POTASSIUM SERPL-MCNC: 4.2 MMOL/L — SIGNIFICANT CHANGE UP (ref 3.5–5)
POTASSIUM SERPL-MCNC: 4.3 MMOL/L — SIGNIFICANT CHANGE UP (ref 3.5–5)
POTASSIUM SERPL-MCNC: 4.3 MMOL/L — SIGNIFICANT CHANGE UP (ref 3.5–5)
POTASSIUM SERPL-MCNC: 4.4 MMOL/L — SIGNIFICANT CHANGE UP (ref 3.5–5)
POTASSIUM SERPL-MCNC: 4.5 MMOL/L — SIGNIFICANT CHANGE UP (ref 3.5–5)
POTASSIUM SERPL-MCNC: 4.5 MMOL/L — SIGNIFICANT CHANGE UP (ref 3.5–5)
POTASSIUM SERPL-MCNC: 4.6 MMOL/L — SIGNIFICANT CHANGE UP (ref 3.5–5)
POTASSIUM SERPL-MCNC: 4.7 MMOL/L — SIGNIFICANT CHANGE UP (ref 3.5–5)
POTASSIUM SERPL-MCNC: 4.8 MMOL/L — SIGNIFICANT CHANGE UP (ref 3.5–5)
POTASSIUM SERPL-MCNC: 5.1 MMOL/L — HIGH (ref 3.5–5)
POTASSIUM SERPL-MCNC: 5.1 MMOL/L — HIGH (ref 3.5–5)
POTASSIUM SERPL-MCNC: 5.3 MMOL/L — HIGH (ref 3.5–5)
POTASSIUM SERPL-MCNC: 5.5 MMOL/L — HIGH (ref 3.5–5)
POTASSIUM SERPL-MCNC: 5.7 MMOL/L — HIGH (ref 3.5–5)
POTASSIUM SERPL-SCNC: 3.4 MMOL/L — LOW (ref 3.5–5)
POTASSIUM SERPL-SCNC: 3.4 MMOL/L — LOW (ref 3.5–5)
POTASSIUM SERPL-SCNC: 3.5 MMOL/L — SIGNIFICANT CHANGE UP (ref 3.5–5)
POTASSIUM SERPL-SCNC: 3.6 MMOL/L — SIGNIFICANT CHANGE UP (ref 3.5–5)
POTASSIUM SERPL-SCNC: 3.7 MMOL/L — SIGNIFICANT CHANGE UP (ref 3.5–5)
POTASSIUM SERPL-SCNC: 3.9 MMOL/L — SIGNIFICANT CHANGE UP (ref 3.5–5)
POTASSIUM SERPL-SCNC: 4 MMOL/L — SIGNIFICANT CHANGE UP (ref 3.5–5)
POTASSIUM SERPL-SCNC: 4.2 MMOL/L — SIGNIFICANT CHANGE UP (ref 3.5–5)
POTASSIUM SERPL-SCNC: 4.2 MMOL/L — SIGNIFICANT CHANGE UP (ref 3.5–5)
POTASSIUM SERPL-SCNC: 4.3 MMOL/L — SIGNIFICANT CHANGE UP (ref 3.5–5)
POTASSIUM SERPL-SCNC: 4.3 MMOL/L — SIGNIFICANT CHANGE UP (ref 3.5–5)
POTASSIUM SERPL-SCNC: 4.4 MMOL/L — SIGNIFICANT CHANGE UP (ref 3.5–5)
POTASSIUM SERPL-SCNC: 4.5 MMOL/L — SIGNIFICANT CHANGE UP (ref 3.5–5)
POTASSIUM SERPL-SCNC: 4.5 MMOL/L — SIGNIFICANT CHANGE UP (ref 3.5–5)
POTASSIUM SERPL-SCNC: 4.6 MMOL/L — SIGNIFICANT CHANGE UP (ref 3.5–5)
POTASSIUM SERPL-SCNC: 4.7 MMOL/L — SIGNIFICANT CHANGE UP (ref 3.5–5)
POTASSIUM SERPL-SCNC: 4.8 MMOL/L — SIGNIFICANT CHANGE UP (ref 3.5–5)
POTASSIUM SERPL-SCNC: 5.1 MMOL/L — HIGH (ref 3.5–5)
POTASSIUM SERPL-SCNC: 5.1 MMOL/L — HIGH (ref 3.5–5)
POTASSIUM SERPL-SCNC: 5.3 MMOL/L — HIGH (ref 3.5–5)
POTASSIUM SERPL-SCNC: 5.5 MMOL/L — HIGH (ref 3.5–5)
POTASSIUM SERPL-SCNC: 5.7 MMOL/L — HIGH (ref 3.5–5)
PROCALCITONIN SERPL-MCNC: 0.16 NG/ML — HIGH (ref 0.02–0.1)
PROCALCITONIN SERPL-MCNC: 0.22 NG/ML — HIGH (ref 0.02–0.1)
PROCALCITONIN SERPL-MCNC: 0.42 NG/ML — HIGH (ref 0.02–0.1)
PROCALCITONIN SERPL-MCNC: 0.57 NG/ML — HIGH (ref 0.02–0.1)
PROCALCITONIN SERPL-MCNC: 0.82 NG/ML — HIGH (ref 0.02–0.1)
PROCALCITONIN SERPL-MCNC: 1.85 NG/ML — HIGH (ref 0.02–0.1)
PROCALCITONIN SERPL-MCNC: 1.95 NG/ML — HIGH (ref 0.02–0.1)
PROCALCITONIN SERPL-MCNC: 2.2 NG/ML — HIGH (ref 0.02–0.1)
PROT SERPL-MCNC: 3.3 G/DL — LOW (ref 6–8)
PROT SERPL-MCNC: 3.6 G/DL — LOW (ref 6–8)
PROT SERPL-MCNC: 3.7 G/DL — LOW (ref 6–8)
PROT SERPL-MCNC: 3.8 G/DL — LOW (ref 6–8)
PROT SERPL-MCNC: 3.8 G/DL — LOW (ref 6–8)
PROT SERPL-MCNC: 3.9 G/DL — LOW (ref 6–8)
PROT SERPL-MCNC: 4 G/DL — LOW (ref 6–8)
PROT SERPL-MCNC: 4.1 G/DL — LOW (ref 6–8)
PROT SERPL-MCNC: 4.3 G/DL — LOW (ref 6–8)
PROT SERPL-MCNC: 4.5 G/DL — LOW (ref 6–8)
PROT SERPL-MCNC: 4.6 G/DL — LOW (ref 6–8)
PROT SERPL-MCNC: 4.6 G/DL — LOW (ref 6–8)
PROT SERPL-MCNC: 4.7 G/DL — LOW (ref 6–8)
PROT SERPL-MCNC: 4.8 G/DL — LOW (ref 6–8)
PROT SERPL-MCNC: 5 G/DL — LOW (ref 6–8)
PROT SERPL-MCNC: 5.4 G/DL — LOW (ref 6–8)
PROT SERPL-MCNC: 5.5 G/DL — LOW (ref 6–8)
PROT SERPL-MCNC: 5.9 G/DL — LOW (ref 6–8)
PROT SERPL-MCNC: 6.1 G/DL — SIGNIFICANT CHANGE UP (ref 6–8)
PROT SERPL-MCNC: 6.3 G/DL — SIGNIFICANT CHANGE UP (ref 6–8)
PROT SERPL-MCNC: 6.6 G/DL — SIGNIFICANT CHANGE UP (ref 6–8)
PROT UR-MCNC: 30 MG/DL
PROT UR-MCNC: ABNORMAL
PROT UR-MCNC: SIGNIFICANT CHANGE UP
PROTHROM AB SERPL-ACNC: 11.4 SEC — SIGNIFICANT CHANGE UP (ref 9.95–12.87)
PROTHROM AB SERPL-ACNC: 11.6 SEC — SIGNIFICANT CHANGE UP (ref 9.95–12.87)
PROTHROM AB SERPL-ACNC: 12.5 SEC — SIGNIFICANT CHANGE UP (ref 9.95–12.87)
PROTHROM AB SERPL-ACNC: 13.2 SEC — HIGH (ref 9.95–12.87)
PROTHROM AB SERPL-ACNC: 13.4 SEC — HIGH (ref 9.95–12.87)
PROTHROM AB SERPL-ACNC: 13.6 SEC — HIGH (ref 9.95–12.87)
PROTHROM AB SERPL-ACNC: 14.3 SEC — HIGH (ref 9.95–12.87)
PROTHROM AB SERPL-ACNC: 14.7 SEC — HIGH (ref 9.95–12.87)
PROTHROM AB SERPL-ACNC: 14.7 SEC — HIGH (ref 9.95–12.87)
PROTHROM AB SERPL-ACNC: 14.9 SEC — HIGH (ref 9.95–12.87)
PROTHROM AB SERPL-ACNC: 15.1 SEC — HIGH (ref 9.95–12.87)
PROTHROM AB SERPL-ACNC: 15.2 SEC — HIGH (ref 9.95–12.87)
RBC # BLD: 1.97 M/UL — LOW (ref 4.2–5.4)
RBC # BLD: 2.05 M/UL — LOW (ref 4.2–5.4)
RBC # BLD: 2.15 M/UL — LOW (ref 4.2–5.4)
RBC # BLD: 2.31 M/UL — LOW (ref 4.2–5.4)
RBC # BLD: 2.44 M/UL — LOW (ref 4.2–5.4)
RBC # BLD: 2.47 M/UL — LOW (ref 4.2–5.4)
RBC # BLD: 2.52 M/UL — LOW (ref 4.2–5.4)
RBC # BLD: 2.52 M/UL — LOW (ref 4.2–5.4)
RBC # BLD: 2.58 M/UL — LOW (ref 4.2–5.4)
RBC # BLD: 2.58 M/UL — LOW (ref 4.2–5.4)
RBC # BLD: 2.62 M/UL — LOW (ref 4.2–5.4)
RBC # BLD: 2.62 M/UL — LOW (ref 4.2–5.4)
RBC # BLD: 2.63 M/UL — LOW (ref 4.2–5.4)
RBC # BLD: 2.73 M/UL — LOW (ref 4.2–5.4)
RBC # BLD: 2.8 M/UL — LOW (ref 4.2–5.4)
RBC # BLD: 2.84 M/UL — LOW (ref 4.2–5.4)
RBC # BLD: 2.9 M/UL — LOW (ref 4.2–5.4)
RBC # BLD: 2.94 M/UL — LOW (ref 4.2–5.4)
RBC # BLD: 2.94 M/UL — LOW (ref 4.2–5.4)
RBC # BLD: 3.06 M/UL — LOW (ref 4.2–5.4)
RBC # BLD: 3.23 M/UL — LOW (ref 4.2–5.4)
RBC # BLD: 3.39 M/UL — LOW (ref 4.2–5.4)
RBC # BLD: 3.52 M/UL — LOW (ref 4.2–5.4)
RBC # BLD: 3.54 M/UL — LOW (ref 4.2–5.4)
RBC # BLD: 3.66 M/UL — LOW (ref 4.2–5.4)
RBC # BLD: 3.7 M/UL — LOW (ref 4.2–5.4)
RBC # FLD: 15.1 % — HIGH (ref 11.5–14.5)
RBC # FLD: 15.2 % — HIGH (ref 11.5–14.5)
RBC # FLD: 15.4 % — HIGH (ref 11.5–14.5)
RBC # FLD: 15.4 % — HIGH (ref 11.5–14.5)
RBC # FLD: 15.5 % — HIGH (ref 11.5–14.5)
RBC # FLD: 15.9 % — HIGH (ref 11.5–14.5)
RBC # FLD: 16 % — HIGH (ref 11.5–14.5)
RBC # FLD: 16.1 % — HIGH (ref 11.5–14.5)
RBC # FLD: 16.2 % — HIGH (ref 11.5–14.5)
RBC # FLD: 16.3 % — HIGH (ref 11.5–14.5)
RBC # FLD: 16.4 % — HIGH (ref 11.5–14.5)
RBC # FLD: 16.5 % — HIGH (ref 11.5–14.5)
RBC # FLD: 16.6 % — HIGH (ref 11.5–14.5)
RBC # FLD: 16.6 % — HIGH (ref 11.5–14.5)
RBC # FLD: 16.7 % — HIGH (ref 11.5–14.5)
RBC BLD AUTO: ABNORMAL
RBC BLD AUTO: ABNORMAL
RBC CASTS # UR COMP ASSIST: 1 /HPF — SIGNIFICANT CHANGE UP (ref 0–4)
RBC CASTS # UR COMP ASSIST: 133 /HPF — HIGH (ref 0–4)
RBC CASTS # UR COMP ASSIST: 2 /HPF — SIGNIFICANT CHANGE UP (ref 0–4)
RBC CASTS # UR COMP ASSIST: 20 /HPF — HIGH (ref 0–4)
RBC CASTS # UR COMP ASSIST: 20 /HPF — HIGH (ref 0–4)
RBC CASTS # UR COMP ASSIST: 21 /HPF — HIGH (ref 0–4)
RBC CASTS # UR COMP ASSIST: 33 /HPF — HIGH (ref 0–4)
RBC CASTS # UR COMP ASSIST: 38 /HPF — HIGH (ref 0–4)
RBC CASTS # UR COMP ASSIST: 39 /HPF — HIGH (ref 0–4)
RBC CASTS # UR COMP ASSIST: 66 /HPF — HIGH (ref 0–4)
RBC CASTS # UR COMP ASSIST: 67 /HPF — HIGH (ref 0–4)
RBC CASTS # UR COMP ASSIST: 7 /HPF — HIGH (ref 0–4)
RBC CASTS # UR COMP ASSIST: 7 /HPF — HIGH (ref 0–4)
RBC CASTS # UR COMP ASSIST: SIGNIFICANT CHANGE UP /HPF
SAO2 % BLDA: 87 % — LOW (ref 92–96)
SAO2 % BLDA: 89 % — LOW (ref 92–96)
SAO2 % BLDA: 90 % — LOW (ref 92–96)
SAO2 % BLDA: 92 % — SIGNIFICANT CHANGE UP (ref 92–96)
SAO2 % BLDA: 94 % — SIGNIFICANT CHANGE UP (ref 92–96)
SAO2 % BLDA: 96 % — SIGNIFICANT CHANGE UP (ref 92–96)
SAO2 % BLDA: 96 % — SIGNIFICANT CHANGE UP (ref 92–96)
SAO2 % BLDA: 98 % — HIGH (ref 92–96)
SARS-COV-2 RNA SPEC QL NAA+PROBE: DETECTED
SCHISTOCYTES BLD QL AUTO: SLIGHT — SIGNIFICANT CHANGE UP
SMUDGE CELLS # BLD: PRESENT — SIGNIFICANT CHANGE UP
SODIUM SERPL-SCNC: 129 MMOL/L — LOW (ref 135–146)
SODIUM SERPL-SCNC: 129 MMOL/L — LOW (ref 135–146)
SODIUM SERPL-SCNC: 130 MMOL/L — LOW (ref 135–146)
SODIUM SERPL-SCNC: 131 MMOL/L — LOW (ref 135–146)
SODIUM SERPL-SCNC: 132 MMOL/L — LOW (ref 135–146)
SODIUM SERPL-SCNC: 133 MMOL/L — LOW (ref 135–146)
SODIUM SERPL-SCNC: 136 MMOL/L — SIGNIFICANT CHANGE UP (ref 135–146)
SODIUM SERPL-SCNC: 137 MMOL/L — SIGNIFICANT CHANGE UP (ref 135–146)
SODIUM SERPL-SCNC: 138 MMOL/L — SIGNIFICANT CHANGE UP (ref 135–146)
SODIUM SERPL-SCNC: 141 MMOL/L — SIGNIFICANT CHANGE UP (ref 135–146)
SODIUM SERPL-SCNC: 143 MMOL/L — SIGNIFICANT CHANGE UP (ref 135–146)
SODIUM SERPL-SCNC: 144 MMOL/L — SIGNIFICANT CHANGE UP (ref 135–146)
SODIUM SERPL-SCNC: 145 MMOL/L — SIGNIFICANT CHANGE UP (ref 135–146)
SODIUM SERPL-SCNC: 145 MMOL/L — SIGNIFICANT CHANGE UP (ref 135–146)
SODIUM SERPL-SCNC: 147 MMOL/L — HIGH (ref 135–146)
SP GR SPEC: 1.01 — LOW (ref 1.01–1.02)
SP GR SPEC: 1.01 — SIGNIFICANT CHANGE UP (ref 1.01–1.02)
SP GR SPEC: 1.01 — SIGNIFICANT CHANGE UP (ref 1.01–1.02)
SP GR SPEC: 1.02 — SIGNIFICANT CHANGE UP (ref 1.01–1.02)
SP GR SPEC: 1.02 — SIGNIFICANT CHANGE UP (ref 1.01–1.03)
SPECIMEN SOURCE: SIGNIFICANT CHANGE UP
TARGETS BLD QL SMEAR: SLIGHT — SIGNIFICANT CHANGE UP
TRIGL SERPL-MCNC: 105 MG/DL — SIGNIFICANT CHANGE UP (ref 10–149)
TROPONIN T SERPL-MCNC: <0.01 NG/ML — SIGNIFICANT CHANGE UP
UROBILINOGEN FLD QL: 0.2 MG/DL — SIGNIFICANT CHANGE UP (ref 0.2–0.2)
UROBILINOGEN FLD QL: ABNORMAL
UROBILINOGEN FLD QL: ABNORMAL
UROBILINOGEN FLD QL: SIGNIFICANT CHANGE UP
VANCOMYCIN FLD-MCNC: 14.2 UG/ML — HIGH (ref 5–10)
VANCOMYCIN FLD-MCNC: 19.8 UG/ML — HIGH (ref 5–10)
VANCOMYCIN FLD-MCNC: 27.2 UG/ML — HIGH (ref 5–10)
VANCOMYCIN TROUGH SERPL-MCNC: 27.2 UG/ML — HIGH (ref 5–10)
WBC # BLD: 13.69 K/UL — HIGH (ref 4.8–10.8)
WBC # BLD: 15.79 K/UL — HIGH (ref 4.8–10.8)
WBC # BLD: 15.82 K/UL — HIGH (ref 4.8–10.8)
WBC # BLD: 16.41 K/UL — HIGH (ref 4.8–10.8)
WBC # BLD: 16.45 K/UL — HIGH (ref 4.8–10.8)
WBC # BLD: 18.01 K/UL — HIGH (ref 4.8–10.8)
WBC # BLD: 18.02 K/UL — HIGH (ref 4.8–10.8)
WBC # BLD: 19.01 K/UL — HIGH (ref 4.8–10.8)
WBC # BLD: 20.11 K/UL — HIGH (ref 4.8–10.8)
WBC # BLD: 20.78 K/UL — HIGH (ref 4.8–10.8)
WBC # BLD: 20.94 K/UL — HIGH (ref 4.8–10.8)
WBC # BLD: 24.79 K/UL — HIGH (ref 4.8–10.8)
WBC # BLD: 25.44 K/UL — HIGH (ref 4.8–10.8)
WBC # BLD: 26.11 K/UL — HIGH (ref 4.8–10.8)
WBC # BLD: 26.28 K/UL — HIGH (ref 4.8–10.8)
WBC # BLD: 30.21 K/UL — HIGH (ref 4.8–10.8)
WBC # BLD: 30.92 K/UL — HIGH (ref 4.8–10.8)
WBC # BLD: 30.95 K/UL — HIGH (ref 4.8–10.8)
WBC # BLD: 31.76 K/UL — HIGH (ref 4.8–10.8)
WBC # BLD: 32.77 K/UL — HIGH (ref 4.8–10.8)
WBC # BLD: 35.3 K/UL — HIGH (ref 4.8–10.8)
WBC # BLD: 37.53 K/UL — HIGH (ref 4.8–10.8)
WBC # BLD: 38.87 K/UL — HIGH (ref 4.8–10.8)
WBC # BLD: 8.12 K/UL — SIGNIFICANT CHANGE UP (ref 4.8–10.8)
WBC # BLD: 8.14 K/UL — SIGNIFICANT CHANGE UP (ref 4.8–10.8)
WBC # BLD: 9.44 K/UL — SIGNIFICANT CHANGE UP (ref 4.8–10.8)
WBC # FLD AUTO: 13.69 K/UL — HIGH (ref 4.8–10.8)
WBC # FLD AUTO: 15.79 K/UL — HIGH (ref 4.8–10.8)
WBC # FLD AUTO: 15.82 K/UL — HIGH (ref 4.8–10.8)
WBC # FLD AUTO: 16.41 K/UL — HIGH (ref 4.8–10.8)
WBC # FLD AUTO: 16.45 K/UL — HIGH (ref 4.8–10.8)
WBC # FLD AUTO: 18.01 K/UL — HIGH (ref 4.8–10.8)
WBC # FLD AUTO: 18.02 K/UL — HIGH (ref 4.8–10.8)
WBC # FLD AUTO: 19.01 K/UL — HIGH (ref 4.8–10.8)
WBC # FLD AUTO: 20.11 K/UL — HIGH (ref 4.8–10.8)
WBC # FLD AUTO: 20.78 K/UL — HIGH (ref 4.8–10.8)
WBC # FLD AUTO: 20.94 K/UL — HIGH (ref 4.8–10.8)
WBC # FLD AUTO: 24.79 K/UL — HIGH (ref 4.8–10.8)
WBC # FLD AUTO: 25.44 K/UL — HIGH (ref 4.8–10.8)
WBC # FLD AUTO: 26.11 K/UL — HIGH (ref 4.8–10.8)
WBC # FLD AUTO: 26.28 K/UL — HIGH (ref 4.8–10.8)
WBC # FLD AUTO: 30.21 K/UL — HIGH (ref 4.8–10.8)
WBC # FLD AUTO: 30.92 K/UL — HIGH (ref 4.8–10.8)
WBC # FLD AUTO: 30.95 K/UL — HIGH (ref 4.8–10.8)
WBC # FLD AUTO: 31.76 K/UL — HIGH (ref 4.8–10.8)
WBC # FLD AUTO: 32.77 K/UL — HIGH (ref 4.8–10.8)
WBC # FLD AUTO: 35.3 K/UL — HIGH (ref 4.8–10.8)
WBC # FLD AUTO: 37.53 K/UL — HIGH (ref 4.8–10.8)
WBC # FLD AUTO: 38.87 K/UL — HIGH (ref 4.8–10.8)
WBC # FLD AUTO: 8.12 K/UL — SIGNIFICANT CHANGE UP (ref 4.8–10.8)
WBC # FLD AUTO: 8.14 K/UL — SIGNIFICANT CHANGE UP (ref 4.8–10.8)
WBC # FLD AUTO: 9.44 K/UL — SIGNIFICANT CHANGE UP (ref 4.8–10.8)
WBC UR QL: 118 /HPF — HIGH (ref 0–5)
WBC UR QL: 14 /HPF — HIGH (ref 0–5)
WBC UR QL: 14 /HPF — HIGH (ref 0–5)
WBC UR QL: 16 /HPF — HIGH (ref 0–5)
WBC UR QL: 26 /HPF — HIGH (ref 0–5)
WBC UR QL: 3 /HPF — SIGNIFICANT CHANGE UP (ref 0–5)
WBC UR QL: 336 /HPF — HIGH (ref 0–5)
WBC UR QL: 37 /HPF — HIGH (ref 0–5)
WBC UR QL: 45 /HPF — HIGH (ref 0–5)
WBC UR QL: 49 /HPF — HIGH (ref 0–5)
WBC UR QL: 59 /HPF — HIGH (ref 0–5)
WBC UR QL: 68 /HPF — HIGH (ref 0–5)
WBC UR QL: 88 /HPF — HIGH (ref 0–5)
WBC UR QL: ABNORMAL /HPF

## 2020-01-01 PROCEDURE — 71045 X-RAY EXAM CHEST 1 VIEW: CPT | Mod: 26

## 2020-01-01 PROCEDURE — 93010 ELECTROCARDIOGRAM REPORT: CPT | Mod: 76

## 2020-01-01 PROCEDURE — 99223 1ST HOSP IP/OBS HIGH 75: CPT

## 2020-01-01 PROCEDURE — 70450 CT HEAD/BRAIN W/O DYE: CPT | Mod: 26,CS

## 2020-01-01 PROCEDURE — 99291 CRITICAL CARE FIRST HOUR: CPT

## 2020-01-01 PROCEDURE — 99233 SBSQ HOSP IP/OBS HIGH 50: CPT

## 2020-01-01 PROCEDURE — 71045 X-RAY EXAM CHEST 1 VIEW: CPT | Mod: 26,77

## 2020-01-01 PROCEDURE — 99285 EMERGENCY DEPT VISIT HI MDM: CPT

## 2020-01-01 PROCEDURE — 71045 X-RAY EXAM CHEST 1 VIEW: CPT | Mod: 26,CS

## 2020-01-01 PROCEDURE — 95720 EEG PHY/QHP EA INCR W/VEEG: CPT

## 2020-01-01 PROCEDURE — 93010 ELECTROCARDIOGRAM REPORT: CPT

## 2020-01-01 PROCEDURE — 71045 X-RAY EXAM CHEST 1 VIEW: CPT | Mod: 26,CS,77

## 2020-01-01 PROCEDURE — 71045 X-RAY EXAM CHEST 1 VIEW: CPT | Mod: 26,76

## 2020-01-01 PROCEDURE — 99238 HOSP IP/OBS DSCHRG MGMT 30/<: CPT

## 2020-01-01 PROCEDURE — 99497 ADVNCD CARE PLAN 30 MIN: CPT | Mod: 25

## 2020-01-01 PROCEDURE — 70551 MRI BRAIN STEM W/O DYE: CPT | Mod: 26,CS

## 2020-01-01 PROCEDURE — 99222 1ST HOSP IP/OBS MODERATE 55: CPT

## 2020-01-01 RX ORDER — SODIUM CHLORIDE 9 MG/ML
1000 INJECTION INTRAMUSCULAR; INTRAVENOUS; SUBCUTANEOUS ONCE
Refills: 0 | Status: COMPLETED | OUTPATIENT
Start: 2020-01-01 | End: 2020-01-01

## 2020-01-01 RX ORDER — ACETAMINOPHEN 500 MG
650 TABLET ORAL EVERY 6 HOURS
Refills: 0 | Status: DISCONTINUED | OUTPATIENT
Start: 2020-01-01 | End: 2020-01-01

## 2020-01-01 RX ORDER — VANCOMYCIN HCL 1 G
750 VIAL (EA) INTRAVENOUS EVERY 24 HOURS
Refills: 0 | Status: DISCONTINUED | OUTPATIENT
Start: 2020-01-01 | End: 2020-01-01

## 2020-01-01 RX ORDER — SODIUM CHLORIDE 9 MG/ML
1000 INJECTION INTRAMUSCULAR; INTRAVENOUS; SUBCUTANEOUS
Refills: 0 | Status: DISCONTINUED | OUTPATIENT
Start: 2020-01-01 | End: 2020-01-01

## 2020-01-01 RX ORDER — DEXMEDETOMIDINE HYDROCHLORIDE IN 0.9% SODIUM CHLORIDE 4 UG/ML
0.2 INJECTION INTRAVENOUS
Qty: 400 | Refills: 0 | Status: DISCONTINUED | OUTPATIENT
Start: 2020-01-01 | End: 2020-01-01

## 2020-01-01 RX ORDER — FLUCONAZOLE 150 MG/1
TABLET ORAL
Refills: 0 | Status: DISCONTINUED | OUTPATIENT
Start: 2020-01-01 | End: 2020-01-01

## 2020-01-01 RX ORDER — GUAIFENESIN/DEXTROMETHORPHAN 600MG-30MG
10 TABLET, EXTENDED RELEASE 12 HR ORAL
Qty: 0 | Refills: 0 | DISCHARGE
Start: 2020-01-01

## 2020-01-01 RX ORDER — ONDANSETRON 8 MG/1
4 TABLET, FILM COATED ORAL EVERY 8 HOURS
Refills: 0 | Status: DISCONTINUED | OUTPATIENT
Start: 2020-01-01 | End: 2020-01-01

## 2020-01-01 RX ORDER — FLUCONAZOLE 150 MG/1
400 TABLET ORAL ONCE
Refills: 0 | Status: COMPLETED | OUTPATIENT
Start: 2020-01-01 | End: 2020-01-01

## 2020-01-01 RX ORDER — MEROPENEM 1 G/30ML
1000 INJECTION INTRAVENOUS EVERY 12 HOURS
Refills: 0 | Status: DISCONTINUED | OUTPATIENT
Start: 2020-01-01 | End: 2020-01-01

## 2020-01-01 RX ORDER — ACETAMINOPHEN 500 MG
325 TABLET ORAL ONCE
Refills: 0 | Status: COMPLETED | OUTPATIENT
Start: 2020-01-01 | End: 2020-01-01

## 2020-01-01 RX ORDER — ROCURONIUM BROMIDE 10 MG/ML
35 VIAL (ML) INTRAVENOUS ONCE
Refills: 0 | Status: COMPLETED | OUTPATIENT
Start: 2020-01-01 | End: 2020-01-01

## 2020-01-01 RX ORDER — ALPRAZOLAM 0.25 MG
1 TABLET ORAL THREE TIMES A DAY
Refills: 0 | Status: DISCONTINUED | OUTPATIENT
Start: 2020-01-01 | End: 2020-01-01

## 2020-01-01 RX ORDER — ATORVASTATIN CALCIUM 80 MG/1
80 TABLET, FILM COATED ORAL AT BEDTIME
Refills: 0 | Status: DISCONTINUED | OUTPATIENT
Start: 2020-01-01 | End: 2020-01-01

## 2020-01-01 RX ORDER — MORPHINE SULFATE 50 MG/1
10 CAPSULE, EXTENDED RELEASE ORAL
Refills: 0 | Status: DISCONTINUED | OUTPATIENT
Start: 2020-01-01 | End: 2020-01-01

## 2020-01-01 RX ORDER — LACTULOSE 10 G/15ML
20 SOLUTION ORAL
Refills: 0 | Status: DISCONTINUED | OUTPATIENT
Start: 2020-01-01 | End: 2020-01-01

## 2020-01-01 RX ORDER — BUDESONIDE AND FORMOTEROL FUMARATE DIHYDRATE 160; 4.5 UG/1; UG/1
2 AEROSOL RESPIRATORY (INHALATION)
Refills: 0 | Status: DISCONTINUED | OUTPATIENT
Start: 2020-01-01 | End: 2020-01-01

## 2020-01-01 RX ORDER — PANTOPRAZOLE SODIUM 20 MG/1
40 TABLET, DELAYED RELEASE ORAL
Refills: 0 | Status: DISCONTINUED | OUTPATIENT
Start: 2020-01-01 | End: 2020-01-01

## 2020-01-01 RX ORDER — MEROPENEM 1 G/30ML
1000 INJECTION INTRAVENOUS EVERY 12 HOURS
Refills: 0 | Status: COMPLETED | OUTPATIENT
Start: 2020-01-01 | End: 2020-01-01

## 2020-01-01 RX ORDER — SODIUM CHLORIDE 9 MG/ML
1000 INJECTION, SOLUTION INTRAVENOUS ONCE
Refills: 0 | Status: DISCONTINUED | OUTPATIENT
Start: 2020-01-01 | End: 2020-01-01

## 2020-01-01 RX ORDER — MIDAZOLAM HYDROCHLORIDE 1 MG/ML
0.02 INJECTION, SOLUTION INTRAMUSCULAR; INTRAVENOUS
Qty: 100 | Refills: 0 | Status: DISCONTINUED | OUTPATIENT
Start: 2020-01-01 | End: 2020-01-01

## 2020-01-01 RX ORDER — METOPROLOL TARTRATE 50 MG
1 TABLET ORAL
Qty: 0 | Refills: 0 | DISCHARGE

## 2020-01-01 RX ORDER — ALPRAZOLAM 0.25 MG
1 TABLET ORAL
Qty: 0 | Refills: 0 | DISCHARGE

## 2020-01-01 RX ORDER — SENNA PLUS 8.6 MG/1
2 TABLET ORAL AT BEDTIME
Refills: 0 | Status: DISCONTINUED | OUTPATIENT
Start: 2020-01-01 | End: 2020-01-01

## 2020-01-01 RX ORDER — FLUCONAZOLE 150 MG/1
800 TABLET ORAL ONCE
Refills: 0 | Status: DISCONTINUED | OUTPATIENT
Start: 2020-01-01 | End: 2020-01-01

## 2020-01-01 RX ORDER — DEXTROSE 50 % IN WATER 50 %
15 SYRINGE (ML) INTRAVENOUS ONCE
Refills: 0 | Status: DISCONTINUED | OUTPATIENT
Start: 2020-01-01 | End: 2020-01-01

## 2020-01-01 RX ORDER — LEVETIRACETAM 250 MG/1
1000 TABLET, FILM COATED ORAL EVERY 12 HOURS
Refills: 0 | Status: DISCONTINUED | OUTPATIENT
Start: 2020-01-01 | End: 2020-01-01

## 2020-01-01 RX ORDER — SODIUM CHLORIDE 9 MG/ML
500 INJECTION, SOLUTION INTRAVENOUS ONCE
Refills: 0 | Status: DISCONTINUED | OUTPATIENT
Start: 2020-01-01 | End: 2020-01-01

## 2020-01-01 RX ORDER — LABETALOL HCL 100 MG
10 TABLET ORAL ONCE
Refills: 0 | Status: COMPLETED | OUTPATIENT
Start: 2020-01-01 | End: 2020-01-01

## 2020-01-01 RX ORDER — SERTRALINE 25 MG/1
1 TABLET, FILM COATED ORAL
Qty: 0 | Refills: 0 | DISCHARGE
Start: 2020-01-01

## 2020-01-01 RX ORDER — ERGOCALCIFEROL 1.25 MG/1
50000 CAPSULE ORAL
Refills: 0 | Status: DISCONTINUED | OUTPATIENT
Start: 2020-01-01 | End: 2020-01-01

## 2020-01-01 RX ORDER — POLYETHYLENE GLYCOL 3350 17 G/17G
17 POWDER, FOR SOLUTION ORAL DAILY
Refills: 0 | Status: DISCONTINUED | OUTPATIENT
Start: 2020-01-01 | End: 2020-01-01

## 2020-01-01 RX ORDER — LABETALOL HCL 100 MG
100 TABLET ORAL
Refills: 0 | Status: DISCONTINUED | OUTPATIENT
Start: 2020-01-01 | End: 2020-01-01

## 2020-01-01 RX ORDER — CEFTRIAXONE 500 MG/1
1000 INJECTION, POWDER, FOR SOLUTION INTRAMUSCULAR; INTRAVENOUS EVERY 24 HOURS
Refills: 0 | Status: DISCONTINUED | OUTPATIENT
Start: 2020-01-01 | End: 2020-01-01

## 2020-01-01 RX ORDER — SODIUM CHLORIDE 9 MG/ML
1000 INJECTION, SOLUTION INTRAVENOUS
Refills: 0 | Status: DISCONTINUED | OUTPATIENT
Start: 2020-01-01 | End: 2020-01-01

## 2020-01-01 RX ORDER — ACETAMINOPHEN 500 MG
650 TABLET ORAL ONCE
Refills: 0 | Status: COMPLETED | OUTPATIENT
Start: 2020-01-01 | End: 2020-01-01

## 2020-01-01 RX ORDER — ASCORBIC ACID 60 MG
500 TABLET,CHEWABLE ORAL DAILY
Refills: 0 | Status: DISCONTINUED | OUTPATIENT
Start: 2020-01-01 | End: 2020-01-01

## 2020-01-01 RX ORDER — CASPOFUNGIN ACETATE 7 MG/ML
INJECTION, POWDER, LYOPHILIZED, FOR SOLUTION INTRAVENOUS
Refills: 0 | Status: DISCONTINUED | OUTPATIENT
Start: 2020-01-01 | End: 2020-01-01

## 2020-01-01 RX ORDER — SODIUM CHLORIDE 9 MG/ML
1000 INJECTION, SOLUTION INTRAVENOUS ONCE
Refills: 0 | Status: COMPLETED | OUTPATIENT
Start: 2020-01-01 | End: 2020-01-01

## 2020-01-01 RX ORDER — PROPOFOL 10 MG/ML
20 INJECTION, EMULSION INTRAVENOUS
Qty: 500 | Refills: 0 | Status: DISCONTINUED | OUTPATIENT
Start: 2020-01-01 | End: 2020-01-01

## 2020-01-01 RX ORDER — CASPOFUNGIN ACETATE 7 MG/ML
70 INJECTION, POWDER, LYOPHILIZED, FOR SOLUTION INTRAVENOUS ONCE
Refills: 0 | Status: COMPLETED | OUTPATIENT
Start: 2020-01-01 | End: 2020-01-01

## 2020-01-01 RX ORDER — POTASSIUM CHLORIDE 20 MEQ
40 PACKET (EA) ORAL ONCE
Refills: 0 | Status: COMPLETED | OUTPATIENT
Start: 2020-01-01 | End: 2020-01-01

## 2020-01-01 RX ORDER — GUAIFENESIN/DEXTROMETHORPHAN 600MG-30MG
10 TABLET, EXTENDED RELEASE 12 HR ORAL EVERY 4 HOURS
Refills: 0 | Status: DISCONTINUED | OUTPATIENT
Start: 2020-01-01 | End: 2020-01-01

## 2020-01-01 RX ORDER — FOLIC ACID 0.8 MG
1 TABLET ORAL
Qty: 0 | Refills: 0 | DISCHARGE

## 2020-01-01 RX ORDER — METHOTREXATE 2.5 MG/1
3 TABLET ORAL
Qty: 0 | Refills: 0 | DISCHARGE

## 2020-01-01 RX ORDER — PROPOFOL 10 MG/ML
10 INJECTION, EMULSION INTRAVENOUS
Qty: 1000 | Refills: 0 | Status: DISCONTINUED | OUTPATIENT
Start: 2020-01-01 | End: 2020-01-01

## 2020-01-01 RX ORDER — NOREPINEPHRINE BITARTRATE/D5W 8 MG/250ML
0.05 PLASTIC BAG, INJECTION (ML) INTRAVENOUS
Qty: 16 | Refills: 0 | Status: DISCONTINUED | OUTPATIENT
Start: 2020-01-01 | End: 2020-01-01

## 2020-01-01 RX ORDER — INSULIN LISPRO 100/ML
5 VIAL (ML) SUBCUTANEOUS ONCE
Refills: 0 | Status: COMPLETED | OUTPATIENT
Start: 2020-01-01 | End: 2020-01-01

## 2020-01-01 RX ORDER — PANTOPRAZOLE SODIUM 20 MG/1
8 TABLET, DELAYED RELEASE ORAL
Qty: 80 | Refills: 0 | Status: DISCONTINUED | OUTPATIENT
Start: 2020-01-01 | End: 2020-01-01

## 2020-01-01 RX ORDER — HEPARIN SODIUM 5000 [USP'U]/ML
5000 INJECTION INTRAVENOUS; SUBCUTANEOUS
Refills: 0 | Status: DISCONTINUED | OUTPATIENT
Start: 2020-01-01 | End: 2020-01-01

## 2020-01-01 RX ORDER — HYDROXYCHLOROQUINE SULFATE 200 MG
800 TABLET ORAL EVERY 24 HOURS
Refills: 0 | Status: COMPLETED | OUTPATIENT
Start: 2020-01-01 | End: 2020-01-01

## 2020-01-01 RX ORDER — TICAGRELOR 90 MG/1
90 TABLET ORAL EVERY 12 HOURS
Refills: 0 | Status: DISCONTINUED | OUTPATIENT
Start: 2020-01-01 | End: 2020-01-01

## 2020-01-01 RX ORDER — ASPIRIN/CALCIUM CARB/MAGNESIUM 324 MG
81 TABLET ORAL DAILY
Refills: 0 | Status: DISCONTINUED | OUTPATIENT
Start: 2020-01-01 | End: 2020-01-01

## 2020-01-01 RX ORDER — VANCOMYCIN HCL 1 G
1000 VIAL (EA) INTRAVENOUS ONCE
Refills: 0 | Status: COMPLETED | OUTPATIENT
Start: 2020-01-01 | End: 2020-01-01

## 2020-01-01 RX ORDER — INSULIN LISPRO 100/ML
2 VIAL (ML) SUBCUTANEOUS
Refills: 0 | Status: DISCONTINUED | OUTPATIENT
Start: 2020-01-01 | End: 2020-01-01

## 2020-01-01 RX ORDER — GLUCAGON INJECTION, SOLUTION 0.5 MG/.1ML
1 INJECTION, SOLUTION SUBCUTANEOUS ONCE
Refills: 0 | Status: DISCONTINUED | OUTPATIENT
Start: 2020-01-01 | End: 2020-01-01

## 2020-01-01 RX ORDER — SODIUM BICARBONATE 1 MEQ/ML
0.21 SYRINGE (ML) INTRAVENOUS
Qty: 150 | Refills: 0 | Status: DISCONTINUED | OUTPATIENT
Start: 2020-01-01 | End: 2020-01-01

## 2020-01-01 RX ORDER — MEROPENEM 1 G/30ML
1 INJECTION INTRAVENOUS EVERY 12 HOURS
Refills: 0 | Status: DISCONTINUED | OUTPATIENT
Start: 2020-01-01 | End: 2020-01-01

## 2020-01-01 RX ORDER — SODIUM ZIRCONIUM CYCLOSILICATE 10 G/10G
5 POWDER, FOR SUSPENSION ORAL ONCE
Refills: 0 | Status: COMPLETED | OUTPATIENT
Start: 2020-01-01 | End: 2020-01-01

## 2020-01-01 RX ORDER — ERGOCALCIFEROL 1.25 MG/1
1 CAPSULE ORAL
Qty: 0 | Refills: 0 | DISCHARGE

## 2020-01-01 RX ORDER — DEXMEDETOMIDINE HYDROCHLORIDE IN 0.9% SODIUM CHLORIDE 4 UG/ML
0.2 INJECTION INTRAVENOUS
Qty: 200 | Refills: 0 | Status: DISCONTINUED | OUTPATIENT
Start: 2020-01-01 | End: 2020-01-01

## 2020-01-01 RX ORDER — MORPHINE SULFATE 50 MG/1
1 CAPSULE, EXTENDED RELEASE ORAL
Qty: 100 | Refills: 0 | Status: DISCONTINUED | OUTPATIENT
Start: 2020-01-01 | End: 2020-01-01

## 2020-01-01 RX ORDER — VANCOMYCIN HCL 1 G
750 VIAL (EA) INTRAVENOUS EVERY 12 HOURS
Refills: 0 | Status: DISCONTINUED | OUTPATIENT
Start: 2020-01-01 | End: 2020-01-01

## 2020-01-01 RX ORDER — METOPROLOL TARTRATE 50 MG
12.5 TABLET ORAL
Refills: 0 | Status: DISCONTINUED | OUTPATIENT
Start: 2020-01-01 | End: 2020-01-01

## 2020-01-01 RX ORDER — CHLORHEXIDINE GLUCONATE 213 G/1000ML
15 SOLUTION TOPICAL EVERY 12 HOURS
Refills: 0 | Status: DISCONTINUED | OUTPATIENT
Start: 2020-01-01 | End: 2020-01-01

## 2020-01-01 RX ORDER — ZINC SULFATE TAB 220 MG (50 MG ZINC EQUIVALENT) 220 (50 ZN) MG
220 TAB ORAL DAILY
Refills: 0 | Status: DISCONTINUED | OUTPATIENT
Start: 2020-01-01 | End: 2020-01-01

## 2020-01-01 RX ORDER — AZITHROMYCIN 500 MG/1
500 TABLET, FILM COATED ORAL ONCE
Refills: 0 | Status: COMPLETED | OUTPATIENT
Start: 2020-01-01 | End: 2020-01-01

## 2020-01-01 RX ORDER — ROCURONIUM BROMIDE 10 MG/ML
25 VIAL (ML) INTRAVENOUS ONCE
Refills: 0 | Status: COMPLETED | OUTPATIENT
Start: 2020-01-01 | End: 2020-01-01

## 2020-01-01 RX ORDER — FUROSEMIDE 40 MG
8 TABLET ORAL
Qty: 500 | Refills: 0 | Status: DISCONTINUED | OUTPATIENT
Start: 2020-01-01 | End: 2020-01-01

## 2020-01-01 RX ORDER — DEXTROSE 50 % IN WATER 50 %
25 SYRINGE (ML) INTRAVENOUS ONCE
Refills: 0 | Status: DISCONTINUED | OUTPATIENT
Start: 2020-01-01 | End: 2020-01-01

## 2020-01-01 RX ORDER — PANTOPRAZOLE SODIUM 20 MG/1
40 TABLET, DELAYED RELEASE ORAL DAILY
Refills: 0 | Status: DISCONTINUED | OUTPATIENT
Start: 2020-01-01 | End: 2020-01-01

## 2020-01-01 RX ORDER — FUROSEMIDE 40 MG
60 TABLET ORAL ONCE
Refills: 0 | Status: COMPLETED | OUTPATIENT
Start: 2020-01-01 | End: 2020-01-01

## 2020-01-01 RX ORDER — MEROPENEM 1 G/30ML
1000 INJECTION INTRAVENOUS EVERY 8 HOURS
Refills: 0 | Status: DISCONTINUED | OUTPATIENT
Start: 2020-01-01 | End: 2020-01-01

## 2020-01-01 RX ORDER — HYDROXYCHLOROQUINE SULFATE 200 MG
TABLET ORAL
Refills: 0 | Status: DISCONTINUED | OUTPATIENT
Start: 2020-01-01 | End: 2020-01-01

## 2020-01-01 RX ORDER — CHLORHEXIDINE GLUCONATE 213 G/1000ML
1 SOLUTION TOPICAL
Refills: 0 | Status: DISCONTINUED | OUTPATIENT
Start: 2020-01-01 | End: 2020-01-01

## 2020-01-01 RX ORDER — ANAKINRA 100MG/0.67
100 SYRINGE (ML) SUBCUTANEOUS EVERY 6 HOURS
Refills: 0 | Status: DISCONTINUED | OUTPATIENT
Start: 2020-01-01 | End: 2020-01-01

## 2020-01-01 RX ORDER — ANAKINRA 100MG/0.67
SYRINGE (ML) SUBCUTANEOUS
Refills: 0 | Status: DISCONTINUED | OUTPATIENT
Start: 2020-01-01 | End: 2020-01-01

## 2020-01-01 RX ORDER — FOLIC ACID 0.8 MG
1 TABLET ORAL DAILY
Refills: 0 | Status: DISCONTINUED | OUTPATIENT
Start: 2020-01-01 | End: 2020-01-01

## 2020-01-01 RX ORDER — INSULIN LISPRO 100/ML
VIAL (ML) SUBCUTANEOUS
Refills: 0 | Status: DISCONTINUED | OUTPATIENT
Start: 2020-01-01 | End: 2020-01-01

## 2020-01-01 RX ORDER — FUROSEMIDE 40 MG
80 TABLET ORAL ONCE
Refills: 0 | Status: COMPLETED | OUTPATIENT
Start: 2020-01-01 | End: 2020-01-01

## 2020-01-01 RX ORDER — SERTRALINE 25 MG/1
50 TABLET, FILM COATED ORAL AT BEDTIME
Refills: 0 | Status: DISCONTINUED | OUTPATIENT
Start: 2020-01-01 | End: 2020-01-01

## 2020-01-01 RX ORDER — TICAGRELOR 90 MG/1
90 TABLET ORAL
Refills: 0 | Status: DISCONTINUED | OUTPATIENT
Start: 2020-01-01 | End: 2020-01-01

## 2020-01-01 RX ORDER — FAMOTIDINE 10 MG/ML
20 INJECTION INTRAVENOUS AT BEDTIME
Refills: 0 | Status: DISCONTINUED | OUTPATIENT
Start: 2020-01-01 | End: 2020-01-01

## 2020-01-01 RX ORDER — METOPROLOL TARTRATE 50 MG
25 TABLET ORAL
Refills: 0 | Status: DISCONTINUED | OUTPATIENT
Start: 2020-01-01 | End: 2020-01-01

## 2020-01-01 RX ORDER — ROSUVASTATIN CALCIUM 5 MG/1
1 TABLET ORAL
Qty: 0 | Refills: 0 | DISCHARGE

## 2020-01-01 RX ORDER — FAMOTIDINE 10 MG/ML
1 INJECTION INTRAVENOUS
Qty: 0 | Refills: 0 | DISCHARGE

## 2020-01-01 RX ORDER — PANTOPRAZOLE SODIUM 20 MG/1
1 TABLET, DELAYED RELEASE ORAL
Qty: 0 | Refills: 0 | DISCHARGE

## 2020-01-01 RX ORDER — VASOPRESSIN 20 [USP'U]/ML
0.04 INJECTION INTRAVENOUS
Qty: 50 | Refills: 0 | Status: DISCONTINUED | OUTPATIENT
Start: 2020-01-01 | End: 2020-01-01

## 2020-01-01 RX ORDER — FENTANYL CITRATE 50 UG/ML
0.5 INJECTION INTRAVENOUS
Qty: 2500 | Refills: 0 | Status: DISCONTINUED | OUTPATIENT
Start: 2020-01-01 | End: 2020-01-01

## 2020-01-01 RX ORDER — SODIUM CHLORIDE 9 MG/ML
500 INJECTION, SOLUTION INTRAVENOUS ONCE
Refills: 0 | Status: COMPLETED | OUTPATIENT
Start: 2020-01-01 | End: 2020-01-01

## 2020-01-01 RX ORDER — PROPOFOL 10 MG/ML
20 INJECTION, EMULSION INTRAVENOUS
Qty: 1000 | Refills: 0 | Status: DISCONTINUED | OUTPATIENT
Start: 2020-01-01 | End: 2020-01-01

## 2020-01-01 RX ORDER — HYDROXYCHLOROQUINE SULFATE 200 MG
400 TABLET ORAL DAILY
Refills: 0 | Status: COMPLETED | OUTPATIENT
Start: 2020-01-01 | End: 2020-01-01

## 2020-01-01 RX ORDER — AZTREONAM 2 G
1 VIAL (EA) INJECTION
Qty: 6 | Refills: 0
Start: 2020-01-01 | End: 2020-01-01

## 2020-01-01 RX ORDER — HYDROXYCHLOROQUINE SULFATE 200 MG
400 TABLET ORAL EVERY 24 HOURS
Refills: 0 | Status: DISCONTINUED | OUTPATIENT
Start: 2020-01-01 | End: 2020-01-01

## 2020-01-01 RX ORDER — DEXTROSE 50 % IN WATER 50 %
12.5 SYRINGE (ML) INTRAVENOUS ONCE
Refills: 0 | Status: DISCONTINUED | OUTPATIENT
Start: 2020-01-01 | End: 2020-01-01

## 2020-01-01 RX ORDER — AZITHROMYCIN 500 MG/1
500 TABLET, FILM COATED ORAL EVERY 24 HOURS
Refills: 0 | Status: COMPLETED | OUTPATIENT
Start: 2020-01-01 | End: 2020-01-01

## 2020-01-01 RX ORDER — INSULIN LISPRO 100/ML
2 VIAL (ML) SUBCUTANEOUS EVERY 8 HOURS
Refills: 0 | Status: DISCONTINUED | OUTPATIENT
Start: 2020-01-01 | End: 2020-01-01

## 2020-01-01 RX ORDER — CASPOFUNGIN ACETATE 7 MG/ML
50 INJECTION, POWDER, LYOPHILIZED, FOR SOLUTION INTRAVENOUS EVERY 24 HOURS
Refills: 0 | Status: DISCONTINUED | OUTPATIENT
Start: 2020-01-01 | End: 2020-01-01

## 2020-01-01 RX ORDER — INSULIN GLARGINE 100 [IU]/ML
6 INJECTION, SOLUTION SUBCUTANEOUS AT BEDTIME
Refills: 0 | Status: DISCONTINUED | OUTPATIENT
Start: 2020-01-01 | End: 2020-01-01

## 2020-01-01 RX ORDER — FLUCONAZOLE 150 MG/1
400 TABLET ORAL EVERY 24 HOURS
Refills: 0 | Status: DISCONTINUED | OUTPATIENT
Start: 2020-01-01 | End: 2020-01-01

## 2020-01-01 RX ORDER — METHOTREXATE 2.5 MG/1
7.5 TABLET ORAL
Refills: 0 | Status: DISCONTINUED | OUTPATIENT
Start: 2020-01-01 | End: 2020-01-01

## 2020-01-01 RX ORDER — FLUCONAZOLE 150 MG/1
200 TABLET ORAL EVERY 24 HOURS
Refills: 0 | Status: DISCONTINUED | OUTPATIENT
Start: 2020-01-01 | End: 2020-01-01

## 2020-01-01 RX ORDER — METHOTREXATE 2.5 MG/1
6 TABLET ORAL
Qty: 0 | Refills: 0 | DISCHARGE

## 2020-01-01 RX ORDER — SERTRALINE 25 MG/1
50 TABLET, FILM COATED ORAL DAILY
Refills: 0 | Status: DISCONTINUED | OUTPATIENT
Start: 2020-01-01 | End: 2020-01-01

## 2020-01-01 RX ORDER — MIDAZOLAM HYDROCHLORIDE 1 MG/ML
0.05 INJECTION, SOLUTION INTRAMUSCULAR; INTRAVENOUS
Qty: 100 | Refills: 0 | Status: DISCONTINUED | OUTPATIENT
Start: 2020-01-01 | End: 2020-01-01

## 2020-01-01 RX ORDER — AZITHROMYCIN 500 MG/1
TABLET, FILM COATED ORAL
Refills: 0 | Status: COMPLETED | OUTPATIENT
Start: 2020-01-01 | End: 2020-01-01

## 2020-01-01 RX ORDER — MIDAZOLAM HYDROCHLORIDE 1 MG/ML
4 INJECTION, SOLUTION INTRAMUSCULAR; INTRAVENOUS ONCE
Refills: 0 | Status: DISCONTINUED | OUTPATIENT
Start: 2020-01-01 | End: 2020-01-01

## 2020-01-01 RX ORDER — METOPROLOL TARTRATE 50 MG
25 TABLET ORAL DAILY
Refills: 0 | Status: DISCONTINUED | OUTPATIENT
Start: 2020-01-01 | End: 2020-01-01

## 2020-01-01 RX ORDER — CISATRACURIUM BESYLATE 2 MG/ML
3 INJECTION INTRAVENOUS
Qty: 200 | Refills: 0 | Status: DISCONTINUED | OUTPATIENT
Start: 2020-01-01 | End: 2020-01-01

## 2020-01-01 RX ORDER — ENOXAPARIN SODIUM 100 MG/ML
40 INJECTION SUBCUTANEOUS DAILY
Refills: 0 | Status: DISCONTINUED | OUTPATIENT
Start: 2020-01-01 | End: 2020-01-01

## 2020-01-01 RX ORDER — HYDRALAZINE HCL 50 MG
10 TABLET ORAL DAILY
Refills: 0 | Status: DISCONTINUED | OUTPATIENT
Start: 2020-01-01 | End: 2020-01-01

## 2020-01-01 RX ORDER — ACETAMINOPHEN 500 MG
650 TABLET ORAL ONCE
Refills: 0 | Status: DISCONTINUED | OUTPATIENT
Start: 2020-01-01 | End: 2020-01-01

## 2020-01-01 RX ORDER — FUROSEMIDE 40 MG
6 TABLET ORAL
Qty: 500 | Refills: 0 | Status: DISCONTINUED | OUTPATIENT
Start: 2020-01-01 | End: 2020-01-01

## 2020-01-01 RX ORDER — MORPHINE SULFATE 50 MG/1
10 CAPSULE, EXTENDED RELEASE ORAL
Qty: 100 | Refills: 0 | Status: DISCONTINUED | OUTPATIENT
Start: 2020-01-01 | End: 2020-01-01

## 2020-01-01 RX ADMIN — CHLORHEXIDINE GLUCONATE 1 APPLICATION(S): 213 SOLUTION TOPICAL at 05:20

## 2020-01-01 RX ADMIN — SERTRALINE 50 MILLIGRAM(S): 25 TABLET, FILM COATED ORAL at 12:54

## 2020-01-01 RX ADMIN — Medication 2 UNIT(S): at 06:43

## 2020-01-01 RX ADMIN — Medication 1 TABLET(S): at 05:57

## 2020-01-01 RX ADMIN — Medication 25 MILLIGRAM(S): at 17:13

## 2020-01-01 RX ADMIN — Medication 250 MILLIGRAM(S): at 13:48

## 2020-01-01 RX ADMIN — ENOXAPARIN SODIUM 40 MILLIGRAM(S): 100 INJECTION SUBCUTANEOUS at 11:46

## 2020-01-01 RX ADMIN — Medication 1 MILLIGRAM(S): at 11:37

## 2020-01-01 RX ADMIN — SENNA PLUS 2 TABLET(S): 8.6 TABLET ORAL at 04:57

## 2020-01-01 RX ADMIN — SODIUM ZIRCONIUM CYCLOSILICATE 5 GRAM(S): 10 POWDER, FOR SUSPENSION ORAL at 12:19

## 2020-01-01 RX ADMIN — MEROPENEM 100 MILLIGRAM(S): 1 INJECTION INTRAVENOUS at 13:25

## 2020-01-01 RX ADMIN — ATORVASTATIN CALCIUM 80 MILLIGRAM(S): 80 TABLET, FILM COATED ORAL at 22:02

## 2020-01-01 RX ADMIN — SERTRALINE 50 MILLIGRAM(S): 25 TABLET, FILM COATED ORAL at 22:17

## 2020-01-01 RX ADMIN — Medication 2 UNIT(S): at 22:00

## 2020-01-01 RX ADMIN — Medication 81 MILLIGRAM(S): at 11:46

## 2020-01-01 RX ADMIN — SENNA PLUS 2 TABLET(S): 8.6 TABLET ORAL at 21:28

## 2020-01-01 RX ADMIN — TICAGRELOR 90 MILLIGRAM(S): 90 TABLET ORAL at 05:43

## 2020-01-01 RX ADMIN — Medication 1 MILLIGRAM(S): at 12:16

## 2020-01-01 RX ADMIN — TICAGRELOR 90 MILLIGRAM(S): 90 TABLET ORAL at 04:36

## 2020-01-01 RX ADMIN — ERGOCALCIFEROL 50000 UNIT(S): 1.25 CAPSULE ORAL at 11:46

## 2020-01-01 RX ADMIN — MEROPENEM 100 MILLIGRAM(S): 1 INJECTION INTRAVENOUS at 17:20

## 2020-01-01 RX ADMIN — CHLORHEXIDINE GLUCONATE 15 MILLILITER(S): 213 SOLUTION TOPICAL at 17:13

## 2020-01-01 RX ADMIN — INSULIN GLARGINE 6 UNIT(S): 100 INJECTION, SOLUTION SUBCUTANEOUS at 21:58

## 2020-01-01 RX ADMIN — MEROPENEM 100 MILLIGRAM(S): 1 INJECTION INTRAVENOUS at 16:03

## 2020-01-01 RX ADMIN — Medication 2 UNIT(S): at 05:23

## 2020-01-01 RX ADMIN — CASPOFUNGIN ACETATE 260 MILLIGRAM(S): 7 INJECTION, POWDER, LYOPHILIZED, FOR SOLUTION INTRAVENOUS at 12:13

## 2020-01-01 RX ADMIN — MEROPENEM 100 MILLIGRAM(S): 1 INJECTION INTRAVENOUS at 17:01

## 2020-01-01 RX ADMIN — Medication 40 MILLIGRAM(S): at 05:44

## 2020-01-01 RX ADMIN — CHLORHEXIDINE GLUCONATE 15 MILLILITER(S): 213 SOLUTION TOPICAL at 17:01

## 2020-01-01 RX ADMIN — CHLORHEXIDINE GLUCONATE 15 MILLILITER(S): 213 SOLUTION TOPICAL at 05:03

## 2020-01-01 RX ADMIN — INSULIN GLARGINE 6 UNIT(S): 100 INJECTION, SOLUTION SUBCUTANEOUS at 22:02

## 2020-01-01 RX ADMIN — Medication 25 MILLIGRAM(S): at 05:47

## 2020-01-01 RX ADMIN — Medication 60 MILLIGRAM(S): at 16:33

## 2020-01-01 RX ADMIN — AZITHROMYCIN 255 MILLIGRAM(S): 500 TABLET, FILM COATED ORAL at 13:43

## 2020-01-01 RX ADMIN — Medication 10 MILLIGRAM(S): at 05:57

## 2020-01-01 RX ADMIN — Medication 650 MILLIGRAM(S): at 19:41

## 2020-01-01 RX ADMIN — Medication 650 MILLIGRAM(S): at 22:18

## 2020-01-01 RX ADMIN — TICAGRELOR 90 MILLIGRAM(S): 90 TABLET ORAL at 17:50

## 2020-01-01 RX ADMIN — MEROPENEM 100 MILLIGRAM(S): 1 INJECTION INTRAVENOUS at 05:23

## 2020-01-01 RX ADMIN — Medication 250 MILLIGRAM(S): at 16:21

## 2020-01-01 RX ADMIN — MEROPENEM 100 MILLIGRAM(S): 1 INJECTION INTRAVENOUS at 04:39

## 2020-01-01 RX ADMIN — INSULIN GLARGINE 6 UNIT(S): 100 INJECTION, SOLUTION SUBCUTANEOUS at 23:58

## 2020-01-01 RX ADMIN — SERTRALINE 50 MILLIGRAM(S): 25 TABLET, FILM COATED ORAL at 11:36

## 2020-01-01 RX ADMIN — Medication 60 MILLIGRAM(S): at 15:58

## 2020-01-01 RX ADMIN — Medication 25 MILLIGRAM(S): at 16:02

## 2020-01-01 RX ADMIN — MORPHINE SULFATE 1 MG/HR: 50 CAPSULE, EXTENDED RELEASE ORAL at 10:19

## 2020-01-01 RX ADMIN — CHLORHEXIDINE GLUCONATE 1 APPLICATION(S): 213 SOLUTION TOPICAL at 05:32

## 2020-01-01 RX ADMIN — Medication 25 MILLIGRAM(S): at 05:57

## 2020-01-01 RX ADMIN — Medication 2 UNIT(S): at 21:57

## 2020-01-01 RX ADMIN — CHLORHEXIDINE GLUCONATE 15 MILLILITER(S): 213 SOLUTION TOPICAL at 16:13

## 2020-01-01 RX ADMIN — Medication 10 MILLIGRAM(S): at 16:33

## 2020-01-01 RX ADMIN — TICAGRELOR 90 MILLIGRAM(S): 90 TABLET ORAL at 17:40

## 2020-01-01 RX ADMIN — ZINC SULFATE TAB 220 MG (50 MG ZINC EQUIVALENT) 220 MILLIGRAM(S): 220 (50 ZN) TAB at 10:53

## 2020-01-01 RX ADMIN — ERGOCALCIFEROL 50000 UNIT(S): 1.25 CAPSULE ORAL at 11:16

## 2020-01-01 RX ADMIN — TICAGRELOR 90 MILLIGRAM(S): 90 TABLET ORAL at 17:24

## 2020-01-01 RX ADMIN — Medication 12.5 MILLIGRAM(S): at 15:58

## 2020-01-01 RX ADMIN — Medication 10 MILLIGRAM(S): at 05:44

## 2020-01-01 RX ADMIN — ATORVASTATIN CALCIUM 80 MILLIGRAM(S): 80 TABLET, FILM COATED ORAL at 21:09

## 2020-01-01 RX ADMIN — PANTOPRAZOLE SODIUM 40 MILLIGRAM(S): 20 TABLET, DELAYED RELEASE ORAL at 11:56

## 2020-01-01 RX ADMIN — PANTOPRAZOLE SODIUM 40 MILLIGRAM(S): 20 TABLET, DELAYED RELEASE ORAL at 11:13

## 2020-01-01 RX ADMIN — BUDESONIDE AND FORMOTEROL FUMARATE DIHYDRATE 2 PUFF(S): 160; 4.5 AEROSOL RESPIRATORY (INHALATION) at 20:38

## 2020-01-01 RX ADMIN — Medication 2 UNIT(S): at 14:03

## 2020-01-01 RX ADMIN — SODIUM CHLORIDE 1000 MILLILITER(S): 9 INJECTION INTRAMUSCULAR; INTRAVENOUS; SUBCUTANEOUS at 19:45

## 2020-01-01 RX ADMIN — Medication 12.5 MILLIGRAM(S): at 04:25

## 2020-01-01 RX ADMIN — TICAGRELOR 90 MILLIGRAM(S): 90 TABLET ORAL at 05:19

## 2020-01-01 RX ADMIN — Medication 75 MEQ/KG/HR: at 17:15

## 2020-01-01 RX ADMIN — SENNA PLUS 2 TABLET(S): 8.6 TABLET ORAL at 22:02

## 2020-01-01 RX ADMIN — Medication 25 MILLIGRAM(S): at 11:39

## 2020-01-01 RX ADMIN — Medication 12.5 MILLIGRAM(S): at 17:01

## 2020-01-01 RX ADMIN — Medication 2 MG/HR: at 10:18

## 2020-01-01 RX ADMIN — Medication 81 MILLIGRAM(S): at 12:51

## 2020-01-01 RX ADMIN — CHLORHEXIDINE GLUCONATE 15 MILLILITER(S): 213 SOLUTION TOPICAL at 05:40

## 2020-01-01 RX ADMIN — Medication 1 MILLIGRAM(S): at 11:51

## 2020-01-01 RX ADMIN — TICAGRELOR 90 MILLIGRAM(S): 90 TABLET ORAL at 17:01

## 2020-01-01 RX ADMIN — CHLORHEXIDINE GLUCONATE 1 APPLICATION(S): 213 SOLUTION TOPICAL at 05:03

## 2020-01-01 RX ADMIN — SERTRALINE 50 MILLIGRAM(S): 25 TABLET, FILM COATED ORAL at 13:43

## 2020-01-01 RX ADMIN — FLUCONAZOLE 50 MILLIGRAM(S): 150 TABLET ORAL at 16:31

## 2020-01-01 RX ADMIN — HEPARIN SODIUM 5000 UNIT(S): 5000 INJECTION INTRAVENOUS; SUBCUTANEOUS at 17:50

## 2020-01-01 RX ADMIN — Medication 325 MILLIGRAM(S): at 10:25

## 2020-01-01 RX ADMIN — Medication 81 MILLIGRAM(S): at 11:36

## 2020-01-01 RX ADMIN — Medication 81 MILLIGRAM(S): at 13:29

## 2020-01-01 RX ADMIN — MEROPENEM 100 MILLIGRAM(S): 1 INJECTION INTRAVENOUS at 13:48

## 2020-01-01 RX ADMIN — CHLORHEXIDINE GLUCONATE 15 MILLILITER(S): 213 SOLUTION TOPICAL at 18:01

## 2020-01-01 RX ADMIN — BUDESONIDE AND FORMOTEROL FUMARATE DIHYDRATE 2 PUFF(S): 160; 4.5 AEROSOL RESPIRATORY (INHALATION) at 10:21

## 2020-01-01 RX ADMIN — Medication 1 MILLIGRAM(S): at 13:35

## 2020-01-01 RX ADMIN — FLUCONAZOLE 50 MILLIGRAM(S): 150 TABLET ORAL at 15:32

## 2020-01-01 RX ADMIN — MEROPENEM 100 MILLIGRAM(S): 1 INJECTION INTRAVENOUS at 04:21

## 2020-01-01 RX ADMIN — SERTRALINE 50 MILLIGRAM(S): 25 TABLET, FILM COATED ORAL at 11:51

## 2020-01-01 RX ADMIN — LEVETIRACETAM 400 MILLIGRAM(S): 250 TABLET, FILM COATED ORAL at 16:14

## 2020-01-01 RX ADMIN — Medication 12.5 MILLIGRAM(S): at 04:36

## 2020-01-01 RX ADMIN — MEROPENEM 100 MILLIGRAM(S): 1 INJECTION INTRAVENOUS at 17:24

## 2020-01-01 RX ADMIN — PROPOFOL 6.36 MICROGRAM(S)/KG/MIN: 10 INJECTION, EMULSION INTRAVENOUS at 13:34

## 2020-01-01 RX ADMIN — Medication 650 MILLIGRAM(S): at 20:00

## 2020-01-01 RX ADMIN — PANTOPRAZOLE SODIUM 40 MILLIGRAM(S): 20 TABLET, DELAYED RELEASE ORAL at 11:59

## 2020-01-01 RX ADMIN — Medication 81 MILLIGRAM(S): at 13:43

## 2020-01-01 RX ADMIN — Medication 40 MILLIGRAM(S): at 05:12

## 2020-01-01 RX ADMIN — Medication 60 MILLIGRAM(S): at 04:36

## 2020-01-01 RX ADMIN — ATORVASTATIN CALCIUM 80 MILLIGRAM(S): 80 TABLET, FILM COATED ORAL at 04:56

## 2020-01-01 RX ADMIN — ENOXAPARIN SODIUM 40 MILLIGRAM(S): 100 INJECTION SUBCUTANEOUS at 11:36

## 2020-01-01 RX ADMIN — Medication 60 MILLIGRAM(S): at 17:39

## 2020-01-01 RX ADMIN — Medication 1: at 06:43

## 2020-01-01 RX ADMIN — PANTOPRAZOLE SODIUM 40 MILLIGRAM(S): 20 TABLET, DELAYED RELEASE ORAL at 12:16

## 2020-01-01 RX ADMIN — ENOXAPARIN SODIUM 40 MILLIGRAM(S): 100 INJECTION SUBCUTANEOUS at 11:14

## 2020-01-01 RX ADMIN — CASPOFUNGIN ACETATE 260 MILLIGRAM(S): 7 INJECTION, POWDER, LYOPHILIZED, FOR SOLUTION INTRAVENOUS at 13:49

## 2020-01-01 RX ADMIN — CHLORHEXIDINE GLUCONATE 15 MILLILITER(S): 213 SOLUTION TOPICAL at 05:47

## 2020-01-01 RX ADMIN — TICAGRELOR 90 MILLIGRAM(S): 90 TABLET ORAL at 04:58

## 2020-01-01 RX ADMIN — MEROPENEM 100 MILLIGRAM(S): 1 INJECTION INTRAVENOUS at 05:03

## 2020-01-01 RX ADMIN — POLYETHYLENE GLYCOL 3350 17 GRAM(S): 17 POWDER, FOR SOLUTION ORAL at 11:37

## 2020-01-01 RX ADMIN — Medication 81 MILLIGRAM(S): at 10:20

## 2020-01-01 RX ADMIN — MEROPENEM 100 MILLIGRAM(S): 1 INJECTION INTRAVENOUS at 20:53

## 2020-01-01 RX ADMIN — Medication 800 MILLIGRAM(S): at 11:02

## 2020-01-01 RX ADMIN — Medication 400 MILLIGRAM(S): at 11:46

## 2020-01-01 RX ADMIN — CHLORHEXIDINE GLUCONATE 15 MILLILITER(S): 213 SOLUTION TOPICAL at 05:20

## 2020-01-01 RX ADMIN — Medication 10 MILLIGRAM(S): at 05:15

## 2020-01-01 RX ADMIN — Medication 1 MILLIGRAM(S): at 12:13

## 2020-01-01 RX ADMIN — AZITHROMYCIN 255 MILLIGRAM(S): 500 TABLET, FILM COATED ORAL at 12:53

## 2020-01-01 RX ADMIN — FAMOTIDINE 20 MILLIGRAM(S): 10 INJECTION INTRAVENOUS at 21:31

## 2020-01-01 RX ADMIN — Medication 40 MILLIGRAM(S): at 11:14

## 2020-01-01 RX ADMIN — FAMOTIDINE 20 MILLIGRAM(S): 10 INJECTION INTRAVENOUS at 20:39

## 2020-01-01 RX ADMIN — HEPARIN SODIUM 5000 UNIT(S): 5000 INJECTION INTRAVENOUS; SUBCUTANEOUS at 05:44

## 2020-01-01 RX ADMIN — SERTRALINE 50 MILLIGRAM(S): 25 TABLET, FILM COATED ORAL at 12:22

## 2020-01-01 RX ADMIN — Medication 60 MILLIGRAM(S): at 09:39

## 2020-01-01 RX ADMIN — CHLORHEXIDINE GLUCONATE 1 APPLICATION(S): 213 SOLUTION TOPICAL at 05:17

## 2020-01-01 RX ADMIN — ATORVASTATIN CALCIUM 80 MILLIGRAM(S): 80 TABLET, FILM COATED ORAL at 05:03

## 2020-01-01 RX ADMIN — ATORVASTATIN CALCIUM 80 MILLIGRAM(S): 80 TABLET, FILM COATED ORAL at 21:59

## 2020-01-01 RX ADMIN — PANTOPRAZOLE SODIUM 10 MG/HR: 20 TABLET, DELAYED RELEASE ORAL at 16:27

## 2020-01-01 RX ADMIN — Medication 12.5 MILLIGRAM(S): at 18:11

## 2020-01-01 RX ADMIN — METHOTREXATE 7.5 MILLIGRAM(S): 2.5 TABLET ORAL at 10:08

## 2020-01-01 RX ADMIN — ENOXAPARIN SODIUM 40 MILLIGRAM(S): 100 INJECTION SUBCUTANEOUS at 11:49

## 2020-01-01 RX ADMIN — Medication 650 MILLIGRAM(S): at 20:32

## 2020-01-01 RX ADMIN — Medication 12.5 MILLIGRAM(S): at 18:01

## 2020-01-01 RX ADMIN — Medication 2 UNIT(S): at 12:53

## 2020-01-01 RX ADMIN — MEROPENEM 100 MILLIGRAM(S): 1 INJECTION INTRAVENOUS at 18:04

## 2020-01-01 RX ADMIN — PANTOPRAZOLE SODIUM 40 MILLIGRAM(S): 20 TABLET, DELAYED RELEASE ORAL at 05:18

## 2020-01-01 RX ADMIN — TICAGRELOR 90 MILLIGRAM(S): 90 TABLET ORAL at 16:02

## 2020-01-01 RX ADMIN — Medication 81 MILLIGRAM(S): at 12:16

## 2020-01-01 RX ADMIN — Medication 20 MILLIGRAM(S): at 05:17

## 2020-01-01 RX ADMIN — CHLORHEXIDINE GLUCONATE 15 MILLILITER(S): 213 SOLUTION TOPICAL at 17:24

## 2020-01-01 RX ADMIN — ATORVASTATIN CALCIUM 80 MILLIGRAM(S): 80 TABLET, FILM COATED ORAL at 21:58

## 2020-01-01 RX ADMIN — Medication 250 MILLIGRAM(S): at 04:55

## 2020-01-01 RX ADMIN — Medication 100 MILLIGRAM(S): at 17:02

## 2020-01-01 RX ADMIN — CHLORHEXIDINE GLUCONATE 15 MILLILITER(S): 213 SOLUTION TOPICAL at 04:22

## 2020-01-01 RX ADMIN — PANTOPRAZOLE SODIUM 40 MILLIGRAM(S): 20 TABLET, DELAYED RELEASE ORAL at 05:15

## 2020-01-01 RX ADMIN — CHLORHEXIDINE GLUCONATE 1 APPLICATION(S): 213 SOLUTION TOPICAL at 05:04

## 2020-01-01 RX ADMIN — CHLORHEXIDINE GLUCONATE 15 MILLILITER(S): 213 SOLUTION TOPICAL at 17:20

## 2020-01-01 RX ADMIN — TICAGRELOR 90 MILLIGRAM(S): 90 TABLET ORAL at 04:26

## 2020-01-01 RX ADMIN — Medication 100 MILLIGRAM(S): at 04:40

## 2020-01-01 RX ADMIN — CEFTRIAXONE 100 MILLIGRAM(S): 500 INJECTION, POWDER, FOR SOLUTION INTRAMUSCULAR; INTRAVENOUS at 05:56

## 2020-01-01 RX ADMIN — Medication 60 MILLIGRAM(S): at 11:49

## 2020-01-01 RX ADMIN — ATORVASTATIN CALCIUM 80 MILLIGRAM(S): 80 TABLET, FILM COATED ORAL at 21:04

## 2020-01-01 RX ADMIN — SERTRALINE 50 MILLIGRAM(S): 25 TABLET, FILM COATED ORAL at 11:37

## 2020-01-01 RX ADMIN — SERTRALINE 50 MILLIGRAM(S): 25 TABLET, FILM COATED ORAL at 13:48

## 2020-01-01 RX ADMIN — CHLORHEXIDINE GLUCONATE 15 MILLILITER(S): 213 SOLUTION TOPICAL at 15:58

## 2020-01-01 RX ADMIN — MEROPENEM 100 MILLIGRAM(S): 1 INJECTION INTRAVENOUS at 18:09

## 2020-01-01 RX ADMIN — Medication 10 MILLIGRAM(S): at 19:17

## 2020-01-01 RX ADMIN — Medication 1 MILLIGRAM(S): at 11:36

## 2020-01-01 RX ADMIN — Medication 500 MILLIGRAM(S): at 10:53

## 2020-01-01 RX ADMIN — Medication 250 MILLIGRAM(S): at 11:46

## 2020-01-01 RX ADMIN — MEROPENEM 100 MILLIGRAM(S): 1 INJECTION INTRAVENOUS at 05:34

## 2020-01-01 RX ADMIN — Medication 25 MILLIGRAM(S): at 16:13

## 2020-01-01 RX ADMIN — SERTRALINE 50 MILLIGRAM(S): 25 TABLET, FILM COATED ORAL at 21:31

## 2020-01-01 RX ADMIN — SODIUM CHLORIDE 75 MILLILITER(S): 9 INJECTION, SOLUTION INTRAVENOUS at 20:21

## 2020-01-01 RX ADMIN — Medication 81 MILLIGRAM(S): at 11:49

## 2020-01-01 RX ADMIN — AZITHROMYCIN 255 MILLIGRAM(S): 500 TABLET, FILM COATED ORAL at 11:55

## 2020-01-01 RX ADMIN — Medication 650 MILLIGRAM(S): at 19:45

## 2020-01-01 RX ADMIN — Medication 12.5 MILLIGRAM(S): at 17:24

## 2020-01-01 RX ADMIN — CHLORHEXIDINE GLUCONATE 15 MILLILITER(S): 213 SOLUTION TOPICAL at 17:21

## 2020-01-01 RX ADMIN — ENOXAPARIN SODIUM 40 MILLIGRAM(S): 100 INJECTION SUBCUTANEOUS at 13:43

## 2020-01-01 RX ADMIN — Medication 3 MG/HR: at 17:20

## 2020-01-01 RX ADMIN — CHLORHEXIDINE GLUCONATE 1 APPLICATION(S): 213 SOLUTION TOPICAL at 05:57

## 2020-01-01 RX ADMIN — CHLORHEXIDINE GLUCONATE 1 APPLICATION(S): 213 SOLUTION TOPICAL at 05:47

## 2020-01-01 RX ADMIN — CHLORHEXIDINE GLUCONATE 15 MILLILITER(S): 213 SOLUTION TOPICAL at 18:03

## 2020-01-01 RX ADMIN — Medication 1 MILLIGRAM(S): at 10:20

## 2020-01-01 RX ADMIN — MEROPENEM 100 MILLIGRAM(S): 1 INJECTION INTRAVENOUS at 04:57

## 2020-01-01 RX ADMIN — ONDANSETRON 4 MILLIGRAM(S): 8 TABLET, FILM COATED ORAL at 00:56

## 2020-01-01 RX ADMIN — PANTOPRAZOLE SODIUM 40 MILLIGRAM(S): 20 TABLET, DELAYED RELEASE ORAL at 06:33

## 2020-01-01 RX ADMIN — Medication 25 MILLIGRAM(S): at 06:34

## 2020-01-01 RX ADMIN — ERGOCALCIFEROL 50000 UNIT(S): 1.25 CAPSULE ORAL at 10:02

## 2020-01-01 RX ADMIN — Medication 2: at 11:46

## 2020-01-01 RX ADMIN — TICAGRELOR 90 MILLIGRAM(S): 90 TABLET ORAL at 05:13

## 2020-01-01 RX ADMIN — AZITHROMYCIN 255 MILLIGRAM(S): 500 TABLET, FILM COATED ORAL at 12:00

## 2020-01-01 RX ADMIN — Medication 250 MILLIGRAM(S): at 10:53

## 2020-01-01 RX ADMIN — ERGOCALCIFEROL 50000 UNIT(S): 1.25 CAPSULE ORAL at 11:13

## 2020-01-01 RX ADMIN — ENOXAPARIN SODIUM 40 MILLIGRAM(S): 100 INJECTION SUBCUTANEOUS at 12:16

## 2020-01-01 RX ADMIN — Medication 500 MILLIGRAM(S): at 13:28

## 2020-01-01 RX ADMIN — MEROPENEM 100 MILLIGRAM(S): 1 INJECTION INTRAVENOUS at 05:24

## 2020-01-01 RX ADMIN — Medication 3: at 11:38

## 2020-01-01 RX ADMIN — MEROPENEM 100 MILLIGRAM(S): 1 INJECTION INTRAVENOUS at 05:40

## 2020-01-01 RX ADMIN — TICAGRELOR 90 MILLIGRAM(S): 90 TABLET ORAL at 16:32

## 2020-01-01 RX ADMIN — MEROPENEM 100 MILLIGRAM(S): 1 INJECTION INTRAVENOUS at 05:05

## 2020-01-01 RX ADMIN — Medication 2: at 05:14

## 2020-01-01 RX ADMIN — SERTRALINE 50 MILLIGRAM(S): 25 TABLET, FILM COATED ORAL at 12:13

## 2020-01-01 RX ADMIN — Medication 25 MILLIGRAM(S): at 18:03

## 2020-01-01 RX ADMIN — Medication 1 MILLIGRAM(S): at 11:12

## 2020-01-01 RX ADMIN — Medication 650 MILLIGRAM(S): at 09:58

## 2020-01-01 RX ADMIN — PANTOPRAZOLE SODIUM 40 MILLIGRAM(S): 20 TABLET, DELAYED RELEASE ORAL at 05:45

## 2020-01-01 RX ADMIN — Medication 60 MILLIGRAM(S): at 05:04

## 2020-01-01 RX ADMIN — SERTRALINE 50 MILLIGRAM(S): 25 TABLET, FILM COATED ORAL at 11:46

## 2020-01-01 RX ADMIN — ATORVASTATIN CALCIUM 80 MILLIGRAM(S): 80 TABLET, FILM COATED ORAL at 22:17

## 2020-01-01 RX ADMIN — Medication 1 MILLIGRAM(S): at 13:44

## 2020-01-01 RX ADMIN — Medication 25 MILLIGRAM(S): at 05:13

## 2020-01-01 RX ADMIN — CHLORHEXIDINE GLUCONATE 15 MILLILITER(S): 213 SOLUTION TOPICAL at 05:05

## 2020-01-01 RX ADMIN — Medication 40 MILLIGRAM(S): at 05:47

## 2020-01-01 RX ADMIN — BUDESONIDE AND FORMOTEROL FUMARATE DIHYDRATE 2 PUFF(S): 160; 4.5 AEROSOL RESPIRATORY (INHALATION) at 08:05

## 2020-01-01 RX ADMIN — Medication 60 MILLIGRAM(S): at 05:32

## 2020-01-01 RX ADMIN — Medication 1 MILLIGRAM(S): at 11:59

## 2020-01-01 RX ADMIN — Medication 25 MILLIGRAM(S): at 05:45

## 2020-01-01 RX ADMIN — PROPOFOL 6.36 MICROGRAM(S)/KG/MIN: 10 INJECTION, EMULSION INTRAVENOUS at 09:16

## 2020-01-01 RX ADMIN — Medication 650 MILLIGRAM(S): at 14:39

## 2020-01-01 RX ADMIN — TICAGRELOR 90 MILLIGRAM(S): 90 TABLET ORAL at 05:33

## 2020-01-01 RX ADMIN — HEPARIN SODIUM 5000 UNIT(S): 5000 INJECTION INTRAVENOUS; SUBCUTANEOUS at 18:13

## 2020-01-01 RX ADMIN — HEPARIN SODIUM 5000 UNIT(S): 5000 INJECTION INTRAVENOUS; SUBCUTANEOUS at 05:57

## 2020-01-01 RX ADMIN — PANTOPRAZOLE SODIUM 40 MILLIGRAM(S): 20 TABLET, DELAYED RELEASE ORAL at 11:36

## 2020-01-01 RX ADMIN — ATORVASTATIN CALCIUM 80 MILLIGRAM(S): 80 TABLET, FILM COATED ORAL at 22:01

## 2020-01-01 RX ADMIN — SERTRALINE 50 MILLIGRAM(S): 25 TABLET, FILM COATED ORAL at 11:13

## 2020-01-01 RX ADMIN — Medication 12.5 MILLIGRAM(S): at 17:21

## 2020-01-01 RX ADMIN — Medication 12.5 MILLIGRAM(S): at 04:57

## 2020-01-01 RX ADMIN — MIDAZOLAM HYDROCHLORIDE 4 MILLIGRAM(S): 1 INJECTION, SOLUTION INTRAMUSCULAR; INTRAVENOUS at 11:30

## 2020-01-01 RX ADMIN — Medication 25 MILLIGRAM(S): at 06:23

## 2020-01-01 RX ADMIN — MEROPENEM 100 MILLIGRAM(S): 1 INJECTION INTRAVENOUS at 05:18

## 2020-01-01 RX ADMIN — SODIUM CHLORIDE 75 MILLILITER(S): 9 INJECTION INTRAMUSCULAR; INTRAVENOUS; SUBCUTANEOUS at 02:46

## 2020-01-01 RX ADMIN — PANTOPRAZOLE SODIUM 40 MILLIGRAM(S): 20 TABLET, DELAYED RELEASE ORAL at 07:20

## 2020-01-01 RX ADMIN — SERTRALINE 50 MILLIGRAM(S): 25 TABLET, FILM COATED ORAL at 21:10

## 2020-01-01 RX ADMIN — Medication 60 MILLIGRAM(S): at 05:20

## 2020-01-01 RX ADMIN — Medication 60 MILLIGRAM(S): at 17:21

## 2020-01-01 RX ADMIN — Medication 1 MILLIGRAM(S): at 11:13

## 2020-01-01 RX ADMIN — Medication 60 MILLIGRAM(S): at 05:05

## 2020-01-01 RX ADMIN — ATORVASTATIN CALCIUM 80 MILLIGRAM(S): 80 TABLET, FILM COATED ORAL at 21:31

## 2020-01-01 RX ADMIN — Medication 650 MILLIGRAM(S): at 00:00

## 2020-01-01 RX ADMIN — Medication 60 MILLIGRAM(S): at 18:11

## 2020-01-01 RX ADMIN — Medication 25 MILLIGRAM(S): at 05:07

## 2020-01-01 RX ADMIN — ENOXAPARIN SODIUM 40 MILLIGRAM(S): 100 INJECTION SUBCUTANEOUS at 11:51

## 2020-01-01 RX ADMIN — Medication 81 MILLIGRAM(S): at 10:01

## 2020-01-01 RX ADMIN — SODIUM CHLORIDE 500 MILLILITER(S): 9 INJECTION, SOLUTION INTRAVENOUS at 15:12

## 2020-01-01 RX ADMIN — PANTOPRAZOLE SODIUM 40 MILLIGRAM(S): 20 TABLET, DELAYED RELEASE ORAL at 02:26

## 2020-01-01 RX ADMIN — SERTRALINE 50 MILLIGRAM(S): 25 TABLET, FILM COATED ORAL at 12:51

## 2020-01-01 RX ADMIN — CHLORHEXIDINE GLUCONATE 15 MILLILITER(S): 213 SOLUTION TOPICAL at 05:18

## 2020-01-01 RX ADMIN — TICAGRELOR 90 MILLIGRAM(S): 90 TABLET ORAL at 15:58

## 2020-01-01 RX ADMIN — TICAGRELOR 90 MILLIGRAM(S): 90 TABLET ORAL at 05:44

## 2020-01-01 RX ADMIN — Medication 25 MILLIGRAM(S): at 16:14

## 2020-01-01 RX ADMIN — PANTOPRAZOLE SODIUM 40 MILLIGRAM(S): 20 TABLET, DELAYED RELEASE ORAL at 06:15

## 2020-01-01 RX ADMIN — ATORVASTATIN CALCIUM 80 MILLIGRAM(S): 80 TABLET, FILM COATED ORAL at 22:00

## 2020-01-01 RX ADMIN — TICAGRELOR 90 MILLIGRAM(S): 90 TABLET ORAL at 05:57

## 2020-01-01 RX ADMIN — Medication 25 MILLIGRAM(S): at 05:17

## 2020-01-01 RX ADMIN — TICAGRELOR 90 MILLIGRAM(S): 90 TABLET ORAL at 05:56

## 2020-01-01 RX ADMIN — Medication 650 MILLIGRAM(S): at 21:59

## 2020-01-01 RX ADMIN — VASOPRESSIN 2.4 UNIT(S)/MIN: 20 INJECTION INTRAVENOUS at 13:34

## 2020-01-01 RX ADMIN — Medication 35 MILLIGRAM(S): at 12:32

## 2020-01-01 RX ADMIN — AZITHROMYCIN 255 MILLIGRAM(S): 500 TABLET, FILM COATED ORAL at 13:25

## 2020-01-01 RX ADMIN — MEROPENEM 100 MILLIGRAM(S): 1 INJECTION INTRAVENOUS at 17:22

## 2020-01-01 RX ADMIN — Medication 25 MILLIGRAM(S): at 17:20

## 2020-01-01 RX ADMIN — Medication 100 MILLIGRAM(S): at 23:30

## 2020-01-01 RX ADMIN — CHLORHEXIDINE GLUCONATE 15 MILLILITER(S): 213 SOLUTION TOPICAL at 16:14

## 2020-01-01 RX ADMIN — LEVETIRACETAM 400 MILLIGRAM(S): 250 TABLET, FILM COATED ORAL at 18:24

## 2020-01-01 RX ADMIN — Medication 250 MILLIGRAM(S): at 10:18

## 2020-01-01 RX ADMIN — Medication 20 MILLIGRAM(S): at 05:20

## 2020-01-01 RX ADMIN — Medication 400 MILLIGRAM(S): at 13:46

## 2020-01-01 RX ADMIN — Medication 81 MILLIGRAM(S): at 11:59

## 2020-01-01 RX ADMIN — ZINC SULFATE TAB 220 MG (50 MG ZINC EQUIVALENT) 220 MILLIGRAM(S): 220 (50 ZN) TAB at 10:20

## 2020-01-01 RX ADMIN — MEROPENEM 100 MILLIGRAM(S): 1 INJECTION INTRAVENOUS at 17:15

## 2020-01-01 RX ADMIN — Medication 650 MILLIGRAM(S): at 06:28

## 2020-01-01 RX ADMIN — Medication 250 MILLIGRAM(S): at 21:48

## 2020-01-01 RX ADMIN — CHLORHEXIDINE GLUCONATE 1 APPLICATION(S): 213 SOLUTION TOPICAL at 06:17

## 2020-01-01 RX ADMIN — SERTRALINE 50 MILLIGRAM(S): 25 TABLET, FILM COATED ORAL at 11:59

## 2020-01-01 RX ADMIN — Medication 40 MILLIEQUIVALENT(S): at 12:06

## 2020-01-01 RX ADMIN — Medication 650 MILLIGRAM(S): at 21:31

## 2020-01-01 RX ADMIN — Medication 3: at 16:21

## 2020-01-01 RX ADMIN — TICAGRELOR 90 MILLIGRAM(S): 90 TABLET ORAL at 05:18

## 2020-01-01 RX ADMIN — Medication 25 MILLIGRAM(S): at 05:40

## 2020-01-01 RX ADMIN — ZINC SULFATE TAB 220 MG (50 MG ZINC EQUIVALENT) 220 MILLIGRAM(S): 220 (50 ZN) TAB at 10:02

## 2020-01-01 RX ADMIN — Medication 5 UNIT(S): at 23:15

## 2020-01-01 RX ADMIN — CHLORHEXIDINE GLUCONATE 15 MILLILITER(S): 213 SOLUTION TOPICAL at 16:02

## 2020-01-01 RX ADMIN — Medication 2 UNIT(S): at 05:55

## 2020-01-01 RX ADMIN — Medication 25 MILLIGRAM(S): at 05:32

## 2020-01-01 RX ADMIN — Medication 1 MILLIGRAM(S): at 12:22

## 2020-01-01 RX ADMIN — ZINC SULFATE TAB 220 MG (50 MG ZINC EQUIVALENT) 220 MILLIGRAM(S): 220 (50 ZN) TAB at 13:29

## 2020-01-01 RX ADMIN — ATORVASTATIN CALCIUM 80 MILLIGRAM(S): 80 TABLET, FILM COATED ORAL at 20:59

## 2020-01-01 RX ADMIN — ATORVASTATIN CALCIUM 80 MILLIGRAM(S): 80 TABLET, FILM COATED ORAL at 20:52

## 2020-01-01 RX ADMIN — PANTOPRAZOLE SODIUM 40 MILLIGRAM(S): 20 TABLET, DELAYED RELEASE ORAL at 11:49

## 2020-01-01 RX ADMIN — MEROPENEM 100 MILLIGRAM(S): 1 INJECTION INTRAVENOUS at 21:48

## 2020-01-01 RX ADMIN — Medication 250 MILLIGRAM(S): at 04:39

## 2020-01-01 RX ADMIN — SODIUM CHLORIDE 75 MILLILITER(S): 9 INJECTION INTRAMUSCULAR; INTRAVENOUS; SUBCUTANEOUS at 03:16

## 2020-01-01 RX ADMIN — Medication 500 MILLIGRAM(S): at 10:20

## 2020-01-01 RX ADMIN — SENNA PLUS 2 TABLET(S): 8.6 TABLET ORAL at 05:03

## 2020-01-01 RX ADMIN — SENNA PLUS 2 TABLET(S): 8.6 TABLET ORAL at 21:58

## 2020-01-01 RX ADMIN — FENTANYL CITRATE 2.65 MICROGRAM(S)/KG/HR: 50 INJECTION INTRAVENOUS at 20:00

## 2020-01-01 RX ADMIN — Medication 81 MILLIGRAM(S): at 10:52

## 2020-01-01 RX ADMIN — Medication 1 MILLIGRAM(S): at 12:51

## 2020-01-01 RX ADMIN — Medication 250 MILLIGRAM(S): at 20:52

## 2020-01-01 RX ADMIN — Medication 81 MILLIGRAM(S): at 11:37

## 2020-01-01 RX ADMIN — BUDESONIDE AND FORMOTEROL FUMARATE DIHYDRATE 2 PUFF(S): 160; 4.5 AEROSOL RESPIRATORY (INHALATION) at 22:15

## 2020-01-01 RX ADMIN — TICAGRELOR 90 MILLIGRAM(S): 90 TABLET ORAL at 05:04

## 2020-01-01 RX ADMIN — HEPARIN SODIUM 5000 UNIT(S): 5000 INJECTION INTRAVENOUS; SUBCUTANEOUS at 05:14

## 2020-01-01 RX ADMIN — Medication 100 MILLIGRAM(S): at 11:38

## 2020-01-01 RX ADMIN — CHLORHEXIDINE GLUCONATE 15 MILLILITER(S): 213 SOLUTION TOPICAL at 04:37

## 2020-01-01 RX ADMIN — Medication 80 MILLIGRAM(S): at 09:16

## 2020-01-01 RX ADMIN — Medication 2: at 17:01

## 2020-01-01 RX ADMIN — ENOXAPARIN SODIUM 40 MILLIGRAM(S): 100 INJECTION SUBCUTANEOUS at 12:51

## 2020-01-01 RX ADMIN — SERTRALINE 50 MILLIGRAM(S): 25 TABLET, FILM COATED ORAL at 20:39

## 2020-01-01 RX ADMIN — SERTRALINE 50 MILLIGRAM(S): 25 TABLET, FILM COATED ORAL at 11:12

## 2020-01-01 RX ADMIN — POLYETHYLENE GLYCOL 3350 17 GRAM(S): 17 POWDER, FOR SOLUTION ORAL at 12:51

## 2020-01-01 RX ADMIN — TICAGRELOR 90 MILLIGRAM(S): 90 TABLET ORAL at 05:47

## 2020-01-01 RX ADMIN — Medication 1 TABLET(S): at 05:14

## 2020-01-01 RX ADMIN — CHLORHEXIDINE GLUCONATE 1 APPLICATION(S): 213 SOLUTION TOPICAL at 05:13

## 2020-01-01 RX ADMIN — SENNA PLUS 2 TABLET(S): 8.6 TABLET ORAL at 21:05

## 2020-01-01 RX ADMIN — CHLORHEXIDINE GLUCONATE 1 APPLICATION(S): 213 SOLUTION TOPICAL at 04:56

## 2020-01-01 RX ADMIN — ATORVASTATIN CALCIUM 80 MILLIGRAM(S): 80 TABLET, FILM COATED ORAL at 20:39

## 2020-01-01 RX ADMIN — Medication 100 MILLIGRAM(S): at 18:08

## 2020-01-01 RX ADMIN — Medication 60 MILLIGRAM(S): at 09:43

## 2020-01-01 RX ADMIN — CHLORHEXIDINE GLUCONATE 15 MILLILITER(S): 213 SOLUTION TOPICAL at 05:43

## 2020-01-01 RX ADMIN — Medication 25 MILLIGRAM(S): at 05:43

## 2020-01-01 RX ADMIN — CHLORHEXIDINE GLUCONATE 15 MILLILITER(S): 213 SOLUTION TOPICAL at 05:04

## 2020-01-01 RX ADMIN — ATORVASTATIN CALCIUM 80 MILLIGRAM(S): 80 TABLET, FILM COATED ORAL at 22:18

## 2020-01-01 RX ADMIN — Medication 650 MILLIGRAM(S): at 16:56

## 2020-01-01 RX ADMIN — Medication 25 MILLIGRAM(S): at 06:32

## 2020-01-01 RX ADMIN — MEROPENEM 100 MILLIGRAM(S): 1 INJECTION INTRAVENOUS at 13:44

## 2020-01-01 RX ADMIN — Medication 1 TABLET(S): at 18:13

## 2020-01-01 RX ADMIN — Medication 1 MILLIGRAM(S): at 11:49

## 2020-01-01 RX ADMIN — Medication 2: at 16:01

## 2020-01-01 RX ADMIN — Medication 1 MILLIGRAM(S): at 08:30

## 2020-01-01 RX ADMIN — BUDESONIDE AND FORMOTEROL FUMARATE DIHYDRATE 2 PUFF(S): 160; 4.5 AEROSOL RESPIRATORY (INHALATION) at 09:58

## 2020-01-01 RX ADMIN — Medication 2 UNIT(S): at 23:59

## 2020-01-01 RX ADMIN — INSULIN GLARGINE 6 UNIT(S): 100 INJECTION, SOLUTION SUBCUTANEOUS at 22:00

## 2020-01-01 RX ADMIN — Medication 81 MILLIGRAM(S): at 11:14

## 2020-01-01 RX ADMIN — Medication 400 MILLIGRAM(S): at 15:43

## 2020-01-01 RX ADMIN — TICAGRELOR 90 MILLIGRAM(S): 90 TABLET ORAL at 18:14

## 2020-01-01 RX ADMIN — ATORVASTATIN CALCIUM 80 MILLIGRAM(S): 80 TABLET, FILM COATED ORAL at 21:28

## 2020-01-01 RX ADMIN — ENOXAPARIN SODIUM 40 MILLIGRAM(S): 100 INJECTION SUBCUTANEOUS at 11:59

## 2020-01-01 RX ADMIN — Medication 25 MILLIGRAM(S): at 22:14

## 2020-01-01 RX ADMIN — PANTOPRAZOLE SODIUM 40 MILLIGRAM(S): 20 TABLET, DELAYED RELEASE ORAL at 05:03

## 2020-01-01 RX ADMIN — LEVETIRACETAM 400 MILLIGRAM(S): 250 TABLET, FILM COATED ORAL at 05:02

## 2020-01-01 RX ADMIN — ENOXAPARIN SODIUM 40 MILLIGRAM(S): 100 INJECTION SUBCUTANEOUS at 11:38

## 2020-01-01 RX ADMIN — Medication 1: at 17:12

## 2020-01-01 RX ADMIN — CHLORHEXIDINE GLUCONATE 1 APPLICATION(S): 213 SOLUTION TOPICAL at 04:36

## 2020-01-01 RX ADMIN — Medication 35 MILLIGRAM(S): at 08:45

## 2020-01-01 RX ADMIN — TICAGRELOR 90 MILLIGRAM(S): 90 TABLET ORAL at 05:37

## 2020-01-01 RX ADMIN — CHLORHEXIDINE GLUCONATE 1 APPLICATION(S): 213 SOLUTION TOPICAL at 05:45

## 2020-01-01 RX ADMIN — POLYETHYLENE GLYCOL 3350 17 GRAM(S): 17 POWDER, FOR SOLUTION ORAL at 11:59

## 2020-01-01 RX ADMIN — CASPOFUNGIN ACETATE 260 MILLIGRAM(S): 7 INJECTION, POWDER, LYOPHILIZED, FOR SOLUTION INTRAVENOUS at 12:23

## 2020-01-01 RX ADMIN — ATORVASTATIN CALCIUM 80 MILLIGRAM(S): 80 TABLET, FILM COATED ORAL at 22:33

## 2020-01-01 RX ADMIN — FLUCONAZOLE 50 MILLIGRAM(S): 150 TABLET ORAL at 16:11

## 2020-01-01 RX ADMIN — BUDESONIDE AND FORMOTEROL FUMARATE DIHYDRATE 2 PUFF(S): 160; 4.5 AEROSOL RESPIRATORY (INHALATION) at 10:53

## 2020-01-01 RX ADMIN — PANTOPRAZOLE SODIUM 40 MILLIGRAM(S): 20 TABLET, DELAYED RELEASE ORAL at 16:13

## 2020-01-01 RX ADMIN — Medication 40 MILLIGRAM(S): at 05:40

## 2020-01-01 RX ADMIN — Medication 1 MILLIGRAM(S): at 13:29

## 2020-01-01 RX ADMIN — HEPARIN SODIUM 5000 UNIT(S): 5000 INJECTION INTRAVENOUS; SUBCUTANEOUS at 17:21

## 2020-01-01 RX ADMIN — PANTOPRAZOLE SODIUM 40 MILLIGRAM(S): 20 TABLET, DELAYED RELEASE ORAL at 16:14

## 2020-01-01 RX ADMIN — Medication 1 MILLIGRAM(S): at 11:16

## 2020-01-01 RX ADMIN — TICAGRELOR 90 MILLIGRAM(S): 90 TABLET ORAL at 17:21

## 2020-01-01 RX ADMIN — Medication 1: at 05:54

## 2020-01-01 RX ADMIN — Medication 1 MILLIGRAM(S): at 11:46

## 2020-01-01 RX ADMIN — ATORVASTATIN CALCIUM 80 MILLIGRAM(S): 80 TABLET, FILM COATED ORAL at 20:00

## 2020-01-01 RX ADMIN — Medication 12.5 MILLIGRAM(S): at 05:19

## 2020-01-01 RX ADMIN — TICAGRELOR 90 MILLIGRAM(S): 90 TABLET ORAL at 18:13

## 2020-01-01 RX ADMIN — Medication 25 MILLIGRAM(S): at 17:01

## 2020-01-01 RX ADMIN — SERTRALINE 50 MILLIGRAM(S): 25 TABLET, FILM COATED ORAL at 13:34

## 2020-01-01 RX ADMIN — CHLORHEXIDINE GLUCONATE 15 MILLILITER(S): 213 SOLUTION TOPICAL at 05:13

## 2020-01-01 RX ADMIN — INSULIN GLARGINE 6 UNIT(S): 100 INJECTION, SOLUTION SUBCUTANEOUS at 22:15

## 2020-01-01 RX ADMIN — Medication 1 MILLIGRAM(S): at 10:53

## 2020-01-01 RX ADMIN — TICAGRELOR 90 MILLIGRAM(S): 90 TABLET ORAL at 18:01

## 2020-01-01 RX ADMIN — Medication 500 MILLIGRAM(S): at 10:02

## 2020-01-01 RX ADMIN — CHLORHEXIDINE GLUCONATE 1 APPLICATION(S): 213 SOLUTION TOPICAL at 05:05

## 2020-01-01 RX ADMIN — Medication 100 MILLIGRAM(S): at 01:34

## 2020-01-01 RX ADMIN — Medication 81 MILLIGRAM(S): at 11:51

## 2020-01-01 RX ADMIN — TICAGRELOR 90 MILLIGRAM(S): 90 TABLET ORAL at 05:07

## 2020-01-01 RX ADMIN — MEROPENEM 100 MILLIGRAM(S): 1 INJECTION INTRAVENOUS at 18:03

## 2020-01-01 RX ADMIN — SERTRALINE 50 MILLIGRAM(S): 25 TABLET, FILM COATED ORAL at 11:16

## 2020-01-01 RX ADMIN — FAMOTIDINE 20 MILLIGRAM(S): 10 INJECTION INTRAVENOUS at 22:17

## 2020-01-01 RX ADMIN — Medication 2 UNIT(S): at 06:16

## 2020-01-01 RX ADMIN — Medication 60 MILLIGRAM(S): at 04:57

## 2020-01-01 RX ADMIN — SODIUM CHLORIDE 1000 MILLILITER(S): 9 INJECTION, SOLUTION INTRAVENOUS at 01:47

## 2020-01-01 RX ADMIN — Medication 60 MILLIGRAM(S): at 04:22

## 2020-01-01 RX ADMIN — HEPARIN SODIUM 5000 UNIT(S): 5000 INJECTION INTRAVENOUS; SUBCUTANEOUS at 05:18

## 2020-01-01 RX ADMIN — Medication 1 MILLIGRAM(S): at 10:01

## 2020-01-01 RX ADMIN — CHLORHEXIDINE GLUCONATE 1 APPLICATION(S): 213 SOLUTION TOPICAL at 04:27

## 2020-01-01 RX ADMIN — Medication 2 UNIT(S): at 22:14

## 2020-01-01 RX ADMIN — SERTRALINE 50 MILLIGRAM(S): 25 TABLET, FILM COATED ORAL at 12:16

## 2020-01-01 RX ADMIN — Medication 25 MILLIGRAM(S): at 05:03

## 2020-01-01 RX ADMIN — AZITHROMYCIN 255 MILLIGRAM(S): 500 TABLET, FILM COATED ORAL at 12:03

## 2020-01-01 RX ADMIN — LEVETIRACETAM 400 MILLIGRAM(S): 250 TABLET, FILM COATED ORAL at 06:33

## 2020-01-01 RX ADMIN — Medication 100 MILLIGRAM(S): at 05:31

## 2020-01-01 RX ADMIN — Medication 2: at 06:36

## 2020-01-01 RX ADMIN — Medication 650 MILLIGRAM(S): at 14:19

## 2020-01-01 RX ADMIN — CHLORHEXIDINE GLUCONATE 15 MILLILITER(S): 213 SOLUTION TOPICAL at 04:56

## 2020-01-01 RX ADMIN — Medication 10 MILLIGRAM(S): at 05:18

## 2020-01-01 RX ADMIN — Medication 1 TABLET(S): at 20:38

## 2020-01-01 RX ADMIN — PANTOPRAZOLE SODIUM 40 MILLIGRAM(S): 20 TABLET, DELAYED RELEASE ORAL at 07:28

## 2020-01-01 RX ADMIN — Medication 12.5 MILLIGRAM(S): at 05:04

## 2020-01-01 RX ADMIN — FAMOTIDINE 20 MILLIGRAM(S): 10 INJECTION INTRAVENOUS at 21:09

## 2020-01-01 RX ADMIN — MEROPENEM 100 MILLIGRAM(S): 1 INJECTION INTRAVENOUS at 09:44

## 2020-01-01 RX ADMIN — PANTOPRAZOLE SODIUM 40 MILLIGRAM(S): 20 TABLET, DELAYED RELEASE ORAL at 12:52

## 2020-01-01 RX ADMIN — Medication 1 MILLIGRAM(S): at 13:48

## 2020-01-01 RX ADMIN — Medication 650 MILLIGRAM(S): at 04:39

## 2020-01-01 RX ADMIN — AZITHROMYCIN 255 MILLIGRAM(S): 500 TABLET, FILM COATED ORAL at 11:16

## 2020-02-10 NOTE — PATIENT PROFILE ADULT - HAVE YOU EXPERIENCED VIOLENCE OR A TRAUMATIC EVENT?
Outpatient Medications Marked as Taking for the 2/10/20 encounter (Refill) with Jarred Avila Jr., MD   Medication Sig Dispense Refill   • AMPYRA 10 MG TABLET SR 12 HR TAKE 1 TABLET BY MOUTH  EVERY 12 HOURS 180 tablet 1        Ok to leave detailed Message: Yes  Informed caller of refill policy- 24-48 hours: Yes  No call back needed unless nurse has questions.     Pharmacy: [facility]  - [address]  [phone]    Patient requests the generic be called into the pharmacy.     She states the name brand is too expensive.       
Regular rate & rhythm, normal S1, S2; no murmurs, gallops or rubs; no S3, S4
no

## 2020-04-13 NOTE — ED ADULT TRIAGE NOTE - CHIEF COMPLAINT QUOTE
BIBA from home as per EMS "She syncope this morning, has been feeling weak, and syncope a second time today".

## 2020-04-13 NOTE — ED ADULT NURSE NOTE - NSIMPLEMENTINTERV_GEN_ALL_ED
Implemented All Fall Risk Interventions:  [place] to call system. Call bell, personal items and telephone within reach. Instruct patient to call for assistance. Room bathroom lighting operational. Non-slip footwear when patient is off stretcher. Physically safe environment: no spills, clutter or unnecessary equipment. Stretcher in lowest position, wheels locked, appropriate side rails in place. Provide visual cue, wrist band, yellow gown, etc. Monitor gait and stability. Monitor for mental status changes and reorient to person, place, and time. Review medications for side effects contributing to fall risk. Reinforce activity limits and safety measures with patient and family.

## 2020-04-13 NOTE — H&P ADULT - NSHPLABSRESULTS_GEN_ALL_CORE
12.2   8.12  )-----------( 178      ( 13 Apr 2020 19:37 )             36.9     04-13    138  |  102  |  17  ----------------------------<  97  3.9   |  24  |  1.0    Ca    8.3<L>      13 Apr 2020 19:37    TPro  6.6  /  Alb  4.4  /  TBili  0.3  /  DBili  x   /  AST  17  /  ALT  10  /  AlkPhos  67  04-13            PT/INR - ( 13 Apr 2020 19:37 )   PT: 13.20 sec;   INR: 1.15 ratio         PTT - ( 13 Apr 2020 19:37 )  PTT:29.7 sec  Lactate Trend  04-13 @ 19:37 Lactate:0.9     CARDIAC MARKERS ( 13 Apr 2020 19:37 )  x     / <0.01 ng/mL / x     / x     / x          CAPILLARY BLOOD GLUCOSE      POCT Blood Glucose.: 100 mg/dL (13 Apr 2020 18:32) 12.2   8.12  )-----------( 178      ( 13 Apr 2020 19:37 )             36.9     04-13    138  |  102  |  17  ----------------------------<  97  3.9   |  24  |  1.0    Ca    8.3<L>      13 Apr 2020 19:37    TPro  6.6  /  Alb  4.4  /  TBili  0.3  /  DBili  x   /  AST  17  /  ALT  10  /  AlkPhos  67  04-13            PT/INR - ( 13 Apr 2020 19:37 )   PT: 13.20 sec;   INR: 1.15 ratio         PTT - ( 13 Apr 2020 19:37 )  PTT:29.7 sec  Lactate Trend  04-13 @ 19:37 Lactate:0.9     CARDIAC MARKERS ( 13 Apr 2020 19:37 )  x     / <0.01 ng/mL / x     / x     / x          CAPILLARY BLOOD GLUCOSE      POCT Blood Glucose.: 100 mg/dL (13 Apr 2020 18:32)    EKG- per ER- NSR    CXR to me is unremarkable

## 2020-04-13 NOTE — H&P ADULT - HISTORY OF PRESENT ILLNESS
[age]yo female whose PMH includes CAD (stents placed) presents to the ER after collapsing to the floor in her home. She thinks she had period of loss of consciousness. Associated with weakness and episode of urinary incontinence in bed but she denies fevers, chills, headache, chest pain or palpitations.  She notes that her  is sick at home (covid?). To me she is denying having diarrhea (though ER notes list this) 75yo female whose PMH includes CAD ( 5 stents placed 1 year ago) presents to the ER after collapsing to the floor in her home twice (bedroom then in bathroom). She thinks she had period of loss of consciousness. Associated with weakness and episode of urinary incontinence in bed but she denies fevers, chills, headache, chest pain or palpitations.  She notes that her  is sick at home (covid?). To me she is denying having diarrhea (though ER notes list this)

## 2020-04-13 NOTE — ED PROVIDER NOTE - OBJECTIVE STATEMENT
patient is a 73 yo f who presents for evaluation of syncope she states she has been having diarrhea and generalized weakness over the past several hours her  has been having similar symptoms she denies any subjective fevers or chills she denies any chest pain sob, she denies any abdominal pain  at this time

## 2020-04-13 NOTE — ED ADULT TRIAGE NOTE - NS ED NURSE DIRECT TO ROOM YN
[address] Patient Status:  Inpatient    1982 MRN [id number]   Location [address] [facility] Attending [address], [facility] Jasealexandra Echevarria Triplett 647 Day # 1 PCP Sadie Carroll DO     SUBJECTIVE:  Re Rfl: 2, Taking  ABDOUL MICROLET LANCETS Does not apply Misc, 1 lancet by Finger stick route 4 (four) times daily.  Use as directed., Disp: 1 Box, Rfl: 2, Taking  Cholecalciferol (VITAMIN D3) 21053 units Oral Cap, TK 1 C PO 1 TIME Q WK, Disp: , Rfl: 1, More t 24 hour   Intake 625 ml   Output 1025 ml   Net -400 ml       Physical Exam:  General appearance: alert, appears stated age, cooperative and no distress      Data Review:   Lab Results   Component Value Date    WBC 11.2 11/17/2019    HGB 12.2 11/17/2019 Yes

## 2020-04-13 NOTE — ED PROVIDER NOTE - CLINICAL SUMMARY MEDICAL DECISION MAKING FREE TEXT BOX
Patient presents for evaluation of syncope diarrhea and weakness with her history of mulitple stents we obtained ekg labs I will admit for further workup at this time pateint pui at this point for potential covid infection given symptoms and fever

## 2020-04-14 NOTE — PHYSICAL THERAPY INITIAL EVALUATION ADULT - TRANSFER SAFETY CONCERNS NOTED: SIT/STAND, REHAB EVAL
decreased weight-shifting ability/decreased balance during turns/decreased step length/losing balance

## 2020-04-14 NOTE — PROGRESS NOTE ADULT - SUBJECTIVE AND OBJECTIVE BOX
HPI  Patient is a 74y old Female who presents with a chief complaint of debility (2020 09:49)    Currently admitted to medicine with the primary diagnosis of Syncope     Today is hospital day 1d.     INTERVAL HPI / OVERNIGHT EVENTS:  Patient was examined and seen at bedside. This morning she is resting comfortably in bed and reports no new issues or overnight events.     ROS: Otherwise unremarkable     PAST MEDICAL & SURGICAL HISTORY  Rheumatoid arthritis  GERD (gastroesophageal reflux disease)  MI (myocardial infarction)  Hyperlipidemia  CAD (coronary artery disease)  History of surgery: stent placement    ALLERGIES  Zocor (Joint Pain)    MEDICATIONS  STANDING MEDICATIONS  ascorbic acid 500 milliGRAM(s) Oral daily  aspirin  chewable 81 milliGRAM(s) Oral daily  atorvastatin 80 milliGRAM(s) Oral at bedtime  budesonide 160 MICROgram(s)/formoterol 4.5 MICROgram(s) Inhaler 2 Puff(s) Inhalation two times a day  famotidine    Tablet 20 milliGRAM(s) Oral at bedtime  folic acid 1 milliGRAM(s) Oral daily  heparin  Injectable 5000 Unit(s) SubCutaneous two times a day  metoprolol succinate ER 25 milliGRAM(s) Oral daily  pantoprazole    Tablet 40 milliGRAM(s) Oral before breakfast  predniSONE   Tablet 10 milliGRAM(s) Oral daily  sodium chloride 0.9%. 1000 milliLiter(s) IV Continuous <Continuous>  ticagrelor 90 milliGRAM(s) Oral two times a day  zinc sulfate 220 milliGRAM(s) Oral daily    PRN MEDICATIONS  acetaminophen   Tablet .. 650 milliGRAM(s) Oral every 6 hours PRN  ALPRAZolam 1 milliGRAM(s) Oral three times a day PRN  guaifenesin/dextromethorphan  Syrup 10 milliLiter(s) Oral every 4 hours PRN    VITALS:  T(F): 98.9  HR: 79  BP: 137/61  RR: 18  SpO2: 94%         LABS                        11.6   8.14  )-----------( 163      ( 2020 09:13 )             35.2     04-14    138  |  105  |  16  ----------------------------<  111<H>  4.0   |  21  |  0.7    Ca    8.1<L>      2020 09:13    TPro  5.9<L>  /  Alb  3.9  /  TBili  0.3  /  DBili  x   /  AST  18  /  ALT  10  /  AlkPhos  61  04-14    PT/INR - ( 2020 19:37 )   PT: 13.20 sec;   INR: 1.15 ratio         PTT - ( 2020 19:37 )  PTT:29.7 sec  Urinalysis Basic - ( 2020 09:31 )    Color: Yellow / Appearance: Clear / S.025 / pH: x  Gluc: x / Ketone: Negative  / Bili: Negative / Urobili: 0.2 mg/dL   Blood: x / Protein: 30 mg/dL / Nitrite: Negative   Leuk Esterase: Small / RBC: 1-2 /HPF / WBC 10-25 /HPF   Sq Epi: x / Non Sq Epi: Occasional /HPF / Bacteria: x        Lactate, Blood: 0.9 mmol/L (20 @ 19:37)  Troponin T, Serum: <0.01 ng/mL (20 @ 19:37)      CARDIAC MARKERS ( 2020 19:37 )  x     / <0.01 ng/mL / x     / x     / x          RADIOLOGY    < from: Xray Chest 1 View-PORTABLE IMMEDIATE (20 @ 19:50) >    Impression:      Stable bilateral emphysematous changes. No focal consolidation        < end of copied text >

## 2020-04-14 NOTE — DISCHARGE NOTE PROVIDER - NSDCMRMEDTOKEN_GEN_ALL_CORE_FT
aspirin 81 mg oral tablet, chewable: 1 tab(s) orally once a day  atorvastatin 80 mg oral tablet: 1 tab(s) orally once a day (at bedtime) MDD:1  Bactrim  mg-160 mg oral tablet: 1 tab(s) orally 2 times a day   budesonide-formoterol 160 mcg-4.5 mcg/inh inhalation aerosol: 2 puff(s) inhaled 2 times a day   famotidine 40 mg oral tablet: 1 tab(s) orally once a day (at bedtime)  folic acid 1 mg oral tablet: 1 tab(s) orally once a day  methotrexate 2.5 mg oral tablet: 3 tab(s) orally once a week  pantoprazole 40 mg oral delayed release tablet: 1 tab(s) orally once a day  predniSONE 10 mg oral tablet: 1 tab(s) orally once a day  ticagrelor 90 mg oral tablet: 1 tab(s) orally 2 times a day MDD:2  Toprol-XL 25 mg oral tablet, extended release: 1 tab(s) orally once a day  Vitamin D2 50,000 intl units (1.25 mg) oral capsule: 1 cap(s) orally once a week  Xanax 1 mg oral tablet: 1 tab(s) orally 3 times a day, As Needed aspirin 81 mg oral tablet, chewable: 1 tab(s) orally once a day  atorvastatin 80 mg oral tablet: 1 tab(s) orally once a day (at bedtime) MDD:1  budesonide-formoterol 160 mcg-4.5 mcg/inh inhalation aerosol: 2 puff(s) inhaled 2 times a day   famotidine 40 mg oral tablet: 1 tab(s) orally once a day (at bedtime)  folic acid 1 mg oral tablet: 1 tab(s) orally once a day  guaifenesin-dextromethorphan 100 mg-10 mg/5 mL oral liquid: 10 milliliter(s) orally every 4 hours, As needed, Cough  methotrexate 2.5 mg oral tablet: 3 tab(s) orally once a week  pantoprazole 40 mg oral delayed release tablet: 1 tab(s) orally once a day  predniSONE 10 mg oral tablet: 1 tab(s) orally once a day  sertraline 50 mg oral tablet: 1 tab(s) orally once a day (at bedtime)  sulfamethoxazole-trimethoprim 800 mg-160 mg oral tablet: 1 tab(s) orally 2 times a day for 3 days, ends morning of 4/18/20  ticagrelor 90 mg oral tablet: 1 tab(s) orally 2 times a day MDD:2  Toprol-XL 25 mg oral tablet, extended release: 1 tab(s) orally once a day  Vitamin D2 50,000 intl units (1.25 mg) oral capsule: 1 cap(s) orally once a week  Xanax 1 mg oral tablet: 1 tab(s) orally 3 times a day, As Needed

## 2020-04-14 NOTE — PHYSICAL THERAPY INITIAL EVALUATION ADULT - IMPAIRMENTS FOUND, PT EVAL
muscle strength/aerobic capacity/endurance/gait, locomotion, and balance/ergonomics and body mechanics/posture

## 2020-04-14 NOTE — PROGRESS NOTE ADULT - ATTENDING COMMENTS
Pt seen and examined. Stable today on room air. Will treat for possible UTI. COVID test pending. Pt seen and examined. Stable today on room air. Will treat for possible UTI. COVID test pending. As patient endorses urinary incontinence with her syncopal episode, neuro consult requested. Ordered rEEG.

## 2020-04-14 NOTE — DISCHARGE NOTE PROVIDER - NSDCCPCAREPLAN_GEN_ALL_CORE_FT
PRINCIPAL DISCHARGE DIAGNOSIS  Diagnosis: Syncope  Assessment and Plan of Treatment: Likely orthostatic hypotension. Ensure adequate hydration. Arise slowly when getting from lying down/sitting position to sianding. Follow up with your PCP within 2 weeks.      SECONDARY DISCHARGE DIAGNOSES  Diagnosis: Acute UTI  Assessment and Plan of Treatment: Complete course of bactrim DS 1 tab twice a day x 3 days

## 2020-04-14 NOTE — DISCHARGE NOTE PROVIDER - HOSPITAL COURSE
75yo female whose PMH includes CAD ( 5 stents placed 1 year ago) presents to the ER after collapsing to the floor in her home twice (bedroom then in bathroom). She thinks she had period of loss of consciousness. Associated with weakness and episode of urinary incontinence in bed but she denies fevers, chills, headache, chest pain or palpitations.  She notes that her  is sick at home (covid?). To me she is denying having diarrhea (though ER notes list this)        Patient was observed overnight and was clinically stable. Oxygen saturations have been good. U/A was positive for possible UTI. Patient is medically stable to be discharged with course of antibiotics. 73yo female whose PMH includes CAD ( 5 stents placed 1 year ago) presents to the ER after collapsing to the floor in her home twice (bedroom then in bathroom). She thinks she had period of loss of consciousness. Associated with weakness and episode of urinary incontinence in bed but she denies fevers, chills, headache, chest pain or palpitations.  She notes that her  is sick at home (covid?). To me she is denying having diarrhea (though ER notes list this)        Patient was observed overnight and was clinically stable. Pt is Covid positive. Oxygen saturations have been good on room air. U/A was positive for possible UTI. Patient is medically stable to be discharged with course of antibiotics.

## 2020-04-14 NOTE — CONSULT NOTE ADULT - SUBJECTIVE AND OBJECTIVE BOX
HPI:  73yo female whose PMH includes CAD ( 5 stents placed 1 year ago) presents to the ER after collapsing to the floor in her home twice (bedroom then in bathroom). She thinks she had period of loss of consciousness. Associated with weakness and episode of urinary incontinence in bed but she denies fevers, chills, headache, chest pain or palpitations.  She notes that her  is sick at home (covid?). To me she is denying having diarrhea (though ER notes list this) (2020 23:19)      PAST MEDICAL & SURGICAL HISTORY:  Rheumatoid arthritis  GERD (gastroesophageal reflux disease)  MI (myocardial infarction)  Hyperlipidemia  CAD (coronary artery disease)  History of surgery: stent placement      Hospital Course:  Collapsing at home. weakness. R/O COVID.  may have a viral syndrome.  TODAY'S SUBJECTIVE & REVIEW OF SYMPTOMS:           MEDICATIONS  (STANDING):  ascorbic acid 500 milliGRAM(s) Oral daily  aspirin  chewable 81 milliGRAM(s) Oral daily  atorvastatin 80 milliGRAM(s) Oral at bedtime  budesonide 160 MICROgram(s)/formoterol 4.5 MICROgram(s) Inhaler 2 Puff(s) Inhalation two times a day  famotidine    Tablet 20 milliGRAM(s) Oral at bedtime  folic acid 1 milliGRAM(s) Oral daily  heparin  Injectable 5000 Unit(s) SubCutaneous two times a day  metoprolol succinate ER 25 milliGRAM(s) Oral daily  pantoprazole    Tablet 40 milliGRAM(s) Oral before breakfast  predniSONE   Tablet 10 milliGRAM(s) Oral daily  sodium chloride 0.9%. 1000 milliLiter(s) (75 mL/Hr) IV Continuous <Continuous>  ticagrelor 90 milliGRAM(s) Oral two times a day  zinc sulfate 220 milliGRAM(s) Oral daily    MEDICATIONS  (PRN):  acetaminophen   Tablet .. 650 milliGRAM(s) Oral every 6 hours PRN Mild Pain (1 - 3)  ALPRAZolam 1 milliGRAM(s) Oral three times a day PRN anxiety  guaifenesin/dextromethorphan  Syrup 10 milliLiter(s) Oral every 4 hours PRN Cough      FAMILY HISTORY:  Family history of heart disease (Father): MI at age 84,   Family history of heart attack (Mother): MI at age 60      Allergies    Zocor (Joint Pain)    Intolerances        SOCIAL HISTORY:    [  ] Etoh  [  ] Smoking  [  ] Substance abuse     Home Environment:  [  ] Home Alone  [x  ] Lives with Family-  [  ] Home Health Aid    Dwelling:  [  ] Apartment  [  ] Private House  [  ] Adult Home  [  ] Skilled Nursing Facility      [  ] Short Term  [  ] Long Term  [  ] Stairs       Elevator [  ]    FUNCTIONAL STATUS PTA: (Check all that apply)  Ambulation: [   ]Independent    [  ] Dependent     [  ] Non-Ambulatory  Assistive Device: [  ] SA Cane  [  ]  Q Cane  [  ] Walker  [  ]  Wheelchair  ADL : [  ] Independent  [  ]  Dependent       Vital Signs Last 24 Hrs  T(C): 37.2 (2020 02:10), Max: 37.9 (2020 18:24)  T(F): 98.9 (2020 02:10), Max: 100.2 (2020 18:24)  HR: 79 (2020 02:10) (72 - 94)  BP: 137/61 (2020 02:10) (123/57 - 138/63)  BP(mean): --  RR: 18 (2020 22:41) (18 - 19)  SpO2: 94% (2020 09:38) (93% - 96%)  Physical Exam:  · Constitutional	Well-developed, well nourished	  · Eyes	EOMI; PERRL; no drainage or redness	  · ENMT	No oral lesions; no gross abnormalities	  · Neck	No bruits; no thyromegaly or nodules	  · Breasts	not examined	  · Back	No deformity or limitation of movement	  · Respiratory	detailed exam	  · Respiratory Details	respirations non-labored	  · Cardiovascular	Regular rate & rhythm, normal S1, S2; no murmurs, gallops or rubs; no S3, S4	  · Gastrointestinal	not examined	  · Genitourinary	not examined	  · Rectal	not examined	  · Extremities	No cyanosis, clubbing or edema	  · Vascular	not examined	  · Neurological	Alert & oriented; no sensory, motor or coordination deficits, normal reflexes	  · Skin	No lesions; no rash	  · Lymph Nodes	not examined	  · Musculoskeletal	not examined	  · Psychiatric	Affect and characteristics of appearance, verbalizations, behaviors are appropriate	      FUNCTIONAL STATUS:  Bed Mobility: Independent [  ]  Supervision [  ]  Needs Assistance [  ]  N/A [  ]  Transfers: Independent [  ]  Supervision [  ]  Needs Assistance [  ]  N/A [  ]   Ambulation: Independent [  ]  Supervision [  ]  Needs Assistance [  ]  N/A [  ]  ADL: Independent [  ] Requires Assistance [  ] N/A [  ]      LABS:                        11.6   8.14  )-----------( 163      ( 2020 09:13 )             35.2     04-13    138  |  102  |  17  ----------------------------<  97  3.9   |  24  |  1.0    Ca    8.3<L>      2020 19:37    TPro  6.6  /  Alb  4.4  /  TBili  0.3  /  DBili  x   /  AST  17  /  ALT  10  /  AlkPhos  67  04-13    PT/INR - ( 2020 19:37 )   PT: 13.20 sec;   INR: 1.15 ratio         PTT - ( 2020 19:37 )  PTT:29.7 sec      RADIOLOGY & ADDITIONAL STUDIES:    Assesment:

## 2020-04-14 NOTE — CONSULT NOTE ADULT - ASSESSMENT
IMPRESSION: Rehab of  debility, suspected COVID viral pneumonia    PRECAUTIONS: [  ] Cardiac  [ x ] Respiratory  [x  ] Seizures [x  ] Contact Isolation  [  ] Droplet Isolation  [  ] Other    Weight Bearing Status:     RECOMMENDATION:    Out of Bed to Chair     DVT/Decubiti Prophylaxis    REHAB PLAN:     [ x  ] Bedside P/T 3-5 times a week   [   ]   Bedside O/T  2-3 times a week             [   ] No Rehab Therapy Indicated                   [   ]  Speech Therapy   Conditioning/ROM                                    ADL  Bed Mobility                                               Conditioning/ROM  Transfers                                                     Bed Mobility  Sitting /Standing Balance                         Transfers                                        Gait Training                                               Sitting/Standing Balance  Stair Training [   ]Applicable                    Home equipment Eval                                                                        Splinting  [   ] Only      GOALS:   ADL   [ x  ]   Independent                    Transfers  [  x ] Independent                          Ambulation  [x   ] Independent     [ x   ] With device                            [   ]  CG                                                         [   ]  CG                                                                  [   ] CG                            [    ] Min A                                                   [   ] Min A                                                              [   ] Min  A          DISCHARGE PLAN:   [   ]  Good candidate for Intensive Rehabilitation/Hospital based-[facility]                                             Will tolerate 3hrs Intensive Rehab Daily                                       [ xx   ]  Short Term Rehab in Skilled Nursing Facility                                       [    ]  Home with Outpatient or  services                                         [    ]  Possible Candidate for Intensive Hospital based Rehab

## 2020-04-14 NOTE — PHYSICAL THERAPY INITIAL EVALUATION ADULT - GENERAL OBSERVATIONS, REHAB EVAL
9:10 - 9:38. Chart reviewed. Patient available to be seen for physical therapy, confirmed with nurse. Patient encountered semi-reclined in bed, +IV. Denies pain at rest, says she feels weak but is agreeable for PT evaluation.

## 2020-04-15 NOTE — CONSULT NOTE ADULT - ASSESSMENT
74 year old woman with history of Rheumatoid arthritis, GERD, MI, Hyperlipidemia and CAD who present with two episodes of fall and possible LOC for few seconds. Patient is currently admitted for UTI and tested positive for COVID. Exam is non focal and patient is alert and oriented. Based on event description, diarrhea and low systolic blood pressure upon arrival the episodes were most likely syncope due to dehydration and current infection rather then seizure. Recommend cardiac work up for syncope, hydration and COVD treatment. No need for EEG unless mental status changes. No further work up per neurological standpoint.

## 2020-04-15 NOTE — PROGRESS NOTE ADULT - ASSESSMENT
74F PMHx RA, HTN, CAD/MI s/p stents here with fall, unwitnessed, in setting of covid +/- uti.    #Fall, unwitnessed, possible syncope per pt  in setting of covid, +/- uti  monitor off plaquenil  satting well on ra  orthostatics noted  PT, discharge planning to snf  bactrim pending ucx  #RA  prednisone 10  mtx 7.5 qweek  folic acid  #HTN  toprol 25  #CAD  asa  brilinta  #DVT ppx  subq hep    #Progress Note Handoff:  Pending (specify):  Consults_________, Tests________, Test Results_______, Other___d/c planning______  Family discussion:d/w pt at bedside re: treatment plan, primary dx  Disposition: Home___/SNF_x__/Other________/Unknown at this time________

## 2020-04-15 NOTE — PROGRESS NOTE ADULT - ASSESSMENT
# Syncope, Urinary incontinence,  covid19 infection     - Suspect vasovagal, getting IV fluids  - pt with recent sick contact,  covid Positive  - f/u UA shows mild UTI, started bactrim  - O2 sat of 93% on room air  - CRP - 4.73, ferritin 169, Procal 0.16  - PT consulted, they recommend SNF    # CAD (coronary artery disease with stents)  - continue home meds.        # Rheumatoid arthritis.    - continue home meds.     # Hyperlipidemia.    - c/w Lipitor.       # Folic acid deficiency.  -supplementing    # Vitamin D deficiency.   -supplementing    Dispo: To [facility] tomorrow. Not a candidate for SNF since pt is still having fevers  Full Code

## 2020-04-15 NOTE — PROGRESS NOTE ADULT - SUBJECTIVE AND OBJECTIVE BOX
INTERVAL HPI/OVERNIGHT EVENTS:    SUBJECTIVE: Patient seen and examined at bedside.     no cp, sob, abd pain, fever  no sob, orthopnea, pnd, cough    OBJECTIVE:    VITAL SIGNS:  Vital Signs Last 24 Hrs  T(C): 37.6 (15 Apr 2020 05:30), Max: 39.3 (2020 22:39)  T(F): 99.6 (15 Apr 2020 05:30), Max: 102.8 (2020 22:39)  HR: 84 (15 Apr 2020 06:31) (77 - 104)  BP: 120/58 (15 Apr 2020 06:31) (114/56 - 139/65)  BP(mean): --  RR: 18 (15 Apr 2020 05:30) (16 - 18)  SpO2: 95% (15 Apr 2020 07:45) (95% - 96%)      PHYSICAL EXAM:    General: NAD  HEENT: NC/AT; PERRL, clear conjunctiva  Neck: supple  Respiratory: CTA b/l  Cardiovascular: +S1/S2; RRR  Abdomen: soft, NT/ND; +BS x4  Extremities: WWP, 2+ peripheral pulses b/l; no LE edema  Skin: normal color and turgor; no rash  Neurological:    MEDICATIONS:  MEDICATIONS  (STANDING):  ascorbic acid 500 milliGRAM(s) Oral daily  aspirin  chewable 81 milliGRAM(s) Oral daily  atorvastatin 80 milliGRAM(s) Oral at bedtime  budesonide 160 MICROgram(s)/formoterol 4.5 MICROgram(s) Inhaler 2 Puff(s) Inhalation two times a day  ergocalciferol 56677 Unit(s) Oral every week  famotidine    Tablet 20 milliGRAM(s) Oral at bedtime  folic acid 1 milliGRAM(s) Oral daily  heparin  Injectable 5000 Unit(s) SubCutaneous two times a day  methotrexate 7.5 milliGRAM(s) Oral every week  metoprolol succinate ER 25 milliGRAM(s) Oral daily  pantoprazole    Tablet 40 milliGRAM(s) Oral before breakfast  predniSONE   Tablet 10 milliGRAM(s) Oral daily  sertraline 50 milliGRAM(s) Oral at bedtime  sodium chloride 0.9%. 1000 milliLiter(s) (75 mL/Hr) IV Continuous <Continuous>  ticagrelor 90 milliGRAM(s) Oral two times a day  trimethoprim  160 mG/sulfamethoxazole 800 mG 1 Tablet(s) Oral two times a day  zinc sulfate 220 milliGRAM(s) Oral daily    MEDICATIONS  (PRN):  acetaminophen   Tablet .. 650 milliGRAM(s) Oral every 6 hours PRN Mild Pain (1 - 3)  ALPRAZolam 1 milliGRAM(s) Oral three times a day PRN anxiety  guaifenesin/dextromethorphan  Syrup 10 milliLiter(s) Oral every 4 hours PRN Cough      ALLERGIES:  Allergies    Zocor (Joint Pain)    Intolerances        LABS:                        11.7   9.44  )-----------( 143      ( 15 Apr 2020 06:30 )             34.7     Hemoglobin: 11.7 g/dL (04-15 @ 06:30)  Hemoglobin: 11.6 g/dL ( @ 09:13)  Hemoglobin: 12.2 g/dL ( @ 19:37)    CBC Full  -  ( 15 Apr 2020 06:30 )  WBC Count : 9.44 K/uL  RBC Count : 3.54 M/uL  Hemoglobin : 11.7 g/dL  Hematocrit : 34.7 %  Platelet Count - Automated : 143 K/uL  Mean Cell Volume : 98.0 fL  Mean Cell Hemoglobin : 33.1 pg  Mean Cell Hemoglobin Concentration : 33.7 g/dL  Auto Neutrophil # : 8.58 K/uL  Auto Lymphocyte # : 0.46 K/uL  Auto Monocyte # : 0.31 K/uL  Auto Eosinophil # : 0.01 K/uL  Auto Basophil # : 0.02 K/uL  Auto Neutrophil % : 90.9 %  Auto Lymphocyte % : 4.9 %  Auto Monocyte % : 3.3 %  Auto Eosinophil % : 0.1 %  Auto Basophil % : 0.2 %    04-15    137  |  102  |  14  ----------------------------<  97  4.0   |  23  |  0.7    Ca    8.1<L>      15 Apr 2020 06:30    TPro  6.1  /  Alb  3.7  /  TBili  0.4  /  DBili  x   /  AST  29  /  ALT  10  /  AlkPhos  55  04-15    Creatinine Trend: 0.7<--, 0.7<--, 1.0<--  LIVER FUNCTIONS - ( 15 Apr 2020 06:30 )  Alb: 3.7 g/dL / Pro: 6.1 g/dL / ALK PHOS: 55 U/L / ALT: 10 U/L / AST: 29 U/L / GGT: x           PT/INR - ( 2020 19:37 )   PT: 13.20 sec;   INR: 1.15 ratio         PTT - ( 2020 19:37 )  PTT:29.7 sec    hs Troponin:            Urinalysis Basic - ( 2020 09:31 )    Color: Yellow / Appearance: Clear / S.025 / pH: x  Gluc: x / Ketone: Negative  / Bili: Negative / Urobili: 0.2 mg/dL   Blood: x / Protein: 30 mg/dL / Nitrite: Negative   Leuk Esterase: Small / RBC: 1-2 /HPF / WBC 10-25 /HPF   Sq Epi: x / Non Sq Epi: Occasional /HPF / Bacteria: x      CSF:                      EKG:   MICROBIOLOGY:    Culture - Blood (collected 2020 19:37)  Source: .Blood Blood-Peripheral  Preliminary Report (15 Apr 2020 03:01):    No growth to date.    Culture - Blood (collected 2020 19:37)  Source: .Blood Blood-Peripheral  Preliminary Report (15 Apr 2020 03:01):    No growth to date.      IMAGING:      Labs, imaging, EKG personally reviewed    RADIOLOGY & ADDITIONAL TESTS: Reviewed.

## 2020-04-15 NOTE — CONSULT NOTE ADULT - SUBJECTIVE AND OBJECTIVE BOX
Neurology  Consult Note  04-15-20    Name:  ABHINAV DREW; 74y (1945)    Reason for Admission: SYNCOPE DIARRHEA      Chief Complaint:  HPI:  75yo female whose PMH includes CAD ( 5 stents placed 1 year ago) presents to the ER after collapsing to the floor in her home twice (bedroom then in bathroom). She thinks she had period of loss of consciousness. Associated with weakness and episode of urinary incontinence in bed but she denies fevers, chills, headache, chest pain or palpitations.  She notes that her  is sick at home (covid?). To me she is denying having diarrhea (though ER notes list this) (13 Apr 2020 23:19)    Patient is poor historian but reported that he tried to get up twice and fell down and could not get up again. She is not sure if she lost her consciousness or not. Per report she lost control of her bladder in the bed but not during the episode. There is no report of shaking or tongue biting.  She denies any history of seizure, head trauma or family history of seizure.     Review of Systems:  As states in HPI.    Zocor (Joint Pain)      PMHx:   Rheumatoid arthritis  GERD (gastroesophageal reflux disease)  MI (myocardial infarction)  Hyperlipidemia  CAD (coronary artery disease)    PFHx:   Family history of heart disease (Father)  Family history of heart attack (Mother)    PSuHx:   History of surgery  No significant past surgical history    PSoHx:     Marital Status:    No substance abuse       Medications:  MEDICATIONS  (STANDING):  ascorbic acid 500 milliGRAM(s) Oral daily  aspirin  chewable 81 milliGRAM(s) Oral daily  atorvastatin 80 milliGRAM(s) Oral at bedtime  budesonide 160 MICROgram(s)/formoterol 4.5 MICROgram(s) Inhaler 2 Puff(s) Inhalation two times a day  ergocalciferol 96282 Unit(s) Oral every week  famotidine    Tablet 20 milliGRAM(s) Oral at bedtime  folic acid 1 milliGRAM(s) Oral daily  heparin  Injectable 5000 Unit(s) SubCutaneous two times a day  methotrexate 7.5 milliGRAM(s) Oral every week  metoprolol succinate ER 25 milliGRAM(s) Oral daily  pantoprazole    Tablet 40 milliGRAM(s) Oral before breakfast  predniSONE   Tablet 10 milliGRAM(s) Oral daily  sertraline 50 milliGRAM(s) Oral at bedtime  sodium chloride 0.9%. 1000 milliLiter(s) (75 mL/Hr) IV Continuous <Continuous>  ticagrelor 90 milliGRAM(s) Oral two times a day  trimethoprim  160 mG/sulfamethoxazole 800 mG 1 Tablet(s) Oral two times a day  zinc sulfate 220 milliGRAM(s) Oral daily    MEDICATIONS  (PRN):  acetaminophen   Tablet .. 650 milliGRAM(s) Oral every 6 hours PRN Mild Pain (1 - 3)  ALPRAZolam 1 milliGRAM(s) Oral three times a day PRN anxiety  guaifenesin/dextromethorphan  Syrup 10 milliLiter(s) Oral every 4 hours PRN Cough    Vitals:  T(C): 37.6 (04-15-20 @ 05:30), Max: 39.3 (04-14-20 @ 22:39)  HR: 84 (04-15-20 @ 06:31) (77 - 104)  BP: 120/58 (04-15-20 @ 06:31) (114/56 - 139/65)  RR: 18 (04-15-20 @ 05:30) (16 - 18)  SpO2: 95% (04-15-20 @ 07:45) (95% - 96%)    Labs:                        11.7   9.44  )-----------( 143      ( 15 Apr 2020 06:30 )             34.7     04-15    137  |  102  |  14  ----------------------------<  97  4.0   |  23  |  0.7    Ca    8.1<L>      15 Apr 2020 06:30    TPro  6.1  /  Alb  3.7  /  TBili  0.4  /  DBili  x   /  AST  29  /  ALT  10  /  AlkPhos  55  04-15    CAPILLARY BLOOD GLUCOSE      POCT Blood Glucose.: 100 mg/dL (13 Apr 2020 18:32)    LIVER FUNCTIONS - ( 15 Apr 2020 06:30 )  Alb: 3.7 g/dL / Pro: 6.1 g/dL / ALK PHOS: 55 U/L / ALT: 10 U/L / AST: 29 U/L / GGT: x             Culture - Blood (collected 13 Apr 2020 19:37)  Source: .Blood Blood-Peripheral  Preliminary Report (15 Apr 2020 03:01):    No growth to date.    Culture - Blood (collected 13 Apr 2020 19:37)  Source: .Blood Blood-Peripheral  Preliminary Report (15 Apr 2020 03:01):    No growth to date.      PT/INR - ( 13 Apr 2020 19:37 )   PT: 13.20 sec;   INR: 1.15 ratio         PTT - ( 13 Apr 2020 19:37 )  PTT:29.7 sec    PHYSICAL EXAMINATION:  General: Well-developed, well nourished, in no acute distress.  Eyes: Conjunctiva and sclera clear.  Neurologic:  - Mental Status:  Alert, awake, oriented to person, place, and time; Speech is fluent with intact naming, repetition, and comprehension    - Cranial Nerves II-XII:    II:  Pupils are equal, round, and reactive to light  III, IV, VI:  Extraocular movements are intact without nystagmus.  VII:  Face is symmetric with normal eye closure and smile  XII:  Tongue protudes in the midline.  - Motor:  Strength is 5/5 throughout.  There is no pronator drift.  Normal muscle bulk and tone throughout.  - Sensory:  Intact throughout to light touch  - Coordination:  Finger-nose-finger       CXR:   Stable bilateral emphysematous changes. No focal consolidation

## 2020-04-15 NOTE — PROGRESS NOTE ADULT - SUBJECTIVE AND OBJECTIVE BOX
HPI  Patient is a 74y old Female who presents with a chief complaint of Syncope (2020 17:13)    Currently admitted to medicine with the primary diagnosis of Syncope     Today is hospital day 2d.     INTERVAL HPI / OVERNIGHT EVENTS:  Patient was examined and seen at bedside. This morning she is resting comfortably in bed and reports no new issues or overnight events.     ROS: Otherwise unremarkable     PAST MEDICAL & SURGICAL HISTORY  Rheumatoid arthritis  GERD (gastroesophageal reflux disease)  MI (myocardial infarction)  Hyperlipidemia  CAD (coronary artery disease)  History of surgery: stent placement    ALLERGIES  Zocor (Joint Pain)    MEDICATIONS  STANDING MEDICATIONS  ascorbic acid 500 milliGRAM(s) Oral daily  aspirin  chewable 81 milliGRAM(s) Oral daily  atorvastatin 80 milliGRAM(s) Oral at bedtime  budesonide 160 MICROgram(s)/formoterol 4.5 MICROgram(s) Inhaler 2 Puff(s) Inhalation two times a day  ergocalciferol 01222 Unit(s) Oral every week  famotidine    Tablet 20 milliGRAM(s) Oral at bedtime  folic acid 1 milliGRAM(s) Oral daily  heparin  Injectable 5000 Unit(s) SubCutaneous two times a day  methotrexate 7.5 milliGRAM(s) Oral every week  metoprolol succinate ER 25 milliGRAM(s) Oral daily  pantoprazole    Tablet 40 milliGRAM(s) Oral before breakfast  predniSONE   Tablet 10 milliGRAM(s) Oral daily  sertraline 50 milliGRAM(s) Oral at bedtime  sodium chloride 0.9%. 1000 milliLiter(s) IV Continuous <Continuous>  ticagrelor 90 milliGRAM(s) Oral two times a day  trimethoprim  160 mG/sulfamethoxazole 800 mG 1 Tablet(s) Oral two times a day  zinc sulfate 220 milliGRAM(s) Oral daily    PRN MEDICATIONS  acetaminophen   Tablet .. 650 milliGRAM(s) Oral every 6 hours PRN  ALPRAZolam 1 milliGRAM(s) Oral three times a day PRN  guaifenesin/dextromethorphan  Syrup 10 milliLiter(s) Oral every 4 hours PRN    VITALS:  T(F): 99.6  HR: 84  BP: 120/58  RR: 18  SpO2: 95%         LABS                        11.7   9.44  )-----------( 143      ( 15 Apr 2020 06:30 )             34.7     04-15    137  |  102  |  14  ----------------------------<  97  4.0   |  23  |  0.7    Ca    8.1<L>      15 Apr 2020 06:30    TPro  6.1  /  Alb  3.7  /  TBili  0.4  /  DBili  x   /  AST  29  /  ALT  10  /  AlkPhos  55  04-15    PT/INR - ( 2020 19:37 )   PT: 13.20 sec;   INR: 1.15 ratio         PTT - ( 2020 19:37 )  PTT:29.7 sec  Urinalysis Basic - ( 2020 09:31 )    Color: Yellow / Appearance: Clear / S.025 / pH: x  Gluc: x / Ketone: Negative  / Bili: Negative / Urobili: 0.2 mg/dL   Blood: x / Protein: 30 mg/dL / Nitrite: Negative   Leuk Esterase: Small / RBC: 1-2 /HPF / WBC 10-25 /HPF   Sq Epi: x / Non Sq Epi: Occasional /HPF / Bacteria: x            Culture - Blood (collected 2020 19:37)  Source: .Blood Blood-Peripheral  Preliminary Report (15 Apr 2020 03:01):    No growth to date.    Culture - Blood (collected 2020 19:37)  Source: .Blood Blood-Peripheral  Preliminary Report (15 Apr 2020 03:01):    No growth to date.      CARDIAC MARKERS ( 2020 19:37 )  x     / <0.01 ng/mL / x     / x     / x          RADIOLOGY    < from: Xray Chest 1 View-PORTABLE IMMEDIATE (20 @ 19:50) >    Impression:      Stable bilateral emphysematous changes. No focal consolidation        < end of copied text >

## 2020-04-16 NOTE — CONSULT NOTE ADULT - SUBJECTIVE AND OBJECTIVE BOX
ABHINAV DREW  74y, Female  Allergy: Zocor (Joint Pain)      CHIEF COMPLAINT: syncope (15 Apr 2020 13:44)      HPI:  73yo female whose PMH includes CAD ( 5 stents placed 1 year ago) presents to the ER after collapsing to the floor in her home twice (bedroom then in bathroom). She thinks she had period of loss of consciousness. Associated with weakness and episode of urinary incontinence in bed but she denies fevers, chills, headache, chest pain or palpitations.  She notes that her  is sick at home (covid?). To me she is denying having diarrhea (though ER notes list this) (2020 23:19)    FAMILY HISTORY:  Family history of heart disease (Father): MI at age 84,   Family history of heart attack (Mother): MI at age 60    PAST MEDICAL & SURGICAL HISTORY:  Rheumatoid arthritis  GERD (gastroesophageal reflux disease)  MI (myocardial infarction)  Hyperlipidemia  CAD (coronary artery disease)  History of surgery: stent placement    Social History:  , denies alcohol or tobacco use (2020 23:19)        ROS  10 system review - weakness + NO Dysuria or urinary complaints     VITALS:  T(F): 101.1, Max: 103.6 (20 @ 05:20)  HR: 99  BP: 133/56  RR: 18Vital Signs Last 24 Hrs  T(C): 38.4 (2020 06:14), Max: 39.8 (2020 05:20)  T(F): 101.1 (2020 06:14), Max: 103.6 (2020 05:20)  HR: 99 (2020 06:14) (76 - 136)  BP: 133/56 (2020 06:14) (110/50 - 142/63)  BP(mean): --  RR: 18 (2020 06:14) (16 - 20)  SpO2: 92% (2020 07:52) (92% - 93%)    PHYSICAL EXAM:  Gen: NAD, resting in bed  HEENT: Normocephalic, atraumatic  Neck: supple, no lymphadenopathy  CV: s 1 s 2+   Lungs: decreased BS   Abdomen: Soft, BS present  Ext: Warm, well perfused  Neuro: non focal, awake  Skin: no rash, no erythema    TESTS & MEASUREMENTS:                        11.7   9.44  )-----------( 143      ( 15 Apr 2020 06:30 )             34.7     04-15    137  |  102  |  14  ----------------------------<  97  4.0   |  23  |  0.7    Ca    8.1<L>      15 Apr 2020 06:30    TPro  6.1  /  Alb  3.7  /  TBili  0.4  /  DBili  x   /  AST  29  /  ALT  10  /  AlkPhos  55  04-15      LIVER FUNCTIONS - ( 15 Apr 2020 06:30 )  Alb: 3.7 g/dL / Pro: 6.1 g/dL / ALK PHOS: 55 U/L / ALT: 10 U/L / AST: 29 U/L / GGT: x               Culture - Urine (collected 20 @ 09:31)  Source: .Urine Clean Catch (Midstream)  Preliminary Report (04-15-20 @ 17:00):    50,000 - 99,000 CFU/mL Gram Negative Rods    10,000 - 49,000 CFU/mL Gram Negative Rods #2    <10,000 CFU/ml Normal Urogenital ailyn present    Culture - Blood (collected 20 @ 19:37)  Source: .Blood Blood-Peripheral  Preliminary Report (04-15-20 @ 03:01):    No growth to date.    Culture - Blood (collected 20 @ 19:37)  Source: .Blood Blood-Peripheral  Preliminary Report (04-15-20 @ 03:01):    No growth to date.        Lactate, Blood: 0.9 mmol/L (20 @ 19:37)      INFECTIOUS DISEASES TESTING  MRSA PCR Result.: Negative (19 @ 13:49)      RADIOLOGY & ADDITIONAL TESTS:  I have personally reviewed the last Chest xray  CXR - bilat chronic changes    CARDIOLOGY TESTING  12 Lead ECG:   Ventricular Rate 81 BPM    Atrial Rate 81 BPM    P-R Interval 124 ms    QRS Duration 90 ms    Q-T Interval 390 ms    QTC Calculation(Bezet) 453 ms    P Axis 60 degrees    R Axis 9 degrees    T Axis 25 degrees    Diagnosis Line Normal sinus rhythm  Possible Anterior infarct , age undetermined  Abnormal ECG  Suspect error in lead placement  Reconfirmed by YUE GRANADOS, DORA ([id number]) on 2020 6:57:22 PM (04-14-20 @ 07:35)  12 Lead ECG:   Ventricular Rate 88 BPM    Atrial Rate 88 BPM    P-R Interval 126 ms    QRS Duration 84 ms    Q-T Interval 388 ms    QTC Calculation(Bezet) 469 ms    P Axis 70 degrees    R Axis 40 degrees    T Axis 39 degrees    Diagnosis Line Normal sinus rhythm  Nonspecific ST and T wave abnormality  Prolonged QT    Confirmed by DORA TURNER MD [id number]) on 2020 9:14:22 PM (20 @ 18:55)      MEDICATIONS  ascorbic acid 500  aspirin  chewable 81  atorvastatin 80  budesonide 160 MICROgram(s)/formoterol 4.5 MICROgram(s) Inhaler 2  ergocalciferol 27116  famotidine    Tablet 20  folic acid 1  heparin  Injectable 5000  hydroxychloroquine   methotrexate 7.5  metoprolol succinate ER 25  pantoprazole    Tablet 40  predniSONE   Tablet 10  sertraline 50  sodium chloride 0.9%. 1000  ticagrelor 90  zinc sulfate 220      ANTIBIOTICS:  hydroxychloroquine   Oral

## 2020-04-16 NOTE — CHART NOTE - NSCHARTNOTEFT_GEN_A_CORE
73yo female whose PMH includes CAD ( 5 stents placed 1 year ago) presents to the ER after collapsing to the floor in her home twice (bedroom then in bathroom). She thinks she had period of loss of consciousness, associated with weakness and episode of urinary incontinence in bed but she denies fevers, chills, headache, chest pain or palpitations.  She notes that her  is sick at home (covid?). To me she is denying having diarrhea (though ER notes list this)    Patient was observed overnight and was clinically stable. Pt is Covid positive. Oxygen saturations have been good on room air. U/A was positive for possible UTI. Patient is medically stable to be discharged with course of antibiotics.  On    Currently admitted to medicine with the primary diagnosis of Syncope 73yo female whose PMH includes CAD ( 5 stents placed 1 year ago) presents to the ER after collapsing to the floor in her home twice (bedroom then in bathroom). She thinks she had period of loss of consciousness, associated with weakness and episode of urinary incontinence in bed. Pt is Covid-19 positive. Oxygen saturation on 2L NC was 90 % and Temp. was 98.4. Patient was received in [facility] room 5. Patient is medically stable. 73 yo female whose PMH includes CAD ( 5 stents placed 1 year ago) presents to the ER after collapsing to the floor in her home twice (bedroom then in bathroom). She thinks she had period of loss of consciousness, associated with weakness and episode of urinary incontinence in bed. Pt is Covid-19 positive. Oxygen saturation on 2L NC was 90 % and T 98.4. Patient was received in [facility] room 5. She had a large bowel movement of watery brown stools. Patient is medically stable. 73 yo female whose PMH includes CAD ( 5 stents placed 1 year ago) presents to the ER after collapsing to the floor in her home twice (bedroom then in bathroom). She thinks she had period of loss of consciousness, associated with weakness and episode of urinary incontinence in bed. Pt is Covid-19 positive. Oxygen saturation on 2L NC was 90 % and T 98.4. Patient was received in [facility] room 5. She had a large bowel movement of watery brown stools. Patient is medically stable.    I spoke with patient's  Zohaib [phone] and updated him on her status.

## 2020-04-16 NOTE — PROGRESS NOTE ADULT - ASSESSMENT
74F PMHx RA, HTN, CAD/MI s/p stents here with fall, unwitnessed, in setting of covid +/- uti.    #Fall, unwitnessed, possible syncope per pt  in setting of covid, +/- uti  no further episodes syncope; appreciate neuro  reeg neg  monitor off plaquenil  satting well on ra  PT, discharge planning to snf  stable for transfer to [facility]  ucx gnr <100,000; pt with +increased urinary freq; cont ceftriaxone plan for 5 days total  #RA  prednisone 10  mtx 7.5 qweek  folic acid  #HTN  toprol 25  #CAD  asa  brilinta  #DVT ppx  subq hep    #Progress Note Handoff:  Pending (specify):  Consults_________, Tests________, Test Results_______, Other___d/c planning______  Family discussion:d/w pt at bedside re: treatment plan, primary dx  Disposition: Home___/SNF_x__/Other________/Unknown at this time________

## 2020-04-16 NOTE — PROGRESS NOTE ADULT - SUBJECTIVE AND OBJECTIVE BOX
INTERVAL HPI/OVERNIGHT EVENTS:    SUBJECTIVE: Patient seen and examined at bedside.     no cp, sob, abd pain, fever  no sob, orthopnea, pnd, cough    OBJECTIVE:    VITAL SIGNS:  Vital Signs Last 24 Hrs  T(C): 38.4 (16 Apr 2020 06:14), Max: 39.8 (16 Apr 2020 05:20)  T(F): 101.1 (16 Apr 2020 06:14), Max: 103.6 (16 Apr 2020 05:20)  HR: 99 (16 Apr 2020 06:14) (76 - 136)  BP: 133/56 (16 Apr 2020 06:14) (110/50 - 142/63)  BP(mean): --  RR: 18 (16 Apr 2020 06:14) (16 - 20)  SpO2: 92% (16 Apr 2020 07:52) (92% - 93%)      PHYSICAL EXAM:    General: NAD  HEENT: NC/AT; PERRL, clear conjunctiva  Neck: supple  Respiratory: base crackles  Cardiovascular: +S1/S2; RRR  Abdomen: soft, NT/ND; +BS x4  Extremities: WWP, 2+ peripheral pulses b/l; no LE edema  Skin: normal color and turgor; no rash  Neurological:    MEDICATIONS:  MEDICATIONS  (STANDING):  ascorbic acid 500 milliGRAM(s) Oral daily  aspirin  chewable 81 milliGRAM(s) Oral daily  atorvastatin 80 milliGRAM(s) Oral at bedtime  budesonide 160 MICROgram(s)/formoterol 4.5 MICROgram(s) Inhaler 2 Puff(s) Inhalation two times a day  cefTRIAXone   IVPB 1000 milliGRAM(s) IV Intermittent every 24 hours  ergocalciferol 59838 Unit(s) Oral every week  famotidine    Tablet 20 milliGRAM(s) Oral at bedtime  folic acid 1 milliGRAM(s) Oral daily  heparin  Injectable 5000 Unit(s) SubCutaneous two times a day  methotrexate 7.5 milliGRAM(s) Oral every week  metoprolol succinate ER 25 milliGRAM(s) Oral daily  pantoprazole    Tablet 40 milliGRAM(s) Oral before breakfast  predniSONE   Tablet 10 milliGRAM(s) Oral daily  sertraline 50 milliGRAM(s) Oral at bedtime  sodium chloride 0.9%. 1000 milliLiter(s) (75 mL/Hr) IV Continuous <Continuous>  ticagrelor 90 milliGRAM(s) Oral two times a day  zinc sulfate 220 milliGRAM(s) Oral daily    MEDICATIONS  (PRN):  acetaminophen   Tablet .. 650 milliGRAM(s) Oral every 6 hours PRN Mild Pain (1 - 3)  ALPRAZolam 1 milliGRAM(s) Oral three times a day PRN anxiety  guaifenesin/dextromethorphan  Syrup 10 milliLiter(s) Oral every 4 hours PRN Cough  ondansetron Injectable 4 milliGRAM(s) IV Push every 8 hours PRN Nausea and/or Vomiting      ALLERGIES:  Allergies    Zocor (Joint Pain)    Intolerances        LABS:                        11.7   9.44  )-----------( 143      ( 15 Apr 2020 06:30 )             34.7     Hemoglobin: 11.7 g/dL (04-15 @ 06:30)  Hemoglobin: 11.6 g/dL (04-14 @ 09:13)  Hemoglobin: 12.2 g/dL (04-13 @ 19:37)    CBC Full  -  ( 15 Apr 2020 06:30 )  WBC Count : 9.44 K/uL  RBC Count : 3.54 M/uL  Hemoglobin : 11.7 g/dL  Hematocrit : 34.7 %  Platelet Count - Automated : 143 K/uL  Mean Cell Volume : 98.0 fL  Mean Cell Hemoglobin : 33.1 pg  Mean Cell Hemoglobin Concentration : 33.7 g/dL  Auto Neutrophil # : 8.58 K/uL  Auto Lymphocyte # : 0.46 K/uL  Auto Monocyte # : 0.31 K/uL  Auto Eosinophil # : 0.01 K/uL  Auto Basophil # : 0.02 K/uL  Auto Neutrophil % : 90.9 %  Auto Lymphocyte % : 4.9 %  Auto Monocyte % : 3.3 %  Auto Eosinophil % : 0.1 %  Auto Basophil % : 0.2 %    04-15    137  |  102  |  14  ----------------------------<  97  4.0   |  23  |  0.7    Ca    8.1<L>      15 Apr 2020 06:30    TPro  6.1  /  Alb  3.7  /  TBili  0.4  /  DBili  x   /  AST  29  /  ALT  10  /  AlkPhos  55  04-15    Creatinine Trend: 0.7<--, 0.7<--, 1.0<--  LIVER FUNCTIONS - ( 15 Apr 2020 06:30 )  Alb: 3.7 g/dL / Pro: 6.1 g/dL / ALK PHOS: 55 U/L / ALT: 10 U/L / AST: 29 U/L / GGT: x               hs Troponin:              CSF:                      EKG:   MICROBIOLOGY:    Culture - Urine (collected 14 Apr 2020 09:31)  Source: .Urine Clean Catch (Midstream)  Preliminary Report (15 Apr 2020 17:00):    50,000 - 99,000 CFU/mL Gram Negative Rods    10,000 - 49,000 CFU/mL Gram Negative Rods #2    <10,000 CFU/ml Normal Urogenital ailyn present    Culture - Blood (collected 13 Apr 2020 19:37)  Source: .Blood Blood-Peripheral  Preliminary Report (15 Apr 2020 03:01):    No growth to date.    Culture - Blood (collected 13 Apr 2020 19:37)  Source: .Blood Blood-Peripheral  Preliminary Report (15 Apr 2020 03:01):    No growth to date.      IMAGING:      Labs, imaging, EKG personally reviewed    RADIOLOGY & ADDITIONAL TESTS: Reviewed. INTERVAL HPI/OVERNIGHT EVENTS:    SUBJECTIVE: Patient seen and examined at bedside.     no cp, sob, abd pain, fever  no sob, orthopnea, pnd, cough    OBJECTIVE:    VITAL SIGNS:  Vital Signs Last 24 Hrs  T(C): 38.4 (16 Apr 2020 06:14), Max: 39.8 (16 Apr 2020 05:20)  T(F): 101.1 (16 Apr 2020 06:14), Max: 103.6 (16 Apr 2020 05:20)  HR: 99 (16 Apr 2020 06:14) (76 - 136)  BP: 133/56 (16 Apr 2020 06:14) (110/50 - 142/63)  BP(mean): --  RR: 18 (16 Apr 2020 06:14) (16 - 20)  SpO2: 92% (16 Apr 2020 07:52) (92% - 93%)    PHYSICAL EXAM:    General: NAD  HEENT: NC/AT; PERRL, clear conjunctiva  Neck: supple  Respiratory: base crackles  Cardiovascular: +S1/S2; RRR  Abdomen: soft, NT/ND; +BS x4  Extremities: WWP, 2+ peripheral pulses b/l; no LE edema  Skin: normal color and turgor; no rash  Neurological:    MEDICATIONS:  MEDICATIONS  (STANDING):  ascorbic acid 500 milliGRAM(s) Oral daily  aspirin  chewable 81 milliGRAM(s) Oral daily  atorvastatin 80 milliGRAM(s) Oral at bedtime  budesonide 160 MICROgram(s)/formoterol 4.5 MICROgram(s) Inhaler 2 Puff(s) Inhalation two times a day  cefTRIAXone   IVPB 1000 milliGRAM(s) IV Intermittent every 24 hours  ergocalciferol 41794 Unit(s) Oral every week  famotidine    Tablet 20 milliGRAM(s) Oral at bedtime  folic acid 1 milliGRAM(s) Oral daily  heparin  Injectable 5000 Unit(s) SubCutaneous two times a day  methotrexate 7.5 milliGRAM(s) Oral every week  metoprolol succinate ER 25 milliGRAM(s) Oral daily  pantoprazole    Tablet 40 milliGRAM(s) Oral before breakfast  predniSONE   Tablet 10 milliGRAM(s) Oral daily  sertraline 50 milliGRAM(s) Oral at bedtime  sodium chloride 0.9%. 1000 milliLiter(s) (75 mL/Hr) IV Continuous <Continuous>  ticagrelor 90 milliGRAM(s) Oral two times a day  zinc sulfate 220 milliGRAM(s) Oral daily    MEDICATIONS  (PRN):  acetaminophen   Tablet .. 650 milliGRAM(s) Oral every 6 hours PRN Mild Pain (1 - 3)  ALPRAZolam 1 milliGRAM(s) Oral three times a day PRN anxiety  guaifenesin/dextromethorphan  Syrup 10 milliLiter(s) Oral every 4 hours PRN Cough  ondansetron Injectable 4 milliGRAM(s) IV Push every 8 hours PRN Nausea and/or Vomiting      ALLERGIES:  Allergies    Zocor (Joint Pain)    Intolerances        LABS:                        11.7   9.44  )-----------( 143      ( 15 Apr 2020 06:30 )             34.7     Hemoglobin: 11.7 g/dL (04-15 @ 06:30)  Hemoglobin: 11.6 g/dL (04-14 @ 09:13)  Hemoglobin: 12.2 g/dL (04-13 @ 19:37)    CBC Full  -  ( 15 Apr 2020 06:30 )  WBC Count : 9.44 K/uL  RBC Count : 3.54 M/uL  Hemoglobin : 11.7 g/dL  Hematocrit : 34.7 %  Platelet Count - Automated : 143 K/uL  Mean Cell Volume : 98.0 fL  Mean Cell Hemoglobin : 33.1 pg  Mean Cell Hemoglobin Concentration : 33.7 g/dL  Auto Neutrophil # : 8.58 K/uL  Auto Lymphocyte # : 0.46 K/uL  Auto Monocyte # : 0.31 K/uL  Auto Eosinophil # : 0.01 K/uL  Auto Basophil # : 0.02 K/uL  Auto Neutrophil % : 90.9 %  Auto Lymphocyte % : 4.9 %  Auto Monocyte % : 3.3 %  Auto Eosinophil % : 0.1 %  Auto Basophil % : 0.2 %    04-15    137  |  102  |  14  ----------------------------<  97  4.0   |  23  |  0.7    Ca    8.1<L>      15 Apr 2020 06:30    TPro  6.1  /  Alb  3.7  /  TBili  0.4  /  DBili  x   /  AST  29  /  ALT  10  /  AlkPhos  55  04-15    Creatinine Trend: 0.7<--, 0.7<--, 1.0<--  LIVER FUNCTIONS - ( 15 Apr 2020 06:30 )  Alb: 3.7 g/dL / Pro: 6.1 g/dL / ALK PHOS: 55 U/L / ALT: 10 U/L / AST: 29 U/L / GGT: x           MICROBIOLOGY:    Culture -   Culture - Urine (04.14.20 @ 09:31)    -  Amikacin: S <=16    -  Amikacin: S <=16    -  Ampicillin: R >16 These ampicillin results predict results for amoxicillin    -  Ampicillin/Sulbactam: R 16/8 Enterobacter, Citrobacter, and Serratia may develop resistance during prolonged therapy (3-4 days)    -  Aztreonam: S <=4    -  Aztreonam: R >16    -  Cefazolin: R >16 (MIC_CL_COM_ENTERIC_CEFAZU) For uncomplicated UTI with K. pneumoniae, E. coli, or P. mirablis: HERBER <=16 is sensitive and HERBER >=32 is resistant. This also predicts results for oral agents cefaclor, cefdinir, cefpodoxime, cefprozil, cefuroxime axetil, cephalexin and locarbef for uncomplicated UTI. Note that some isolates may be susceptible to these agents while testing resistant to cefazolin.    -  Cefepime: S <=4    -  Cefepime: R >16    -  Cefoxitin: S <=8    -  Ceftazidime: S <=1    -  Ceftriaxone: R >32 Enterobacter, Citrobacter, and Serratia may develop resistance during prolonged therapy    -  Ciprofloxacin: S <=1    -  Ciprofloxacin: R >2    -  Ertapenem: S <=1    -  Gentamicin: S <=4    -  Gentamicin: R >8    -  Imipenem: S <=1    -  Imipenem: S <=1    -  Levofloxacin: S <=2    -  Levofloxacin: R >4    -  Meropenem: S <=1    -  Meropenem: S <=1    -  Nitrofurantoin: S <=32 Should not be used to treat pyelonephritis    -  Piperacillin/Tazobactam: S <=16    -  Piperacillin/Tazobactam: R <=16    -  Tigecycline: S <=2    -  Tobramycin: S <=4    -  Tobramycin: R >8    -  Trimethoprim/Sulfamethoxazole: S <=2/38    Specimen Source: .Urine Clean Catch (Midstream)    Culture Results:   50,000 - 99,000 CFU/mL Pseudomonas aeruginosa  10,000 - 49,000 CFU/mL Escherichia coli ESBL  <10,000 CFU/ml Normal Urogenital ailyn present    Organism Identification: Pseudomonas aeruginosa  Escherichia coli ESBL    Organism: Pseudomonas aeruginosa    Organism: Escherichia coli ESBL    Method Type: HERBER    Method Type: HERBER    IMAGING:      Labs, imaging, EKG personally reviewed    RADIOLOGY & ADDITIONAL TESTS: Reviewed.

## 2020-04-17 NOTE — ED ADULT NURSE NOTE - NSIMPLEMENTINTERV_GEN_ALL_ED
Implemented All Fall with Harm Risk Interventions:  [place]mbarger to call system. Call bell, personal items and telephone within reach. Instruct patient to call for assistance. Room bathroom lighting operational. Non-slip footwear when patient is off stretcher. Physically safe environment: no spills, clutter or unnecessary equipment. Stretcher in lowest position, wheels locked, appropriate side rails in place. Provide visual cue, wrist band, yellow gown, etc. Monitor gait and stability. Monitor for mental status changes and reorient to person, place, and time. Review medications for side effects contributing to fall risk. Reinforce activity limits and safety measures with patient and family. Provide visual clues: red socks.

## 2020-04-17 NOTE — ED ADULT TRIAGE NOTE - CHIEF COMPLAINT QUOTE
pt is positive covid. pt has been short of breath and was desating at [facility]. pt 02 is in the 80s on 4 lnc.

## 2020-04-17 NOTE — CHART NOTE - NSCHARTNOTEFT_GEN_A_CORE
Called to bedside. PT with increased work of breathing, tachypnic to mid 30s. SaO2 83-85 on 5L NC including with pronation. Pt placed on NRB at 15L. Sa02 increased to 91-93, tachypnea improving. EMS called. Will transfer to [facility] ED.

## 2020-04-17 NOTE — PROGRESS NOTE ADULT - SUBJECTIVE AND OBJECTIVE BOX
Medicine Progress Note    Patient is a 74y old  Female who presents with a chief complaint of syncope (15 Apr 2020 13:44)      SUBJECTIVE / OVERNIGHT EVENTS:    ADDITIONAL REVIEW OF SYSTEMS:    MEDICATIONS  (STANDING):  ascorbic acid 500 milliGRAM(s) Oral daily  aspirin  chewable 81 milliGRAM(s) Oral daily  atorvastatin 80 milliGRAM(s) Oral at bedtime  budesonide 160 MICROgram(s)/formoterol 4.5 MICROgram(s) Inhaler 2 Puff(s) Inhalation two times a day  ergocalciferol 53450 Unit(s) Oral every week  famotidine    Tablet 20 milliGRAM(s) Oral at bedtime  folic acid 1 milliGRAM(s) Oral daily  heparin  Injectable 5000 Unit(s) SubCutaneous two times a day  hydroxychloroquine 400 milliGRAM(s) Oral every 24 hours  hydroxychloroquine   Oral   methotrexate 7.5 milliGRAM(s) Oral every week  metoprolol succinate ER 25 milliGRAM(s) Oral daily  pantoprazole    Tablet 40 milliGRAM(s) Oral before breakfast  predniSONE   Tablet 10 milliGRAM(s) Oral daily  sertraline 50 milliGRAM(s) Oral at bedtime  sodium chloride 0.9%. 1000 milliLiter(s) (75 mL/Hr) IV Continuous <Continuous>  ticagrelor 90 milliGRAM(s) Oral two times a day  zinc sulfate 220 milliGRAM(s) Oral daily    MEDICATIONS  (PRN):  acetaminophen   Tablet .. 650 milliGRAM(s) Oral every 6 hours PRN Mild Pain (1 - 3)  ALPRAZolam 1 milliGRAM(s) Oral three times a day PRN anxiety  guaifenesin/dextromethorphan  Syrup 10 milliLiter(s) Oral every 4 hours PRN Cough  ondansetron Injectable 4 milliGRAM(s) IV Push every 8 hours PRN Nausea and/or Vomiting    CAPILLARY BLOOD GLUCOSE        I&O's Summary      PHYSICAL EXAM:  Vital Signs Last 24 Hrs  T(C): 36.7 (17 Apr 2020 04:00), Max: 37.2 (16 Apr 2020 12:04)  T(F): 98.1 (17 Apr 2020 04:00), Max: 99 (16 Apr 2020 12:04)  HR: 88 (17 Apr 2020 04:00) (81 - 88)  BP: 128/82 (17 Apr 2020 04:00) (102/44 - 128/82)  BP(mean): 63 (16 Apr 2020 12:04) (63 - 63)  RR: 22 (17 Apr 2020 04:00) (20 - 24)  SpO2: 90% (17 Apr 2020 04:00) (87% - 92%)  CONSTITUTIONAL: NAD, well-developed, well-groomed  ENMT: Moist oral mucosa, no pharyngeal injection or exudates; normal dentition  RESPIRATORY: Normal respiratory effort; lungs are clear to auscultation bilaterally  CARDIOVASCULAR: Regular rate and rhythm, normal S1 and S2, no murmur/rub/gallop; No lower extremity edema; Peripheral pulses are 2+ bilaterally  ABDOMEN: Nontender to palpation, normoactive bowel sounds, no rebound/guarding; No hepatosplenomegaly  PSYCH: A+O to person, place, and time; affect appropriate  NEUROLOGY: CN 2-12 are intact and symmetric; no gross sensory deficits   SKIN: No rashes; no palpable lesions    LABS:    Culture - Urine (collected 14 Apr 2020 09:31)  Source: .Urine Clean Catch (Midstream)  Final Report (16 Apr 2020 16:56):    50,000 - 99,000 CFU/mL Pseudomonas aeruginosa    10,000 - 49,000 CFU/mL Escherichia coli ESBL    <10,000 CFU/ml Normal Urogenital ailyn present  Organism: Pseudomonas aeruginosa  Escherichia coli ESBL (16 Apr 2020 16:56)  Organism: Escherichia coli ESBL (16 Apr 2020 16:56)  Organism: Pseudomonas aeruginosa (16 Apr 2020 16:56)      COVID-19 PCR: Detected (13 Apr 2020 20:42)      RADIOLOGY & ADDITIONAL TESTS:  Imaging from Last 24 Hours:    Electrocardiogram/QTc Interval:    COORDINATION OF CARE:  Care Discussed with Consultants/Other Providers: Medicine Progress Note    Patient is a 74y old  Female who presents with a chief complaint of syncope (15 Apr 2020 13:44)      SUBJECTIVE / OVERNIGHT EVENTS:    ADDITIONAL REVIEW OF SYSTEMS:    MEDICATIONS  (STANDING):  ascorbic acid 500 milliGRAM(s) Oral daily  aspirin  chewable 81 milliGRAM(s) Oral daily  atorvastatin 80 milliGRAM(s) Oral at bedtime  budesonide 160 MICROgram(s)/formoterol 4.5 MICROgram(s) Inhaler 2 Puff(s) Inhalation two times a day  ergocalciferol 49690 Unit(s) Oral every week  famotidine    Tablet 20 milliGRAM(s) Oral at bedtime  folic acid 1 milliGRAM(s) Oral daily  heparin  Injectable 5000 Unit(s) SubCutaneous two times a day  hydroxychloroquine 400 milliGRAM(s) Oral every 24 hours  hydroxychloroquine   Oral   methotrexate 7.5 milliGRAM(s) Oral every week  metoprolol succinate ER 25 milliGRAM(s) Oral daily  pantoprazole    Tablet 40 milliGRAM(s) Oral before breakfast  predniSONE   Tablet 10 milliGRAM(s) Oral daily  sertraline 50 milliGRAM(s) Oral at bedtime  sodium chloride 0.9%. 1000 milliLiter(s) (75 mL/Hr) IV Continuous <Continuous>  ticagrelor 90 milliGRAM(s) Oral two times a day  zinc sulfate 220 milliGRAM(s) Oral daily    MEDICATIONS  (PRN):  acetaminophen   Tablet .. 650 milliGRAM(s) Oral every 6 hours PRN Mild Pain (1 - 3)  ALPRAZolam 1 milliGRAM(s) Oral three times a day PRN anxiety  guaifenesin/dextromethorphan  Syrup 10 milliLiter(s) Oral every 4 hours PRN Cough  ondansetron Injectable 4 milliGRAM(s) IV Push every 8 hours PRN Nausea and/or Vomiting    CAPILLARY BLOOD GLUCOSE        I&O's Summary      PHYSICAL EXAM:  Vital Signs Last 24 Hrs  T(C): 36.7 (17 Apr 2020 04:00), Max: 37.2 (16 Apr 2020 12:04)  T(F): 98.1 (17 Apr 2020 04:00), Max: 99 (16 Apr 2020 12:04)  HR: 88 (17 Apr 2020 04:00) (81 - 88)  BP: 128/82 (17 Apr 2020 04:00) (102/44 - 128/82)  BP(mean): 63 (16 Apr 2020 12:04) (63 - 63)  RR: 22 (17 Apr 2020 04:00) (20 - 24)  SpO2: 90% (17 Apr 2020 04:00) (87% - 92%)  CONSTITUTIONAL: NAD, well-developed, well-groomed  ENMT: Moist oral mucosa, no pharyngeal injection or exudates; normal dentition  RESPIRATORY: Normal respiratory effort; lungs are clear to auscultation bilaterally  CARDIOVASCULAR: Regular rate and rhythm, normal S1 and S2, no murmur/rub/gallop; No lower extremity edema; Peripheral pulses are 2+ bilaterally  ABDOMEN: Nontender to palpation, normoactive bowel sounds, no rebound/guarding; No hepatosplenomegaly  PSYCH: A+O to person, place, and time; affect appropriate  NEUROLOGY: CN 2-12 are intact and symmetric; no gross sensory deficits   SKIN: No rashes; no palpable lesions    LABS:    Culture - Urine (04.14.20 @ 09:31)    -  Amikacin: S <=16    -  Amikacin: S <=16    -  Ampicillin: R >16 These ampicillin results predict results for amoxicillin    -  Ampicillin/Sulbactam: R 16/8 Enterobacter, Citrobacter, and Serratia may develop resistance during prolonged therapy (3-4 days)    -  Aztreonam: S <=4    -  Aztreonam: R >16    -  Cefazolin: R >16 (MIC_CL_COM_ENTERIC_CEFAZU) For uncomplicated UTI with K. pneumoniae, E. coli, or P. mirablis: HERBER <=16 is sensitive and HERBER >=32 is resistant. This also predicts results for oral agents cefaclor, cefdinir, cefpodoxime, cefprozil, cefuroxime axetil, cephalexin and locarbef for uncomplicated UTI. Note that some isolates may be susceptible to these agents while testing resistant to cefazolin.    -  Cefepime: S <=4    -  Cefepime: R >16    -  Cefoxitin: S <=8    -  Ceftazidime: S <=1    -  Ceftriaxone: R >32 Enterobacter, Citrobacter, and Serratia may develop resistance during prolonged therapy    -  Ciprofloxacin: S <=1    -  Ciprofloxacin: R >2    -  Ertapenem: S <=1    -  Gentamicin: S <=4    -  Gentamicin: R >8    -  Imipenem: S <=1    -  Imipenem: S <=1    -  Levofloxacin: S <=2    -  Levofloxacin: R >4    -  Meropenem: S <=1    -  Meropenem: S <=1    -  Nitrofurantoin: S <=32 Should not be used to treat pyelonephritis    -  Piperacillin/Tazobactam: S <=16    -  Piperacillin/Tazobactam: R <=16    -  Tigecycline: S <=2    -  Tobramycin: S <=4    -  Tobramycin: R >8    -  Trimethoprim/Sulfamethoxazole: S <=2/38    Specimen Source: .Urine Clean Catch (Midstream)    Culture Results:   50,000 - 99,000 CFU/mL Pseudomonas aeruginosa  10,000 - 49,000 CFU/mL Escherichia coli ESBL  <10,000 CFU/ml Normal Urogenital ailyn present    Organism Identification: Pseudomonas aeruginosa  Escherichia coli ESBL    Organism: Pseudomonas aeruginosa    Organism: Escherichia coli ESBL    Method Type: HERBER    Method Type: HERBER    Culture - Urine (collected 14 Apr 2020 09:31)  Source: .Urine Clean Catch (Midstream)  Final Report (16 Apr 2020 16:56):    50,000 - 99,000 CFU/mL Pseudomonas aeruginosa    10,000 - 49,000 CFU/mL Escherichia coli ESBL    <10,000 CFU/ml Normal Urogenital ailyn present  Organism: Pseudomonas aeruginosa  Escherichia coli ESBL (16 Apr 2020 16:56)  Organism: Escherichia coli ESBL (16 Apr 2020 16:56)  Organism: Pseudomonas aeruginosa (16 Apr 2020 16:56)      COVID-19 PCR: Detected (13 Apr 2020 20:42)      RADIOLOGY & ADDITIONAL TESTS:  Imaging from Last 24 Hours:    Electrocardiogram/QTc Interval:    COORDINATION OF CARE:  Care Discussed with Consultants/Other Providers: Medicine Progress Note    Patient is a 74y old  Female who presents with a chief complaint of syncope (15 Apr 2020 13:44)      SUBJECTIVE / OVERNIGHT EVENTS: She denies SOB and chest pain. She remains on 2L NC with 02 sat of 90%. No N/V    ADDITIONAL REVIEW OF SYSTEMS:    MEDICATIONS  (STANDING):  ascorbic acid 500 milliGRAM(s) Oral daily  aspirin  chewable 81 milliGRAM(s) Oral daily  atorvastatin 80 milliGRAM(s) Oral at bedtime  budesonide 160 MICROgram(s)/formoterol 4.5 MICROgram(s) Inhaler 2 Puff(s) Inhalation two times a day  ergocalciferol 43951 Unit(s) Oral every week  famotidine Tablet 20 milliGRAM(s) Oral at bedtime  folic acid 1 milliGRAM(s) Oral daily  heparin  Injectable 5000 Unit(s) SubCutaneous two times a day  hydroxychloroquine 400 milliGRAM(s) Oral every 24 hours  hydroxychloroquine   Oral   methotrexate 7.5 milliGRAM(s) Oral every week  metoprolol succinate ER 25 milliGRAM(s) Oral daily  pantoprazole    Tablet 40 milliGRAM(s) Oral before breakfast  predniSONE Tablet 10 milliGRAM(s) Oral daily  sertraline 50 milliGRAM(s) Oral at bedtime  sodium chloride 0.9%. 1000 milliLiter(s) (75 mL/Hr) IV Continuous <Continuous>  ticagrelor 90 milliGRAM(s) Oral two times a day  zinc sulfate 220 milliGRAM(s) Oral daily    MEDICATIONS  (PRN):  acetaminophen Tablet .. 650 milliGRAM(s) Oral every 6 hours PRN Mild Pain (1 - 3)  ALPRAZolam 1 milliGRAM(s) Oral three times a day PRN anxiety  guaifenesin/dextromethorphan  Syrup 10 milliLiter(s) Oral every 4 hours PRN Cough  ondansetron Injectable 4 milliGRAM(s) IV Push every 8 hours PRN Nausea and/or Vomiting    CAPILLARY BLOOD GLUCOSE        I&O's Summary      PHYSICAL EXAM:  Vital Signs Last 24 Hrs  T(C): 36.7 (17 Apr 2020 04:00), Max: 37.2 (16 Apr 2020 12:04)  T(F): 98.1 (17 Apr 2020 04:00), Max: 99 (16 Apr 2020 12:04)  HR: 88 (17 Apr 2020 04:00) (81 - 88)  BP: 128/82 (17 Apr 2020 04:00) (102/44 - 128/82)  BP(mean): 63 (16 Apr 2020 12:04) (63 - 63)  RR: 22 (17 Apr 2020 04:00) (20 - 24)  SpO2: 90% (17 Apr 2020 04:00) (87% - 92%)  CONSTITUTIONAL: NAD, well-developed, well-groomed  ENMT: Moist oral mucosa, no pharyngeal injection or exudates; normal dentition  RESPIRATORY: Normal respiratory effort; lungs are clear to auscultation bilaterally  CARDIOVASCULAR: Regular rate and rhythm, normal S1 and S2, no murmur/rub/gallop; No lower extremity edema; Peripheral pulses are 2+ bilaterally  ABDOMEN: Nontender to palpation, normoactive bowel sounds, no rebound/guarding; No hepatosplenomegaly  PSYCH: A+O to person, place, and time; affect appropriate  NEUROLOGY: CN 2-12 are intact and symmetric; no gross sensory deficits   SKIN: No rashes; no palpable lesions    LABS:    Culture - Urine (04.14.20 @ 09:31)    -  Amikacin: S <=16    -  Amikacin: S <=16    -  Ampicillin: R >16 These ampicillin results predict results for amoxicillin    -  Ampicillin/Sulbactam: R 16/8 Enterobacter, Citrobacter, and Serratia may develop resistance during prolonged therapy (3-4 days)    -  Aztreonam: S <=4    -  Aztreonam: R >16    -  Cefazolin: R >16 (MIC_CL_COM_ENTERIC_CEFAZU) For uncomplicated UTI with K. pneumoniae, E. coli, or P. mirablis: HERBER <=16 is sensitive and HERBER >=32 is resistant. This also predicts results for oral agents cefaclor, cefdinir, cefpodoxime, cefprozil, cefuroxime axetil, cephalexin and locarbef for uncomplicated UTI. Note that some isolates may be susceptible to these agents while testing resistant to cefazolin.    -  Cefepime: S <=4    -  Cefepime: R >16    -  Cefoxitin: S <=8    -  Ceftazidime: S <=1    -  Ceftriaxone: R >32 Enterobacter, Citrobacter, and Serratia may develop resistance during prolonged therapy    -  Ciprofloxacin: S <=1    -  Ciprofloxacin: R >2    -  Ertapenem: S <=1    -  Gentamicin: S <=4    -  Gentamicin: R >8    -  Imipenem: S <=1    -  Imipenem: S <=1    -  Levofloxacin: S <=2    -  Levofloxacin: R >4    -  Meropenem: S <=1    -  Meropenem: S <=1    -  Nitrofurantoin: S <=32 Should not be used to treat pyelonephritis    -  Piperacillin/Tazobactam: S <=16    -  Piperacillin/Tazobactam: R <=16    -  Tigecycline: S <=2    -  Tobramycin: S <=4    -  Tobramycin: R >8    -  Trimethoprim/Sulfamethoxazole: S <=2/38    Specimen Source: .Urine Clean Catch (Midstream)    Culture Results:   50,000 - 99,000 CFU/mL Pseudomonas aeruginosa  10,000 - 49,000 CFU/mL Escherichia coli ESBL  <10,000 CFU/ml Normal Urogenital ailyn present    Organism Identification: Pseudomonas aeruginosa  Escherichia coli ESBL    Organism: Pseudomonas aeruginosa    Organism: Escherichia coli ESBL    Method Type: HERBER    Method Type: HERBER    Culture - Urine (collected 14 Apr 2020 09:31)  Source: .Urine Clean Catch (Midstream)  Final Report (16 Apr 2020 16:56):    50,000 - 99,000 CFU/mL Pseudomonas aeruginosa    10,000 - 49,000 CFU/mL Escherichia coli ESBL    <10,000 CFU/ml Normal Urogenital ailyn present  Organism: Pseudomonas aeruginosa  Escherichia coli ESBL (16 Apr 2020 16:56)  Organism: Escherichia coli ESBL (16 Apr 2020 16:56)  Organism: Pseudomonas aeruginosa (16 Apr 2020 16:56)      COVID-19 PCR: Detected (13 Apr 2020 20:42)      RADIOLOGY & ADDITIONAL TESTS:  Imaging from Last 24 Hours:    Electrocardiogram/QTc Interval:    COORDINATION OF CARE:  Care Discussed with Consultants/Other Providers:

## 2020-04-17 NOTE — PROGRESS NOTE ADULT - ASSESSMENT
Medicine Progress Note    Patient is a 74y old  Female who presents with a chief complaint of syncope (15 Apr 2020 13:44)      SUBJECTIVE / OVERNIGHT EVENTS: Stable. Remained on 2L NC      ADDITIONAL REVIEW OF SYSTEMS:    MEDICATIONS  (STANDING):    ascorbic acid 500 milliGRAM(s) Oral daily  aspirin  chewable 81 milliGRAM(s) Oral daily  atorvastatin 80 milliGRAM(s) Oral at bedtime  budesonide 160 MICROgram(s)/formoterol 4.5 MICROgram(s) Inhaler 2 Puff(s) Inhalation two times a day  ergocalciferol 63313 Unit(s) Oral every week  famotidine    Tablet 20 milliGRAM(s) Oral at bedtime  folic acid 1 milliGRAM(s) Oral daily  heparin  Injectable 5000 Unit(s) SubCutaneous two times a day  hydroxychloroquine 400 milliGRAM(s) Oral every 24 hours  hydroxychloroquine   Oral   methotrexate 7.5 milliGRAM(s) Oral every week  metoprolol succinate ER 25 milliGRAM(s) Oral daily  pantoprazole    Tablet 40 milliGRAM(s) Oral before breakfast  predniSONE   Tablet 10 milliGRAM(s) Oral daily  sertraline 50 milliGRAM(s) Oral at bedtime  sodium chloride 0.9%. 1000 milliLiter(s) (75 mL/Hr) IV Continuous <Continuous>  ticagrelor 90 milliGRAM(s) Oral two times a day  zinc sulfate 220 milliGRAM(s) Oral daily    MEDICATIONS  (PRN):  acetaminophen   Tablet .. 650 milliGRAM(s) Oral every 6 hours PRN Mild Pain (1 - 3)  ALPRAZolam 1 milliGRAM(s) Oral three times a day PRN anxiety  guaifenesin/dextromethorphan  Syrup 10 milliLiter(s) Oral every 4 hours PRN Cough  ondansetron Injectable 4 milliGRAM(s) IV Push every 8 hours PRN Nausea and/or Vomiting    CAPILLARY BLOOD GLUCOSE        I&O's Summary      PHYSICAL EXAM:  Vital Signs Last 24 Hrs  T(C): 36.7 (17 Apr 2020 04:00), Max: 37.2 (16 Apr 2020 12:04)  T(F): 98.1 (17 Apr 2020 04:00), Max: 99 (16 Apr 2020 12:04)  HR: 88 (17 Apr 2020 04:00) (81 - 88)  BP: 128/82 (17 Apr 2020 04:00) (102/44 - 128/82)  BP(mean): 63 (16 Apr 2020 12:04) (63 - 63)  RR: 22 (17 Apr 2020 04:00) (20 - 24)  SpO2: 90% (17 Apr 2020 04:00) (87% - 92%)  CONSTITUTIONAL: NAD, well-developed, well-groomed  ENMT: Moist oral mucosa, no pharyngeal injection or exudates; normal dentition  RESPIRATORY: Normal respiratory effort; lungs are clear to auscultation bilaterally  CARDIOVASCULAR: Regular rate and rhythm, normal S1 and S2, no murmur/rub/gallop; No lower extremity edema; Peripheral pulses are 2+ bilaterally  ABDOMEN: Nontender to palpation, normoactive bowel sounds, no rebound/guarding; No hepatosplenomegaly  PSYCH: A+O to person, place, and time; affect appropriate  NEUROLOGY: CN 2-12 are intact and symmetric; no gross sensory deficits   SKIN: No rashes; no palpable lesions    LABS: 74F PMHx RA, HTN, CAD/MI s/p stents here with fall, unwitnessed, in setting of covid +/- uti.    #Fall, unwitnessed, possible syncope per pt  in setting of covid, +/- uti  monitor off plaquenil  satting well on ra  orthostatics noted  PT, discharge planning to snf  bactrim pending ucx    #RA  prednisone 10  mtx 7.5 qweek  folic acid    #HTN  toprol 25    #CAD  asa  brilinta    #DVT ppx  subq hep    #Progress Note Handoff:  Pending (specify):  Consults_________, Tests________, Test Results_______, Other___d/c planning______  Family discussion:d/w pt at bedside re: treatment plan, primary dx  Disposition: Home___/SNF_x__/Other________/Unknown at this time________      Attempted to write a note today however accidently modified yesterday's progress note. Today's note is current to plan as discussed with attending. 74F PMHx RA, HTN, CAD/MI s/p stents here with fall, unwitnessed, in setting of covid +/- uti.    #Fall, unwitnessed, possible syncope per pt in setting of covid, +/- uti  monitor off plaquenil  on 2L NC  orthostatics noted  urine culture (+) - started on Bactrim for 4 days     #RA  prednisone 10  mtx 7.5 qweek  folic acid    #HTN  toprol 25    #CAD  asa  brilinta    #DVT ppx  subq hep.    Attempted to write a note today however accidently modified yesterday's progress note. Today's note is current to plan as discussed with attending.

## 2020-04-17 NOTE — ED ADULT NURSE NOTE - OBJECTIVE STATEMENT
Pt comes from home, transferred to [facility] covid test +. Pt transferred due to desaturation to 83% on RA. Pt states her  is sick at home and caught it from him. Pt febrile 102.3 rectally and having episodes of diarrhea. Allergy to zocor. As per patient PMH of stents x5 in the past.

## 2020-04-17 NOTE — CHART NOTE - NSCHARTNOTEFT_GEN_A_CORE
Pt currently awake and alert and appears exhausted pt noted to be agitated and desaturating on 5L NC, pt O2 saturation 83% on 5 L and pt states she is too tired to talk, pt tachypneic with RR > 30 at this time. ED attending called and pt placed on NRB at 15L. Ambulance called for Level 1 lights and sirens transfer to [facility] ED. Verbal sign out given to ED attending Jonathan. Family notified of patient transfer.

## 2020-04-17 NOTE — ED ADULT NURSE NOTE - CHIEF COMPLAINT QUOTE
pt is positive covid. pt has been short of breath and was desating at [facility]. pt 02 is in the 80s on 4 lnc.

## 2020-04-18 NOTE — H&P ADULT - HISTORY OF PRESENT ILLNESS
Patient is a 75 y/o F with PMH of CAD s/p PCI x5, GERD, Rheumatoid arthritis presented from [place] ([place]) for worsening shortness of breath and hypoxia. Patient was admitted to [facility] on 4/13 after she had 2 episodes of syncope at home and was tested positive for COVID-19. Patient denied any symptoms related to COVID-19 until then except for the fall. Patient was on 2L NC until yesterday but today she was hypoxic on NC and tachypneic. She was started on nonrebreather and sent to [facility] for further management. Also c/o severe myalgias and generalized weakness. No c/o cough, nausea, vomiting. Had diarrhea at [place].   In ED, /75mm Hg, /min, 102.3F, saturating at 81% on RA. Labs showed d-dimer 516, WBC 15K with lymphopenia, creatinine 1.2. Chest x ray showed new bilateral opacities. Currently saturating at 98% on NRB.

## 2020-04-18 NOTE — ED PROVIDER NOTE - CLINICAL SUMMARY MEDICAL DECISION MAKING FREE TEXT BOX
pt tx from [facility] for resp distress  s/o from Dr. Garcia,   sx improved with NRB, will continue to eval  in ED and admit

## 2020-04-18 NOTE — ED PROVIDER NOTE - ATTENDING CONTRIBUTION TO CARE
I have personally evaluated this patient. I have seen this patient with the resident please see progress notes for my contribution to care

## 2020-04-18 NOTE — H&P ADULT - NSHPREVIEWOFSYSTEMS_GEN_ALL_CORE
CONSTITUTIONAL: No weakness, fevers or chills, no weight loss   EYES/ENT: No visual changes;  No vertigo or throat pain   NECK: No pain or stiffness  RESPIRATORY: No cough, wheezing, hemoptysis; + shortness of breath  CARDIOVASCULAR: No chest pain or palpitations  GASTROINTESTINAL: No abdominal or epigastric pain. No nausea, vomiting, or hematemesis; No diarrhea or constipation. No melena or hematochezia.  GENITOURINARY: No dysuria, frequency or hematuria  NEUROLOGICAL: No numbness or weakness  All other review of systems is negative unless indicated above.

## 2020-04-18 NOTE — H&P ADULT - NSHPLABSRESULTS_GEN_ALL_CORE
Complete Blood Count + Automated Diff (04.17.20 @ 23:17)    WBC Count: 15.79 K/uL    RBC Count: 3.66 M/uL    Hemoglobin: 12.4 g/dL    Hematocrit: 35.3 %    Mean Cell Volume: 96.4 fL    Mean Cell Hemoglobin: 33.9 pg    Mean Cell Hemoglobin Conc: 35.1 g/dL    Red Cell Distrib Width: 15.2 %    Platelet Count - Automated: 142 K/uL    Auto Neutrophil #: 14.72 K/uL    Auto Lymphocyte #: 0.67 K/uL    Auto Monocyte #: 0.29 K/uL    Auto Eosinophil #: 0.00 K/uL    Auto Basophil #: 0.03 K/uL    Auto Neutrophil %: 93.3: Differential percentages must be correlated with absolute numbers for  clinical significance. %    Auto Lymphocyte %: 4.2 %    Auto Monocyte %: 1.8 %    Auto Eosinophil %: 0.0 %    Auto Basophil %: 0.2 %    Auto Immature Granulocyte %: 0.5 %    Nucleated RBC: 0 /100 WBCs    Comprehensive Metabolic Panel (04.17.20 @ 23:17)    Sodium, Serum: 133 mmol/L    Potassium, Serum: 3.7 mmol/L    Chloride, Serum: 96 mmol/L    Carbon Dioxide, Serum: 20 mmol/L    Anion Gap, Serum: 17 mmol/L    Blood Urea Nitrogen, Serum: 22 mg/dL    Creatinine, Serum: 1.1 mg/dL    Glucose, Serum: 84 mg/dL    Calcium, Total Serum: 8.0 mg/dL    Protein Total, Serum: 6.3 g/dL    Albumin, Serum: 3.7 g/dL    Bilirubin Total, Serum: 0.6 mg/dL    Alkaline Phosphatase, Serum: 57 U/L    Aspartate Aminotransferase (AST/SGOT): 76 U/L    Alanine Aminotransferase (ALT/SGPT): 20 U/L    eGFR if Non : 50: Interpretative comment  The units for eGFR are mL/min/1.73M2 (normalized body surface area). The  eGFR is calculated from a serum creatinine using the CKD-EPI equation.  Other variables required for calculation are race, age and sex. Among  patients with chronic kidney disease (CKD), the eGFR is useful in  determining the stage of disease according to KDOQI CKD classification.  All eGFR results are reported numerically with the following  interpretation.            < from: Xray Chest 1 View-PORTABLE IMMEDIATE (04.18.20 @ 00:00) >    Impression:    New bilateral opacities suspicious for a viral pneumonia.    < end of copied text >

## 2020-04-18 NOTE — CONSULT NOTE ADULT - ASSESSMENT
73 y/o F with PMH of CAD s/p PCI x5, GERD, Rheumatoid arthritis presented from [facility]) for worsening shortness of breath and hypoxia.     IMPRESSION;   COVID 19 with Hypoxemia and CXR with b/l opacities  -mild elevation of inflammatory markers but given the duration of symptoms  possibility of cytokine release syndrome  s/p HCQ  Fevers but doubt has a superimposed bacterial PNA  Polymicrobial bacteruria with no pyuria ( of little clinical significance )    RECOMMENDATIONS;   catalina fpr ORSA   BCx  vancomycin 750 mg iv q12h  Meropenem 1 gm iv q8h  f/i procalcitonin  d/c HCQ as she received it once already

## 2020-04-18 NOTE — H&P ADULT - NSHPPHYSICALEXAM_GEN_ALL_CORE
PHYSICAL EXAM:  GENERAL: NAD, lying in bed comfortably  HEAD:  Atraumatic, Normocephalic  EYES: EOMI, PERRLA, conjunctiva and sclera clear  ENT: Moist mucous membranes  NECK: Supple, No JVD  CHEST/LUNG: Clear to auscultation bilaterally; No rales, rhonchi, wheezing, or rubs.   HEART: Regular rate and rhythm; No murmurs, rubs, or gallops  ABDOMEN: Bowel sounds present; Soft, Nontender, Nondistended. No hepatomegaly  EXTREMITIES:  2+ Peripheral Pulses, brisk capillary refill. No clubbing, cyanosis, or edema  NERVOUS SYSTEM:  Alert & Oriented X3, speech clear. No deficits   MSK: FROM all 4 extremities, full and equal strength  SKIN: No rashes or lesions

## 2020-04-18 NOTE — CONSULT NOTE ADULT - ASSESSMENT
IMPRESSION:    Acute hypoxemic respiratory failure  COVID 19  Possible superinfection  HO ESBL: UTI     PLAN:    CNS:  No depressants     HEENT: Oral care    PULMONARY:  HOB @ 45 degrees.  Aspiration precautions Wean O2    CARDIOVASCULAR:  Gentle hydration while NPO.  Monitor QTC     GI: GI prophylaxis.  Feeding.  Bowel regimen     RENAL:  Follow up lytes.  Correct as needed    INFECTIOUS DISEASE: Follow up cultures.  Beatriz and Vacn.  Inflammatory markers.  Repeat Procalcitonin     HEMATOLOGICAL:  DVT prophylaxis.    ENDOCRINE:  Follow up FS.  Insulin protocol if needed.    MUSCULOSKELETAL:  Bed chair position     MICU for now

## 2020-04-18 NOTE — H&P ADULT - ASSESSMENT
Patient is a 75 y/o F with PMH of CAD s/p PCI x5, GERD, Rheumatoid arthritis presented from [facility]) for worsening shortness of breath and hypoxia.     ASSESSMENT AND PLAN:    #Acute hypoxic respiratory failure 2/2 COVID-19   - Patient appears very weak and lethargic   - currently on NRB and saturating well at 98%. O2 supplementation to keep SpO2 > 92 %   - repeat chest x ray in AM or earlier if any signs of decompensation   - Started on rocephin due to elevated WBC count. f/u repeat procalcitonin level  - D-dimer 516 on 4/17, low probability for PE   - Follow-up immune hyperactivation panel : LDH, fibrinogen, ferritin, triglycerides, ESR, CRP, pro-calcitonin  - Hydroxychloroquine 400 mg q24h for 2 more days. EKG showed Qtc 453 on 4/14  - Spoke to  to enroll in convalescent plasma trial as patient meets criteria  - Continue IVF with NS @ 75 mL/h   - Tylenol 650 mg q6h PRN for fever   - Contact and Airborne precautions   - spoke to pulmonary fellow. approved for ICU admission due to high risk for intubation  - ID consult     #Sinus tachycardia  -Likely 2/2 dehydration and poor oral intake, fever  -improved with LR bolus  -c/w metoprolol and rehydration with NS @ 75cc/hr    #CAD s/p PCI  -c/w aspirin and brilinta  -c/w statin and metoprolol. Hold metoprolol if BP is dropping    #Rheumatoid arthritis  -Will hold prednisone and methotrexate for now    #Asymptomatic bacteriuria  -Positive urine culture with E.coli and P. aeruginosa, elevated WBC count  -asymptomatic. Will hold antibiotics for now    #Diet: DASH/TLC  #DVT ppx: lovenox  #GI ppx: protonix  #Dispo: ICU     Left voicemail for  Zohaib on [phone]

## 2020-04-19 NOTE — PROGRESS NOTE ADULT - SUBJECTIVE AND OBJECTIVE BOX
Patient is a 74y old  Female who presents with a chief complaint of shortness of breath (19 Apr 2020 02:33)        Over Night Events: Worsenign respiratory status.  Desating on 100%.  Off pressors         ROS:     All ROS are negative except HPI         PHYSICAL EXAM    ICU Vital Signs Last 24 Hrs  T(C): 36.1 (19 Apr 2020 04:00), Max: 37.6 (18 Apr 2020 12:00)  T(F): 96.9 (19 Apr 2020 04:00), Max: 99.7 (18 Apr 2020 12:00)  HR: 78 (19 Apr 2020 07:00) (70 - 120)  BP: 119/54 (19 Apr 2020 07:00) (88/45 - 130/46)  BP(mean): 80 (19 Apr 2020 07:00) (51 - 87)  ABP: --  ABP(mean): --  RR: 22 (19 Apr 2020 07:00) (14 - 34)  SpO2: 92% (19 Apr 2020 08:17) (91% - 99%)      CONSTITUTIONAL:   Ill appearing.  Well nourished. In moderate respiratory distress     ENT:   Airway patent,   Mouth with normal mucosa.   No thrush    EYES:   Pupils equal,   Round and reactive to light.    CARDIAC:   Normal rate,   Regular rhythm.    No edema      Vascular:  Normal systolic impulse  No Carotid bruits    RESPIRATORY:   No wheezing  Bilateral BS  Normal chest expansion  Tachypneic,  use of accessory muscles    GASTROINTESTINAL:  Abdomen soft,   Non-tender,   No guarding,   + BS    GENITOURINARY  normal genitalia for sex  no edema    MUSCULOSKELETAL:   Range of motion is not limited,  No clubbing, cyanosis    NEUROLOGICAL:   Alert and oriented   No motor  deficits.    SKIN:   Skin normal color for race,   Warm and dry and intact.   No evidence of rash.    PSYCHIATRIC:   Normal mood and affect.   No apparent risk to self or others.    HEMATOLOGICAL:  No cervical  lymphadenopathy.  no inguinal lymphadenopathy      04-18-20 @ 07:01  -  04-19-20 @ 07:00  --------------------------------------------------------  IN:    IV PiggyBack: 350 mL    sodium chloride 0.9%.: 1800 mL  Total IN: 2150 mL    OUT:    Voided: 1300 mL  Total OUT: 1300 mL    Total NET: 850 mL          LABS:                            10.3   16.41 )-----------( 141      ( 19 Apr 2020 04:47 )             31.3                            10.3   16.41 )-----------( 141      ( 04-19 @ 04:47 )             31.3                11.3   13.69 )-----------( 131      ( 04-18 @ 05:05 )             33.0                12.4   15.79 )-----------( 142      ( 04-17 @ 23:17 )             35.3                                       04-19    138  |  104  |  14  ----------------------------<  110<H>  3.5   |  19  |  0.6<L>    Ca    7.1<L>      19 Apr 2020 04:47  Phos  2.1     04-19  Mg     2.2     04-19    TPro  4.8<L>  /  Alb  2.8<L>  /  TBili  0.3  /  DBili  x   /  AST  65<H>  /  ALT  17  /  AlkPhos  52  04-19                                                                                           LIVER FUNCTIONS - ( 19 Apr 2020 04:47 )  Alb: 2.8 g/dL / Pro: 4.8 g/dL / ALK PHOS: 52 U/L / ALT: 17 U/L / AST: 65 U/L / GGT: x                                                                                                                                       MEDICATIONS  (STANDING):  aspirin  chewable 81 milliGRAM(s) Oral daily  atorvastatin 80 milliGRAM(s) Oral at bedtime  chlorhexidine 4% Liquid 1 Application(s) Topical <User Schedule>  enoxaparin Injectable 40 milliGRAM(s) SubCutaneous daily  ergocalciferol 90655 Unit(s) Oral every week  folic acid 1 milliGRAM(s) Oral daily  hydroxychloroquine 400 milliGRAM(s) Oral daily  meropenem  IVPB 1000 milliGRAM(s) IV Intermittent every 8 hours  methylPREDNISolone sodium succinate Injectable 60 milliGRAM(s) IV Push every 12 hours  metoprolol succinate ER 25 milliGRAM(s) Oral daily  pantoprazole    Tablet 40 milliGRAM(s) Oral before breakfast  sertraline 50 milliGRAM(s) Oral daily  sodium chloride 0.9%. 1000 milliLiter(s) (75 mL/Hr) IV Continuous <Continuous>  ticagrelor 90 milliGRAM(s) Oral every 12 hours  vancomycin  IVPB 750 milliGRAM(s) IV Intermittent every 12 hours    MEDICATIONS  (PRN):  acetaminophen   Tablet .. 650 milliGRAM(s) Oral every 6 hours PRN Temp greater or equal to 38C (100.4F)  ALPRAZolam 1 milliGRAM(s) Oral three times a day PRN anxiety      New X-rays reviewed:                                                                                  ECHO    CXR interpreted by me:  Worsening bilateral infiltrates.

## 2020-04-19 NOTE — PROGRESS NOTE ADULT - ASSESSMENT
· Assessment		  75 y/o F with PMH of CAD s/p PCI x5, GERD, Rheumatoid arthritis presented from [facility]) for worsening shortness of breath and hypoxia.     IMPRESSION;   COVID 19 with Hypoxemia and CXR with b/l opacities  -elevation of inflammatory markers and  given the duration of symptoms  possibility of cytokine release syndrome  s/p HCQ  Resolved fevers. No superimposed bacterial PNA  Meets criteria for Anakinra. Hypoxic and elevated inflammatory marker ( Ferritin 1705 )  Polymicrobial bacteruria with no pyuria ( of little clinical significance )    RECOMMENDATIONS;   nares for  ORSA   BCx  vancomycin 750 mg iv q12h  Meropenem 1 gm iv q8h  Anakinra 100 mg SC q6h x 3d.   monitor ferritin

## 2020-04-19 NOTE — PROGRESS NOTE ADULT - SUBJECTIVE AND OBJECTIVE BOX
ABHINAV DREW  74y, Female    All available historical data reviewed    OVERNIGHT EVENTS:  no fevers  94% NRB    ROS:  no cough  has SOB  no pressors    VITALS:  T(F): 96, Max: 98.3 (04-18-20 @ 20:00)  HR: 94  BP: 94/44  RR: 21Vital Signs Last 24 Hrs  T(C): 35.6 (19 Apr 2020 08:00), Max: 36.8 (18 Apr 2020 20:00)  T(F): 96 (19 Apr 2020 08:00), Max: 98.3 (18 Apr 2020 20:00)  HR: 94 (19 Apr 2020 13:30) (70 - 160)  BP: 94/44 (19 Apr 2020 12:30) (88/45 - 201/92)  BP(mean): 61 (19 Apr 2020 12:30) (51 - 143)  RR: 21 (19 Apr 2020 13:30) (13 - 41)  SpO2: 95% (19 Apr 2020 13:30) (68% - 100%)    TESTS & MEASUREMENTS:                        10.3   16.41 )-----------( 141      ( 19 Apr 2020 04:47 )             31.3     04-19    138  |  104  |  14  ----------------------------<  110<H>  3.5   |  19  |  0.6<L>    Ca    7.1<L>      19 Apr 2020 04:47  Phos  2.1     04-19  Mg     2.2     04-19    TPro  4.8<L>  /  Alb  2.8<L>  /  TBili  0.3  /  DBili  x   /  AST  65<H>  /  ALT  17  /  AlkPhos  52  04-19    LIVER FUNCTIONS - ( 19 Apr 2020 04:47 )  Alb: 2.8 g/dL / Pro: 4.8 g/dL / ALK PHOS: 52 U/L / ALT: 17 U/L / AST: 65 U/L / GGT: x             Culture - Urine (collected 04-14-20 @ 09:31)  Source: .Urine Clean Catch (Midstream)  Final Report (04-16-20 @ 16:56):    50,000 - 99,000 CFU/mL Pseudomonas aeruginosa    10,000 - 49,000 CFU/mL Escherichia coli ESBL    <10,000 CFU/ml Normal Urogenital ailyn present  Organism: Pseudomonas aeruginosa  Escherichia coli ESBL (04-16-20 @ 16:56)  Organism: Escherichia coli ESBL (04-16-20 @ 16:56)      -  Amikacin: S <=16      -  Ampicillin: R >16 These ampicillin results predict results for amoxicillin      -  Ampicillin/Sulbactam: R 16/8 Enterobacter, Citrobacter, and Serratia may develop resistance during prolonged therapy (3-4 days)      -  Aztreonam: R >16      -  Cefazolin: R >16 ([id number]) For uncomplicated UTI with K. pneumoniae, E. coli, or P. mirablis: HERBER <=16 is sensitive and HERBER >=32 is resistant. This also predicts results for oral agents cefaclor, cefdinir, cefpodoxime, cefprozil, cefuroxime axetil, cephalexin and locarbef for uncomplicated UTI. Note that some isolates may be susceptible to these agents while testing resistant to cefazolin.      -  Cefepime: R >16      -  Cefoxitin: S <=8      -  Ceftriaxone: R >32 Enterobacter, Citrobacter, and Serratia may develop resistance during prolonged therapy      -  Ciprofloxacin: R >2      -  Ertapenem: S <=1      -  Gentamicin: R >8      -  Imipenem: S <=1      -  Levofloxacin: R >4      -  Meropenem: S <=1      -  Nitrofurantoin: S <=32 Should not be used to treat pyelonephritis      -  Piperacillin/Tazobactam: R <=16      -  Tigecycline: S <=2      -  Tobramycin: R >8      -  Trimethoprim/Sulfamethoxazole: S <=2/38      Method Type: HERBER  Organism: Pseudomonas aeruginosa (04-16-20 @ 16:56)      -  Amikacin: S <=16      -  Aztreonam: S <=4      -  Cefepime: S <=4      -  Ceftazidime: S <=1      -  Ciprofloxacin: S <=1      -  Gentamicin: S <=4      -  Imipenem: S <=1      -  Levofloxacin: S <=2      -  Meropenem: S <=1      -  Piperacillin/Tazobactam: S <=16      -  Tobramycin: S <=4      Method Type: HERBER    Culture - Blood (collected 04-13-20 @ 19:37)  Source: .Blood Blood-Peripheral  Final Report (04-19-20 @ 03:01):    No Growth Final    Culture - Blood (collected 04-13-20 @ 19:37)  Source: .Blood Blood-Peripheral  Final Report (04-19-20 @ 03:01):    No Growth Final            RADIOLOGY & ADDITIONAL TESTS:  Personal review of radiological diagnostics performed  Echo and EKG results noted when applicable.     MEDICATIONS:  acetaminophen   Tablet .. 650 milliGRAM(s) Oral every 6 hours PRN  ALPRAZolam 1 milliGRAM(s) Oral three times a day PRN  aspirin  chewable 81 milliGRAM(s) Oral daily  atorvastatin 80 milliGRAM(s) Oral at bedtime  azithromycin  IVPB      chlorhexidine 4% Liquid 1 Application(s) Topical <User Schedule>  enoxaparin Injectable 40 milliGRAM(s) SubCutaneous daily  ergocalciferol 39308 Unit(s) Oral every week  folic acid 1 milliGRAM(s) Oral daily  meropenem  IVPB 1000 milliGRAM(s) IV Intermittent every 8 hours  methylPREDNISolone sodium succinate Injectable 60 milliGRAM(s) IV Push every 12 hours  metoprolol succinate ER 25 milliGRAM(s) Oral daily  morphine  Infusion. 1 mG/Hr IV Continuous <Continuous>  pantoprazole    Tablet 40 milliGRAM(s) Oral before breakfast  propofol Infusion 20 MICROgram(s)/kG/Min IV Continuous <Continuous>  sertraline 50 milliGRAM(s) Oral daily  sodium chloride 0.9%. 1000 milliLiter(s) IV Continuous <Continuous>  ticagrelor 90 milliGRAM(s) Oral every 12 hours  vancomycin  IVPB 750 milliGRAM(s) IV Intermittent every 12 hours      ANTIBIOTICS:  azithromycin  IVPB      meropenem  IVPB 1000 milliGRAM(s) IV Intermittent every 8 hours  vancomycin  IVPB 750 milliGRAM(s) IV Intermittent every 12 hours

## 2020-04-19 NOTE — PROCEDURE NOTE - NSPROCDETAILS_GEN_ALL_CORE
sutured in place/hemostasis with direct pressure, dressing applied/location identified, draped/prepped, sterile technique used, needle inserted/introduced/connected to a pressurized flush line/ultrasound guidance/positive blood return obtained via catheter/all materials/supplies accounted for at end of procedure
sterile technique, catheter placed/guidewire recovered/lumen(s) aspirated and flushed/sterile dressing applied/ultrasound guidance

## 2020-04-19 NOTE — AIRWAY PLACEMENT NOTE ADULT - POST AIRWAY PLACEMENT ASSESSMENT:
breath sounds equal/positive end tidal CO2 noted/breath sounds bilateral/CXR pending/chest excursion noted

## 2020-04-19 NOTE — PROGRESS NOTE ADULT - SUBJECTIVE AND OBJECTIVE BOX
ABHINAV DREW 74y Female  MRN#: [id number]   Hospital Day: 1d    SUBJECTIVE  Patient is a 74y old Female who presents with a chief complaint of shortness of breath (18 Apr 2020 12:04)  Currently admitted to medicine with the primary diagnosis of COVID-19 virus detected    INTERVAL HPI AND OVERNIGHT EVENTS:  Patient was examined and seen at bedside. This morning she is resting comfortably in bed and reports no issues or overnight events.    REVIEW OF SYMPTOMS:  CONSTITUTIONAL: No weakness, fevers or chills; No headaches  EYES: No visual changes, eye pain, or discharge  ENT: No vertigo; No ear pain or change in hearing; No sore throat or difficulty swallowing  NECK: No pain or stiffness  RESPIRATORY: No cough, wheezing, or hemoptysis; No shortness of breath  CARDIOVASCULAR: No chest pain or palpitations  GASTROINTESTINAL: No abdominal or epigastric pain; No nausea, vomiting, or hematemesis; No diarrhea or constipation; No melena or hematochezia  GENITOURINARY: No dysuria, frequency or hematuria  MUSCULOSKELETAL: No joint pain, no muscle pain, no weakness  NEUROLOGICAL: No numbness or weakness  SKIN: No itching or rashes    OBJECTIVE  PAST MEDICAL & SURGICAL HISTORY  Rheumatoid arthritis  GERD (gastroesophageal reflux disease)  MI (myocardial infarction)  Hyperlipidemia  CAD (coronary artery disease)  History of surgery: stent placement    ALLERGIES:  Zocor (Joint Pain)    MEDICATIONS:  STANDING MEDICATIONS  aspirin  chewable 81 milliGRAM(s) Oral daily  chlorhexidine 4% Liquid 1 Application(s) Topical <User Schedule>  enoxaparin Injectable 40 milliGRAM(s) SubCutaneous daily  ergocalciferol 19616 Unit(s) Oral every week  folic acid 1 milliGRAM(s) Oral daily  hydroxychloroquine 400 milliGRAM(s) Oral daily  meropenem  IVPB 1000 milliGRAM(s) IV Intermittent every 8 hours  methylPREDNISolone sodium succinate Injectable 60 milliGRAM(s) IV Push every 12 hours  metoprolol succinate ER 25 milliGRAM(s) Oral daily  pantoprazole    Tablet 40 milliGRAM(s) Oral before breakfast  sertraline 50 milliGRAM(s) Oral daily  sodium chloride 0.9%. 1000 milliLiter(s) IV Continuous <Continuous>  ticagrelor 90 milliGRAM(s) Oral every 12 hours  vancomycin  IVPB 750 milliGRAM(s) IV Intermittent every 12 hours    PRN MEDICATIONS  acetaminophen   Tablet .. 650 milliGRAM(s) Oral every 6 hours PRN  ALPRAZolam 1 milliGRAM(s) Oral three times a day PRN      VITAL SIGNS: Last 24 Hours  T(C): 36.8 (18 Apr 2020 20:00), Max: 38.2 (18 Apr 2020 03:00)  T(F): 98.3 (18 Apr 2020 20:00), Max: 100.8 (18 Apr 2020 03:00)  HR: 84 (18 Apr 2020 23:00) (76 - 120)  BP: 114/48 (18 Apr 2020 23:00) (95/43 - 130/46)  BP(mean): 73 (18 Apr 2020 23:00) (57 - 87)  RR: 19 (18 Apr 2020 23:00) (17 - 34)  SpO2: 98% (18 Apr 2020 23:00) (91% - 99%)    LABS:                        11.3   13.69 )-----------( 131      ( 18 Apr 2020 05:05 )             33.0     04-18    136  |  101  |  21<H>  ----------------------------<  81  4.0   |  20  |  1.0    Ca    7.7<L>      18 Apr 2020 05:05  Mg     2.2     04-18    TPro  5.5<L>  /  Alb  3.1<L>  /  TBili  0.5  /  DBili  x   /  AST  75<H>  /  ALT  19  /  AlkPhos  49  04-18    Sedimentation Rate, Erythrocyte: 62 mm/Hr <H> (04-18-20 @ 05:05)      RADIOLOGY:  < from: Xray Chest 1 View-PORTABLE IMMEDIATE (04.18.20 @ 00:00) >  Findings:  Support devices: None. Telemetry leads  Cardiac/mediastinum/hilum: Tortuous aorta with heart within normal limits in size.  Lung parenchyma/Pleura: Bilateral emphysematous changes are present. New hazy opacities are noted predominantly involving bilateral lower lung zones. No effusion or pneumothorax.  Skeleton/soft tissues: Degenerative changes.    Impression:  New bilateral opacities suspicious for a viral pneumonia.  < end of copied text >    PHYSICAL EXAM:  CONSTITUTIONAL: No acute distress, well-developed, well-groomed, AAOx3, on non-rebreather  HEAD: Atraumatic, normocephalic  EYES: EOM intact, PERRLA, conjunctiva and sclera clear  ENT: Supple, no masses, no thyromegaly, no bruits, no JVD; moist mucous membranes  PULMONARY: Clear to auscultation bilaterally; no wheezes, rales, or rhonchi  CARDIOVASCULAR: Regular rate and rhythm; no murmurs, rubs, or gallops  GASTROINTESTINAL: Soft, non-tender, non-distended; bowel sounds present  MUSCULOSKELETAL: 2+ peripheral pulses; no clubbing, no cyanosis, no edema  NEUROLOGY: non-focal  SKIN: No rashes or lesions; warm and dry    ASSESSMENT & PLAN  Patient is a 73yo female with PMH of CAD s/p PCI x5, GERD, rheumatoid arthritis, and hyperlipidemia who was transferred from [facility] for worsening shortness of breath and hypoxia. Patient was admitted to the [facility] site after having 2 episodes of syncope at home and tested positive for COVID-19.      #Acute hypoxemic respiratory failure secondary to COVID-19 pneumonia with possible superinfection  - COVID-19 PCR 4/13/2020: positive  - Isolation precautions (contact, droplet, airborne)  - ECG 4/17/2020: QTc 510  - Procalcitonin: 0.16->1.95  - CRP: 4.73->18.39  - D-dimer: 516  - Patient currently saturating 92% on 100% FiO2 via non-rebreather  - ID consult appreciated  - Completed course of hydroxychloroquine  - Continue with meropenem 1g IV Q8H (Day #2)  - Continue with vancomycin 750mg IV Q12H (Day #2)  - Continue with methylprednisolone 60mg IV Q12H (Day #2)    #CAD s/p PCI x5  - Continue with aspirin 81mg PO QD  - Continue with ticagrelor 90mg PO Q12H  - Continue with metoprolol succinate 25mg PO QD  - Start atorvastatin 80mg PO QHS    #Asymptomatic bacteruria  - Urine Cx Culture Results: 50,000-99,000 Pseudomonas aeruginosa and 10,000-49,000 ESBL E coli  - Patient currently asymptomatic  - Will hold directed antibiotics for now    #Hyperlipidemia  - Patient takes rosuvastatin 20mg PO QHS at home  - Has allergy to simvastatin  - Start atorvastatin 80mg PO QHS    #Rheumatoid arthritis  - Stable    #GERD  - Continue with pantoprazole 40mg PO QD     #Anxiety/Depression  - Continue with alprazolam 1mg PO TID PRN  - Continue with sertraline 50mg PO QD    #Suspected Vitamin D and folate deficiencies  - Continue with ergocalciferol 50,000 units Q7D  - Continue with folic acid 1mg PO QD    #Misc  - DVT Prophylaxis: Lovenox 40mg SQ QD   - GI Prophylaxis: pantoprazole 40mg PO QD   - Diet: DASH/TLC  - Activity: increase as tolerated  - IV Fluids: NS 0.9% at 75mL/hr  - Code Status: Full Code    Dispo: MICU monitoring for now ABHINAV DREW 74y Female  MRN#: [id number]   Hospital Day: 1d    SUBJECTIVE  Patient is a 74y old Female who presents with a chief complaint of shortness of breath (18 Apr 2020 12:04)  Currently admitted to medicine with the primary diagnosis of COVID-19 virus detected    INTERVAL HPI AND OVERNIGHT EVENTS:  Patient was examined and seen at bedside. This morning she is resting comfortably in bed and reports no issues or overnight events.    REVIEW OF SYMPTOMS:  CONSTITUTIONAL: No weakness, fevers or chills; No headaches  EYES: No visual changes, eye pain, or discharge  ENT: No vertigo; No ear pain or change in hearing; No sore throat or difficulty swallowing  NECK: No pain or stiffness  RESPIRATORY: No cough, wheezing, or hemoptysis; No shortness of breath  CARDIOVASCULAR: No chest pain or palpitations  GASTROINTESTINAL: No abdominal or epigastric pain; No nausea, vomiting, or hematemesis; No diarrhea or constipation; No melena or hematochezia  GENITOURINARY: No dysuria, frequency or hematuria  MUSCULOSKELETAL: No joint pain, no muscle pain, no weakness  NEUROLOGICAL: No numbness or weakness  SKIN: No itching or rashes    OBJECTIVE  PAST MEDICAL & SURGICAL HISTORY  Rheumatoid arthritis  GERD (gastroesophageal reflux disease)  MI (myocardial infarction)  Hyperlipidemia  CAD (coronary artery disease)  History of surgery: stent placement    ALLERGIES:  Zocor (Joint Pain)    MEDICATIONS:  STANDING MEDICATIONS  aspirin  chewable 81 milliGRAM(s) Oral daily  chlorhexidine 4% Liquid 1 Application(s) Topical <User Schedule>  enoxaparin Injectable 40 milliGRAM(s) SubCutaneous daily  ergocalciferol 99948 Unit(s) Oral every week  folic acid 1 milliGRAM(s) Oral daily  hydroxychloroquine 400 milliGRAM(s) Oral daily  meropenem  IVPB 1000 milliGRAM(s) IV Intermittent every 8 hours  methylPREDNISolone sodium succinate Injectable 60 milliGRAM(s) IV Push every 12 hours  metoprolol succinate ER 25 milliGRAM(s) Oral daily  pantoprazole    Tablet 40 milliGRAM(s) Oral before breakfast  sertraline 50 milliGRAM(s) Oral daily  sodium chloride 0.9%. 1000 milliLiter(s) IV Continuous <Continuous>  ticagrelor 90 milliGRAM(s) Oral every 12 hours  vancomycin  IVPB 750 milliGRAM(s) IV Intermittent every 12 hours    PRN MEDICATIONS  acetaminophen   Tablet .. 650 milliGRAM(s) Oral every 6 hours PRN  ALPRAZolam 1 milliGRAM(s) Oral three times a day PRN      VITAL SIGNS: Last 24 Hours  T(C): 36.8 (18 Apr 2020 20:00), Max: 38.2 (18 Apr 2020 03:00)  T(F): 98.3 (18 Apr 2020 20:00), Max: 100.8 (18 Apr 2020 03:00)  HR: 84 (18 Apr 2020 23:00) (76 - 120)  BP: 114/48 (18 Apr 2020 23:00) (95/43 - 130/46)  BP(mean): 73 (18 Apr 2020 23:00) (57 - 87)  RR: 19 (18 Apr 2020 23:00) (17 - 34)  SpO2: 98% (18 Apr 2020 23:00) (91% - 99%)    LABS:                        11.3   13.69 )-----------( 131      ( 18 Apr 2020 05:05 )             33.0     04-18    136  |  101  |  21<H>  ----------------------------<  81  4.0   |  20  |  1.0    Ca    7.7<L>      18 Apr 2020 05:05  Mg     2.2     04-18    TPro  5.5<L>  /  Alb  3.1<L>  /  TBili  0.5  /  DBili  x   /  AST  75<H>  /  ALT  19  /  AlkPhos  49  04-18    Sedimentation Rate, Erythrocyte: 62 mm/Hr <H> (04-18-20 @ 05:05)      RADIOLOGY:  < from: Xray Chest 1 View-PORTABLE IMMEDIATE (04.18.20 @ 00:00) >  Findings:  Support devices: None. Telemetry leads  Cardiac/mediastinum/hilum: Tortuous aorta with heart within normal limits in size.  Lung parenchyma/Pleura: Bilateral emphysematous changes are present. New hazy opacities are noted predominantly involving bilateral lower lung zones. No effusion or pneumothorax.  Skeleton/soft tissues: Degenerative changes.    Impression:  New bilateral opacities suspicious for a viral pneumonia.  < end of copied text >    PHYSICAL EXAM:  CONSTITUTIONAL: No acute distress, well-developed, well-groomed, AAOx3, on non-rebreather  HEAD: Atraumatic, normocephalic  EYES: EOM intact, PERRLA, conjunctiva and sclera clear  ENT: Supple, no masses, no thyromegaly, no bruits, no JVD; moist mucous membranes  PULMONARY: Clear to auscultation bilaterally; no wheezes, rales, or rhonchi  CARDIOVASCULAR: Regular rate and rhythm; no murmurs, rubs, or gallops  GASTROINTESTINAL: Soft, non-tender, non-distended; bowel sounds present  MUSCULOSKELETAL: 2+ peripheral pulses; no clubbing, no cyanosis, no edema  NEUROLOGY: non-focal  SKIN: No rashes or lesions; warm and dry    ASSESSMENT & PLAN  Patient is a 73yo female with PMH of CAD s/p PCI x5, GERD, rheumatoid arthritis, and hyperlipidemia who was transferred from [facility] for worsening shortness of breath and hypoxia. Patient was admitted to the [facility] site after having 2 episodes of syncope at home and tested positive for COVID-19.      #Acute hypoxemic respiratory failure secondary to COVID-19 pneumonia with possible superinfection  - COVID-19 PCR 4/13/2020: positive  - Isolation precautions (contact, droplet, airborne)  - ECG 4/17/2020: QTc 510  - Procalcitonin: 0.16->1.95  - CRP: 4.73->18.39  - D-dimer: 516  - Patient currently saturating 92% on 100% FiO2 via non-rebreather  - ID consult appreciated  - Completed course of hydroxychloroquine  - Continue with meropenem 1g IV Q8H (Day #2)  - Continue with vancomycin 750mg IV Q12H (Day #2)  - Continue with methylprednisolone 60mg IV Q12H (Day #2)    #Acute kidney injury  - Baseline creatinine: 0.7  - Creatinine today:     #CAD s/p PCI x5  - Continue with aspirin 81mg PO QD  - Continue with ticagrelor 90mg PO Q12H  - Continue with metoprolol succinate 25mg PO QD  - Start atorvastatin 80mg PO QHS    #Asymptomatic bacteruria  - Urine Cx Culture Results: 50,000-99,000 Pseudomonas aeruginosa and 10,000-49,000 ESBL E coli  - Patient currently asymptomatic  - Will hold directed antibiotics for now    #Hyperlipidemia  - Patient takes rosuvastatin 20mg PO QHS at home  - Has allergy to simvastatin  - Start atorvastatin 80mg PO QHS    #Rheumatoid arthritis  - Stable    #GERD  - Continue with pantoprazole 40mg PO QD     #Anxiety/Depression  - Continue with alprazolam 1mg PO TID PRN  - Continue with sertraline 50mg PO QD    #Suspected Vitamin D and folate deficiencies  - Continue with ergocalciferol 50,000 units Q7D  - Continue with folic acid 1mg PO QD    #Misc  - DVT Prophylaxis: Lovenox 40mg SQ QD   - GI Prophylaxis: pantoprazole 40mg PO QD   - Diet: DASH/TLC  - Activity: increase as tolerated  - IV Fluids: NS 0.9% at 75mL/hr  - Code Status: Full Code    Dispo: MICU monitoring for now ABHINAV DREW 74y Female  MRN#: [id number]   Hospital Day: 1d    SUBJECTIVE  Patient is a 74y old Female who presents with a chief complaint of shortness of breath (18 Apr 2020 12:04)  Currently admitted to medicine with the primary diagnosis of COVID-19 virus detected    INTERVAL HPI AND OVERNIGHT EVENTS:  Patient was examined and seen at bedside. This morning she began desaturating to the 70s while on 100% FiO2 via non-rebreather. SpO2 did not improve when patient was placed in lateral recumbent position. Per critical care attending, the decision was made to intubate the patient.    REVIEW OF SYMPTOMS:  CONSTITUTIONAL: No weakness, fevers or chills; No headaches; (+) anxious  EYES: No visual changes, eye pain, or discharge  ENT: No vertigo; No ear pain or change in hearing; No sore throat or difficulty swallowing  NECK: No pain or stiffness  RESPIRATORY: (+) Shortness of breath  CARDIOVASCULAR: No chest pain or palpitations  GASTROINTESTINAL: No abdominal or epigastric pain; No nausea, vomiting, or hematemesis; No diarrhea or constipation; No melena or hematochezia  GENITOURINARY: No dysuria, frequency or hematuria  MUSCULOSKELETAL: No joint pain, no muscle pain, no weakness  NEUROLOGICAL: No numbness or weakness  SKIN: No itching or rashes    OBJECTIVE  PAST MEDICAL & SURGICAL HISTORY  Rheumatoid arthritis  GERD (gastroesophageal reflux disease)  MI (myocardial infarction)  Hyperlipidemia  CAD (coronary artery disease)  History of surgery: stent placement    ALLERGIES:  Zocor (Joint Pain)    MEDICATIONS:  STANDING MEDICATIONS  aspirin  chewable 81 milliGRAM(s) Oral daily  chlorhexidine 4% Liquid 1 Application(s) Topical <User Schedule>  enoxaparin Injectable 40 milliGRAM(s) SubCutaneous daily  ergocalciferol 30772 Unit(s) Oral every week  folic acid 1 milliGRAM(s) Oral daily  hydroxychloroquine 400 milliGRAM(s) Oral daily  meropenem  IVPB 1000 milliGRAM(s) IV Intermittent every 8 hours  methylPREDNISolone sodium succinate Injectable 60 milliGRAM(s) IV Push every 12 hours  metoprolol succinate ER 25 milliGRAM(s) Oral daily  pantoprazole    Tablet 40 milliGRAM(s) Oral before breakfast  sertraline 50 milliGRAM(s) Oral daily  sodium chloride 0.9%. 1000 milliLiter(s) IV Continuous <Continuous>  ticagrelor 90 milliGRAM(s) Oral every 12 hours  vancomycin  IVPB 750 milliGRAM(s) IV Intermittent every 12 hours    PRN MEDICATIONS  acetaminophen   Tablet .. 650 milliGRAM(s) Oral every 6 hours PRN  ALPRAZolam 1 milliGRAM(s) Oral three times a day PRN      VITAL SIGNS: Last 24 Hours  T(C): 36.8 (18 Apr 2020 20:00), Max: 38.2 (18 Apr 2020 03:00)  T(F): 98.3 (18 Apr 2020 20:00), Max: 100.8 (18 Apr 2020 03:00)  HR: 84 (18 Apr 2020 23:00) (76 - 120)  BP: 114/48 (18 Apr 2020 23:00) (95/43 - 130/46)  BP(mean): 73 (18 Apr 2020 23:00) (57 - 87)  RR: 19 (18 Apr 2020 23:00) (17 - 34)  SpO2: 98% (18 Apr 2020 23:00) (91% - 99%)    LABS:                        11.3   13.69 )-----------( 131      ( 18 Apr 2020 05:05 )             33.0     04-18    136  |  101  |  21<H>  ----------------------------<  81  4.0   |  20  |  1.0    Ca    7.7<L>      18 Apr 2020 05:05  Mg     2.2     04-18    TPro  5.5<L>  /  Alb  3.1<L>  /  TBili  0.5  /  DBili  x   /  AST  75<H>  /  ALT  19  /  AlkPhos  49  04-18    Sedimentation Rate, Erythrocyte: 62 mm/Hr <H> (04-18-20 @ 05:05)      RADIOLOGY:  < from: Xray Chest 1 View-PORTABLE IMMEDIATE (04.18.20 @ 00:00) >  Findings:  Support devices: None. Telemetry leads  Cardiac/mediastinum/hilum: Tortuous aorta with heart within normal limits in size.  Lung parenchyma/Pleura: Bilateral emphysematous changes are present. New hazy opacities are noted predominantly involving bilateral lower lung zones. No effusion or pneumothorax.  Skeleton/soft tissues: Degenerative changes.    Impression:  New bilateral opacities suspicious for a viral pneumonia.  < end of copied text >    < from: Xray Chest 1 View-PORTABLE IMMEDIATE (04.19.20 @ 10:00) >  Findings:  Support devices: Interval placement of endotracheal tube overlying the trachea with tip approximately 5 cm above the megan.  Cardiac/mediastinum/hilum: Stable.  Lung parenchyma/Pleura: Essentially unchanged bilateral patchy airspace opacities, right greater than left. No pneumothorax.  Skeleton/soft tissues: Stable.    Impression:    Unchanged bilateral patchy airspace opacities.  Endotracheal tube placement with tip 5 cm above the megan.  < end of copied text >      PHYSICAL EXAM:  CONSTITUTIONAL: No acute distress, elderly, AAOx3, on non-rebreather  HEAD: Atraumatic, normocephalic  EYES: EOM intact, PERRLA, conjunctiva and sclera clear  ENT: Supple, no masses, no thyromegaly, no bruits, no JVD; moist mucous membranes  PULMONARY: diffuse rhonchi  CARDIOVASCULAR: Regular rate and rhythm; no murmurs, rubs, or gallops  GASTROINTESTINAL: Soft, non-tender, non-distended; bowel sounds present  MUSCULOSKELETAL: 2+ peripheral pulses; no clubbing, no cyanosis, no edema  NEUROLOGY: non-focal  SKIN: No rashes or lesions; warm and dry    ASSESSMENT & PLAN  Patient is a 75yo female with PMH of CAD s/p PCI x5, GERD, rheumatoid arthritis, and hyperlipidemia who was transferred from [facility] for worsening shortness of breath and hypoxia. Patient was admitted to the [facility] site after having 2 episodes of syncope at home and tested positive for COVID-19. Patient was intubated on 4/19/2020 after desaturating while on non-rebreather.    #Acute hypoxemic respiratory failure secondary to COVID-19 pneumonia with possible superinfection  - COVID-19 PCR 4/13/2020: positive  - Isolation precautions (contact, droplet, airborne)  - ECG 4/17/2020: QTc 510  - Procalcitonin: 0.16->1.95  - CRP: 4.73->18.39  - D-dimer: 516  - Chest X-ray is worse this morning  - Patient now intubated. Family notified  - Sedation with morphine and propofol  - ID consult appreciated  - Completed course of hydroxychloroquine  - Continue with meropenem 1g IV Q8H (Day #2)  - Continue with vancomycin 750mg IV Q12H (Day #2)  - Continue with methylprednisolone 60mg IV Q12H (Day #2)  - Check QTc  - Based on QTc, can start either azithromycin or doxycycline    #Acute kidney injury, improving  - Baseline creatinine: 0.7  - Creatinine today: 0.6    #CAD s/p PCI x5  - Continue with aspirin 81mg PO QD  - Continue with ticagrelor 90mg PO Q12H  - Continue with metoprolol succinate 25mg PO QD  - Start atorvastatin 80mg PO QHS    #Asymptomatic bacteruria  - Urine Cx Culture Results: 50,000-99,000 Pseudomonas aeruginosa and 10,000-49,000 ESBL E coli  - Patient currently asymptomatic  - Will hold directed antibiotics for now    #Hyperlipidemia  - Patient takes rosuvastatin 20mg PO QHS at home  - Has allergy to simvastatin  - Start atorvastatin 80mg PO QHS    #Rheumatoid arthritis  - Stable    #GERD  - Continue with pantoprazole 40mg PO QD     #Anxiety/Depression  - Continue with alprazolam 1mg PO TID PRN  - Continue with sertraline 50mg PO QD    #Suspected Vitamin D and folate deficiencies  - Continue with ergocalciferol 50,000 units Q7D  - Continue with folic acid 1mg PO QD    #Misc  - DVT Prophylaxis: Lovenox 40mg SQ QD   - GI Prophylaxis: pantoprazole 40mg PO QD   - Diet: DASH/TLC  - Activity: increase as tolerated  - IV Fluids: NS 0.9% at 75mL/hr  - Code Status: Full Code    Dispo: MICU monitoring for now

## 2020-04-19 NOTE — PROGRESS NOTE ADULT - ASSESSMENT
IMPRESSION:    Acute hypoxemic respiratory failure now requiring MV  COVID 19  Possible superinfection  HO ESBL: UTI     PLAN:    CNS:  Intubate and put on MV.  Sedation after intubation     HEENT: Oral care    PULMONARY:  HOB @ 45 degrees.  Aspiration precautions.  ARDS network MV.  PEEP 8.      CARDIOVASCULAR:  Gentle hydration while NPO.  Monitor QTC     GI: GI prophylaxis. OG Feeding.  Bowel regimen     RENAL:  Follow up lytes.  Correct as needed    INFECTIOUS DISEASE: Follow up cultures.  Beatriz and Vanc.  Inflammatory markers.  Azithromycin if QTC OK, otherwise Doxycycline     HEMATOLOGICAL:  DVT prophylaxis.    ENDOCRINE:  Follow up FS.  Insulin protocol if needed.  Solumedrol D#2    MUSCULOSKELETAL:  Bed chair position     MICU for now

## 2020-04-20 NOTE — PROGRESS NOTE ADULT - SUBJECTIVE AND OBJECTIVE BOX
ABHINAV DREW 74y Female  MRN#: [id number]   Hospital Day: 2d    SUBJECTIVE  Patient is a 74y old Female who presents with a chief complaint of shortness of breath (20 Apr 2020 08:55)  Currently admitted to medicine with the primary diagnosis of COVID-19 virus detected    INTERVAL HPI AND OVERNIGHT EVENTS:  Patient was examined and seen at bedside. This morning she is intubated, sedated, and off pressors.    REVIEW OF SYMPTOMS:  Unable to obtain due to patient's mental status     OBJECTIVE  PAST MEDICAL & SURGICAL HISTORY  Rheumatoid arthritis  GERD (gastroesophageal reflux disease)  MI (myocardial infarction)  Hyperlipidemia  CAD (coronary artery disease)  History of surgery: stent placement    ALLERGIES:  Zocor (Joint Pain)    MEDICATIONS:  STANDING MEDICATIONS  anakinra Injectable 100 milliGRAM(s) SubCutaneous every 6 hours  aspirin  chewable 81 milliGRAM(s) Oral daily  atorvastatin 80 milliGRAM(s) Oral at bedtime  azithromycin  IVPB 500 milliGRAM(s) IV Intermittent every 24 hours  azithromycin  IVPB      chlorhexidine 4% Liquid 1 Application(s) Topical <User Schedule>  enoxaparin Injectable 40 milliGRAM(s) SubCutaneous daily  ergocalciferol 76016 Unit(s) Oral every week  folic acid 1 milliGRAM(s) Oral daily  meropenem  IVPB 1000 milliGRAM(s) IV Intermittent every 12 hours  methylPREDNISolone sodium succinate Injectable 60 milliGRAM(s) IV Push every 12 hours  metoprolol tartrate 12.5 milliGRAM(s) Oral two times a day  midazolam Infusion 0.02 mG/kG/Hr IV Continuous <Continuous>  morphine  Infusion. 1 mG/Hr IV Continuous <Continuous>  polyethylene glycol 3350 17 Gram(s) Oral daily  senna 2 Tablet(s) Oral at bedtime  sertraline 50 milliGRAM(s) Oral daily  sodium chloride 0.9%. 1000 milliLiter(s) IV Continuous <Continuous>  ticagrelor 90 milliGRAM(s) Oral every 12 hours    PRN MEDICATIONS  acetaminophen   Tablet .. 650 milliGRAM(s) Oral every 6 hours PRN      VITAL SIGNS: Last 24 Hours  T(C): 36.5 (20 Apr 2020 08:00), Max: 37.3 (20 Apr 2020 04:00)  T(F): 97.7 (20 Apr 2020 08:00), Max: 99.2 (20 Apr 2020 04:00)  HR: 136 (20 Apr 2020 13:03) (74 - 138)  BP: --  BP(mean): --  RR: 24 (20 Apr 2020 12:00) (15 - 33)  SpO2: 99% (20 Apr 2020 13:03) (86% - 100%)    LABS:                        9.8    20.94 )-----------( 167      ( 20 Apr 2020 05:10 )             30.1     04-20    141  |  105  |  28<H>  ----------------------------<  133<H>  4.3   |  21  |  1.2    Ca    7.3<L>      20 Apr 2020 05:10  Phos  3.7     04-20  Mg     2.3     04-20    TPro  5.4<L>  /  Alb  2.9<L>  /  TBili  0.2  /  DBili  x   /  AST  59<H>  /  ALT  19  /  AlkPhos  64  04-20        ABG - ( 20 Apr 2020 12:32 )  pH, Arterial: 7.21  pH, Blood: x     /  pCO2: 49    /  pO2: 136   / HCO3: 20    / Base Excess: -8.4  /  SaO2: 98          RADIOLOGY:  < from: Xray Chest 1 View- PORTABLE-Routine (04.20.20 @ 04:42) >  Findings:  Support devices: An endotracheal tube is seen with tip 3.4 cm above the megan. Right IJ central venous catheter with tip in the SVC. An enteric tube is seen with tip in the stomach.  Cardiac/mediastinum/hilum: Stable.  Lung parenchyma/Pleura: Bilateral airspace opacities, unchanged. No pneumothorax.  Skeleton/soft tissues: Stable.    Impression:    Bilateral airspace opacities, unchanged.  < end of copied text >    PHYSICAL EXAM:  CONSTITUTIONAL: No acute distress, well-developed, well-groomed, intubated, sedated  HEAD: Atraumatic, normocephalic  EYES: EOM intact, PERRLA, conjunctiva and sclera clear  ENT: Supple, no masses, no thyromegaly, no bruits, no JVD; moist mucous membranes  PULMONARY: Clear to auscultation bilaterally; no wheezes, rales, or rhonchi  CARDIOVASCULAR: Regular rate and rhythm; no murmurs, rubs, or gallops  GASTROINTESTINAL: Soft, non-tender, non-distended; bowel sounds present  MUSCULOSKELETAL: 2+ peripheral pulses; no clubbing, no cyanosis, no edema  NEUROLOGY: sedated  SKIN: No rashes or lesions; warm and dry    ASSESSMENT & PLAN  Patient is a 73yo female with PMH of CAD s/p PCI x5, GERD, rheumatoid arthritis, and hyperlipidemia who was transferred from [facility] for worsening shortness of breath and hypoxia. Patient was admitted to the [facility] site after having 2 episodes of syncope at home and tested positive for COVID-19. Patient was intubated on 4/19/2020 after desaturating while on non-rebreather.    #Acute hypoxemic respiratory failure secondary to COVID-19 pneumonia with possible superinfection  - COVID-19 PCR 4/13/2020: positive  - Isolation precautions (contact, droplet, airborne)  - ECG 4/17/2020: QTc 510  - Procalcitonin: 0.16->1.95  - CRP: 4.73->18.39  - D-dimer: 516  - Sedation with morphine and midazolam  - Patient became hypotensive on propofol  - ID consult appreciated  - Completed course of hydroxychloroquine  - Continue with meropenem 1g IV Q8H (Day #3)  - Change vancomycin to 750mg IV Q24H (Day #3)  - Continue with methylprednisolone 60mg IV Q12H (Day #3)  - Continue with azithromycin 500mg IV Q24H (Day #2)  - Continue with anakinra 100mg SQ Q6H (Day #2)  - Monitor QTc  - Increase tidal volume to 400mL  - Follow vancomycin trough  - Will paralyze patient with rocuronium and repeat ABG/plateau pressure    #Acute kidney injury, worsening  - Baseline creatinine: 0.7  - Creatinine today: 1.2  - Continue with NS 0.9% at 75mL/hr  - Monitor BUN/creatinine  - Avoid nephrotic agents    #CAD s/p PCI x5  - Continue with aspirin 81mg PO QD  - Continue with ticagrelor 90mg PO Q12H  - Discontinue metoprolol succinate  - Start metoprolol tartrate 12.5mg PO BID  - Continue with atorvastatin 80mg PO QHS    #Asymptomatic bacteruria  - Urine Cx Culture Results: 50,000-99,000 Pseudomonas aeruginosa and 10,000-49,000 ESBL E coli  - Patient currently asymptomatic    #Hyperlipidemia  - Patient takes rosuvastatin 20mg PO QHS at home  - Has allergy to simvastatin  - Continue with atorvastatin 80mg PO QHS    #Rheumatoid arthritis  - Stable    #GERD  - Continue with pantoprazole 40mg PO QD     #Anxiety/Depression  - Continue with alprazolam 1mg PO TID PRN  - Continue with sertraline 50mg PO QD    #Suspected Vitamin D and folate deficiencies  - Continue with ergocalciferol 50,000 units Q7D  - Continue with folic acid 1mg PO QD    #Misc  - DVT Prophylaxis: Lovenox 40mg SQ QD   - GI Prophylaxis: pantoprazole 40mg PO QD   - Diet: NPO with tube feeds  - Activity: bedrest  - IV Fluids: NS 0.9% at 75mL/hr  - Code Status: Full Code    Dispo: MICU monitoring for now

## 2020-04-20 NOTE — CHART NOTE - NSCHARTNOTEFT_GEN_A_CORE
as part of the communications, team, I called  Zohaib at [phone] to provide daily update  there was no answer and no voicemail

## 2020-04-20 NOTE — PROGRESS NOTE ADULT - SUBJECTIVE AND OBJECTIVE BOX
Patient is a 74y old  Female who presents with a chief complaint of shortness of breath (19 Apr 2020 13:58)        Over Night Events:  Intubated yesterday.  Off pressors.  Sedated         ROS:     All ROS are negative except HPI         PHYSICAL EXAM    ICU Vital Signs Last 24 Hrs  T(C): 36.5 (20 Apr 2020 08:00), Max: 37.3 (20 Apr 2020 04:00)  T(F): 97.7 (20 Apr 2020 08:00), Max: 99.2 (20 Apr 2020 04:00)  HR: 92 (20 Apr 2020 08:00) (74 - 160)  BP: 94/44 (19 Apr 2020 12:30) (94/44 - 201/92)  BP(mean): 61 (19 Apr 2020 12:30) (61 - 143)  ABP: 130/74 (20 Apr 2020 08:00) (80/48 - 130/74)  ABP(mean): 94 (20 Apr 2020 08:00) (60 - 94)  RR: 16 (20 Apr 2020 08:00) (13 - 41)  SpO2: 92% (20 Apr 2020 08:00) (86% - 100%)      CONSTITUTIONAL:   Ill appearing.  Well nourished.     ENT:   Airway patent,   Mouth with normal mucosa.   No thrush    EYES:   Pupils equal,   Round and reactive to light.    CARDIAC:   Normal rate,   Regular rhythm.    No edema      Vascular:  Normal systolic impulse  No Carotid bruits    RESPIRATORY:   No wheezing  Bilateral BS  Normal chest expansion  Not tachypneic,  No use of accessory muscles    GASTROINTESTINAL:  Abdomen soft,   Non-tender,   No guarding,   + BS    GENITOURINARY  normal genitalia for sex  no edema    MUSCULOSKELETAL:   Range of motion is not limited,  No clubbing, cyanosis    NEUROLOGICAL:   Sedated  Responds to pain     SKIN:   Skin normal color for race,   Warm and dry and intact.   No evidence of rash.    PSYCHIATRIC:   No apparent risk to self or others.    HEMATOLOGICAL:  No cervical  lymphadenopathy.  no inguinal lymphadenopathy      04-19-20 @ 07:01  -  04-20-20 @ 07:00  --------------------------------------------------------  IN:    IV PiggyBack: 950 mL    Lactated Ringers IV Bolus: 500 mL    midazolam Infusion: 32.6 mL    morphine  Infusion.: 80 mL    Oral Fluid: 250 mL    Osmolite: 250 mL    propofol Infusion: 81.2 mL    sodium chloride 0.9%.: 1725 mL  Total IN: 3868.8 mL    OUT:    Indwelling Catheter - Urethral: 1670 mL  Total OUT: 1670 mL    Total NET: 2198.8 mL      04-20-20 @ 07:01  -  04-20-20 @ 08:56  --------------------------------------------------------  IN:    midazolam Infusion: 2.1 mL    morphine  Infusion.: 4 mL    sodium chloride 0.9%.: 75 mL  Total IN: 81.1 mL    OUT:    Indwelling Catheter - Urethral: 100 mL  Total OUT: 100 mL    Total NET: -18.9 mL          LABS:                            9.8    20.94 )-----------( 167      ( 20 Apr 2020 05:10 )             30.1                                               04-20    141  |  105  |  28<H>  ----------------------------<  133<H>  4.3   |  21  |  1.2    20 Apr 2020 05:10    141    |  105    |  28<H>  ----------------------------<  133<H>  4.3     |  21     |  1.2    19 Apr 2020 04:47    138    |  104    |  14     ----------------------------<  110<H>  3.5     |  19     |  0.6<L>    Ca    7.3<L>      20 Apr 2020 05:10  Ca    7.1<L>      19 Apr 2020 04:47  Phos  3.7       20 Apr 2020 05:10  Phos  2.1       19 Apr 2020 04:47  Mg     2.3       20 Apr 2020 05:10  Mg     2.2       19 Apr 2020 04:47    TPro  5.4<L>  /  Alb  2.9<L>  /  TBili  0.2    /  DBili  x      /  AST  59<H>  /  ALT  19     /  AlkPhos  64     20 Apr 2020 05:10  TPro  4.8<L>  /  Alb  2.8<L>  /  TBili  0.3    /  DBili  x      /  AST  65<H>  /  ALT  17     /  AlkPhos  52     19 Apr 2020 04:47      Ca    7.3<L>      20 Apr 2020 05:10  Phos  3.7     04-20  Mg     2.3     04-20    TPro  5.4<L>  /  Alb  2.9<L>  /  TBili  0.2  /  DBili  x   /  AST  59<H>  /  ALT  19  /  AlkPhos  64  04-20                                                                                           LIVER FUNCTIONS - ( 20 Apr 2020 05:10 )  Alb: 2.9 g/dL / Pro: 5.4 g/dL / ALK PHOS: 64 U/L / ALT: 19 U/L / AST: 59 U/L / GGT: x                                                                                               Mode: AC/ CMV (Assist Control/ Continuous Mandatory Ventilation)  RR (machine): 24  TV (machine): 300  FiO2: 100  PEEP: 8  ITime: 1  MAP: 15  PIP: 19                                      ABG - ( 20 Apr 2020 03:04 )  pH, Arterial: 7.23  pH, Blood: x     /  pCO2: 52    /  pO2: 65    / HCO3: 22    / Base Excess: -5.9  /  SaO2: 90    Lac 1.4.  Ppl 16              MEDICATIONS  (STANDING):  anakinra Injectable 100 milliGRAM(s) SubCutaneous every 6 hours  aspirin  chewable 81 milliGRAM(s) Oral daily  atorvastatin 80 milliGRAM(s) Oral at bedtime  azithromycin  IVPB 500 milliGRAM(s) IV Intermittent every 24 hours  azithromycin  IVPB      chlorhexidine 4% Liquid 1 Application(s) Topical <User Schedule>  enoxaparin Injectable 40 milliGRAM(s) SubCutaneous daily  ergocalciferol 45076 Unit(s) Oral every week  folic acid 1 milliGRAM(s) Oral daily  meropenem  IVPB 1000 milliGRAM(s) IV Intermittent every 8 hours  methylPREDNISolone sodium succinate Injectable 60 milliGRAM(s) IV Push every 12 hours  metoprolol succinate ER 25 milliGRAM(s) Oral daily  midazolam Infusion 0.02 mG/kG/Hr (1.06 mL/Hr) IV Continuous <Continuous>  morphine  Infusion. 1 mG/Hr (1 mL/Hr) IV Continuous <Continuous>  pantoprazole    Tablet 40 milliGRAM(s) Oral before breakfast  sertraline 50 milliGRAM(s) Oral daily  sodium chloride 0.9%. 1000 milliLiter(s) (75 mL/Hr) IV Continuous <Continuous>  ticagrelor 90 milliGRAM(s) Oral every 12 hours  vancomycin  IVPB 750 milliGRAM(s) IV Intermittent every 12 hours    MEDICATIONS  (PRN):  acetaminophen   Tablet .. 650 milliGRAM(s) Oral every 6 hours PRN Temp greater or equal to 38C (100.4F)      New X-rays reviewed:                                                                                  ECHO    CXR interpreted by me:  ET OG OK.  Bilateral infiltrates

## 2020-04-20 NOTE — CHART NOTE - NSCHARTNOTEFT_GEN_A_CORE
Spoke with patient's spouse Zohaib Goss regarding Remdesivir expanded access. Also discussed with daughter Keila. Witness x 1 present.   Obtained consent for participation over the phone.  ICF sent to Zohaib for signature and returned via email.   Case sent to [place], awaiting possible approval.     Aileen Suarez MD  Spectra [phone]

## 2020-04-20 NOTE — PROGRESS NOTE ADULT - ASSESSMENT
IMPRESSION:    Acute hypoxemic respiratory failure now requiring MV  COVID 19  Probable superinfection  MYCHAL  HO ESBL: UTI     PLAN:    CNS:  Decrease MSO4    HEENT: Oral care    PULMONARY:  HOB @ 45 degrees.  Aspiration precautions. increase .      CARDIOVASCULAR: .  Monitor QTC     GI: GI prophylaxis. OG Feeding.  Bowel regimen     RENAL:  Follow up lytes.  Correct as needed    INFECTIOUS DISEASE: Follow up cultures.  Beatriz and Vanc renally dosed.  FU Vanc levels.  Inflammatory markers.  Azithromycin if QTC OK,     HEMATOLOGICAL:  DVT prophylaxis.    ENDOCRINE:  Follow up FS.  Insulin protocol if needed.  Solumedrol D#3    MUSCULOSKELETAL:  Bed chair position     MICU for now

## 2020-04-21 NOTE — DIETITIAN INITIAL EVALUATION ADULT. - OTHER INFO
Pt p/w SOB, Acute hypoxemic respiratory failure now requiring MV. COVID19+. Suspected Vitamin D and folate deficiencies - supplemented. Acute kidney injury, worsening. HLD

## 2020-04-21 NOTE — DIETITIAN INITIAL EVALUATION ADULT. - ENERGY NEEDS
Calories; ~1467 kcals/day ([id number])  Protein: 64 - 80 gms/day (1.2 - 1.5 gm/kg ABW)  Fluid: per ICU team

## 2020-04-21 NOTE — PROGRESS NOTE ADULT - SUBJECTIVE AND OBJECTIVE BOX
Patient is a 74y old  Female who presents with a chief complaint of shortness of breath (20 Apr 2020 13:40)        Over Night Events:  On MV.  Sedated.  off pressors.          ROS:     All ROS are negative except HPI         PHYSICAL EXAM    ICU Vital Signs Last 24 Hrs  T(C): 36.1 (21 Apr 2020 04:00), Max: 38.6 (20 Apr 2020 17:00)  T(F): 96.9 (21 Apr 2020 04:00), Max: 101.4 (20 Apr 2020 17:00)  HR: 78 (21 Apr 2020 06:00) (70 - 138)  BP: --  BP(mean): --  ABP: 142/64 (21 Apr 2020 06:00) (82/46 - 144/74)  ABP(mean): 94 (21 Apr 2020 06:00) (60 - 100)  RR: 21 (21 Apr 2020 06:00) (15 - 27)  SpO2: 92% (21 Apr 2020 06:00) (92% - 100%)      CONSTITUTIONAL:   Ill appearing.  Well nourished.  NAD    ENT:   Airway patent,   Mouth with normal mucosa.   No thrush    EYES:   Pupils equal,   Round and reactive to light.    CARDIAC:   Normal rate,   Regular rhythm.    No edema      Vascular:  Normal systolic impulse  No Carotid bruits    RESPIRATORY:   No wheezing  Bilateral BS  Normal chest expansion  Not tachypneic,  No use of accessory muscles    GASTROINTESTINAL:  Abdomen soft,   Non-tender,   No guarding,   + BS    GENITOURINARY  normal genitalia for sex  no edema    MUSCULOSKELETAL:   Range of motion is not limited,  No clubbing, cyanosis    NEUROLOGICAL:   Sedated.      SKIN:   Skin normal color for race,   Warm and dry and intact.   No evidence of rash.    PSYCHIATRIC:   No apparent risk to self or others.    HEMATOLOGICAL:  No cervical  lymphadenopathy.  no inguinal lymphadenopathy      04-20-20 @ 07:01  -  04-21-20 @ 07:00  --------------------------------------------------------  IN:    Enteral Tube Flush: 130 mL    IV PiggyBack: 900 mL    midazolam Infusion: 50.5 mL    morphine  Infusion.: 45 mL    Osmolite: 990 mL    sodium bicarbonate  Infusion: 1050 mL    sodium chloride 0.9%.: 750 mL  Total IN: 3915.5 mL    OUT:    Indwelling Catheter - Urethral: 940 mL    Rectal Tube: 400 mL  Total OUT: 1340 mL    Total NET: 2575.5 mL          LABS:                            9.2    16.45 )-----------( 128      ( 21 Apr 2020 04:30 )             27.5                                               04-21    145  |  106  |  32<H>  ----------------------------<  202<H>  3.6   |  25  |  1.2    Ca    7.0<L>      21 Apr 2020 04:30  Phos  2.5     04-21  Mg     2.5     04-21    TPro  4.6<L>  /  Alb  2.4<L>  /  TBili  <0.2  /  DBili  x   /  AST  44<H>  /  ALT  16  /  AlkPhos  57  04-21                                                                                           LIVER FUNCTIONS - ( 21 Apr 2020 04:30 )  Alb: 2.4 g/dL / Pro: 4.6 g/dL / ALK PHOS: 57 U/L / ALT: 16 U/L / AST: 44 U/L / GGT: x                                                  Culture - Blood (collected 19 Apr 2020 20:00)  Source: .Blood Blood  Preliminary Report (21 Apr 2020 01:02):    No growth to date.                                                   Mode: AC/ CMV (Assist Control/ Continuous Mandatory Ventilation)  RR (machine): 24  TV (machine): 400  FiO2: 80  PEEP: 12  ITime: 1  MAP: 18  PIP: 25                                      ABG - ( 21 Apr 2020 02:45 )  pH, Arterial: 7.35  pH, Blood: x     /  pCO2: 47    /  pO2: 63    / HCO3: 26    / Base Excess: -0.1  /  SaO2: 97                  MEDICATIONS  (STANDING):  anakinra Injectable 100 milliGRAM(s) SubCutaneous every 6 hours  aspirin  chewable 81 milliGRAM(s) Oral daily  atorvastatin 80 milliGRAM(s) Oral at bedtime  azithromycin  IVPB 500 milliGRAM(s) IV Intermittent every 24 hours  azithromycin  IVPB      chlorhexidine 0.12% Liquid 15 milliLiter(s) Oral Mucosa every 12 hours  chlorhexidine 4% Liquid 1 Application(s) Topical <User Schedule>  enoxaparin Injectable 40 milliGRAM(s) SubCutaneous daily  ergocalciferol 29147 Unit(s) Oral every week  folic acid 1 milliGRAM(s) Oral daily  meropenem  IVPB 1000 milliGRAM(s) IV Intermittent every 12 hours  methylPREDNISolone sodium succinate Injectable 60 milliGRAM(s) IV Push every 12 hours  metoprolol tartrate 12.5 milliGRAM(s) Oral two times a day  midazolam Infusion 0.02 mG/kG/Hr (1.06 mL/Hr) IV Continuous <Continuous>  morphine  Infusion. 1 mG/Hr (1 mL/Hr) IV Continuous <Continuous>  pantoprazole   Suspension 40 milliGRAM(s) Oral daily  polyethylene glycol 3350 17 Gram(s) Oral daily  rocuronium Injectable 35 milliGRAM(s) IV Push once  senna 2 Tablet(s) Oral at bedtime  sertraline 50 milliGRAM(s) Oral daily  sodium bicarbonate  Infusion 0.212 mEq/kG/Hr (75 mL/Hr) IV Continuous <Continuous>  sodium chloride 0.9%. 1000 milliLiter(s) (75 mL/Hr) IV Continuous <Continuous>  ticagrelor 90 milliGRAM(s) Oral every 12 hours  vancomycin  IVPB 750 milliGRAM(s) IV Intermittent every 24 hours    MEDICATIONS  (PRN):  acetaminophen   Tablet .. 650 milliGRAM(s) Oral every 6 hours PRN Temp greater or equal to 38C (100.4F)      New X-rays reviewed:                                                                                  ECHO    CXR interpreted by me:  ET .  OG OK>  Bilateral infiltrates

## 2020-04-21 NOTE — PROGRESS NOTE ADULT - SUBJECTIVE AND OBJECTIVE BOX
ABHINAV DREW  74y, Female    All available historical data reviewed    OVERNIGHT EVENTS:  fevers  fio2 80%    ROS:  unable to obtain history secondary to patient's mental status and/or sedation     VITALS:  T(F): 96.9, Max: 101.4 (04-20-20 @ 17:00)  HR: 78  BP: --  RR: 21Vital Signs Last 24 Hrs  T(C): 36.1 (21 Apr 2020 04:00), Max: 38.6 (20 Apr 2020 17:00)  T(F): 96.9 (21 Apr 2020 04:00), Max: 101.4 (20 Apr 2020 17:00)  HR: 78 (21 Apr 2020 06:00) (70 - 138)  BP: --  BP(mean): --  RR: 21 (21 Apr 2020 06:00) (18 - 27)  SpO2: 92% (21 Apr 2020 06:00) (92% - 100%)    TESTS & MEASUREMENTS:                        9.2    16.45 )-----------( 128      ( 21 Apr 2020 04:30 )             27.5     04-21    145  |  106  |  32<H>  ----------------------------<  202<H>  3.6   |  25  |  1.2    Ca    7.0<L>      21 Apr 2020 04:30  Phos  2.5     04-21  Mg     2.5     04-21    TPro  4.6<L>  /  Alb  2.4<L>  /  TBili  <0.2  /  DBili  x   /  AST  44<H>  /  ALT  16  /  AlkPhos  57  04-21    LIVER FUNCTIONS - ( 21 Apr 2020 04:30 )  Alb: 2.4 g/dL / Pro: 4.6 g/dL / ALK PHOS: 57 U/L / ALT: 16 U/L / AST: 44 U/L / GGT: x             Culture - Blood (collected 04-19-20 @ 20:00)  Source: .Blood Blood  Preliminary Report (04-21-20 @ 01:02):    No growth to date.            RADIOLOGY & ADDITIONAL TESTS:  Personal review of radiological diagnostics performed  Echo and EKG results noted when applicable.     MEDICATIONS:  acetaminophen   Tablet .. 650 milliGRAM(s) Oral every 6 hours PRN  anakinra Injectable 100 milliGRAM(s) SubCutaneous every 6 hours  aspirin  chewable 81 milliGRAM(s) Oral daily  atorvastatin 80 milliGRAM(s) Oral at bedtime  azithromycin  IVPB 500 milliGRAM(s) IV Intermittent every 24 hours  azithromycin  IVPB      chlorhexidine 0.12% Liquid 15 milliLiter(s) Oral Mucosa every 12 hours  chlorhexidine 4% Liquid 1 Application(s) Topical <User Schedule>  cisatracurium Infusion 3 MICROgram(s)/kG/Min IV Continuous <Continuous>  enoxaparin Injectable 40 milliGRAM(s) SubCutaneous daily  ergocalciferol 50560 Unit(s) Oral every week  folic acid 1 milliGRAM(s) Oral daily  meropenem  IVPB 1000 milliGRAM(s) IV Intermittent every 12 hours  methylPREDNISolone sodium succinate Injectable 60 milliGRAM(s) IV Push every 12 hours  metoprolol tartrate 12.5 milliGRAM(s) Oral two times a day  midazolam Infusion 0.02 mG/kG/Hr IV Continuous <Continuous>  morphine  Infusion. 1 mG/Hr IV Continuous <Continuous>  pantoprazole   Suspension 40 milliGRAM(s) Oral daily  polyethylene glycol 3350 17 Gram(s) Oral daily  Remdesivir 200 milliGRAM(s) 200 milliGRAM(s) IV Intermittent <User Schedule>  senna 2 Tablet(s) Oral at bedtime  sertraline 50 milliGRAM(s) Oral daily  sodium chloride 0.9%. 1000 milliLiter(s) IV Continuous <Continuous>  ticagrelor 90 milliGRAM(s) Oral every 12 hours  vancomycin  IVPB 750 milliGRAM(s) IV Intermittent every 24 hours      ANTIBIOTICS:  azithromycin  IVPB 500 milliGRAM(s) IV Intermittent every 24 hours  azithromycin  IVPB      meropenem  IVPB 1000 milliGRAM(s) IV Intermittent every 12 hours  vancomycin  IVPB 750 milliGRAM(s) IV Intermittent every 24 hours

## 2020-04-21 NOTE — DIETITIAN INITIAL EVALUATION ADULT. - FACTORS AFF FOOD INTAKE
Intubated/sedated. off pressors. On paralytic. OGT feeds running at goal. LBM 4/21, loose. MAP 88. Unable to obtain nutrition hx at this time.

## 2020-04-21 NOTE — PROGRESS NOTE ADULT - ASSESSMENT
IMPRESSION:    Acute hypoxemic respiratory failure now requiring MV  COVID 19  Probable superinfection  MYCHAL  HO ESBL: UTI     PLAN:    CNS:  Decrease MSO4    HEENT: Oral care    PULMONARY:  HOB @ 45 degrees.  Aspiration precautions. ABG after paralysis and adjust vent settings     CARDIOVASCULAR: .  Monitor QTC.  I=O  Free h2O    GI: GI prophylaxis. OG Feeding.  Bowel regimen     RENAL:  Follow up lytes.  Correct as needed    INFECTIOUS DISEASE: Follow up cultures. Continue ABX>  FU vanc levels.      HEMATOLOGICAL:  DVT prophylaxis.    ENDOCRINE:  Follow up FS.  Insulin protocol if needed.  Solumedrol D#4    MUSCULOSKELETAL:  Bed chair position     MICU for now IMPRESSION:    Acute hypoxemic respiratory failure now requiring MV  COVID 19  Probable superinfection  MYCHAL  HO ESBL: UTI     PLAN:    CNS:  Decrease MSO4    HEENT: Oral care    PULMONARY:  HOB @ 45 degrees.  Aspiration precautions. ABG after paralysis and adjust vent settings.  Advance ET 2 cms     CARDIOVASCULAR: .  Monitor QTC.  I=O  Free h2O    GI: GI prophylaxis. OG Feeding.  Bowel regimen     RENAL:  Follow up lytes.  Correct as needed    INFECTIOUS DISEASE: Follow up cultures. Continue ABX>  FU vanc levels.      HEMATOLOGICAL:  DVT prophylaxis.    ENDOCRINE:  Follow up FS.  Insulin protocol if needed.  Solumedrol D#4    MUSCULOSKELETAL:  Bed chair position     MICU for now

## 2020-04-21 NOTE — DIETITIAN INITIAL EVALUATION ADULT. - PERTINENT MEDS FT
lovenox, abx, nimbex, protonix, lopressor, miralax, senna, lipitor, solumedrol, vit D, folic acid, zoloft

## 2020-04-21 NOTE — DIETITIAN INITIAL EVALUATION ADULT. - ADD RECOMMEND
Change feeds to Osmolite 1.5 @ 240ml q6 hr + beneprotein pkts 2x/day (can give both at one feed) [provides 1490 kcals, 72 gms protein, 730 ml free water] Flushes per LIP. Monitor GI tolerance closely while pt on paralytic. Hold feeds if MAP <65.

## 2020-04-21 NOTE — CHART NOTE - NSCHARTNOTEFT_GEN_A_CORE
as part of the communications team, I called Zohaib at [phone] for daily update.  there was no answer or answering machine

## 2020-04-21 NOTE — PROGRESS NOTE ADULT - ASSESSMENT
· Assessment		  73 y/o F with PMH of CAD s/p PCI x5, GERD, Rheumatoid arthritis presented from [facility]) for worsening shortness of breath and hypoxia.     IMPRESSION;   COVID 19 with Hypoxemia and CXR with b/l opacities  -elevation of inflammatory markers and  given the duration of symptoms  possibility of cytokine release syndrome  s/p HCQ  Resolved fevers. No superimposed bacterial PNA  Meets criteria for Anakinra. Hypoxic and elevated inflammatory marker ( Ferritin 1705 )  Polymicrobial bacteruria with no pyuria ( of little clinical significance )  Cierra LAUREN NG  BCx 4/19 NG    RECOMMENDATIONS;   D/c vancomycin  Meropenem 1 gm iv q8h  Anakinra 100 mg SC q6h x 3d since 4/19  monitor ferritin

## 2020-04-21 NOTE — PROGRESS NOTE ADULT - SUBJECTIVE AND OBJECTIVE BOX
ABHINAV DREW 74y Female  MRN#: [id number]   Hospital Day: 3d    SUBJECTIVE  Patient is a 74y old Female who presents with a chief complaint of shortness of breath (2020 09:37)  Currently admitted to medicine with the primary diagnosis of COVID-19 virus detected    INTERVAL HPI AND OVERNIGHT EVENTS:  Patient was examined and seen at bedside. This morning she is intubated, sedated, and off pressors. Patient was very agitated this morning on mechanical ventilation and was double-triggering the ventilator. She was severely out of sync with adequate sedation. Decision was made to paralyze the patient.    REVIEW OF SYMPTOMS:  Unable to obtain due to patient's mental status     OBJECTIVE  PAST MEDICAL & SURGICAL HISTORY  Rheumatoid arthritis  GERD (gastroesophageal reflux disease)  MI (myocardial infarction)  Hyperlipidemia  CAD (coronary artery disease)  History of surgery: stent placement    ALLERGIES:  Zocor (Joint Pain)    MEDICATIONS:  STANDING MEDICATIONS  aspirin  chewable 81 milliGRAM(s) Oral daily  atorvastatin 80 milliGRAM(s) Oral at bedtime  azithromycin  IVPB 500 milliGRAM(s) IV Intermittent every 24 hours  azithromycin  IVPB      chlorhexidine 0.12% Liquid 15 milliLiter(s) Oral Mucosa every 12 hours  chlorhexidine 4% Liquid 1 Application(s) Topical <User Schedule>  cisatracurium Infusion 3 MICROgram(s)/kG/Min IV Continuous <Continuous>  enoxaparin Injectable 40 milliGRAM(s) SubCutaneous daily  ergocalciferol 29258 Unit(s) Oral every week  folic acid 1 milliGRAM(s) Oral daily  meropenem  IVPB 1000 milliGRAM(s) IV Intermittent every 12 hours  methylPREDNISolone sodium succinate Injectable 60 milliGRAM(s) IV Push every 12 hours  metoprolol tartrate 12.5 milliGRAM(s) Oral two times a day  midazolam Infusion 0.02 mG/kG/Hr IV Continuous <Continuous>  morphine  Infusion. 1 mG/Hr IV Continuous <Continuous>  pantoprazole   Suspension 40 milliGRAM(s) Oral daily  polyethylene glycol 3350 17 Gram(s) Oral daily  senna 2 Tablet(s) Oral at bedtime  sertraline 50 milliGRAM(s) Oral daily  ticagrelor 90 milliGRAM(s) Oral every 12 hours  vancomycin  IVPB 750 milliGRAM(s) IV Intermittent every 24 hours    PRN MEDICATIONS  acetaminophen   Tablet .. 650 milliGRAM(s) Oral every 6 hours PRN      VITAL SIGNS: Last 24 Hours  T(C): 36.1 (2020 04:00), Max: 38.6 (2020 17:00)  T(F): 96.9 (2020 04:00), Max: 101.4 (2020 17:00)  HR: 84 (2020 11:00) (70 - 138)  BP: --  BP(mean): --  RR: 24 (2020 11:00) (21 - 25)  SpO2: 97% (2020 11:00) (78% - 100%)    LABS:                        9.2    16.45 )-----------( 128      ( 2020 04:30 )             27.5     04-21    145  |  106  |  32<H>  ----------------------------<  202<H>  3.6   |  25  |  1.2    Ca    7.0<L>      2020 04:30  Phos  2.5     -21  Mg     2.5     -21    TPro  4.6<L>  /  Alb  2.4<L>  /  TBili  <0.2  /  DBili  x   /  AST  44<H>  /  ALT  16  /  AlkPhos  57  04-21      Urinalysis Basic - ( 2020 10:20 )    Color: Yellow / Appearance: Slightly Turbid / S.017 / pH: x  Gluc: x / Ketone: Trace  / Bili: Negative / Urobili: <2 mg/dL   Blood: x / Protein: 100 mg/dL / Nitrite: Negative   Leuk Esterase: Negative / RBC: 2 /HPF / WBC 14 /HPF   Sq Epi: x / Non Sq Epi: 11 /HPF / Bacteria: Negative      ABG - ( 2020 02:45 )  pH, Arterial: 7.35  pH, Blood: x     /  pCO2: 47    /  pO2: 63    / HCO3: 26    / Base Excess: -0.1  /  SaO2: 97        Culture - Blood (collected 2020 20:00)  Source: .Blood Blood  Preliminary Report (2020 01:02):    No growth to date.    RADIOLOGY:  < from: Xray Chest 1 View- PORTABLE-Routine (20 @ 04:51) >  Findings:  Support devices: An endotracheal tube is seen with tip 5.1cm above the megan. Right IJ central venous catheter with tip in the SVC. An enteric  tube is seen with tip in the stomach.  Cardiac/mediastinum/hilum: Stable.  Lung parenchyma/Pleura: Bilateral airspace opacities, unchanged. No pneumothorax.  Skeleton/soft tissues: Stable.    Impression:  Bilateral airspace opacities, unchanged.  < end of copied text >    PHYSICAL EXAM:  CONSTITUTIONAL: No acute distress, well-developed, well-groomed, intubated, sedated  HEAD: Atraumatic, normocephalic  EYES: EOM intact, PERRLA, conjunctiva and sclera clear  ENT: Supple, no masses, no thyromegaly, no bruits, no JVD; moist mucous membranes  PULMONARY: Clear to auscultation bilaterally; no wheezes, rales, or rhonchi  CARDIOVASCULAR: Regular rate and rhythm; no murmurs, rubs, or gallops  GASTROINTESTINAL: Soft, non-tender, non-distended; bowel sounds present  MUSCULOSKELETAL: 2+ peripheral pulses; no clubbing, no cyanosis, no edema  NEUROLOGY: sedated  SKIN: No rashes or lesions; warm and dry    ASSESSMENT & PLAN  Patient is a 73yo female with PMH of CAD s/p PCI x5, GERD, rheumatoid arthritis, and hyperlipidemia who was transferred from [facility] for worsening shortness of breath and hypoxia. Patient was admitted to the [facility] site after having 2 episodes of syncope at home and tested positive for COVID-19. Patient was intubated on 2020 after desaturating while on non-rebreather.    #Acute hypoxemic respiratory failure secondary to COVID-19 pneumonia with possible superinfection  - COVID-19 PCR 2020: positive  - Isolation precautions (contact, droplet, airborne)  - ECG 2020: QTc 510  - Procalcitonin: 0.16->1.95->1.85  - CRP: 4.73->18.39->8.31  - D-dimer: 516->949  - Sedation with morphine and midazolam (patient became hypotensive on propofol)  - ID consult appreciated  - Completed course of hydroxychloroquine and anakinra  - Continue with meropenem 1g IV Q12H (Day #4)  - Change vancomycin to 750mg IV Q24H (Day #4)  - Random vancomycin level today: 27.2  - Continue with methylprednisolone 60mg IV Q12H (Day #4)  - Continue with azithromycin 500mg IV Q24H (Day #3)  - Start remdesivir 200mg IV x1 dose then 100mg IV Q24H for 9 days (Day #1)  - Follow CBC, CMP, magnesium, and urinalysis daily while on remdesivir  - Monitor QTc  - Follow vancomycin trough  - Start continuous paralysis with Nimbex  - Discontinue sodium bicarbonate  - Advance ET tube by 2cm    #Acute kidney injury  - Baseline creatinine: 0.7  - Creatinine today: 1.2  - Monitor BUN/creatinine  - Avoid nephrotic agents    #Hypernatremia  - Sodium today: 145  - Free water deficit: 0.9L  - Start free water 200mL Q6H  - Follow sodium in AM    #CAD s/p PCI x5  - Continue with aspirin 81mg PO QD  - Continue with ticagrelor 90mg PO Q12H  - Continue with metoprolol tartrate 12.5mg PO BID  - Continue with atorvastatin 80mg PO QHS    #Asymptomatic bacteruria  - Urine Cx Culture Results: 50,000-99,000 Pseudomonas aeruginosa and 10,000-49,000 ESBL E coli  - Patient currently asymptomatic    #Hyperlipidemia  - Patient takes rosuvastatin 20mg PO QHS at home  - Has allergy to simvastatin  - Continue with atorvastatin 80mg PO QHS    #Rheumatoid arthritis  - Stable    #GERD  - Continue with pantoprazole 40mg PO QD     #Anxiety/Depression  - Continue with alprazolam 1mg PO TID PRN  - Continue with sertraline 50mg PO QD    #Suspected Vitamin D and folate deficiencies  - Continue with ergocalciferol 50,000 units Q7D  - Continue with folic acid 1mg PO QD    #Misc  - DVT Prophylaxis: Lovenox 40mg SQ QD   - GI Prophylaxis: pantoprazole 40mg PO QD   - Diet: NPO with tube feeds  - Activity: bedrest  - IV Fluids: not indicated  - Code Status: Full Code    Dispo: MICU monitoring for now

## 2020-04-22 NOTE — PROGRESS NOTE ADULT - SUBJECTIVE AND OBJECTIVE BOX
ABHINAV DREW 74y Female  MRN#: [id number]   Hospital Day: 4d    SUBJECTIVE  Patient is a 74y old Female who presents with a chief complaint of shortness of breath (2020 09:04)  Currently admitted to medicine with the primary diagnosis of COVID-19 virus detected    INTERVAL HPI AND OVERNIGHT EVENTS:  Patient was examined and seen at bedside. This morning she is intubated, sedated, paralyzed, and off pressors.    REVIEW OF SYMPTOMS:  Unable to obtain due to patient's mental status     OBJECTIVE  PAST MEDICAL & SURGICAL HISTORY  Rheumatoid arthritis  GERD (gastroesophageal reflux disease)  MI (myocardial infarction)  Hyperlipidemia  CAD (coronary artery disease)  History of surgery: stent placement    ALLERGIES:  Zocor (Joint Pain)    MEDICATIONS:  STANDING MEDICATIONS  aspirin  chewable 81 milliGRAM(s) Oral daily  atorvastatin 80 milliGRAM(s) Oral at bedtime  azithromycin  IVPB 500 milliGRAM(s) IV Intermittent every 24 hours  azithromycin  IVPB      chlorhexidine 0.12% Liquid 15 milliLiter(s) Oral Mucosa every 12 hours  chlorhexidine 4% Liquid 1 Application(s) Topical <User Schedule>  cisatracurium Infusion 3 MICROgram(s)/kG/Min IV Continuous <Continuous>  dextrose 5%. 1000 milliLiter(s) IV Continuous <Continuous>  enoxaparin Injectable 40 milliGRAM(s) SubCutaneous daily  ergocalciferol 57658 Unit(s) Oral every week  folic acid 1 milliGRAM(s) Oral daily  meropenem  IVPB 1000 milliGRAM(s) IV Intermittent every 12 hours  methylPREDNISolone sodium succinate Injectable 60 milliGRAM(s) IV Push every 12 hours  metoprolol tartrate 12.5 milliGRAM(s) Oral two times a day  midazolam Infusion 0.02 mG/kG/Hr IV Continuous <Continuous>  morphine  Infusion. 1 mG/Hr IV Continuous <Continuous>  pantoprazole   Suspension 40 milliGRAM(s) Oral daily  polyethylene glycol 3350 17 Gram(s) Oral daily  potassium chloride   Powder 40 milliEquivalent(s) Oral once  Remdesivir 100 milliGRAM(s) 100 milliGRAM(s) IV Intermittent <User Schedule>  senna 2 Tablet(s) Oral at bedtime  sertraline 50 milliGRAM(s) Oral daily  ticagrelor 90 milliGRAM(s) Oral every 12 hours    PRN MEDICATIONS  acetaminophen   Tablet .. 650 milliGRAM(s) Oral every 6 hours PRN      VITAL SIGNS: Last 24 Hours  T(C): 36.7 (2020 08:00), Max: 37.3 (2020 16:00)  T(F): 98.1 (2020 08:00), Max: 99.2 (2020 16:00)  HR: 86 (2020 11:00) (86 - 102)  BP: --  BP(mean): --  RR: 25 (2020 11:00) (24 - 26)  SpO2: 99% (2020 11:00) (96% - 99%)    LABS:                        9.0    18.01 )-----------( 153      ( 2020 04:00 )             28.0     04-    147<H>  |  107  |  36<H>  ----------------------------<  164<H>  3.4<L>   |  29  |  1.1    Ca    7.4<L>      2020 04:00  Phos  2.6     04-  Mg     2.6     -    TPro  5.0<L>  /  Alb  2.6<L>  /  TBili  0.2  /  DBili  x   /  AST  33  /  ALT  16  /  AlkPhos  65  04-22    PT/INR - ( 2020 04:00 )   PT: 14.90 sec;   INR: 1.30 ratio         PTT - ( 2020 04:00 )  PTT:29.5 sec  Urinalysis Basic - ( 2020 04:00 )    Color: Yellow / Appearance: Slightly Turbid / S.018 / pH: x  Gluc: x / Ketone: Negative  / Bili: Negative / Urobili: <2 mg/dL   Blood: x / Protein: 100 mg/dL / Nitrite: Negative   Leuk Esterase: Negative / RBC: 1 /HPF / WBC 49 /HPF   Sq Epi: x / Non Sq Epi: 27 /HPF / Bacteria: Negative      ABG - ( 2020 03:17 )  pH, Arterial: 7.37  pH, Blood: x     /  pCO2: 51    /  pO2: 91    / HCO3: 30    / Base Excess: 3.6   /  SaO2: 97        Mode: AC/ CMV (Assist Control/ Continuous Mandatory Ventilation)  RR (machine): 26  TV (machine): 400  FiO2: 90  PEEP: 12  ITime: 1  MAP: 18  PIP: 25    Culture - Blood (collected 2020 20:00)  Source: .Blood Blood  Preliminary Report (2020 01:02):    No growth to date.    RADIOLOGY:  < from: Xray Chest 1 View- PORTABLE-Routine (20 @ 02:54) >  Findings:  Support devices: Stable endotracheal tube, enteric tube coursing below the diaphragm and right IJ central catheter.  Cardiac/mediastinum/hilum: Unchanged.  Lung parenchyma/Pleura: Unchanged bilateral airspace opacities. No pneumothorax  Skeleton/soft tissues: Unchanged.    Impression:    Unchanged bilateral airspace opacities. No pneumothorax.  Stable support devices as above.  < end of copied text >    PHYSICAL EXAM:  CONSTITUTIONAL: No acute distress, well-developed, well-groomed, intubated, sedated, paralyzed  HEAD: Atraumatic, normocephalic  EYES: EOM intact, PERRLA, conjunctiva and sclera clear  ENT: Supple, no masses, no thyromegaly, no bruits, no JVD; moist mucous membranes  PULMONARY: Clear to auscultation bilaterally; no wheezes, rales, or rhonchi  CARDIOVASCULAR: Regular rate and rhythm; no murmurs, rubs, or gallops  GASTROINTESTINAL: Soft, non-tender, non-distended; bowel sounds present  MUSCULOSKELETAL: 2+ peripheral pulses; no clubbing, no cyanosis, no edema  NEUROLOGY: sedated  SKIN: No rashes or lesions; warm and dry    ASSESSMENT & PLAN  Patient is a 75yo female with PMH of CAD s/p PCI x5, GERD, rheumatoid arthritis, and hyperlipidemia who was transferred from [facility] for worsening shortness of breath and hypoxia. Patient was admitted to the [facility] site after having 2 episodes of syncope at home and tested positive for COVID-19. Patient was intubated on 2020 after desaturating while on non-rebreather.    #Acute hypoxemic respiratory failure secondary to COVID-19 pneumonia with possible superinfection  - COVID-19 PCR 2020: positive  - Isolation precautions (contact, droplet, airborne)  - ECG 2020: QTc 510  - Procalcitonin: 0.16->1.95->1.85  - CRP: 4.73->18.39->8.31  - D-dimer: 516->949  - Sedation with morphine and midazolam (patient became hypotensive on propofol)  - ID consult appreciated  - Completed course of hydroxychloroquine and anakinra  - Continue with meropenem 1g IV Q12H (Day #5)  - MRSA nares negative  - Discontinue vancomycin  - Continue with methylprednisolone 60mg IV Q12H (Day #5)  - Continue with azithromycin 500mg IV Q24H (Day #4)  - Continue with remdesivir 100mg IV Q24H (Day #2)  - Follow CBC, CMP, magnesium, and urinalysis daily while on remdesivir  - Monitor QTc  - Continue with continuous paralysis  - Discontinue sodium bicarbonate  - Advance ET tube by 2cm    #Acute kidney injury  - Baseline creatinine: 0.7  - Creatinine today: 1.2  - Monitor BUN/creatinine  - Avoid nephrotic agents    #Hypernatremia  - Sodium today: 145  - Free water deficit: 0.9L  - Start free water 200mL Q6H  - Follow sodium in AM    #CAD s/p PCI x5  - Continue with aspirin 81mg PO QD  - Continue with ticagrelor 90mg PO Q12H  - Continue with metoprolol tartrate 12.5mg PO BID  - Continue with atorvastatin 80mg PO QHS    #Asymptomatic bacteruria  - Urine Cx Culture Results: 50,000-99,000 Pseudomonas aeruginosa and 10,000-49,000 ESBL E coli  - Patient currently asymptomatic    #Hyperlipidemia  - Patient takes rosuvastatin 20mg PO QHS at home  - Has allergy to simvastatin  - Continue with atorvastatin 80mg PO QHS    #Rheumatoid arthritis  - Stable    #GERD  - Continue with pantoprazole 40mg PO QD     #Anxiety/Depression  - Continue with alprazolam 1mg PO TID PRN  - Continue with sertraline 50mg PO QD    #Suspected Vitamin D and folate deficiencies  - Continue with ergocalciferol 50,000 units Q7D  - Continue with folic acid 1mg PO QD    #Misc  - DVT Prophylaxis: Lovenox 40mg SQ QD   - GI Prophylaxis: pantoprazole 40mg PO QD   - Diet: NPO with tube feeds  - Activity: bedrest  - IV Fluids: not indicated  - Code Status: Full Code    Dispo: MICU monitoring for now ABHINAV DREW 74y Female  MRN#: [id number]   Hospital Day: 4d    SUBJECTIVE  Patient is a 74y old Female who presents with a chief complaint of shortness of breath (2020 09:04)  Currently admitted to medicine with the primary diagnosis of COVID-19 virus detected    INTERVAL HPI AND OVERNIGHT EVENTS:  Patient was examined and seen at bedside. This morning she is intubated, sedated, paralyzed, and off pressors.    REVIEW OF SYMPTOMS:  Unable to obtain due to patient's mental status     OBJECTIVE  PAST MEDICAL & SURGICAL HISTORY  Rheumatoid arthritis  GERD (gastroesophageal reflux disease)  MI (myocardial infarction)  Hyperlipidemia  CAD (coronary artery disease)  History of surgery: stent placement    ALLERGIES:  Zocor (Joint Pain)    MEDICATIONS:  STANDING MEDICATIONS  aspirin  chewable 81 milliGRAM(s) Oral daily  atorvastatin 80 milliGRAM(s) Oral at bedtime  azithromycin  IVPB 500 milliGRAM(s) IV Intermittent every 24 hours  azithromycin  IVPB      chlorhexidine 0.12% Liquid 15 milliLiter(s) Oral Mucosa every 12 hours  chlorhexidine 4% Liquid 1 Application(s) Topical <User Schedule>  cisatracurium Infusion 3 MICROgram(s)/kG/Min IV Continuous <Continuous>  dextrose 5%. 1000 milliLiter(s) IV Continuous <Continuous>  enoxaparin Injectable 40 milliGRAM(s) SubCutaneous daily  ergocalciferol 20295 Unit(s) Oral every week  folic acid 1 milliGRAM(s) Oral daily  meropenem  IVPB 1000 milliGRAM(s) IV Intermittent every 12 hours  methylPREDNISolone sodium succinate Injectable 60 milliGRAM(s) IV Push every 12 hours  metoprolol tartrate 12.5 milliGRAM(s) Oral two times a day  midazolam Infusion 0.02 mG/kG/Hr IV Continuous <Continuous>  morphine  Infusion. 1 mG/Hr IV Continuous <Continuous>  pantoprazole   Suspension 40 milliGRAM(s) Oral daily  polyethylene glycol 3350 17 Gram(s) Oral daily  potassium chloride   Powder 40 milliEquivalent(s) Oral once  Remdesivir 100 milliGRAM(s) 100 milliGRAM(s) IV Intermittent <User Schedule>  senna 2 Tablet(s) Oral at bedtime  sertraline 50 milliGRAM(s) Oral daily  ticagrelor 90 milliGRAM(s) Oral every 12 hours    PRN MEDICATIONS  acetaminophen   Tablet .. 650 milliGRAM(s) Oral every 6 hours PRN      VITAL SIGNS: Last 24 Hours  T(C): 36.7 (2020 08:00), Max: 37.3 (2020 16:00)  T(F): 98.1 (2020 08:00), Max: 99.2 (2020 16:00)  HR: 86 (2020 11:00) (86 - 102)  BP: --  BP(mean): --  RR: 25 (2020 11:00) (24 - 26)  SpO2: 99% (2020 11:00) (96% - 99%)    LABS:                        9.0    18.01 )-----------( 153      ( 2020 04:00 )             28.0     04-    147<H>  |  107  |  36<H>  ----------------------------<  164<H>  3.4<L>   |  29  |  1.1    Ca    7.4<L>      2020 04:00  Phos  2.6     04-  Mg     2.6     -    TPro  5.0<L>  /  Alb  2.6<L>  /  TBili  0.2  /  DBili  x   /  AST  33  /  ALT  16  /  AlkPhos  65  04-22    PT/INR - ( 2020 04:00 )   PT: 14.90 sec;   INR: 1.30 ratio         PTT - ( 2020 04:00 )  PTT:29.5 sec  Urinalysis Basic - ( 2020 04:00 )    Color: Yellow / Appearance: Slightly Turbid / S.018 / pH: x  Gluc: x / Ketone: Negative  / Bili: Negative / Urobili: <2 mg/dL   Blood: x / Protein: 100 mg/dL / Nitrite: Negative   Leuk Esterase: Negative / RBC: 1 /HPF / WBC 49 /HPF   Sq Epi: x / Non Sq Epi: 27 /HPF / Bacteria: Negative      ABG - ( 2020 03:17 )  pH, Arterial: 7.37  pH, Blood: x     /  pCO2: 51    /  pO2: 91    / HCO3: 30    / Base Excess: 3.6   /  SaO2: 97        Mode: AC/ CMV (Assist Control/ Continuous Mandatory Ventilation)  RR (machine): 26  TV (machine): 400  FiO2: 90  PEEP: 12  ITime: 1  MAP: 18  PIP: 25    Culture - Blood (collected 2020 20:00)  Source: .Blood Blood  Preliminary Report (2020 01:02):    No growth to date.    RADIOLOGY:  < from: Xray Chest 1 View- PORTABLE-Routine (20 @ 02:54) >  Findings:  Support devices: Stable endotracheal tube, enteric tube coursing below the diaphragm and right IJ central catheter.  Cardiac/mediastinum/hilum: Unchanged.  Lung parenchyma/Pleura: Unchanged bilateral airspace opacities. No pneumothorax  Skeleton/soft tissues: Unchanged.    Impression:    Unchanged bilateral airspace opacities. No pneumothorax.  Stable support devices as above.  < end of copied text >    PHYSICAL EXAM:  CONSTITUTIONAL: No acute distress, well-developed, well-groomed, intubated, sedated, paralyzed  HEAD: Atraumatic, normocephalic  EYES: EOM intact, PERRLA, conjunctiva and sclera clear  ENT: Supple, no masses, no thyromegaly, no bruits, no JVD; moist mucous membranes  PULMONARY: Clear to auscultation bilaterally; no wheezes, rales, or rhonchi  CARDIOVASCULAR: Regular rate and rhythm; no murmurs, rubs, or gallops  GASTROINTESTINAL: Soft, non-tender, non-distended; bowel sounds present  MUSCULOSKELETAL: 2+ peripheral pulses; no clubbing, no cyanosis, no edema  NEUROLOGY: sedated  SKIN: No rashes or lesions; warm and dry    ASSESSMENT & PLAN  Patient is a 75yo female with PMH of CAD s/p PCI x5, GERD, rheumatoid arthritis, and hyperlipidemia who was transferred from [facility] for worsening shortness of breath and hypoxia. Patient was admitted to the [facility] site after having 2 episodes of syncope at home and tested positive for COVID-19. Patient was intubated on 2020 after desaturating while on non-rebreather.    #Acute hypoxemic respiratory failure secondary to COVID-19 pneumonia with possible superinfection  - COVID-19 PCR 2020: positive  - Isolation precautions (contact, droplet, airborne)  - ECG 2020: QTc 510  - Procalcitonin: 0.16->1.95->1.85  - CRP: 4.73->18.39->8.31  - D-dimer: 516->949  - Sedation with morphine and midazolam (patient became hypotensive on propofol)  - ID consult appreciated  - Completed course of hydroxychloroquine and anakinra  - Continue with meropenem 1g IV Q12H (Day #5)  - MRSA nares negative  - Discontinue vancomycin  - Continue with methylprednisolone 60mg IV Q12H (Day #5)  - Continue with azithromycin 500mg IV Q24H (Day #4)  - Continue with remdesivir 100mg IV Q24H (Day #2)  - Follow CBC, CMP, magnesium, PT/PTT, and urinalysis daily while on remdesivir  - Monitor QTc  - Continue with continuous paralysis  - Titrate FiO2    #Acute kidney injury  - Baseline creatinine: 0.7  - Creatinine today: 1.1  - Monitor BUN/creatinine  - Avoid nephrotic agents  - Start D5W at 75mL/hr    #Hypernatremia  - Sodium today: 147  - Free water deficit: 1.2L  - Continue with free water 200mL Q6H  - Start D5W at 75mL/hr  - Follow sodium in AM    #CAD s/p PCI x5  - Continue with aspirin 81mg PO QD  - Continue with ticagrelor 90mg PO Q12H  - Continue with metoprolol tartrate 12.5mg PO BID  - Continue with atorvastatin 80mg PO QHS    #Asymptomatic bacteruria  - Urine Cx Culture Results: 50,000-99,000 Pseudomonas aeruginosa and 10,000-49,000 ESBL E coli  - Patient currently asymptomatic    #Hyperlipidemia  - Patient takes rosuvastatin 20mg PO QHS at home  - Has allergy to simvastatin  - Continue with atorvastatin 80mg PO QHS    #Rheumatoid arthritis  - Stable    #GERD  - Continue with pantoprazole 40mg PO QD     #Anxiety/Depression  - Continue with alprazolam 1mg PO TID PRN  - Continue with sertraline 50mg PO QD    #Suspected Vitamin D and folate deficiencies  - Continue with ergocalciferol 50,000 units Q7D  - Continue with folic acid 1mg PO QD    #Misc  - DVT Prophylaxis: Lovenox 40mg SQ QD   - GI Prophylaxis: pantoprazole 40mg PO QD   - Diet: NPO with tube feeds  - Activity: bedrest  - IV Fluids: D5W at 75mL/hr  - Code Status: Full Code    Dispo: MICU monitoring for now

## 2020-04-22 NOTE — CHART NOTE - NSCHARTNOTEFT_GEN_A_CORE
as part of the communications team, I called  Zohaib at [phone] to provide daily update  There was no answer, only a busy signal.  Daily update will next be provided tomorrow

## 2020-04-22 NOTE — PROGRESS NOTE ADULT - ASSESSMENT
IMPRESSION:    Acute hypoxemic respiratory failure now requiring MV  COVID 19  Probable superinfection  MYCHAL  HO ESBL: UTI     PLAN:    CNS:  Decrease MSO4.  Keep paralysis     HEENT: Oral care    PULMONARY:  HOB @ 45 degrees.  Aspiration precautions.   Wean O2     CARDIOVASCULAR: I=O  Free h2O.  D5W.     GI: GI prophylaxis. OG Feeding.  Bowel regimen     RENAL:  Follow up lytes.  Correct as needed    INFECTIOUS DISEASE: Follow up cultures. Continue ABX>  FU vanc levels.      HEMATOLOGICAL:  DVT prophylaxis.    ENDOCRINE:  Follow up FS.  Insulin protocol if needed.  Solumedrol D#5    MUSCULOSKELETAL:  Bed chair position     MICU for now

## 2020-04-22 NOTE — PROGRESS NOTE ADULT - SUBJECTIVE AND OBJECTIVE BOX
ABHINAV DREW  74y, Female    All available historical data reviewed    OVERNIGHT EVENTS:  no fevers  fio2 80%  ROS:  unable to obtain history secondary to patient's mental status and/or sedation     VITALS:  T(F): 98.1, Max: 99.2 (20 @ 16:00)  HR: 86  BP: --  RR: 25Vital Signs Last 24 Hrs  T(C): 36.7 (2020 08:00), Max: 37.3 (2020 16:00)  T(F): 98.1 (2020 08:00), Max: 99.2 (2020 16:00)  HR: 86 (2020 11:00) (86 - 102)  BP: --  BP(mean): --  RR: 25 (2020 11:00) (24 - 26)  SpO2: 99% (2020 11:00) (96% - 99%)    TESTS & MEASUREMENTS:                        9.0    18.01 )-----------( 153      ( 2020 04:00 )             28.0     04-    147<H>  |  107  |  36<H>  ----------------------------<  164<H>  3.4<L>   |  29  |  1.1    Ca    7.4<L>      2020 04:00  Phos  2.6     04-  Mg     2.6         TPro  5.0<L>  /  Alb  2.6<L>  /  TBili  0.2  /  DBili  x   /  AST  33  /  ALT  16  /  AlkPhos  65  04-22    LIVER FUNCTIONS - ( 2020 04:00 )  Alb: 2.6 g/dL / Pro: 5.0 g/dL / ALK PHOS: 65 U/L / ALT: 16 U/L / AST: 33 U/L / GGT: x             Culture - Blood (collected 20 @ 20:00)  Source: .Blood Blood  Preliminary Report (20 @ 01:02):    No growth to date.      Urinalysis Basic - ( 2020 04:00 )    Color: Yellow / Appearance: Slightly Turbid / S.018 / pH: x  Gluc: x / Ketone: Negative  / Bili: Negative / Urobili: <2 mg/dL   Blood: x / Protein: 100 mg/dL / Nitrite: Negative   Leuk Esterase: Negative / RBC: 1 /HPF / WBC 49 /HPF   Sq Epi: x / Non Sq Epi: 27 /HPF / Bacteria: Negative          RADIOLOGY & ADDITIONAL TESTS:  Personal review of radiological diagnostics performed  Echo and EKG results noted when applicable.     MEDICATIONS:  acetaminophen   Tablet .. 650 milliGRAM(s) Oral every 6 hours PRN  aspirin  chewable 81 milliGRAM(s) Oral daily  atorvastatin 80 milliGRAM(s) Oral at bedtime  azithromycin  IVPB 500 milliGRAM(s) IV Intermittent every 24 hours  azithromycin  IVPB      chlorhexidine 0.12% Liquid 15 milliLiter(s) Oral Mucosa every 12 hours  chlorhexidine 4% Liquid 1 Application(s) Topical <User Schedule>  cisatracurium Infusion 3 MICROgram(s)/kG/Min IV Continuous <Continuous>  dextrose 5%. 1000 milliLiter(s) IV Continuous <Continuous>  enoxaparin Injectable 40 milliGRAM(s) SubCutaneous daily  ergocalciferol 79132 Unit(s) Oral every week  folic acid 1 milliGRAM(s) Oral daily  meropenem  IVPB 1000 milliGRAM(s) IV Intermittent every 12 hours  methylPREDNISolone sodium succinate Injectable 60 milliGRAM(s) IV Push every 12 hours  metoprolol tartrate 12.5 milliGRAM(s) Oral two times a day  midazolam Infusion 0.02 mG/kG/Hr IV Continuous <Continuous>  morphine  Infusion. 1 mG/Hr IV Continuous <Continuous>  pantoprazole   Suspension 40 milliGRAM(s) Oral daily  polyethylene glycol 3350 17 Gram(s) Oral daily  potassium chloride   Powder 40 milliEquivalent(s) Oral once  Remdesivir 100 milliGRAM(s) 100 milliGRAM(s) IV Intermittent <User Schedule>  senna 2 Tablet(s) Oral at bedtime  sertraline 50 milliGRAM(s) Oral daily  ticagrelor 90 milliGRAM(s) Oral every 12 hours      ANTIBIOTICS:  azithromycin  IVPB 500 milliGRAM(s) IV Intermittent every 24 hours  azithromycin  IVPB      meropenem  IVPB 1000 milliGRAM(s) IV Intermittent every 12 hours

## 2020-04-22 NOTE — PROGRESS NOTE ADULT - SUBJECTIVE AND OBJECTIVE BOX
Patient is a 74y old  Female who presents with a chief complaint of shortness of breath (2020 12:34)        Over Night Events:  No events overnight; Off pressors; On Versed 2, morphine 1, nimbex       ROS:     All ROS are negative except HPI       PHYSICAL EXAM    ICU Vital Signs Last 24 Hrs  T(C): 36.7 (2020 08:00), Max: 37.3 (2020 16:00)  T(F): 98.1 (2020 08:00), Max: 99.2 (2020 16:00)  HR: 92 (2020 08:00) (84 - 102)  ABP: 164/74 (2020 08:00) (124/66 - 182/78)  ABP(mean): 108 (2020 08:00) (88 - 118)  RR: 25 (2020 08:00) (24 - 26)  SpO2: 98% (2020 08:00) (96% - 99%)    CONSTITUTIONAL:   Well nourished.  NAD    ENT:   Airway patent,   Mouth with normal mucosa.   No thrush    EYES:   Pupils equal,   Round and reactive to light.    CARDIAC:   Normal rate,   Regular rhythm.    1+ edema      Vascular:  Normal systolic impulse  No Carotid bruits    RESPIRATORY:   Bilateral ronchi   Bilateral BS  Normal chest expansion  Not tachypneic,  No use of accessory muscles    GASTROINTESTINAL:  Abdomen soft,   Non-tender,   No guarding,   + BS      MUSCULOSKELETAL:   Range of motion is not limited,  No clubbing, cyanosis    NEUROLOGICAL:   Sedated paralyzed     SKIN:   Skin normal color for race,   Warm and dry and intact.   No evidence of rash.    HEMATOLOGICAL:  No cervical  lymphadenopathy.  no inguinal lymphadenopathy      20 @ 07:01  -  20 @ 07:00  --------------------------------------------------------  IN:    cisatracurium Infusion: 105.2 mL    Enteral Tube Flush: 100 mL    Free Water: 600 mL    IV PiggyBack: 500 mL    midazolam Infusion: 48 mL    morphine  Infusion.: 24 mL    Osmolite: 750 mL    sodium chloride 0.9%: 300 mL  Total IN: 2427.2 mL    OUT:    Indwelling Catheter - Urethral: 1110 mL    Rectal Tube: 400 mL  Total OUT: 1510 mL    Total NET: 917.2 mL      20 @ 07:01  -  20 @ 09:04  --------------------------------------------------------  IN:    cisatracurium Infusion: 11.4 mL    midazolam Infusion: 4 mL    morphine  Infusion.: 2 mL  Total IN: 17.4 mL    OUT:    Indwelling Catheter - Urethral: 85 mL  Total OUT: 85 mL    Total NET: -67.6 mL    LABS:                            9.0    18.01 )-----------( 153      ( 2020 04:00 )             28.0        147<H>  |  107  |  36<H>  ----------------------------<  164<H>  3.4<L>   |  29  |  1.1    Ca    7.4<L>      2020 04:00  Phos  2.6     -  Mg     2.6         TPro  5.0<L>  /  Alb  2.6<L>  /  TBili  0.2  /  DBili  x   /  AST  33  /  ALT  16  /  AlkPhos  65  04-22      PT/INR - ( 2020 04:00 )   PT: 14.90 sec;   INR: 1.30 ratio         PTT - ( 2020 04:00 )  PTT:29.5 sec                                           Urinalysis Basic - ( 2020 04:00 )  Color: Yellow / Appearance: Slightly Turbid / S.018 / pH: x  Gluc: x / Ketone: Negative  / Bili: Negative / Urobili: <2 mg/dL   Blood: x / Protein: 100 mg/dL / Nitrite: Negative   Leuk Esterase: Negative / RBC: 1 /HPF / WBC 49 /HPF   Sq Epi: x / Non Sq Epi: 27 /HPF / Bacteria: Negative      LIVER FUNCTIONS - ( 2020 04:00 )  Alb: 2.6 g/dL / Pro: 5.0 g/dL / ALK PHOS: 65 U/L / ALT: 16 U/L / AST: 33 U/L / GGT: x                                                Culture - Blood (collected 2020 20:00)  Source: .Blood Blood  Preliminary Report (2020 01:02):    No growth to date.                                                   Mode: AC/ CMV (Assist Control/ Continuous Mandatory Ventilation)  RR (machine): 26  TV (machine): 400  FiO2: 90  PEEP: 12  ITime: 1  MAP: 19  PIP: 27                                        ABG - ( 2020 03:17 )  pH, Arterial: 7.37  pH, Blood: x     /  pCO2: 51    /  pO2: 91    / HCO3: 30    / Base Excess: 3.6   /  SaO2: 97   PPL23      MEDICATIONS  (STANDING):  aspirin  chewable 81 milliGRAM(s) Oral daily  atorvastatin 80 milliGRAM(s) Oral at bedtime  azithromycin  IVPB 500 milliGRAM(s) IV Intermittent every 24 hours  azithromycin  IVPB      chlorhexidine 0.12% Liquid 15 milliLiter(s) Oral Mucosa every 12 hours  chlorhexidine 4% Liquid 1 Application(s) Topical <User Schedule>  cisatracurium Infusion 3 MICROgram(s)/kG/Min (9.54 mL/Hr) IV Continuous <Continuous>  enoxaparin Injectable 40 milliGRAM(s) SubCutaneous daily  ergocalciferol 29417 Unit(s) Oral every week  folic acid 1 milliGRAM(s) Oral daily  meropenem  IVPB 1000 milliGRAM(s) IV Intermittent every 12 hours  methylPREDNISolone sodium succinate Injectable 60 milliGRAM(s) IV Push every 12 hours  metoprolol tartrate 12.5 milliGRAM(s) Oral two times a day  midazolam Infusion 0.02 mG/kG/Hr (1.06 mL/Hr) IV Continuous <Continuous>  morphine  Infusion. 1 mG/Hr (1 mL/Hr) IV Continuous <Continuous>  pantoprazole   Suspension 40 milliGRAM(s) Oral daily  polyethylene glycol 3350 17 Gram(s) Oral daily  potassium chloride   Powder 40 milliEquivalent(s) Oral once  Remdesivir 100 milliGRAM(s) 100 milliGRAM(s) IV Intermittent <User Schedule>  senna 2 Tablet(s) Oral at bedtime  sertraline 50 milliGRAM(s) Oral daily  ticagrelor 90 milliGRAM(s) Oral every 12 hours  vancomycin  IVPB 750 milliGRAM(s) IV Intermittent every 24 hours    MEDICATIONS  (PRN):  acetaminophen   Tablet .. 650 milliGRAM(s) Oral every 6 hours PRN Temp greater or equal to 38C (100.4F)      New X-rays reviewed:                                                                                  ECHO    CXR interpreted by me:

## 2020-04-23 NOTE — PROGRESS NOTE ADULT - SUBJECTIVE AND OBJECTIVE BOX
ABHINAV DREW  74y, Female    All available historical data reviewed    OVERNIGHT EVENTS:    Fio2 80%  ROS:  unable to obtain history secondary to patient's mental status and/or sedation     VITALS:  T(F): 97.1, Max: 98.6 (20 @ 12:00)  HR: 94  BP: --  RR: 26Vital Signs Last 24 Hrs  T(C): 36.2 (2020 20:00), Max: 37 (2020 12:00)  T(F): 97.1 (2020 20:00), Max: 98.6 (2020 12:00)  HR: 94 (2020 10:00) (86 - 108)  BP: --  BP(mean): --  RR: 26 (2020 10:00) (25 - 26)  SpO2: 96% (2020 10:00) (96% - 99%)    TESTS & MEASUREMENTS:                        8.5    19.01 )-----------( 187      ( 2020 05:00 )             26.6         143  |  105  |  38<H>  ----------------------------<  272<H>  3.7   |  28  |  1.0    Ca    7.4<L>      2020 05:00  Phos  1.9       Mg     2.5         TPro  4.7<L>  /  Alb  2.5<L>  /  TBili  <0.2  /  DBili  x   /  AST  23  /  ALT  16  /  AlkPhos  87      LIVER FUNCTIONS - ( 2020 05:00 )  Alb: 2.5 g/dL / Pro: 4.7 g/dL / ALK PHOS: 87 U/L / ALT: 16 U/L / AST: 23 U/L / GGT: x             Culture - Blood (collected 20 @ 20:00)  Source: .Blood Blood  Preliminary Report (20 @ 01:02):    No growth to date.      Urinalysis Basic - ( 2020 05:30 )    Color: Light Yellow / Appearance: Slightly Turbid / S.017 / pH: x  Gluc: x / Ketone: Negative  / Bili: Negative / Urobili: <2 mg/dL   Blood: x / Protein: 30 mg/dL / Nitrite: Negative   Leuk Esterase: Negative / RBC: 66 /HPF / WBC 16 /HPF   Sq Epi: x / Non Sq Epi: 7 /HPF / Bacteria: Negative          RADIOLOGY & ADDITIONAL TESTS:  Personal review of radiological diagnostics performed  Echo and EKG results noted when applicable.     MEDICATIONS:  acetaminophen   Tablet .. 650 milliGRAM(s) Oral every 6 hours PRN  aspirin  chewable 81 milliGRAM(s) Oral daily  atorvastatin 80 milliGRAM(s) Oral at bedtime  azithromycin  IVPB 500 milliGRAM(s) IV Intermittent every 24 hours  azithromycin  IVPB      chlorhexidine 0.12% Liquid 15 milliLiter(s) Oral Mucosa every 12 hours  chlorhexidine 4% Liquid 1 Application(s) Topical <User Schedule>  enoxaparin Injectable 40 milliGRAM(s) SubCutaneous daily  ergocalciferol 65504 Unit(s) Oral every week  folic acid 1 milliGRAM(s) Oral daily  lactated ringers. 1000 milliLiter(s) IV Continuous <Continuous>  meropenem  IVPB 1000 milliGRAM(s) IV Intermittent every 12 hours  methylPREDNISolone sodium succinate Injectable 60 milliGRAM(s) IV Push daily  metoprolol tartrate 12.5 milliGRAM(s) Oral two times a day  midazolam Infusion 0.02 mG/kG/Hr IV Continuous <Continuous>  morphine  Infusion. 1 mG/Hr IV Continuous <Continuous>  pantoprazole   Suspension 40 milliGRAM(s) Oral daily  Remdesivir 100 milliGRAM(s) 100 milliGRAM(s) IV Intermittent <User Schedule>  sertraline 50 milliGRAM(s) Oral daily  ticagrelor 90 milliGRAM(s) Oral every 12 hours      ANTIBIOTICS:  azithromycin  IVPB 500 milliGRAM(s) IV Intermittent every 24 hours  azithromycin  IVPB      meropenem  IVPB 1000 milliGRAM(s) IV Intermittent every 12 hours

## 2020-04-23 NOTE — PROGRESS NOTE ADULT - SUBJECTIVE AND OBJECTIVE BOX
Visit Information Date & Time Provider Department Dept. Phone Encounter #  
 2/9/2017  1:45 PM Israel Hartmann [phone] [id number] Upcoming Health Maintenance Date Due  
 GLAUCOMA SCREENING Q2Y 3/18/2003 Pneumococcal 65+ Low/Medium Risk (2 of 2 - PPSV23) 10/20/2017 MEDICARE YEARLY EXAM 10/21/2017 DTaP/Tdap/Td series (2 - Td) 10/20/2026 Allergies as of 2/9/2017  Review Complete On: 2/9/2017 By: Maura Landrum LPN No Known Allergies Current Immunizations  Reviewed on 10/20/2016 Name Date Td, Adsorbed PF 6/30/2015 10:35 AM  
  
 Not reviewed this visit You Were Diagnosed With   
  
 Codes Comments Exposure to industrial fumes    -  Primary ICD-10-CM: I15.794 ICD-9-CM: V87.2 Chest discomfort     ICD-10-CM: R07.89 ICD-9-CM: 786.59 Vitals BP Pulse Temp Resp Height(growth percentile) Weight(growth percentile) 140/66 (BP 1 Location: Right arm, BP Patient Position: Sitting) 77 97.8 °F (36.6 °C) (Oral) 16 5' 7\" (1.702 m) 260 lb (117.9 kg) SpO2 BMI OB Status Smoking Status 96% 40.72 kg/m2 Ablation Never Smoker Vitals History BMI and BSA Data Body Mass Index Body Surface Area 40.72 kg/m 2 2.36 m 2 Preferred Pharmacy Pharmacy Name Phone [id number] - [place] - [address] [address].. [phone] Your Updated Medication List  
  
   
This list is accurate as of: 2/9/17  2:22 PM.  Always use your most recent med list.  
  
  
  
  
 calcium carbonate 200 mg calcium (500 mg) Marbin Fuentes Commonly known as:  TUMS Take 1 Tab by mouth daily. cyanocobalamin 1,000 mcg sublingual tablet Commonly known as:  VITAMIN B-12 Take 1 tablet by mouth daily. diclofenac EC 75 mg EC tablet Commonly known as:  VOLTAREN Take 75 mg by mouth once over twenty-four (24) hours. FISH OIL PO Take 1 Tab by mouth daily. pyridoxine (vitamin B6) 100 mg tablet ABHINAV DREW 74y Female  MRN#: [id number]   Hospital Day: 5d    SUBJECTIVE  Patient is a 74y old Female who presents with a chief complaint of shortness of breath (2020 10:15)  Currently admitted to medicine with the primary diagnosis of COVID-19 virus detected    INTERVAL HPI AND OVERNIGHT EVENTS:  Patient was examined and seen at bedside. This morning she is intubated, sedated, paralyzed, and off pressors.    REVIEW OF SYMPTOMS:  Unable to obtain due to patient's mental status     OBJECTIVE  PAST MEDICAL & SURGICAL HISTORY  Rheumatoid arthritis  GERD (gastroesophageal reflux disease)  MI (myocardial infarction)  Hyperlipidemia  CAD (coronary artery disease)  History of surgery: stent placement    ALLERGIES:  Zocor (Joint Pain)    MEDICATIONS:  STANDING MEDICATIONS  aspirin  chewable 81 milliGRAM(s) Oral daily  atorvastatin 80 milliGRAM(s) Oral at bedtime  azithromycin  IVPB 500 milliGRAM(s) IV Intermittent every 24 hours  azithromycin  IVPB      chlorhexidine 0.12% Liquid 15 milliLiter(s) Oral Mucosa every 12 hours  chlorhexidine 4% Liquid 1 Application(s) Topical <User Schedule>  enoxaparin Injectable 40 milliGRAM(s) SubCutaneous daily  ergocalciferol 69994 Unit(s) Oral every week  fentaNYL   Infusion. 0.5 MICROgram(s)/kG/Hr IV Continuous <Continuous>  folic acid 1 milliGRAM(s) Oral daily  lactated ringers. 1000 milliLiter(s) IV Continuous <Continuous>  meropenem  IVPB 1000 milliGRAM(s) IV Intermittent every 12 hours  methylPREDNISolone sodium succinate Injectable 60 milliGRAM(s) IV Push daily  metoprolol tartrate 12.5 milliGRAM(s) Oral two times a day  midazolam Infusion 0.02 mG/kG/Hr IV Continuous <Continuous>  pantoprazole   Suspension 40 milliGRAM(s) Oral daily  Remdesivir 100 milliGRAM(s) 100 milliGRAM(s) IV Intermittent <User Schedule>  sertraline 50 milliGRAM(s) Oral daily  ticagrelor 90 milliGRAM(s) Oral every 12 hours    PRN MEDICATIONS  acetaminophen   Tablet .. 650 milliGRAM(s) Oral every 6 hours PRN      VITAL SIGNS: Last 24 Hours  T(C): 36 (2020 12:00), Max: 36.2 (2020 20:00)  T(F): 96.8 (2020 12:00), Max: 97.2 (2020 08:00)  HR: 86 (2020 15:00) (86 - 102)  BP: --  BP(mean): --  RR: 25 (2020 15:00) (24 - 29)  SpO2: 96% (2020 15:00) (85% - 99%)    LABS:                        8.5    19.01 )-----------( 187      ( 2020 05:00 )             26.6         143  |  105  |  38<H>  ----------------------------<  272<H>  3.7   |  28  |  1.0    Ca    7.4<L>      2020 05:00  Phos  1.9       Mg     2.5         TPro  4.7<L>  /  Alb  2.5<L>  /  TBili  <0.2  /  DBili  x   /  AST  23  /  ALT  16  /  AlkPhos  87      PT/INR - ( 2020 05:00 )   PT: 15.20 sec;   INR: 1.32 ratio         PTT - ( 2020 05:00 )  PTT:27.0 sec  Urinalysis Basic - ( 2020 05:30 )    Color: Light Yellow / Appearance: Slightly Turbid / S.017 / pH: x  Gluc: x / Ketone: Negative  / Bili: Negative / Urobili: <2 mg/dL   Blood: x / Protein: 30 mg/dL / Nitrite: Negative   Leuk Esterase: Negative / RBC: 66 /HPF / WBC 16 /HPF   Sq Epi: x / Non Sq Epi: 7 /HPF / Bacteria: Negative      ABG - ( 2020 01:59 )  pH, Arterial: 7.32  pH, Blood: x     /  pCO2: 56    /  pO2: 94    / HCO3: 29    / Base Excess: 2.2   /  SaO2: 97        Mode: AC/ CMV (Assist Control/ Continuous Mandatory Ventilation)  RR (machine): 26  TV (machine): 400  FiO2: 80  PEEP: 12  ITime: 1  MAP: 19  PIP: 25    RADIOLOGY:  < from: Xray Chest 1 View- PORTABLE-Routine (20 @ 05:07) >  Findings:  Support devices: An endotracheal tube is seen with tip above the megan. An enteric tube is seen with tip in the stomach. Right IJ central venous catheter with tip in the SVC.  Cardiac/mediastinum/hilum: Stable.  Lung parenchyma/Pleura: Bilateral airspace opacities, unchanged. No pneumothorax.  Skeleton/soft tissues: Stable.    Impression:    Bilateral airspace opacities, unchanged.  < end of copied text >    PHYSICAL EXAM:  CONSTITUTIONAL: No acute distress, well-developed, well-groomed, intubated, sedated, paralyzed  HEAD: Atraumatic, normocephalic  EYES: EOM intact, PERRLA, conjunctiva and sclera clear  ENT: Supple, no masses, no thyromegaly, no bruits, no JVD; moist mucous membranes  PULMONARY: Clear to auscultation bilaterally; no wheezes, rales, or rhonchi  CARDIOVASCULAR: Regular rate and rhythm; no murmurs, rubs, or gallops  GASTROINTESTINAL: Soft, non-tender, non-distended; bowel sounds present  MUSCULOSKELETAL: 2+ peripheral pulses; no clubbing, no cyanosis, no edema  NEUROLOGY: sedated, paralyzed  SKIN: No rashes or lesions; warm and dry    ASSESSMENT & PLAN  Patient is a 75yo female with PMH of CAD s/p PCI x5, GERD, rheumatoid arthritis, and hyperlipidemia who was transferred from [facility] for worsening shortness of breath and hypoxia. Patient was admitted to the [facility] site after having 2 episodes of syncope at home and tested positive for COVID-19. Patient was intubated on 2020 after desaturating while on non-rebreather.    #Acute hypoxemic respiratory failure secondary to COVID-19 pneumonia with possible superinfection  - COVID-19 PCR 2020: positive  - Isolation precautions (contact, droplet, airborne)  - ECG 2020: QTc 510  - Procalcitonin: 0.16->1.95->1.85->0.57  - CRP: 4.73->18.39->8.31->8.45  - D-dimer: 516->949  - Sedation with morphine and midazolam (patient became hypotensive on propofol)  - ID consult appreciated  - Completed course of hydroxychloroquine and anakinra  - Continue with meropenem 1g IV Q12H (Day #6)  - Change methylprednisolone to 60mg IV Q24H (Day #6)  - Continue with azithromycin 500mg IV Q24H (Day #5)  - Continue with remdesivir 100mg IV Q24H (Day #3)  - Follow CBC, CMP, magnesium, PT/PTT, and urinalysis daily while on remdesivir  - Monitor QTc  - Discontinue paralysis  - Titrate FiO2 to 70%    #Diarrhea  - Hold tube feeds and discontinue bowel regimen  - Start LR at 50mL/hr while NPO    #Acute kidney injury, improving  - Baseline creatinine: 0.7  - Creatinine today: 1.0  - Monitor BUN/creatinine  - Avoid nephrotic agents  - Change IV fluids to LR at 50mL/hr    #Hypernatremia  - Sodium today: 143  - Change free water to 200mL Q12H  - Change IV fluids to LR at 50mL/hr  - Follow sodium in AM    #CAD s/p PCI x5  - Continue with aspirin 81mg PO QD  - Continue with ticagrelor 90mg PO Q12H  - Continue with metoprolol tartrate 12.5mg PO BID  - Continue with atorvastatin 80mg PO QHS    #Asymptomatic bacteruria  - Urine Cx Culture Results: 50,000-99,000 Pseudomonas aeruginosa and 10,000-49,000 ESBL E coli  - Patient currently asymptomatic    #Hyperlipidemia  - Patient takes rosuvastatin 20mg PO QHS at home  - Has allergy to simvastatin  - Continue with atorvastatin 80mg PO QHS    #Rheumatoid arthritis  - Stable    #GERD  - Continue with pantoprazole 40mg PO QD     #Anxiety/Depression  - Continue with alprazolam 1mg PO TID PRN  - Continue with sertraline 50mg PO QD    #Suspected Vitamin D and folate deficiencies  - Continue with ergocalciferol 50,000 units Q7D  - Continue with folic acid 1mg PO QD    #Misc  - DVT Prophylaxis: Lovenox 40mg SQ QD   - GI Prophylaxis: pantoprazole 40mg PO QD   - Diet: NPO  - Activity: bedrest  - IV Fluids: LR at 50mL/hr  - Code Status: Full Code    Dispo: MICU monitoring for now Commonly known as:  VITAMIN B-6 Take 1 tablet by mouth daily. We Performed the Following CBC WITH AUTOMATED DIFF [96310 CPT(R)] METABOLIC PANEL, COMPREHENSIVE [41776 CPT(R)] To-Do List   
 02/09/2017 Imaging:  XR CHEST PA LAT Introducing [facility]! Mally Ayoub introduces [employer] patient portal. Now you can access parts of your medical record, email your doctor's office, and request medication refills online. 1. In your internet browser, go to [email or web address] 2. Click on the First Time User? Click Here link in the Sign In box. You will see the New Member Sign Up page. 3. Enter your [employer] Access Code exactly as it appears below. You will not need to use this code after youve completed the sign-up process. If you do not sign up before the expiration date, you must request a new code. · [employer] Access Code: [email or web address] Expires: 5/10/2017  2:22 PM 
 
4. Enter the last four digits of your Social Security Number (xxxx) and Date of Birth (mm/dd/yyyy) as indicated and click Submit. You will be taken to the next sign-up page. 5. Create a [employer] ID. This will be your [employer] login ID and cannot be changed, so think of one that is secure and easy to remember. 6. Create a [employer] password. You can change your password at any time. 7. Enter your Password Reset Question and Answer. This can be used at a later time if you forget your password. 8. Enter your e-mail address. You will receive e-mail notification when new information is available in [address] 9. Click Sign Up. You can now view and download portions of your medical record. 10. Click the Download Summary menu link to download a portable copy of your medical information. If you have questions, please visit the Frequently Asked Questions section of the [employer] website. Remember, [employer] is NOT to be used for urgent needs. For medical emergencies, dial 911. Now available from your iPhone and Android! Please provide this summary of care documentation to your next provider. Your primary care clinician is listed as [address]. If you have any questions after today's visit, please call [phone].

## 2020-04-23 NOTE — PROGRESS NOTE ADULT - SUBJECTIVE AND OBJECTIVE BOX
Patient is a 74y old  Female who presents with a chief complaint of shortness of breath (2020 11:54)        Over Night Events:  On MV.  Off pressors.  Sedated and paralyzed.          ROS:     All ROS are negative except HPI         PHYSICAL EXAM    ICU Vital Signs Last 24 Hrs  T(C): 36.2 (2020 20:00), Max: 37 (2020 12:00)  T(F): 97.1 (2020 20:00), Max: 98.6 (2020 12:00)  HR: 86 (2020 06:00) (86 - 108)  BP: --  BP(mean): --  ABP: 160/60 (2020 06:00) (128/54 - 174/76)  ABP(mean): 98 (2020 06:00) (80 - 112)  RR: 25 (:00) (25 - 25)  SpO2: 98% (2020 06:00) (98% - 99%)      CONSTITUTIONAL:   Ill appearing.  Well nourished.  NAD    ENT:   Airway patent,   Mouth with normal mucosa.   No thrush    EYES:   Pupils equal,   Round and reactive to light.    CARDIAC:   Normal rate,   Regular rhythm.    No edema      Vascular:  Normal systolic impulse  No Carotid bruits    RESPIRATORY:   No wheezing  Bilateral BS  Normal chest expansion  Not tachypneic,  No use of accessory muscles    GASTROINTESTINAL:  Abdomen soft,   Non-tender,   No guarding,   + BS    GENITOURINARY  normal genitalia for sex  no edema    MUSCULOSKELETAL:   Range of motion is not limited,  No clubbing, cyanosis    NEUROLOGICAL:   Sedated and paralyzed     SKIN:   Skin normal color for race,   Warm and dry and intact.   No evidence of rash.    PSYCHIATRIC:   Normal mood and affect.   No apparent risk to self or others.    HEMATOLOGICAL:  No cervical  lymphadenopathy.  no inguinal lymphadenopathy      20 @ 07:01  -  20 @ 07:00  --------------------------------------------------------  IN:    cisatracurium Infusion: 136.8 mL    dextrose 5%.: 1615 mL    Free Water: 400 mL    IV PiggyBack: 600 mL    midazolam Infusion: 48 mL    morphine  Infusion.: 24 mL    Osmolite: 500 mL  Total IN: 3323.8 mL    OUT:    Indwelling Catheter - Urethral: 1010 mL    Rectal Tube: 600 mL  Total OUT: 1610 mL    Total NET: 1713.8 mL          LABS:                            8.5    19.01 )-----------( 187      ( 2020 05:00 )             26.6                                               04-23    143  |  105  |  38<H>  ----------------------------<  272<H>  3.7   |  28  |  1.0    Ca    7.4<L>      2020 05:00  Phos  1.9     -  Mg     2.5         TPro  4.7<L>  /  Alb  2.5<L>  /  TBili  <0.2  /  DBili  x   /  AST  23  /  ALT  16  /  AlkPhos  87  04-      PT/INR - ( 2020 05:00 )   PT: 15.20 sec;   INR: 1.32 ratio         PTT - ( 2020 05:00 )  PTT:27.0 sec                                       Urinalysis Basic - ( 2020 05:30 )    Color: Light Yellow / Appearance: Slightly Turbid / S.017 / pH: x  Gluc: x / Ketone: Negative  / Bili: Negative / Urobili: <2 mg/dL   Blood: x / Protein: 30 mg/dL / Nitrite: Negative   Leuk Esterase: Negative / RBC: 66 /HPF / WBC 16 /HPF   Sq Epi: x / Non Sq Epi: 7 /HPF / Bacteria: Negative                                                  LIVER FUNCTIONS - ( 2020 05:00 )  Alb: 2.5 g/dL / Pro: 4.7 g/dL / ALK PHOS: 87 U/L / ALT: 16 U/L / AST: 23 U/L / GGT: x                                                                                               Mode: AC/ CMV (Assist Control/ Continuous Mandatory Ventilation)  RR (machine): 26  TV (machine): 400  FiO2: 80  PEEP: 12  ITime: 1  MAP: 19  PIP: 25                                      ABG - ( 2020 01:59 )  pH, Arterial: 7.32  pH, Blood: x     /  pCO2: 56    /  pO2: 94    / HCO3: 29    / Base Excess: 2.2   /  SaO2: 97                  MEDICATIONS  (STANDING):  aspirin  chewable 81 milliGRAM(s) Oral daily  atorvastatin 80 milliGRAM(s) Oral at bedtime  azithromycin  IVPB 500 milliGRAM(s) IV Intermittent every 24 hours  azithromycin  IVPB      chlorhexidine 0.12% Liquid 15 milliLiter(s) Oral Mucosa every 12 hours  chlorhexidine 4% Liquid 1 Application(s) Topical <User Schedule>  cisatracurium Infusion 3 MICROgram(s)/kG/Min (9.54 mL/Hr) IV Continuous <Continuous>  dextrose 5%. 1000 milliLiter(s) (75 mL/Hr) IV Continuous <Continuous>  enoxaparin Injectable 40 milliGRAM(s) SubCutaneous daily  ergocalciferol 02583 Unit(s) Oral every week  folic acid 1 milliGRAM(s) Oral daily  meropenem  IVPB 1000 milliGRAM(s) IV Intermittent every 12 hours  methylPREDNISolone sodium succinate Injectable 60 milliGRAM(s) IV Push every 12 hours  metoprolol tartrate 12.5 milliGRAM(s) Oral two times a day  midazolam Infusion 0.02 mG/kG/Hr (1.06 mL/Hr) IV Continuous <Continuous>  morphine  Infusion. 1 mG/Hr (1 mL/Hr) IV Continuous <Continuous>  pantoprazole   Suspension 40 milliGRAM(s) Oral daily  polyethylene glycol 3350 17 Gram(s) Oral daily  Remdesivir 100 milliGRAM(s) 100 milliGRAM(s) IV Intermittent <User Schedule>  senna 2 Tablet(s) Oral at bedtime  sertraline 50 milliGRAM(s) Oral daily  ticagrelor 90 milliGRAM(s) Oral every 12 hours    MEDICATIONS  (PRN):  acetaminophen   Tablet .. 650 milliGRAM(s) Oral every 6 hours PRN Temp greater or equal to 38C (100.4F)      New X-rays reviewed:                                                                                  ECHO    CXR interpreted by me:  ET OG OK>  Bilateral infiltrates

## 2020-04-23 NOTE — PROGRESS NOTE ADULT - ASSESSMENT
· Assessment		  75 y/o F with PMH of CAD s/p PCI x5, GERD, Rheumatoid arthritis presented from [facility] [place]) for worsening shortness of breath and hypoxia.     IMPRESSION;   COVID 19 with Hypoxemia and CXR with b/l opacities  -elevation of inflammatory markers with cytokine release syndrome  Resolved fevers. No superimposed bacterial PNA  S/p Anakinra with little response 4/19-21  Polymicrobial bacteruria with no pyuria ( of little clinical significance )  Nares ORSA NG  BCx 4/19 NG    RECOMMENDATIONS;   Meropenem 1 gm iv q8h, 14 days in all  Remdesivir started 4/21  DAILY CBC BMP PT PTT UA

## 2020-04-23 NOTE — PROGRESS NOTE ADULT - ASSESSMENT
IMPRESSION:    Acute hypoxemic respiratory failure now requiring MV  COVID 19  Probable superinfection  MYCHAL improving   HO ESBL: UTI     PLAN:    CNS:  Decrease MSO4.  DC paralysis     HEENT: Oral care    PULMONARY:  HOB @ 45 degrees.  Aspiration precautions.   Wean O2     CARDIOVASCULAR: I=O  Free h2O. DC D5W. LR 50 cc per hour     GI: GI prophylaxis. OG Feeding.  Hold Bowel regimen.  NPO     RENAL:  Follow up lytes.  Correct as needed    INFECTIOUS DISEASE: Follow up cultures. Finish ABX course    HEMATOLOGICAL:  DVT prophylaxis.    ENDOCRINE:  Follow up FS.  Insulin protocol if needed.  Solumedrol D#6.  Decrease to Q24     MUSCULOSKELETAL:  Bed chair position     MICU for now

## 2020-04-24 NOTE — CHART NOTE - NSCHARTNOTEFT_GEN_A_CORE
Registered Dietitian Follow-Up     Patient Profile Reviewed                           Yes [x]   No []     Nutrition History Previously Obtained        Yes []  No [x] pt remains intubated      Pertinent Medical Interventions:   1. Acute hypoxemic respiratory failure 2/2 COVID-19 PNA with possible superinfection  --remains intubated since 4/19. afebrile. completed hydroxychloroquine and anakinra. day 4 remdesivir. day 6 solumedrol & meropenem. day 5 azithromycin  2. Diarrhea--C.diff sample sent. bowel regimen d/c'd  3. MYCHAL, improving   4. Hypernatremia, improving. free water 200cc q12H, IV in place   5. Asymptomatic bacteruria     Diet order: Osmolite 1.5 @250 q6H (1500kcal, 63g protein, 760mL free H2O)--feeds held x24H d/t diarrhea. Feeds resumed at midnight as per RN flowsheet. Stool sample sent for C.diff. Diarrhea could also be related to antibiotic regimen, previous bowel regimen, overall acuteness of illness. Recommend continuous feeds of Peptamen which is a peptide based, hydrolyzed formula for optimal tolerance.  No longer paralyzed or receiving propofol or pressor support. Updated recommendations at end of document d/w LIP Jack Carlisle).      Anthropometrics:  - Ht. 152.4cm   - Wt. 53kg (4/18), no new wt   - BMI 22.8   - IBW     Pertinent Lab Data: (4/24/2020) WBC 15.82, RBC 2.52, H/H 8.3/25.8, BUN 29, glucose 101, corrected Ca 8.76, Mg 2.6      Pertinent Meds: aspirin, lovenox, azithromycin, meropenem, lactated ringers @50mL/hr, fentanyl, versed, solumedrol, remdesivir, protonix, metoprolol, atorvastatin, cholecalciferol, folic acid      Physical Findings:  - Appearance: intubated, sedated   - GI function: diarrhea noted in dignishield, 300cc output noted today 5am. check C.diff   - Tubes: OGT   - Oral/Mouth cavity: NPO   - Skin: intact. no edema.      Nutrition Requirements  Weight Used: 53kg dosing wt      Estimated Energy Needs    Adjust [x] ([id number] Ve: 10.6, Tmax: 36.1)   Adjusted Energy Recommendations: 1069 kcal/day     Estimated protein needs   Continue [x] 64-80 gms/day (1.2 - 1.5 gm/kg ABW)  Estimated fluid needs       Continue [x] per ICU      Nutrient Intake: pt was NPO x24hrs. TF per EMR provides 140% est kcal needs,  79% est. protein needs     [] Previous Nutrition Diagnosis: inadequate pro/energy intake---ADJUSTED to inadequate protein intake             [x] Ongoing          [] Resolved     Nutrition Diagnostic #1  Problem: excessive energy intake   Etiology: intubation   Statement:  TF per EMR provides 140% est kcal needs     Nutrition Intervention: enteral nutrition, nutrition related medication mgmt   Recommend :  1. Adjust TF to peptide based, hydrolyzed formula for optimal tolerance. Recommend continuous feeds of Peptamen AF starting @ 20mL/hr and adv as tolerated to GOAL RATE of 40mL/hr x24H. TF at goal rate will provide 1152kcal, 72g protein, 778mL free H2O and 77% RDIs. Flushes per ICU.   2. Consider daily probiotic if not contraindicated.      --aware that regimen will provide 107% est kcal needs but will continue as pt could benefit from additional intake without drastically surpassing est. needs.    Goal/Expected Outcome: TF at goal rate to provide >85% but <105% est needs upon f/u in 4 days.      Indicator/Monitoring: RD will monitor diet order, energy intake, nutrition related labs, body composition, NFPF (TF tolerance, GI s/s) Registered Dietitian Follow-Up     Patient Profile Reviewed                           Yes [x]   No []     Nutrition History Previously Obtained        Yes []  No [x] pt remains intubated      Pertinent Medical Interventions:   1. Acute hypoxemic respiratory failure 2/2 COVID-19 PNA with possible superinfection  --remains intubated since 4/19. afebrile. completed hydroxychloroquine and anakinra. day 4 remdesivir. day 6 solumedrol & meropenem. day 5 azithromycin  2. Diarrhea--C.diff sample sent. bowel regimen d/c'd  3. MYCHAL, improving   4. Hypernatremia, improving. free water 200cc q12H, IV in place   5. Asymptomatic bacteruria     Diet order: Osmolite 1.5 @250 q6H (1500kcal, 63g protein, 760mL free H2O)--feeds held x24H d/t diarrhea. Feeds resumed at midnight as per RN flowsheet. Stool sample sent for C.diff. Diarrhea could also be related to antibiotic regimen, previous bowel regimen, overall acuteness of illness. Recommend continuous feeds of Peptamen which is a peptide based, hydrolyzed formula for optimal tolerance.  No longer paralyzed or receiving propofol or pressor support. Updated recommendations at end of document d/w LIP (Dr. Carlisle). MAP: 84.      Anthropometrics:  - Ht. 152.4cm   - Wt. 53kg (4/18), no new wt   - BMI 22.8   - IBW     Pertinent Lab Data: (4/24/2020) WBC 15.82, RBC 2.52, H/H 8.3/25.8, BUN 29, glucose 101, corrected Ca 8.76, Mg 2.6      Pertinent Meds: aspirin, lovenox, azithromycin, meropenem, lactated ringers @50mL/hr, fentanyl, versed, solumedrol, remdesivir, protonix, metoprolol, atorvastatin, cholecalciferol, folic acid      Physical Findings:  - Appearance: intubated, sedated   - GI function: diarrhea noted in dignishield, 300cc output noted today 5am. check C.diff   - Tubes: OGT   - Oral/Mouth cavity: NPO   - Skin: intact. no edema.      Nutrition Requirements  Weight Used: 53kg dosing wt      Estimated Energy Needs    Adjust [x] ([id number] Ve: 10.6, Tmax: 36.1)   Adjusted Energy Recommendations: 1069 kcal/day     Estimated protein needs   Continue [x] 64-80 gms/day (1.2 - 1.5 gm/kg ABW)  Estimated fluid needs       Continue [x] per ICU      Nutrient Intake: pt was NPO x24hrs. TF per EMR provides 140% est kcal needs,  79% est. protein needs     [] Previous Nutrition Diagnosis: inadequate pro/energy intake---ADJUSTED to inadequate protein intake             [x] Ongoing          [] Resolved     Nutrition Diagnostic #1  Problem: excessive energy intake   Etiology: intubation   Statement:  TF per EMR provides 140% est kcal needs     Nutrition Intervention: enteral nutrition, nutrition related medication mgmt   Recommend :  1. Adjust TF to peptide based, hydrolyzed formula for optimal tolerance. Recommend continuous feeds of Peptamen AF starting @ 20mL/hr and adv as tolerated to GOAL RATE of 40mL/hr x24H. TF at goal rate will provide 1152kcal, 72g protein, 778mL free H2O and 77% RDIs. Flushes per ICU.   2. Consider daily probiotic if not contraindicated.      --aware that regimen will provide 107% est kcal needs but will continue as pt could benefit from additional intake without drastically surpassing est. needs.    Goal/Expected Outcome: TF at goal rate to provide >85% but <105% est needs upon f/u in 4 days.      Indicator/Monitoring: RD will monitor diet order, energy intake, nutrition related labs, body composition, NFPF (TF tolerance, GI s/s)

## 2020-04-24 NOTE — PROGRESS NOTE ADULT - SUBJECTIVE AND OBJECTIVE BOX
Patient is a 74y old  Female who presents with a chief complaint of shortness of breath (2020 16:55)        Over Night Events:  On MV.  Off pressors.  Sedated.          ROS:     All ROS are negative except HPI         PHYSICAL EXAM    ICU Vital Signs Last 24 Hrs  T(C): 36.5 (2020 08:00), Max: 36.5 (2020 08:00)  T(F): 97.7 (2020 08:00), Max: 97.7 (2020 08:00)  HR: 86 (2020 08:00) (72 - 102)  BP: --  BP(mean): --  ABP: 136/56 (2020 08:00) (94/40 - 160/62)  ABP(mean): 84 (2020 08:00) (60 - 100)  RR: 22 (2020 08:00) (22 - 29)  SpO2: 96% (2020 08:05) (85% - 99%)      CONSTITUTIONAL:   Ill appearing.  Well nourished.      ENT:   Airway patent,   Mouth with normal mucosa.   No thrush    EYES:   Pupils equal,   Round and reactive to light.    CARDIAC:   Normal rate,   Regular rhythm.    No edema      Vascular:  Normal systolic impulse  No Carotid bruits    RESPIRATORY:   No wheezing  Bilateral BS  Normal chest expansion  Not tachypneic,  No use of accessory muscles    GASTROINTESTINAL:  Abdomen soft,   Non-tender,   No guarding,   + BS    GENITOURINARY  normal genitalia for sex  no edema    MUSCULOSKELETAL:   Range of motion is not limited,  No clubbing, cyanosis    NEUROLOGICAL:   Sedated    SKIN:   Skin normal color for race,   Warm and dry and intact.   No evidence of rash.    PSYCHIATRIC:   No apparent risk to self or others.    HEMATOLOGICAL:  No cervical  lymphadenopathy.  no inguinal lymphadenopathy      20 @ 07:01  -  20 @ 07:00  --------------------------------------------------------  IN:    cisatracurium Infusion: 5.7 mL    dextrose 5%.: 75 mL    fentaNYL Infusion.: 170 mL    Free Water: 400 mL    IV PiggyBack: 250 mL    lactated ringers.: 1150 mL    midazolam Infusion: 126 mL    morphine  Infusion.: 43 mL    Osmolite: 500 mL  Total IN: 2719.7 mL    OUT:    Indwelling Catheter - Urethral: 1300 mL    Rectal Tube: 500 mL  Total OUT: 1800 mL    Total NET: 919.7 mL      20 @ 07:01  -  20 @ 09:07  --------------------------------------------------------  IN:    fentaNYL Infusion.: 31.8 mL    lactated ringers.: 150 mL    midazolam Infusion: 12 mL  Total IN: 193.8 mL    OUT:  Total OUT: 0 mL    Total NET: 193.8 mL          LABS:                            8.3    15.82 )-----------( 218      ( 2020 04:40 )             25.8                                               04-24    144  |  107  |  39<H>  ----------------------------<  101<H>  4.2   |  29  |  0.9    Ca    7.4<L>      2020 04:40  Phos  2.6     04-24  Mg     2.6     04-24    TPro  4.1<L>  /  Alb  2.3<L>  /  TBili  0.2  /  DBili  x   /  AST  21  /  ALT  13  /  AlkPhos  71  04-24      PT/INR - ( 2020 04:40 )   PT: 14.70 sec;   INR: 1.28 ratio         PTT - ( 2020 04:40 )  PTT:29.6 sec                                       Urinalysis Basic - ( 2020 08:10 )    Color: Yellow / Appearance: Slightly Turbid / S.017 / pH: x  Gluc: x / Ketone: Trace  / Bili: Negative / Urobili: <2 mg/dL   Blood: x / Protein: 30 mg/dL / Nitrite: Negative   Leuk Esterase: Negative / RBC: 33 /HPF / WBC 59 /HPF   Sq Epi: x / Non Sq Epi: 14 /HPF / Bacteria: Negative                                                  LIVER FUNCTIONS - ( 2020 04:40 )  Alb: 2.3 g/dL / Pro: 4.1 g/dL / ALK PHOS: 71 U/L / ALT: 13 U/L / AST: 21 U/L / GGT: x                                                                                               Mode: AC/ CMV (Assist Control/ Continuous Mandatory Ventilation)  RR (machine): 26  TV (machine): 400  FiO2: 80  PEEP: 12  ITime: 1  MAP: 17  PIP: 25                                      ABG - ( 2020 03:49 )  pH, Arterial: 7.30  pH, Blood: x     /  pCO2: 61    /  pO2: 84    / HCO3: 30    / Base Excess: 2.7   /  SaO2: 96    PPL 21               MEDICATIONS  (STANDING):  aspirin  chewable 81 milliGRAM(s) Oral daily  atorvastatin 80 milliGRAM(s) Oral at bedtime  azithromycin  IVPB 500 milliGRAM(s) IV Intermittent every 24 hours  azithromycin  IVPB      chlorhexidine 0.12% Liquid 15 milliLiter(s) Oral Mucosa every 12 hours  chlorhexidine 4% Liquid 1 Application(s) Topical <User Schedule>  enoxaparin Injectable 40 milliGRAM(s) SubCutaneous daily  ergocalciferol 04912 Unit(s) Oral every week  fentaNYL   Infusion. 0.5 MICROgram(s)/kG/Hr (2.65 mL/Hr) IV Continuous <Continuous>  folic acid 1 milliGRAM(s) Oral daily  lactated ringers. 1000 milliLiter(s) (50 mL/Hr) IV Continuous <Continuous>  meropenem  IVPB 1000 milliGRAM(s) IV Intermittent every 12 hours  methylPREDNISolone sodium succinate Injectable 60 milliGRAM(s) IV Push daily  metoprolol tartrate 12.5 milliGRAM(s) Oral two times a day  midazolam Infusion 0.02 mG/kG/Hr (1.06 mL/Hr) IV Continuous <Continuous>  pantoprazole   Suspension 40 milliGRAM(s) Oral daily  Remdesivir 100 milliGRAM(s) 100 milliGRAM(s) IV Intermittent <User Schedule>  sertraline 50 milliGRAM(s) Oral daily  ticagrelor 90 milliGRAM(s) Oral every 12 hours    MEDICATIONS  (PRN):  acetaminophen   Tablet .. 650 milliGRAM(s) Oral every 6 hours PRN Temp greater or equal to 38C (100.4F)      New X-rays reviewed:                                                                                  ECHO    CXR interpreted by me:  ET OG OK>  Bibasilar infiltrates

## 2020-04-24 NOTE — PROGRESS NOTE ADULT - SUBJECTIVE AND OBJECTIVE BOX
ABHINAV DREW 74y Female  MRN#: [id number]   Hospital Day: 6d    SUBJECTIVE  Patient is a 74y old Female who presents with a chief complaint of shortness of breath (2020 09:06)  Currently admitted to medicine with the primary diagnosis of COVID-19 virus detected    INTERVAL HPI AND OVERNIGHT EVENTS:  Patient was examined and seen at bedside. This morning she is intubated, sedated, and off pressors.    REVIEW OF SYMPTOMS:  Unable to obtain due to patient's mental status     OBJECTIVE  PAST MEDICAL & SURGICAL HISTORY  Rheumatoid arthritis  GERD (gastroesophageal reflux disease)  MI (myocardial infarction)  Hyperlipidemia  CAD (coronary artery disease)  History of surgery: stent placement    ALLERGIES:  Zocor (Joint Pain)    MEDICATIONS:  STANDING MEDICATIONS  aspirin  chewable 81 milliGRAM(s) Oral daily  atorvastatin 80 milliGRAM(s) Oral at bedtime  azithromycin  IVPB 500 milliGRAM(s) IV Intermittent every 24 hours  azithromycin  IVPB      chlorhexidine 0.12% Liquid 15 milliLiter(s) Oral Mucosa every 12 hours  chlorhexidine 4% Liquid 1 Application(s) Topical <User Schedule>  enoxaparin Injectable 40 milliGRAM(s) SubCutaneous daily  ergocalciferol 51608 Unit(s) Oral every week  fentaNYL   Infusion. 0.5 MICROgram(s)/kG/Hr IV Continuous <Continuous>  folic acid 1 milliGRAM(s) Oral daily  lactated ringers. 1000 milliLiter(s) IV Continuous <Continuous>  meropenem  IVPB 1000 milliGRAM(s) IV Intermittent every 12 hours  methylPREDNISolone sodium succinate Injectable 60 milliGRAM(s) IV Push daily  metoprolol tartrate 12.5 milliGRAM(s) Oral two times a day  midazolam Infusion 0.02 mG/kG/Hr IV Continuous <Continuous>  pantoprazole   Suspension 40 milliGRAM(s) Oral daily  Remdesivir 100 milliGRAM(s) 100 milliGRAM(s) IV Intermittent <User Schedule>  sertraline 50 milliGRAM(s) Oral daily  ticagrelor 90 milliGRAM(s) Oral every 12 hours    PRN MEDICATIONS  acetaminophen   Tablet .. 650 milliGRAM(s) Oral every 6 hours PRN      VITAL SIGNS: Last 24 Hours  T(C): 36.4 (2020 12:00), Max: 36.5 (2020 08:00)  T(F): 97.6 (2020 12:00), Max: 97.7 (2020 08:00)  HR: 90 (2020 12:00) (72 - 98)  BP: --  BP(mean): --  RR: 25 (2020 12:00) (22 - 27)  SpO2: 98% (2020 12:00) (92% - 98%)    LABS:                        8.3    15.82 )-----------( 218      ( 2020 04:40 )             25.8     04-24    144  |  107  |  39<H>  ----------------------------<  101<H>  4.2   |  29  |  0.9    Ca    7.4<L>      2020 04:40  Phos  2.6     04-24  Mg     2.6     -24    TPro  4.1<L>  /  Alb  2.3<L>  /  TBili  0.2  /  DBili  x   /  AST  21  /  ALT  13  /  AlkPhos  71  04-24    PT/INR - ( 2020 04:40 )   PT: 14.70 sec;   INR: 1.28 ratio         PTT - ( 2020 04:40 )  PTT:29.6 sec  Urinalysis Basic - ( 2020 08:10 )    Color: Yellow / Appearance: Slightly Turbid / S.017 / pH: x  Gluc: x / Ketone: Trace  / Bili: Negative / Urobili: <2 mg/dL   Blood: x / Protein: 30 mg/dL / Nitrite: Negative   Leuk Esterase: Negative / RBC: 33 /HPF / WBC 59 /HPF   Sq Epi: x / Non Sq Epi: 14 /HPF / Bacteria: Negative      ABG - ( 2020 03:49 )  pH, Arterial: 7.30  pH, Blood: x     /  pCO2: 61    /  pO2: 84    / HCO3: 30    / Base Excess: 2.7   /  SaO2: 96        Mode: AC/ CMV (Assist Control/ Continuous Mandatory Ventilation)  RR (machine): 26  TV (machine): 400  FiO2: 80  PEEP: 12  ITime: 1  MAP: 17  PIP: 25    RADIOLOGY:  < from: Xray Chest 1 View- PORTABLE-Routine (20 @ 04:39) >  Findings:  Support devices: Patient is intubated. Enteric catheter extends below the hemidiaphragm. Telemetry leads overlie the thorax. There is a right-sided central venous catheter.  Cardiac/mediastinum/hilum: Unchanged.  Lung parenchyma/Pleura: Bilateral interstitial opacifications, worsening.  Skeleton/soft tissues: Without difference.    Impression:    Worsening bilateral interstitial opacifications.  Follow-up as needed.  < end of copied text >    PHYSICAL EXAM:  CONSTITUTIONAL: No acute distress, well-developed, well-groomed, intubated, sedated  HEAD: Atraumatic, normocephalic  EYES: EOM intact, PERRLA, conjunctiva and sclera clear  ENT: Supple, no masses, no thyromegaly, no bruits, no JVD; moist mucous membranes  PULMONARY: Clear to auscultation bilaterally; no wheezes, rales, or rhonchi  CARDIOVASCULAR: Sinus tachycardia; no murmurs, rubs, or gallops  GASTROINTESTINAL: Soft, non-tender, non-distended; bowel sounds present  MUSCULOSKELETAL: 2+ peripheral pulses; no clubbing, no cyanosis, no edema  NEUROLOGY: sedated  SKIN: No rashes or lesions; warm and dry    ASSESSMENT & PLAN  Patient is a 75yo female with PMH of CAD s/p PCI x5, GERD, rheumatoid arthritis, and hyperlipidemia who was transferred from [facility] for worsening shortness of breath and hypoxia. Patient was admitted to the [facility] site after having 2 episodes of syncope at home and tested positive for COVID-19. Patient was intubated on 2020 after desaturating while on non-rebreather.    #Acute hypoxemic respiratory failure secondary to COVID-19 pneumonia with possible superinfection  - COVID-19 PCR 2020: positive  - Isolation precautions (contact, droplet, airborne)  - ECG 2020: QTc 510  - Procalcitonin: 0.16->1.95->1.85->0.57  - CRP: 4.73->18.39->8.31->8.45  - D-dimer: 516->949  - Sedation with morphine and midazolam (patient became hypotensive on propofol)  - Will decrease midazolam by 1/2 today  - ID consult appreciated  - Completed course of hydroxychloroquine and anakinra  - Continue with meropenem 1g IV Q12H (Day #7)  - Continue with methylprednisolone 60mg IV Q24H (Day #7)  - Continue with azithromycin 500mg IV Q24H (Day #6)  - Continue with remdesivir 100mg IV Q24H (Day #4)  - Follow CBC, CMP, magnesium, PT/PTT, and urinalysis daily while on remdesivir  - Monitor QTc (413 today)  - Increase PEEP and titrate FiO2    #Diarrhea  - C. diff negative  - Restart tube feeds    #Acute kidney injury, improving  - Baseline creatinine: 0.7  - Creatinine today: 0.9  - Monitor BUN/creatinine  - Avoid nephrotic agents  - Continue with LR at 50mL/hr    #Hypernatremia  - Sodium today: 144  - Continue with free water 200mL Q12H  - Continue with LR at 50mL/hr  - Follow sodium in AM    #CAD s/p PCI x5  - Continue with aspirin 81mg PO QD  - Continue with ticagrelor 90mg PO Q12H  - Continue with metoprolol tartrate 12.5mg PO BID  - Continue with atorvastatin 80mg PO QHS    #Asymptomatic bacteruria  - Urine Cx Culture Results: 50,000-99,000 Pseudomonas aeruginosa and 10,000-49,000 ESBL E coli  - Patient currently asymptomatic    #Hyperlipidemia  - Patient takes rosuvastatin 20mg PO QHS at home  - Has allergy to simvastatin  - Continue with atorvastatin 80mg PO QHS    #Rheumatoid arthritis  - Stable    #GERD  - Continue with pantoprazole 40mg PO QD     #Anxiety/Depression  - Continue with alprazolam 1mg PO TID PRN  - Continue with sertraline 50mg PO QD    #Suspected Vitamin D and folate deficiencies  - Continue with ergocalciferol 50,000 units Q7D  - Continue with folic acid 1mg PO QD    #Misc  - DVT Prophylaxis: Lovenox 40mg SQ QD   - GI Prophylaxis: pantoprazole 40mg PO QD   - Diet: NPO with tube feeds  - Activity: bedrest  - IV Fluids: LR at 50mL/hr  - Code Status: Full Code    Dispo: MICU monitoring for now

## 2020-04-24 NOTE — PROGRESS NOTE ADULT - ASSESSMENT
· Assessment		  73 y/o F with PMH of CAD s/p PCI x5, GERD, Rheumatoid arthritis presented from [facility]) for worsening shortness of breath and hypoxia.     IMPRESSION;   COVID 19 with Hypoxemia and CXR with b/l opacities  -elevation of inflammatory markers with cytokine release syndrome  Resolved fevers. No superimposed bacterial PNA  S/p Anakinra with little response 4/19-21  Polymicrobial bacteruria with no pyuria ( of little clinical significance )  Nares ORSA NG  BCx 4/19 NG    RECOMMENDATIONS;   Meropenem 1 gm iv q8h, 14 days in all  Remdesivir started 4/21  DAILY CBC BMP PT PTT UA

## 2020-04-24 NOTE — PROGRESS NOTE ADULT - SUBJECTIVE AND OBJECTIVE BOX
ABHINAV DREW  74y, Female    All available historical data reviewed    OVERNIGHT EVENTS:  no fevers  fio2 90%    ROS:  unable to obtain history secondary to patient's mental status and/or sedation   VITALS:  T(F): 97.6, Max: 97.7 (20 @ 08:00)  HR: 90  BP: --  RR: 25Vital Signs Last 24 Hrs  T(C): 36.4 (2020 12:00), Max: 36.5 (2020 08:00)  T(F): 97.6 (2020 12:00), Max: 97.7 (2020 08:00)  HR: 90 (2020 12:00) (72 - 98)  BP: --  BP(mean): --  RR: 25 (2020 12:00) (22 - 27)  SpO2: 98% (2020 12:00) (92% - 98%)    TESTS & MEASUREMENTS:                        8.3    15.82 )-----------( 218      ( 2020 04:40 )             25.8     0424    144  |  107  |  39<H>  ----------------------------<  101<H>  4.2   |  29  |  0.9    Ca    7.4<L>      2020 04:40  Phos  2.6     04-24  Mg     2.6     24    TPro  4.1<L>  /  Alb  2.3<L>  /  TBili  0.2  /  DBili  x   /  AST  21  /  ALT  13  /  AlkPhos  71  04-24    LIVER FUNCTIONS - ( 2020 04:40 )  Alb: 2.3 g/dL / Pro: 4.1 g/dL / ALK PHOS: 71 U/L / ALT: 13 U/L / AST: 21 U/L / GGT: x             Culture - Blood (collected 20 @ 20:00)  Source: .Blood Blood  Preliminary Report (20 @ 01:02):    No growth to date.      Urinalysis Basic - ( 2020 08:10 )    Color: Yellow / Appearance: Slightly Turbid / S.017 / pH: x  Gluc: x / Ketone: Trace  / Bili: Negative / Urobili: <2 mg/dL   Blood: x / Protein: 30 mg/dL / Nitrite: Negative   Leuk Esterase: Negative / RBC: 33 /HPF / WBC 59 /HPF   Sq Epi: x / Non Sq Epi: 14 /HPF / Bacteria: Negative          RADIOLOGY & ADDITIONAL TESTS:  Personal review of radiological diagnostics performed  Echo and EKG results noted when applicable.     MEDICATIONS:  acetaminophen   Tablet .. 650 milliGRAM(s) Oral every 6 hours PRN  aspirin  chewable 81 milliGRAM(s) Oral daily  atorvastatin 80 milliGRAM(s) Oral at bedtime  azithromycin  IVPB 500 milliGRAM(s) IV Intermittent every 24 hours  azithromycin  IVPB      chlorhexidine 0.12% Liquid 15 milliLiter(s) Oral Mucosa every 12 hours  chlorhexidine 4% Liquid 1 Application(s) Topical <User Schedule>  enoxaparin Injectable 40 milliGRAM(s) SubCutaneous daily  ergocalciferol 72782 Unit(s) Oral every week  fentaNYL   Infusion. 0.5 MICROgram(s)/kG/Hr IV Continuous <Continuous>  folic acid 1 milliGRAM(s) Oral daily  lactated ringers. 1000 milliLiter(s) IV Continuous <Continuous>  meropenem  IVPB 1000 milliGRAM(s) IV Intermittent every 12 hours  methylPREDNISolone sodium succinate Injectable 60 milliGRAM(s) IV Push daily  metoprolol tartrate 12.5 milliGRAM(s) Oral two times a day  midazolam Infusion 0.02 mG/kG/Hr IV Continuous <Continuous>  pantoprazole   Suspension 40 milliGRAM(s) Oral daily  Remdesivir 100 milliGRAM(s) 100 milliGRAM(s) IV Intermittent <User Schedule>  sertraline 50 milliGRAM(s) Oral daily  ticagrelor 90 milliGRAM(s) Oral every 12 hours      ANTIBIOTICS:  azithromycin  IVPB 500 milliGRAM(s) IV Intermittent every 24 hours  azithromycin  IVPB      meropenem  IVPB 1000 milliGRAM(s) IV Intermittent every 12 hours

## 2020-04-25 NOTE — PROGRESS NOTE ADULT - SUBJECTIVE AND OBJECTIVE BOX
Patient is a 74y old  Female who presents with a chief complaint of shortness of breath (2020 16:17)        Over Night Events:  on MV.  Off pressors.  Sedated.          ROS:     All ROS are negative except HPI         PHYSICAL EXAM    ICU Vital Signs Last 24 Hrs  T(C): 37.1 (2020 04:00), Max: 37.3 (2020 20:00)  T(F): 98.8 (2020 04:00), Max: 99.2 (2020 20:00)  HR: 110 (2020 06:00) (86 - 110)  BP: --  BP(mean): --  ABP: 168/74 (2020 06:00) (114/52 - 214/84)  ABP(mean): 108 (2020 06:00) (76 - 130)  RR: 28 (2020 06:00) (21 - 29)  SpO2: 98% (2020 06:00) (88% - 99%)      CONSTITUTIONAL:   Ill appearing.  Well nourished.     ENT:   Airway patent,   Mouth with normal mucosa.   No thrush    EYES:   Pupils equal,   Round and reactive to light.    CARDIAC:   Tachy  Regular rhythm.    No edema      Vascular:  Normal systolic impulse  No Carotid bruits    RESPIRATORY:   No wheezing  Bilateral BS  Normal chest expansion  Not tachypneic,  No use of accessory muscles    GASTROINTESTINAL:  Abdomen soft,   Non-tender,   No guarding,   + BS    GENITOURINARY  normal genitalia for sex  no edema    MUSCULOSKELETAL:   Range of motion is not limited,  No clubbing, cyanosis    NEUROLOGICAL:   Sedated.     SKIN:   Skin normal color for race,   Warm and dry and intact.   No evidence of rash.    PSYCHIATRIC:   No apparent risk to self or others.    HEMATOLOGICAL:  No cervical  lymphadenopathy.  no inguinal lymphadenopathy      20 @ 07:01  -  20 @ 07:00  --------------------------------------------------------  IN:    fentaNYL Infusion.: 254.4 mL    Free Water: 200 mL    lactated ringers.: 1200 mL    midazolam Infusion: 75 mL    Peptamen A.F.: 640 mL  Total IN: 2369.4 mL    OUT:    Indwelling Catheter - Urethral: 1225 mL    Rectal Tube: 100 mL  Total OUT: 1325 mL    Total NET: 1044.4 mL          LABS:                            8.5    18.02 )-----------( 279      ( 2020 03:20 )             26.4                                               04-    144  |  108  |  49<H>  ----------------------------<  150<H>  4.3   |  29  |  0.9    Ca    7.4<L>      2020 03:20  Phos  2.2     04-  Mg     2.5         TPro  4.3<L>  /  Alb  2.2<L>  /  TBili  0.2  /  DBili  x   /  AST  22  /  ALT  15  /  AlkPhos  86  25      PT/INR - ( 2020 03:20 )   PT: 14.70 sec;   INR: 1.28 ratio         PTT - ( 2020 03:20 )  PTT:28.0 sec                                       Urinalysis Basic - ( 2020 03:20 )    Color: Light Yellow / Appearance: Slightly Turbid / S.016 / pH: x  Gluc: x / Ketone: Negative  / Bili: Negative / Urobili: <2 mg/dL   Blood: x / Protein: 30 mg/dL / Nitrite: Negative   Leuk Esterase: Negative / RBC: 7 /HPF / WBC 26 /HPF   Sq Epi: x / Non Sq Epi: 7 /HPF / Bacteria: Negative                                                  LIVER FUNCTIONS - ( 2020 03:20 )  Alb: 2.2 g/dL / Pro: 4.3 g/dL / ALK PHOS: 86 U/L / ALT: 15 U/L / AST: 22 U/L / GGT: x                                                                                               Mode: AC/ CMV (Assist Control/ Continuous Mandatory Ventilation)  RR (machine): 26  TV (machine): 400  FiO2: 80  PEEP: 14  ITime: 1  MAP: 18  PIP: 28                                      ABG - ( 2020 02:27 )  pH, Arterial: 7.34  pH, Blood: x     /  pCO2: 56    /  pO2: 99    / HCO3: 30    / Base Excess: 3.9   /  SaO2: 98    PPL 28              MEDICATIONS  (STANDING):  aspirin  chewable 81 milliGRAM(s) Oral daily  atorvastatin 80 milliGRAM(s) Oral at bedtime  azithromycin  IVPB 500 milliGRAM(s) IV Intermittent every 24 hours  azithromycin  IVPB      chlorhexidine 0.12% Liquid 15 milliLiter(s) Oral Mucosa every 12 hours  chlorhexidine 4% Liquid 1 Application(s) Topical <User Schedule>  enoxaparin Injectable 40 milliGRAM(s) SubCutaneous daily  ergocalciferol 44291 Unit(s) Oral every week  fentaNYL   Infusion. 0.5 MICROgram(s)/kG/Hr (2.65 mL/Hr) IV Continuous <Continuous>  folic acid 1 milliGRAM(s) Oral daily  lactated ringers. 1000 milliLiter(s) (50 mL/Hr) IV Continuous <Continuous>  meropenem  IVPB 1000 milliGRAM(s) IV Intermittent every 12 hours  methylPREDNISolone sodium succinate Injectable 60 milliGRAM(s) IV Push daily  metoprolol tartrate 12.5 milliGRAM(s) Oral two times a day  midazolam Infusion 0.02 mG/kG/Hr (1.06 mL/Hr) IV Continuous <Continuous>  pantoprazole   Suspension 40 milliGRAM(s) Oral daily  Remdesivir 100 milliGRAM(s) 100 milliGRAM(s) IV Intermittent <User Schedule>  sertraline 50 milliGRAM(s) Oral daily  ticagrelor 90 milliGRAM(s) Oral every 12 hours    MEDICATIONS  (PRN):  acetaminophen   Tablet .. 650 milliGRAM(s) Oral every 6 hours PRN Temp greater or equal to 38C (100.4F)      New X-rays reviewed:                                                                                  ECHO    CXR interpreted by me:  ET OG OK>  Bilateral infiltrates

## 2020-04-25 NOTE — PROGRESS NOTE ADULT - SUBJECTIVE AND OBJECTIVE BOX
ABHINVA DREW 74y Female  MRN#: [id number]   Hospital Day: 7d    SUBJECTIVE  Patient is a 74y old Female who presents with a chief complaint of shortness of breath (2020 11:24)  Currently admitted to medicine with the primary diagnosis of COVID-19 virus detected    INTERVAL HPI AND OVERNIGHT EVENTS:  Patient was examined and seen at bedside. This morning she is intubated, sedated, and off pressors. Patient very hypertensive overnight and today.    REVIEW OF SYMPTOMS:  Unable to obtain due to patient's mental status     OBJECTIVE  PAST MEDICAL & SURGICAL HISTORY  Rheumatoid arthritis  GERD (gastroesophageal reflux disease)  MI (myocardial infarction)  Hyperlipidemia  CAD (coronary artery disease)  History of surgery: stent placement    ALLERGIES:  Zocor (Joint Pain)    MEDICATIONS:  STANDING MEDICATIONS  aspirin  chewable 81 milliGRAM(s) Oral daily  atorvastatin 80 milliGRAM(s) Oral at bedtime  chlorhexidine 0.12% Liquid 15 milliLiter(s) Oral Mucosa every 12 hours  chlorhexidine 4% Liquid 1 Application(s) Topical <User Schedule>  enoxaparin Injectable 40 milliGRAM(s) SubCutaneous daily  ergocalciferol 73939 Unit(s) Oral every week  fentaNYL   Infusion. 0.5 MICROgram(s)/kG/Hr IV Continuous <Continuous>  folic acid 1 milliGRAM(s) Oral daily  methylPREDNISolone sodium succinate Injectable 40 milliGRAM(s) IV Push daily  metoprolol tartrate 12.5 milliGRAM(s) Oral two times a day  pantoprazole   Suspension 40 milliGRAM(s) Oral daily  Remdesivir 100 milliGRAM(s) 100 milliGRAM(s) IV Intermittent <User Schedule>  sertraline 50 milliGRAM(s) Oral daily  ticagrelor 90 milliGRAM(s) Oral every 12 hours    PRN MEDICATIONS  acetaminophen   Tablet .. 650 milliGRAM(s) Oral every 6 hours PRN      VITAL SIGNS: Last 24 Hours  T(C): 36.3 (2020 16:00), Max: 37.3 (2020 20:00)  T(F): 97.3 (2020 16:00), Max: 99.2 (2020 20:00)  HR: 94 (2020 18:00) (86 - 114)  BP: --  BP(mean): --  RR: 26 (2020 18:00) (12 - 29)  SpO2: 98% (2020 18:00) (95% - 100%)    LABS:                        8.5    18.02 )-----------( 279      ( 2020 03:20 )             26.4         144  |  108  |  49<H>  ----------------------------<  150<H>  4.3   |  29  |  0.9    Ca    7.4<L>      2020 03:20  Phos  2.2       Mg     2.5         TPro  4.3<L>  /  Alb  2.2<L>  /  TBili  0.2  /  DBili  x   /  AST  22  /  ALT  15  /  AlkPhos  86  25    PT/INR - ( 2020 03:20 )   PT: 14.70 sec;   INR: 1.28 ratio         PTT - ( 2020 03:20 )  PTT:28.0 sec  Urinalysis Basic - ( 2020 03:20 )    Color: Light Yellow / Appearance: Slightly Turbid / S.016 / pH: x  Gluc: x / Ketone: Negative  / Bili: Negative / Urobili: <2 mg/dL   Blood: x / Protein: 30 mg/dL / Nitrite: Negative   Leuk Esterase: Negative / RBC: 7 /HPF / WBC 26 /HPF   Sq Epi: x / Non Sq Epi: 7 /HPF / Bacteria: Negative      ABG - ( 2020 02:27 )  pH, Arterial: 7.34  pH, Blood: x     /  pCO2: 56    /  pO2: 99    / HCO3: 30    / Base Excess: 3.9   /  SaO2: 98        Mode: AC/ CMV (Assist Control/ Continuous Mandatory Ventilation)  RR (machine): 26  TV (machine): 400  FiO2: 70  PEEP: 14  ITime: 1  MAP: 14  PIP: 29      RADIOLOGY:  < from: Xray Chest 1 View- PORTABLE-Routine (20 @ 05:10) >  Findings:  Support devices: Endotracheal tube tip 3.8 cm above the megan. Enteric tube below the diaphragm. Right IJ catheter with tip overlying the SVC.   Cardiac/mediastinum/hilum: Stable.  Lung parenchyma/Pleura: No pneumothorax. Stable bilateral airspace opacities.  Skeleton/soft tissues: Stable.    Impression:    Stable bilateral airspace opacities.  < end of copied text >    PHYSICAL EXAM:  CONSTITUTIONAL: No acute distress, well-developed, well-groomed, intubated, sedated  HEAD: Atraumatic, normocephalic  EYES: EOM intact, PERRLA, conjunctiva and sclera clear  ENT: Supple, no masses, no thyromegaly, no bruits, no JVD; moist mucous membranes  PULMONARY: Clear to auscultation bilaterally; no wheezes, rales, or rhonchi  CARDIOVASCULAR: Sinus tachycardia; no murmurs, rubs, or gallops  GASTROINTESTINAL: Soft, non-tender, non-distended; bowel sounds present  MUSCULOSKELETAL: 2+ peripheral pulses; no clubbing, no cyanosis, no edema  NEUROLOGY: sedated, does not follow commands, withdraws to pain  SKIN: No rashes or lesions; warm and dry    ASSESSMENT & PLAN  Patient is a 75yo female with PMH of CAD s/p PCI x5, GERD, rheumatoid arthritis, and hyperlipidemia who was transferred from [facility] for worsening shortness of breath and hypoxia. Patient was admitted to the [facility] site after having 2 episodes of syncope at home and tested positive for COVID-19. Patient was intubated on 2020 after desaturating while on non-rebreather.    #Acute hypoxemic respiratory failure secondary to COVID-19 pneumonia with possible superinfection  - COVID-19 PCR 2020: positive  - Isolation precautions (contact, droplet, airborne)  - ECG 2020: QTc 510  - Procalcitonin: 0.16->1.95->1.85->0.57  - CRP: 4.73->18.39->8.31->8.45  - D-dimer: 516->949  - ID consult appreciated  - Completed course of hydroxychloroquine and anakinra  - Continue with meropenem 1g IV Q12H (Day #8 of 8)  - Change methylprednisolone to 40mg IV Q24H (Day #8)  - Continue with azithromycin 500mg IV Q24H (Day #7 of 7)  - Continue with remdesivir 100mg IV Q24H (Day #5 of 10)  - Follow CBC, CMP, magnesium, PT/PTT, and urinalysis daily while on remdesivir  - Monitor QTc (413 yesterday)  - Decrease fentanyl  - Discontinue midazolam and start propofol for sedation  - If patient becomes hypotensive, can switch back to midazolam  - Decrease FiO2 t0 70% and change PEEP to 14    #Diarrhea  - C. diff negative  - Restart tube feeds    #Acute kidney injury, improving  - Baseline creatinine: 0.7  - Creatinine today: 0.9  - Monitor BUN/creatinine  - Avoid nephrotic agents    #Hypernatremia  - Sodium today: 144  - Continue with free water 200mL Q12H  - Discontinue LR  - Follow sodium in AM    #CAD s/p PCI x5  - Continue with aspirin 81mg PO QD  - Continue with ticagrelor 90mg PO Q12H  - Continue with metoprolol tartrate 12.5mg PO BID  - Continue with atorvastatin 80mg PO QHS    #Asymptomatic bacteruria  - Urine Cx Culture Results: 50,000-99,000 Pseudomonas aeruginosa and 10,000-49,000 ESBL E coli  - Patient currently asymptomatic    #Hyperlipidemia  - Patient takes rosuvastatin 20mg PO QHS at home  - Has allergy to simvastatin  - Continue with atorvastatin 80mg PO QHS    #Rheumatoid arthritis  - Stable    #GERD  - Continue with pantoprazole 40mg PO QD     #Anxiety/Depression  - Continue with alprazolam 1mg PO TID PRN  - Continue with sertraline 50mg PO QD    #Suspected Vitamin D and folate deficiencies  - Continue with ergocalciferol 50,000 units Q7D  - Continue with folic acid 1mg PO QD    #Misc  - DVT Prophylaxis: Lovenox 40mg SQ QD   - GI Prophylaxis: pantoprazole 40mg PO QD   - Diet: NPO with tube feeds  - Activity: bedrest  - IV Fluids: not indicated  - Code Status: Full Code    Dispo: MICU monitoring for now

## 2020-04-25 NOTE — PROGRESS NOTE ADULT - ASSESSMENT
75 y/o F with PMH of CAD s/p PCI x5, GERD, Rheumatoid arthritis presented from [facility]) for worsening shortness of breath and hypoxia.     IMPRESSION;   COVID 19 with Hypoxemia and CXR with b/l opacities  -elevation of inflammatory markers with cytokine release syndrome  Resolved fevers. No superimposed bacterial PNA  S/p Anakinra with little response 4/19-21  Polymicrobial bacteruria with no pyuria ( of little clinical significance )  Nares ORSA NG  BCx 4/19 NG    RECOMMENDATIONS;   Meropenem 1 gm iv q8h, 14 days in all  Remdesivir started 4/21  DAILY CBC BMP PT PTT UA

## 2020-04-25 NOTE — PROGRESS NOTE ADULT - SUBJECTIVE AND OBJECTIVE BOX
ABHINAV DREW  74y, Female  Allergy: Zocor (Joint Pain)      CHIEF COMPLAINT: shortness of breath (2020 08:41)      INTERVAL EVENTS/HPI  - No acute events overnight  - T(F): , Max: 99.2 (20 @ 20:00)  - Tolerating medication  - WBC Count: 18.02 K/uL (20 @ 03:20)      ROS  unable to obtain history secondary to patient's mental status and/or sedation     FH non-contributory   Social Hx non-contributory    VITALS:  T(F): 98.2, Max: 99.2 (20 @ 20:00)  HR: 102  BP: --  RR: 23Vital Signs Last 24 Hrs  T(C): 36.8 (2020 08:00), Max: 37.3 (2020 20:00)  T(F): 98.2 (2020 08:00), Max: 99.2 (2020 20:00)  HR: 102 (2020 11:00) (87 - 110)  BP: --  BP(mean): --  RR: 23 (2020 11:00) (12 - 29)  SpO2: 98% (2020 11:00) (88% - 99%)    PHYSICAL EXAM:  see below       TESTS & MEASUREMENTS:                        8.5    18.02 )-----------( 279      ( 2020 03:20 )             26.4     -    144  |  108  |  49<H>  ----------------------------<  150<H>  4.3   |  29  |  0.9    Ca    7.4<L>      2020 03:20  Phos  2.2     -  Mg     2.5         TPro  4.3<L>  /  Alb  2.2<L>  /  TBili  0.2  /  DBili  x   /  AST  22  /  ALT  15  /  AlkPhos  86  04-25    eGFR if Non African American: 64 mL/min/1.73M2 (20 @ 03:20)  eGFR if African American: 74 mL/min/1.73M2 (20 @ 03:20)    LIVER FUNCTIONS - ( 2020 03:20 )  Alb: 2.2 g/dL / Pro: 4.3 g/dL / ALK PHOS: 86 U/L / ALT: 15 U/L / AST: 22 U/L / GGT: x           Urinalysis Basic - ( 2020 03:20 )    Color: Light Yellow / Appearance: Slightly Turbid / S.016 / pH: x  Gluc: x / Ketone: Negative  / Bili: Negative / Urobili: <2 mg/dL   Blood: x / Protein: 30 mg/dL / Nitrite: Negative   Leuk Esterase: Negative / RBC: 7 /HPF / WBC 26 /HPF   Sq Epi: x / Non Sq Epi: 7 /HPF / Bacteria: Negative        Culture - Blood (collected 20 @ 20:00)  Source: .Blood Blood  Final Report (20 @ 01:00):    No Growth Final            INFECTIOUS DISEASES TESTING  MRSA PCR Result.: Negative (20 @ 05:10)  MRSA PCR Result.: Negative (19 @ 13:49)  Hepatitis C Virus Interpretation: Nonreact (19 @ 08:55)      RADIOLOGY & ADDITIONAL TESTS:  I have personally reviewed the last Chest xray  CXR      CT      CARDIOLOGY TESTING  12 Lead ECG:   Systolic  mmHg    Diastolic BP 61 mmHg    Ventricular Rate 132 BPM    Atrial Rate 132 BPM    P-R Interval 133 ms    QRS Duration 96 ms    Q-T Interval 279 ms    QTC Calculation(Bezet) 413 ms    P Axis 81 degrees    R Axis 67 degrees    T [id number] degrees    Diagnosis Line Sinus tachycardia  Marked ST abnormality, possible inferior subendocardial injury  Abnormal ECG    Confirmed by REX VALDES MD ([id number]) on 2020 2:53:37 PM (20 @ 13:25)  12 Lead ECG:   Ventricular Rate 134 BPM    Atrial Rate 134 BPM    P-R Interval 98 ms    QRS Duration 90 ms    Q-T Interval 342 ms    QTC Calculation(Bezet) 510 ms    P Axis -87 degrees    R Axis 49 degrees    T Axis 21 degrees    Diagnosis Line Unusual P axis andshort MT, probable junctional tachycardia  Nonspecific ST and T wave abnormality  Abnormal ECG    Confirmed by Oli Guido ([id number]) on 2020 7:17:52 AM (20 @ 23:57)      MEDICATIONS  aspirin  chewable 81  atorvastatin 80  azithromycin  IVPB 500  azithromycin  IVPB   chlorhexidine 0.12% Liquid 15  chlorhexidine 4% Liquid 1  enoxaparin Injectable 40  ergocalciferol 62707  fentaNYL   Infusion. 0.5  folic acid 1  meropenem  IVPB 1000  methylPREDNISolone sodium succinate Injectable 40  metoprolol tartrate 12.5  pantoprazole   Suspension 40  Remdesivir 100 milliGRAM(s) 100  sertraline 50  ticagrelor 90      ANTIBIOTICS:  azithromycin  IVPB 500 milliGRAM(s) IV Intermittent every 24 hours  azithromycin  IVPB      meropenem  IVPB 1000 milliGRAM(s) IV Intermittent every 12 hours      All available historical data has been reviewed

## 2020-04-25 NOTE — PROGRESS NOTE ADULT - ASSESSMENT
IMPRESSION:    Acute hypoxemic respiratory failure now requiring MV  COVID 19  Probable superinfection  MYCHAL resolved  HO ESBL: UTI     PLAN:    CNS:  Adequate sedation     HEENT: Oral care    PULMONARY:  HOB @ 45 degrees.  Aspiration precautions.   Wean O2.  PEEP 14     CARDIOVASCULAR: I=O  DC LR      GI: GI prophylaxis. OG Feeding.     RENAL:  Follow up lytes.  Correct as needed    INFECTIOUS DISEASE: Follow up cultures. Finish ABX course.      HEMATOLOGICAL:  DVT prophylaxis.    ENDOCRINE:  Follow up FS.  Insulin protocol if needed.  Solumedrol D#8  40 mg Q24     MUSCULOSKELETAL:  Bed chair position     MICU for now

## 2020-04-26 NOTE — PROGRESS NOTE ADULT - ASSESSMENT
IMPRESSION:    Acute hypoxemic respiratory failure now requiring MV  COVID 19  Probable superinfection SP ABX therapy   MYCHAL resolved  HO ESBL: UTI     PLAN:    CNS:  Adequate sedation     HEENT: Oral care    PULMONARY:  HOB @ 45 degrees.  Aspiration precautions.   Wean O2.       CARDIOVASCULAR: I=O    GI: GI prophylaxis. OG Feeding.     RENAL:  Follow up lytes.  Correct as needed    INFECTIOUS DISEASE: Follow up cultures.       HEMATOLOGICAL:  DVT prophylaxis.    ENDOCRINE:  Follow up FS.  Insulin protocol if needed.  Solumedrol D#9  40 mg Q24     MUSCULOSKELETAL:  Bed chair position     MICU for now

## 2020-04-26 NOTE — PROGRESS NOTE ADULT - SUBJECTIVE AND OBJECTIVE BOX
Patient is a 74y old  Female who presents with a chief complaint of shortness of breath (2020 19:02)        Over Night Events:  on MV.  Off pressors.  Sedated         ROS:     All ROS are negative except HPI         PHYSICAL EXAM    ICU Vital Signs Last 24 Hrs  T(C): 35.9 (2020 04:00), Max: 36.6 (2020 12:00)  T(F): 96.6 (2020 04:00), Max: 97.9 (2020 12:00)  HR: 84 (2020 07:00) (84 - 114)  BP: --  BP(mean): --  ABP: 144/62 (2020 07:00) (130/62 - 198/78)  ABP(mean): 92 (2020 07:00) (88 - 124)  RR: 25 (2020 07:00) (20 - 29)  SpO2: 100% (:00) (97% - 100%)      CONSTITUTIONAL:   Ill appearing.  Well nourished.  NAD    ENT:   Airway patent,   Mouth with normal mucosa.   No thrush    EYES:   Pupils equal,   Round and reactive to light.    CARDIAC:   Normal rate,   Regular rhythm.    No edema      Vascular:  Normal systolic impulse  No Carotid bruits    RESPIRATORY:   No wheezing  Bilateral BS  Normal chest expansion  Not tachypneic,  No use of accessory muscles    GASTROINTESTINAL:  Abdomen soft,   Non-tender,   No guarding,   + BS    GENITOURINARY  normal genitalia for sex  no edema    MUSCULOSKELETAL:   Range of motion is not limited,  No clubbing, cyanosis    NEUROLOGICAL:   Sedated     SKIN:   Skin normal color for race,   Warm and dry and intact.   No evidence of rash.    PSYCHIATRIC:   No apparent risk to self or others.    HEMATOLOGICAL:  No cervical  lymphadenopathy.  no inguinal lymphadenopathy      20 @ 07:01  -  20 @ 07:00  --------------------------------------------------------  IN:    Enteral Tube Flush: 150 mL    fentaNYL Infusion.: 156.6 mL    Free Water: 400 mL    IV PiggyBack: 550 mL    lactated ringers.: 100 mL    midazolam Infusion: 3 mL    Peptamen A.F.: 960 mL  Total IN: 2319.6 mL    OUT:    Indwelling Catheter - Urethral: 2420 mL    Rectal Tube: 200 mL  Total OUT: 2620 mL    Total NET: -300.4 mL          LABS:                            8.7    20.11 )-----------( 245      ( 2020 04:30 )             26.7                 8.7    20.11 )-----------( 245      (  @ 04:30 )             26.7                8.5    18.02 )-----------( 279      (  @ 03:20 )             26.4                8.3    15.82 )-----------( 218      (  @ 04:40 )             25.8                                                      145  |  106  |  63<HH>  ----------------------------<  222<H>  4.5   |  31  |  0.9    2020 04:30    145    |  106    |  63<HH>  ----------------------------<  222<H>  4.5     |  31     |  0.9    2020 03:20    144    |  108    |  49<H>  ----------------------------<  150<H>  4.3     |  29     |  0.9      Ca    8.3<L>      2020 04:30  Ca    7.4<L>      2020 03:20  Phos  2.9       2020 04:30  Phos  2.2       2020 03:20  Mg     2.5<H>     2020 04:30  Mg     2.5<H>     2020 03:20    TPro  4.6<L>  /  Alb  2.3<L>  /  TBili  0.3    /  DBili  x      /  AST  21     /  ALT  16     /  AlkPhos  101    2020 04:30  TPro  4.3<L>  /  Alb  2.2<L>  /  TBili  0.2    /  DBili  x      /  AST  22     /  ALT  15     /  AlkPhos  86     2020 03:20      Ca    8.3<L>      2020 04:30  Phos  2.9     04-26  Mg     2.5     04-26    TPro  4.6<L>  /  Alb  2.3<L>  /  TBili  0.3  /  DBili  x   /  AST  21  /  ALT  16  /  AlkPhos  101  04-26      PT/INR - ( 2020 04:30 )   PT: 13.20 sec;   INR: 1.15 ratio         PTT - ( 2020 04:30 )  PTT:26.3 sec                                       Urinalysis Basic - ( 2020 04:30 )    Color: Light Yellow / Appearance: Slightly Turbid / S.017 / pH: x  Gluc: x / Ketone: Negative  / Bili: Negative / Urobili: <2 mg/dL   Blood: x / Protein: 30 mg/dL / Nitrite: Negative   Leuk Esterase: Negative / RBC: 133 /HPF / WBC 68 /HPF   Sq Epi: x / Non Sq Epi: 23 /HPF / Bacteria: Negative                                                  LIVER FUNCTIONS - ( 2020 04:30 )  Alb: 2.3 g/dL / Pro: 4.6 g/dL / ALK PHOS: 101 U/L / ALT: 16 U/L / AST: 21 U/L / GGT: x                                                                                               Mode: AC/ CMV (Assist Control/ Continuous Mandatory Ventilation)  RR (machine): 26  TV (machine): 400  FiO2: 70  PEEP: 14  ITime: 1  MAP: 22  PIP: 31                                      ABG - ( 2020 02:25 )  pH, Arterial: 7.29  pH, Blood: x     /  pCO2: 65    /  pO2: 98    / HCO3: 31    / Base Excess: 3.4   /  SaO2: 97                  MEDICATIONS  (STANDING):  aspirin  chewable 81 milliGRAM(s) Oral daily  atorvastatin 80 milliGRAM(s) Oral at bedtime  chlorhexidine 0.12% Liquid 15 milliLiter(s) Oral Mucosa every 12 hours  chlorhexidine 4% Liquid 1 Application(s) Topical <User Schedule>  dextrose 5%. 1000 milliLiter(s) (50 mL/Hr) IV Continuous <Continuous>  dextrose 50% Injectable 12.5 Gram(s) IV Push once  dextrose 50% Injectable 25 Gram(s) IV Push once  dextrose 50% Injectable 25 Gram(s) IV Push once  enoxaparin Injectable 40 milliGRAM(s) SubCutaneous daily  ergocalciferol 29319 Unit(s) Oral every week  fentaNYL   Infusion. 0.5 MICROgram(s)/kG/Hr (2.65 mL/Hr) IV Continuous <Continuous>  folic acid 1 milliGRAM(s) Oral daily  insulin lispro (HumaLOG) corrective regimen sliding scale   SubCutaneous three times a day before meals  methylPREDNISolone sodium succinate Injectable 40 milliGRAM(s) IV Push daily  metoprolol tartrate 25 milliGRAM(s) Oral two times a day  pantoprazole   Suspension 40 milliGRAM(s) Oral daily  Remdesivir 100 milliGRAM(s) 100 milliGRAM(s) IV Intermittent <User Schedule>  sertraline 50 milliGRAM(s) Oral daily  ticagrelor 90 milliGRAM(s) Oral every 12 hours    MEDICATIONS  (PRN):  acetaminophen   Tablet .. 650 milliGRAM(s) Oral every 6 hours PRN Temp greater or equal to 38C (100.4F)  dextrose 40% Gel 15 Gram(s) Oral once PRN Blood Glucose LESS THAN 70 milliGRAM(s)/deciliter  glucagon  Injectable 1 milliGRAM(s) IntraMuscular once PRN Glucose LESS THAN 70 milligrams/deciliter      New X-rays reviewed:                                                                                  ECHO    CXR interpreted by me:  ET OG OK>  Bilateral infiltrates

## 2020-04-27 NOTE — PROGRESS NOTE ADULT - ASSESSMENT
· Assessment		  73 y/o F with PMH of CAD s/p PCI x5, GERD, Rheumatoid arthritis presented from [facility]) for worsening shortness of breath and hypoxia.     IMPRESSION;   COVID 19 with Hypoxemia and CXR with b/l opacities which seem to have increased  -elevation of inflammatory markers with cytokine release syndrome  Resolved fevers. No superimposed bacterial PNA  S/p Anakinra with little response 4/19-21  Polymicrobial bacteruria with no pyuria ( of little clinical significance )  Nares ORSA NG  BCx 4/19 NG    RECOMMENDATIONS;   Meropenem 1 gm iv q8h, 14 days in all  Remdesivir started 4/21. Little response so far  DAILY CBC BMP PT PTT UA

## 2020-04-27 NOTE — PROGRESS NOTE ADULT - SUBJECTIVE AND OBJECTIVE BOX
ABHINAV DREW  74y, Female    All available historical data reviewed    OVERNIGHT EVENTS:  no fevers  fio2 60%    ROS:  unable to obtain history secondary to patient's mental status and/or sedation     VITALS:  T(F): 95.5, Max: 96.6 (-20 @ 12:00)  HR: 100  BP: --  RR: 25Vital Signs Last 24 Hrs  T(C): 35.3 (2020 04:00), Max: 35.9 (2020 12:00)  T(F): 95.5 (2020 04:00), Max: 96.6 (2020 12:00)  HR: 100 (2020 07:00) (76 - 124)  BP: --  BP(mean): --  RR: 25 (2020 07:00) (25 - 27)  SpO2: 100% (2020 07:00) (95% - 100%)    TESTS & MEASUREMENTS:                        8.1    20.78 )-----------( 228      ( 2020 04:40 )             25.4         144  |  105  |  67<HH>  ----------------------------<  203<H>  4.6   |  31  |  0.7    Ca    7.9<L>      2020 04:40  Phos  3.1       Mg     2.6         TPro  4.5<L>  /  Alb  2.4<L>  /  TBili  0.4  /  DBili  x   /  AST  20  /  ALT  15  /  AlkPhos  89  27    LIVER FUNCTIONS - ( 2020 04:40 )  Alb: 2.4 g/dL / Pro: 4.5 g/dL / ALK PHOS: 89 U/L / ALT: 15 U/L / AST: 20 U/L / GGT: x             Urinalysis Basic - ( 2020 09:34 )    Color: Yellow / Appearance: Slightly Turbid / S.018 / pH: x  Gluc: x / Ketone: Negative  / Bili: Negative / Urobili: <2 mg/dL   Blood: x / Protein: 30 mg/dL / Nitrite: Negative   Leuk Esterase: Moderate / RBC: 7 /HPF / WBC 45 /HPF   Sq Epi: x / Non Sq Epi: 22 /HPF / Bacteria: Few          RADIOLOGY & ADDITIONAL TESTS:  Personal review of radiological diagnostics performed  Echo and EKG results noted when applicable.     MEDICATIONS:  acetaminophen   Tablet .. 650 milliGRAM(s) Oral every 6 hours PRN  aspirin  chewable 81 milliGRAM(s) Oral daily  atorvastatin 80 milliGRAM(s) Oral at bedtime  chlorhexidine 0.12% Liquid 15 milliLiter(s) Oral Mucosa every 12 hours  chlorhexidine 4% Liquid 1 Application(s) Topical <User Schedule>  dexMEDEtomidine Infusion 0.2 MICROgram(s)/kG/Hr IV Continuous <Continuous>  dextrose 5%. 1000 milliLiter(s) IV Continuous <Continuous>  enoxaparin Injectable 40 milliGRAM(s) SubCutaneous daily  ergocalciferol 63699 Unit(s) Oral every week  fentaNYL   Infusion. 0.5 MICROgram(s)/kG/Hr IV Continuous <Continuous>  folic acid 1 milliGRAM(s) Oral daily  insulin glargine Injectable (LANTUS) 6 Unit(s) SubCutaneous at bedtime  insulin lispro (HumaLOG) corrective regimen sliding scale   SubCutaneous three times a day before meals  insulin lispro Injectable (HumaLOG) 2 Unit(s) SubCutaneous every 8 hours  methylPREDNISolone sodium succinate Injectable 40 milliGRAM(s) IV Push daily  metoprolol tartrate 25 milliGRAM(s) Oral two times a day  pantoprazole   Suspension 40 milliGRAM(s) Oral daily  Remdesivir 100 milliGRAM(s) 100 milliGRAM(s) IV Intermittent <User Schedule>  senna 2 Tablet(s) Oral at bedtime  sertraline 50 milliGRAM(s) Oral daily  ticagrelor 90 milliGRAM(s) Oral every 12 hours      ANTIBIOTICS:

## 2020-04-27 NOTE — PROGRESS NOTE ADULT - SUBJECTIVE AND OBJECTIVE BOX
Patient is a 74y old  Female who presents with a chief complaint of shortness of breath (2020 08:35)    Over Night Events:  No events over night; On fentanyl 3;     ROS:     All ROS are negative except HPI       PHYSICAL EXAM    ICU Vital Signs Last 24 Hrs  T(C): 35.3 (2020 04:00), Max: 35.9 (2020 12:00)  T(F): 95.5 (2020 04:00), Max: 96.6 (2020 12:00)  HR: 100 (2020 07:00) (76 - 124)  ABP: 98/56 (2020 07:00) (98/56 - 182/70)  ABP(mean): 74 (2020 07:00) (74 - 116)  RR: 25 (2020 07:00) (25 - 27)  SpO2: 100% (:00) (95% - 100%)    CONSTITUTIONAL:  Well nourished.  NAD    ENT:   Airway patent,   Mouth with normal mucosa.   No thrush    EYES:   Pupils equal,   Round and reactive to light.    CARDIAC:   Normal rate,   Regular rhythm.    No edema      Vascular:  Normal systolic impulse  No Carotid bruits    RESPIRATORY:   No wheezing  Bilateral BS  Normal chest expansion  Not tachypneic,  No use of accessory muscles    GASTROINTESTINAL:  Abdomen soft,   Non-tender,   No guarding,   + BS    MUSCULOSKELETAL:   Range of motion is not limited,  No clubbing, cyanosis    NEUROLOGICAL:   No response to commands    SKIN:   Skin normal color for race,   Warm and dry and intact.   No evidence of rash.    PSYCHIATRIC:   No apparent risk to self or others.    HEMATOLOGICAL:  No cervical  lymphadenopathy.  no inguinal lymphadenopathy      20 @ 07:01  -  20 @ 07:00  --------------------------------------------------------  IN:    dextrose 5%.: 925 mL    Enteral Tube Flush: 100 mL    fentaNYL Infusion.: 336.4 mL    Free Water: 1200 mL    Peptamen A.F.: 840 mL  Total IN: 3401.4 mL    OUT:    Indwelling Catheter - Urethral: 225 mL    Rectal Tube: 100 mL    Voided: 900 mL  Total OUT: 1225 mL    Total NET: 2176.4 mL      LABS:                          8.1    20.78 )-----------( 228      ( 2020 04:40 )             25.4                                                     144  |  105  |  67<HH>  ----------------------------<  203<H>  4.6   |  31  |  0.7    Ca    7.9<L>      2020 04:40  Phos  3.1       Mg     2.6         TPro  4.5<L>  /  Alb  2.4<L>  /  TBili  0.4  /  DBili  x   /  AST  20  /  ALT  15  /  AlkPhos  89      PT/INR - ( 2020 04:40 )   PT: 13.20 sec;   INR: 1.15 ratio    PTT - ( 2020 04:40 )  PTT:26.9 sec                                           Urinalysis Basic - ( 2020 04:30 )  Color: Light Yellow / Appearance: Slightly Turbid / S.017 / pH: x  Gluc: x / Ketone: Negative  / Bili: Negative / Urobili: <2 mg/dL   Blood: x / Protein: 30 mg/dL / Nitrite: Negative   Leuk Esterase: Negative / RBC: 133 /HPF / WBC 68 /HPF   Sq Epi: x / Non Sq Epi: 23 /HPF / Bacteria: Negative    LIVER FUNCTIONS - ( 2020 04:40 )  Alb: 2.4 g/dL / Pro: 4.5 g/dL / ALK PHOS: 89 U/L / ALT: 15 U/L / AST: 20 U/L / GGT: x                                                                 Mode: AC/ CMV (Assist Control/ Continuous Mandatory Ventilation)  RR (machine): 26  TV (machine): 400  FiO2: 60  PEEP: 14  ITime: 1  MAP: 23  PIP: 29                                        ABG - ( 2020 03:32 )  pH, Arterial: 7.24  pH, Blood: x     /  pCO2: 76    /  pO2: 87    / HCO3: 32    / Base Excess: 3.0   /  SaO2: 96    PL 27      MEDICATIONS  (STANDING):  aspirin  chewable 81 milliGRAM(s) Oral daily  atorvastatin 80 milliGRAM(s) Oral at bedtime  chlorhexidine 0.12% Liquid 15 milliLiter(s) Oral Mucosa every 12 hours  chlorhexidine 4% Liquid 1 Application(s) Topical <User Schedule>  dexMEDEtomidine Infusion 0.2 MICROgram(s)/kG/Hr (2.65 mL/Hr) IV Continuous <Continuous>  dextrose 5%. 1000 milliLiter(s) (75 mL/Hr) IV Continuous <Continuous>  dextrose 5%. 1000 milliLiter(s) (50 mL/Hr) IV Continuous <Continuous>  dextrose 50% Injectable 12.5 Gram(s) IV Push once  dextrose 50% Injectable 25 Gram(s) IV Push once  dextrose 50% Injectable 25 Gram(s) IV Push once  enoxaparin Injectable 40 milliGRAM(s) SubCutaneous daily  ergocalciferol 86344 Unit(s) Oral every week  fentaNYL   Infusion. 0.5 MICROgram(s)/kG/Hr (2.65 mL/Hr) IV Continuous <Continuous>  folic acid 1 milliGRAM(s) Oral daily  hydrALAZINE Injectable 10 milliGRAM(s) IV Push daily  insulin lispro (HumaLOG) corrective regimen sliding scale   SubCutaneous three times a day before meals  lactated ringers Bolus 500 milliLiter(s) IV Bolus once  methylPREDNISolone sodium succinate Injectable 40 milliGRAM(s) IV Push daily  metoprolol tartrate 25 milliGRAM(s) Oral two times a day  pantoprazole   Suspension 40 milliGRAM(s) Oral daily  Remdesivir 100 milliGRAM(s) 100 milliGRAM(s) IV Intermittent <User Schedule>  sertraline 50 milliGRAM(s) Oral daily  ticagrelor 90 milliGRAM(s) Oral every 12 hours    MEDICATIONS  (PRN):  acetaminophen   Tablet .. 650 milliGRAM(s) Oral every 6 hours PRN Temp greater or equal to 38C (100.4F)  dextrose 40% Gel 15 Gram(s) Oral once PRN Blood Glucose LESS THAN 70 milliGRAM(s)/deciliter  glucagon  Injectable 1 milliGRAM(s) IntraMuscular once PRN Glucose LESS THAN 70 milligrams/deciliter      New X-rays reviewed:                                                                                  ECHO    CXR interpreted by me:    ETT ok; OG tube ok; Bilateral opacities; Right TLC

## 2020-04-27 NOTE — PROGRESS NOTE ADULT - ASSESSMENT
IMPRESSION:    Acute hypoxemic respiratory failure now requiring MV  COVID 19  Probable superinfection SP ABX therapy   MYCHAL resolved  HO ESBL: UTI     PLAN:    CNS:  Adequate sedation. SAT;      HEENT: Oral care    PULMONARY:  HOB @ 45 degrees.  Aspiration precautions.  RR 28 PEEP 12. Wean O2.       CARDIOVASCULAR: Keep I=O;     GI: GI prophylaxis. OG Feeding. bowel regimen     RENAL:  Follow up lytes.  Correct as needed    INFECTIOUS DISEASE: Follow up cultures.       HEMATOLOGICAL:  DVT prophylaxis.     ENDOCRINE:  Follow up FS.  Insulin protocol if needed.  Solumedrol D#10  40 mg Q24     MUSCULOSKELETAL:  Bed chair position     MICU for now

## 2020-04-27 NOTE — PROGRESS NOTE ADULT - SUBJECTIVE AND OBJECTIVE BOX
ABHINAV DREW 74y Female  MRN#: [id number]   Hospital Day: 9d    SUBJECTIVE  Patient is a 74y old Female who presents with a chief complaint of shortness of breath (2020 10:55)  Currently admitted to medicine with the primary diagnosis of COVID-19 virus detected    INTERVAL HPI AND OVERNIGHT EVENTS:  Patient was examined and seen at bedside. This morning she is intubated, sedated, and off pressors.    REVIEW OF SYMPTOMS:  Unable to obtain due to patient's mental status     OBJECTIVE  PAST MEDICAL & SURGICAL HISTORY  Rheumatoid arthritis  GERD (gastroesophageal reflux disease)  MI (myocardial infarction)  Hyperlipidemia  CAD (coronary artery disease)  History of surgery: stent placement    ALLERGIES:  Zocor (Joint Pain)    MEDICATIONS:  STANDING MEDICATIONS  aspirin  chewable 81 milliGRAM(s) Oral daily  atorvastatin 80 milliGRAM(s) Oral at bedtime  chlorhexidine 0.12% Liquid 15 milliLiter(s) Oral Mucosa every 12 hours  chlorhexidine 4% Liquid 1 Application(s) Topical <User Schedule>  dexMEDEtomidine Infusion 0.2 MICROgram(s)/kG/Hr IV Continuous <Continuous>  dextrose 5%. 1000 milliLiter(s) IV Continuous <Continuous>  enoxaparin Injectable 40 milliGRAM(s) SubCutaneous daily  ergocalciferol 38995 Unit(s) Oral every week  fentaNYL   Infusion. 0.5 MICROgram(s)/kG/Hr IV Continuous <Continuous>  folic acid 1 milliGRAM(s) Oral daily  insulin glargine Injectable (LANTUS) 6 Unit(s) SubCutaneous at bedtime  insulin lispro (HumaLOG) corrective regimen sliding scale   SubCutaneous three times a day before meals  insulin lispro Injectable (HumaLOG) 2 Unit(s) SubCutaneous every 8 hours  methylPREDNISolone sodium succinate Injectable 40 milliGRAM(s) IV Push daily  metoprolol tartrate 25 milliGRAM(s) Oral two times a day  pantoprazole   Suspension 40 milliGRAM(s) Oral daily  Remdesivir 100 milliGRAM(s) 100 milliGRAM(s) IV Intermittent <User Schedule>  senna 2 Tablet(s) Oral at bedtime  sertraline 50 milliGRAM(s) Oral daily  ticagrelor 90 milliGRAM(s) Oral every 12 hours    PRN MEDICATIONS  acetaminophen   Tablet .. 650 milliGRAM(s) Oral every 6 hours PRN      VITAL SIGNS: Last 24 Hours  T(C): 35.3 (2020 04:00), Max: 35.8 (2020 16:00)  T(F): 95.5 (2020 04:00), Max: 96.5 (2020 16:00)  HR: 100 (2020 07:00) (76 - 124)  BP: --  BP(mean): --  RR: 25 (2020 07:00) (25 - 27)  SpO2: 100% (2020 07:00) (96% - 100%)    LABS:                        8.1    20.78 )-----------( 228      ( 2020 04:40 )             25.4     04-27    144  |  105  |  67<HH>  ----------------------------<  203<H>  4.6   |  31  |  0.7    Ca    7.9<L>      2020 04:40  Phos  3.1     27  Mg     2.6     27    TPro  4.5<L>  /  Alb  2.4<L>  /  TBili  0.4  /  DBili  x   /  AST  20  /  ALT  15  /  AlkPhos  89  04-27    PT/INR - ( 2020 04:40 )   PT: 13.20 sec;   INR: 1.15 ratio         PTT - ( 2020 04:40 )  PTT:26.9 sec  Urinalysis Basic - ( 2020 09:34 )    Color: Yellow / Appearance: Slightly Turbid / S.018 / pH: x  Gluc: x / Ketone: Negative  / Bili: Negative / Urobili: <2 mg/dL   Blood: x / Protein: 30 mg/dL / Nitrite: Negative   Leuk Esterase: Moderate / RBC: 7 /HPF / WBC 45 /HPF   Sq Epi: x / Non Sq Epi: 22 /HPF / Bacteria: Few      ABG - ( 2020 03:32 )  pH, Arterial: 7.24  pH, Blood: x     /  pCO2: 76    /  pO2: 87    / HCO3: 32    / Base Excess: 3.0   /  SaO2: 96        Mode: AC/ CMV (Assist Control/ Continuous Mandatory Ventilation)  RR (machine): 26  TV (machine): 400  FiO2: 60  PEEP: 14  ITime: 1  MAP: 23  PIP: 29    RADIOLOGY:  < from: Xray Chest 1 View- PORTABLE-Routine (20 @ 05:08) >  Findings:  Support devices: Patient is intubated. Enteric catheter extends below the hemidiaphragm. Right-sided central venous catheter is seen.  Cardiac/mediastinum/hilum: Unremarkable.  Lung parenchyma/Pleura: Bilateral opacifications.  Skeleton/soft tissues: Unchanged.    Impression:    Bilateral opacifications. Support devices as described.  Unchanged.  < end of copied text >    PHYSICAL EXAM:  CONSTITUTIONAL: No acute distress, well-developed, well-groomed, intubated, sedated  HEAD: Atraumatic, normocephalic  EYES: EOM intact, PERRLA, conjunctiva and sclera clear  ENT: Supple, no masses, no thyromegaly, no bruits, no JVD; moist mucous membranes  PULMONARY: Clear to auscultation bilaterally; no wheezes, rales, or rhonchi  CARDIOVASCULAR: Sinus tachycardia; no murmurs, rubs, or gallops  GASTROINTESTINAL: Soft, non-tender, non-distended; bowel sounds present  MUSCULOSKELETAL: 2+ peripheral pulses; no clubbing, no cyanosis, no edema  NEUROLOGY: sedated, does not follow commands, withdraws to pain  SKIN: No rashes or lesions; warm and dry    ASSESSMENT & PLAN  Patient is a 73yo female with PMH of CAD s/p PCI x5, GERD, rheumatoid arthritis, and hyperlipidemia who was transferred from [facility] for worsening shortness of breath and hypoxia. Patient was admitted to the [facility] site after having 2 episodes of syncope at home and tested positive for COVID-19. Patient was intubated on 2020 after desaturating while on non-rebreather.    #Acute hypoxemic respiratory failure secondary to COVID-19 pneumonia with possible superinfection  - COVID-19 PCR 2020: positive  - Isolation precautions (contact, droplet, airborne)  - ECG 2020: QTc 510  - Procalcitonin: 0.16->1.95->1.85->0.57->0.22  - CRP: 4.73->18.39->8.31->8.45->4.49  - D-dimer: 516->949  - ID consult appreciated  - Completed course of hydroxychloroquine, azithromycin, meropenem, and anakinra  - Continue with methylprednisolone 40mg IV Q24H (Day #10)  - Continue with remdesivir 100mg IV Q24H (Day #7 of 10)  - Follow CBC, CMP, magnesium, PT/PTT, and urinalysis daily while on remdesivir  - Monitor QTc (409 today)  - SAT today  - Decrease PEEP to 12 and increase respiratory rate to 28  - Follow plateau pressure    #Hyperglycemia  - Likely secondary to steroid use  - Start insulin glargine 6 units SQ QHS  - Start insulin lispro 2 units SQ Q8H  - Insulin sliding scale coverage  - Monitor fingerstick glucose    #Hypertension  - Discontinue hydralazine IV  - Continue with metoprolol tartrate 25mg PO BID  - Can use PRN pushes of hydralazine    #Diarrhea, improving  - 100-200mL via rectal tube overnight  - C. diff 2020: negative  - Continue with  tube feeds    #Acute kidney injury, resolved  - Baseline creatinine: 0.7  - Creatinine today: 0.7  - Monitor BUN/creatinine    #Hypernatremia  - Sodium today: 144  - Continue with free water 300mL Q4H  - Continue with D5W at 75mL/hr  - Follow sodium in AM    #CAD s/p PCI x5  - Continue with aspirin 81mg PO QD  - Continue with ticagrelor 90mg PO Q12H  - Continue with metoprolol tartrate 12.5mg PO BID  - Continue with atorvastatin 80mg PO QHS    #Asymptomatic bacteruria  - Urine Cx Culture Results: 50,000-99,000 Pseudomonas aeruginosa and 10,000-49,000 ESBL E coli  - Patient currently asymptomatic    #Hyperlipidemia  - Patient takes rosuvastatin 20mg PO QHS at home  - Has allergy to simvastatin  - Continue with atorvastatin 80mg PO QHS    #Rheumatoid arthritis  - Stable    #GERD  - Continue with pantoprazole 40mg PO QD     #Anxiety/Depression  - Continue with alprazolam 1mg PO TID PRN  - Continue with sertraline 50mg PO QD    #Suspected Vitamin D and folate deficiencies  - Continue with ergocalciferol 50,000 units Q7D  - Continue with folic acid 1mg PO QD    #Misc  - DVT Prophylaxis: Lovenox 40mg SQ QD   - GI Prophylaxis: pantoprazole 40mg PO QD   - Diet: NPO with tube feeds  - Activity: bedrest  - IV Fluids: not indicated  - Code Status: Full Code    Dispo: MICU monitoring for now

## 2020-04-28 NOTE — CHART NOTE - NSCHARTNOTEFT_GEN_A_CORE
Registered Dietitian Follow-Up    ***Scroll to the bottom for RD recommendation***    Patient Profile Reviewed                           Yes [x]   No []  Nutrition History Previously Obtained        Yes []  No [x]          PERTINENT SUBJECTIVE INFORMATION (LATEST AS OF TODAY):  - pt is intubated to ventilator, on IV fentanyl only.  - RN spoken with, Pt tube feed is on hold, transitioned from NGT to OGT, confirming placement before resuming tube feed. otherwise, was tolerating.  - Ve 12.9, temp 37.2, /40, MAP 58.       PERTINENT MEDICAL INFORMATIONS:  (1) Acute hypoxemic resp failure now on MV. COVID19  (2) MYCHAL resolved- s/p abx therapy. f/u bowel regimen.  (3) Insulin PRN.          DIET ORDER: PEPTAMEN AF at 40cc/hr (OGT) = 1152 kcal/ 72g prtoein/ 778mL free water        ANTHROPOMETRICS:  - Ht.  152.4cm  - Wt.  (4/18): 53kg  (4/28): 67.4kg - weight trended upward, likely from 1+ edema v.s. bedscale error. will monitor trend  - BMI. 23.8  - IBW.        PERTINENT LAB DATA:  4/28: h/h 6.7/21.1, Na 132, K 5.1, BUN 78, Glucose 168, Ca 7.6, Albumin 2.0, GFR 41  PERTINENT MEDS:  insulin, senna, methylprednisolone, metoprolol, fentanyl, protonix, acetaminophen, atorvastatin, vitamin D3, B9      PHYSICAL FINDINGS  - APPEARANCE:        intubated to ventilator, 1+ generalized edema  - GI FUNCTION:        Rectal tube ongoing  - TUBES:                     OGT  - ORAL/MOUTH:      NPO  - SKIN:                        Ecchymosis        NUTRITION REQUIREMENTS  WEIGHT USED:                          53kg dosing wt   ESTIMATED ENERGY NEEDS:       CONTINUE [  ]      ADJUST [ x ]    ESTIMATED ENERGY NEEDS:         1323 kcal/day ([id number])  ESTIMATED PROTEIN NEEDS:        64-80 gms/day (1.2 - 1.5 gm/kg ABW)  ESTIMATED FLUID NEEDS:             fluid per MICU team    CURRENT NUTRIENT NEEDS:    1152 kcal/ 72g protein/ 778mL free water          [ x ] PREVIOUS NUTRITION DIAGNOSIS:   (1) inadequate protein-energy intake (re-instated), (2) Excessive energy intake (no longer valid)            [ x ] ONGOING        [  ] RESOLVED              PATIENT INTERVENTION:    [  ] ORAL        [ x] EN/TF     GOAL/EXPECTED OUTCOME:     pt to meet and tolerate >85% of estimated kcal/protein via TF upon f/u in 3 days. pt to have 1BM/day.  INDICATOR/MONITORING:       RD to monitor diet order, energy intake, body composition, nutrition focused physical findings (EN tolerance, renal function, electrolytes)  NUTRITION INTERVENTION:        Enteral nutrition      RECS: (1) When OG is confirmed when pt is hemodynamically stable to resume feeding with MAP>65 consistently, please order PEPTAMEN AF at 40cc/hr. This regimen gives a total of 1152 kcal/ 72g protein/ 778mL free water, additional flushes per MICU team.

## 2020-04-28 NOTE — PROGRESS NOTE ADULT - ASSESSMENT
IMPRESSION:    Acute hypoxemic respiratory failure now requiring MV  COVID 19  Probable superinfection SP ABX therapy   MYCHAL resolved  HO ESBL: UTI     PLAN:    CNS:  Adequate sedation fentanyl; daily SAT     HEENT: Oral care    PULMONARY:  HOB @ 45 degrees.  Aspiration precautions. Vent changes  RR 30;       CARDIOVASCULAR: Keep I=O;     GI: GI prophylaxis. OG Feeding. bowel regimen     RENAL:  Follow up lytes.  Correct as needed repeat BMP;     INFECTIOUS DISEASE: Follow up cultures.   Check fungitel; DTa culture;     HEMATOLOGICAL:  DVT prophylaxis.  Repeat CBC; Target Hb >7     ENDOCRINE:  Follow up FS.  Insulin protocol if needed.  Solumedrol D#11  40 mg Q24     MUSCULOSKELETAL:  Bed chair position     MICU for now

## 2020-04-28 NOTE — PROGRESS NOTE ADULT - SUBJECTIVE AND OBJECTIVE BOX
Patient is a 74y old  Female who presents with a chief complaint of shortness of breath (2020 12:19)    Over Night Events:  Off sedation s24 hrs;     ROS:     All ROS are negative except HPI     PHYSICAL EXAM    ICU Vital Signs Last 24 Hrs  T(C): 37.2 (2020 00:00), Max: 38.2 (2020 20:00)  T(F): 98.9 (2020 00:00), Max: 100.7 (2020 20:00)  HR: 86 (2020 07:00) (82 - 154)  ABP: 102/40 (:00) (86/40 - 176/66)  ABP(mean): 58 (2020 07:00) (52 - 102)  RR: 28 (:00) (22 - 30)  SpO2: 86% (:00) (86% - 99%)    CONSTITUTIONAL:   Ill appearing.  Well nourished.  NAD    ENT:   ETT +  Mouth with normal mucosa.   No thrush    EYES:   Pupils equal,   Round and reactive to light.    CARDIAC:   Normal rate,   Regular rhythm.    No edema      Vascular:  Normal systolic impulse  No Carotid bruits    RESPIRATORY:   No wheezing  Bilateral BS  Normal chest expansion  Not tachypneic,  No use of accessory muscles    GASTROINTESTINAL:  Abdomen soft,   Non-tender,   No guarding,   + BS    MUSCULOSKELETAL:   Range of motion is not limited,  No clubbing, cyanosis    NEUROLOGICAL:   Opens eyes; Not following commands    SKIN:   Skin normal color for race,   Warm and dry and intact.   No evidence of rash.    PSYCHIATRIC:   No apparent risk to self or others.    HEMATOLOGICAL:  No cervical  lymphadenopathy.  no inguinal lymphadenopathy      20 @ 07:01  -  20 @ 07:00  --------------------------------------------------------  IN:    dextrose 5%.: 1725 mL    Free Water: 1800 mL    IV PiggyBack: 250 mL    Peptamen A.F.: 960 mL  Total IN: 4735 mL    OUT:    Rectal Tube: 250 mL    Voided: 500 mL  Total OUT: 750 mL    Total NET: 3985 mL      LABS:                          6.7    24.79 )-----------( 153      ( 2020 04:50 )             21.1                                                           6.7    24.79 )-----------( 153      (  @ 04:50 )             21.1                8.1    20.78 )-----------( 228      (  @ 04:40 )             25.4                8.7    20.11 )-----------( 245      (  @ 04:30 )             26.7            132<L>  |  97<L>  |  78<HH>  ----------------------------<  168<H>  5.1<H>   |  28  |  1.3    2020 04:50    132<L>  |  97<L>  |  78<HH>  ----------------------------<  168<H>  5.1<H>   |  28     |  1.3    2020 04:40    144    |  105    |  67<HH>  ----------------------------<  203<H>  4.6     |  31     |  0.7      Ca    7.6<L>      2020 04:50  Ca    7.9<L>      2020 04:40  Phos  3.5       2020 04:50  Phos  3.1       2020 04:40  Mg     2.4       2020 04:50  Mg     2.6<H>     2020 04:40    TPro  3.6<L>  /  Alb  2.0<L>  /  TBili  0.5    /  DBili  x      /  AST  29     /  ALT  16     /  AlkPhos  87     2020 04:50  TPro  4.5<L>  /  Alb  2.4<L>  /  TBili  0.4    /  DBili  x      /  AST  20     /  ALT  15     /  AlkPhos  89     2020 04:40      Ca    7.6<L>      2020 04:50  Phos  3.5       Mg     2.4         TPro  3.6<L>  /  Alb  2.0<L>  /  TBili  0.5  /  DBili  x   /  AST  29  /  ALT  16  /  AlkPhos  87  04-28      PT/INR - ( 2020 04:50 )   PT: 15.10 sec;   INR: 1.31 ratio         PTT - ( 2020 04:50 )  PTT:34.0 sec                                           Urinalysis Basic - ( 2020 04:50 )  Color: Yellow / Appearance: Slightly Turbid / S.021 / pH: x  Gluc: x / Ketone: Negative  / Bili: Negative / Urobili: <2 mg/dL   Blood: x / Protein: 100 mg/dL / Nitrite: Negative   Leuk Esterase: Moderate / RBC: 38 /HPF / WBC 37 /HPF   Sq Epi: x / Non Sq Epi: 9 /HPF / Bacteria: Negative    LIVER FUNCTIONS - ( 2020 04:50 )  Alb: 2.0 g/dL / Pro: 3.6 g/dL / ALK PHOS: 87 U/L / ALT: 16 U/L / AST: 29 U/L / GGT: x                                     Mode: AC/ CMV (Assist Control/ Continuous Mandatory Ventilation)  RR (machine): 28  TV (machine): 400  FiO2: 60  PEEP: 12  ITime: 1  MAP: 20  PIP: 26                                        ABG - ( 2020 04:12 )  pH, Arterial: 7.17  pH, Blood: x     /  pCO2: 78    /  pO2: 62    / HCO3: 29    / Base Excess: -0.2  /  SaO2: 88   PL 24        Procalcitonin, Serum: 0.42      MEDICATIONS  (STANDING):  aspirin  chewable 81 milliGRAM(s) Oral daily  atorvastatin 80 milliGRAM(s) Oral at bedtime  chlorhexidine 0.12% Liquid 15 milliLiter(s) Oral Mucosa every 12 hours  chlorhexidine 4% Liquid 1 Application(s) Topical <User Schedule>  enoxaparin Injectable 40 milliGRAM(s) SubCutaneous daily  ergocalciferol 31069 Unit(s) Oral every week  fentaNYL   Infusion. 0.5 MICROgram(s)/kG/Hr (2.65 mL/Hr) IV Continuous <Continuous>  folic acid 1 milliGRAM(s) Oral daily  insulin glargine Injectable (LANTUS) 6 Unit(s) SubCutaneous at bedtime  insulin lispro (HumaLOG) corrective regimen sliding scale   SubCutaneous three times a day before meals  insulin lispro Injectable (HumaLOG) 2 Unit(s) SubCutaneous every 8 hours  methylPREDNISolone sodium succinate Injectable 40 milliGRAM(s) IV Push daily  metoprolol tartrate 25 milliGRAM(s) Oral two times a day  pantoprazole   Suspension 40 milliGRAM(s) Oral daily  Remdesivir 100 milliGRAM(s) 100 milliGRAM(s) IV Intermittent <User Schedule>  senna 2 Tablet(s) Oral at bedtime  sertraline 50 milliGRAM(s) Oral daily  ticagrelor 90 milliGRAM(s) Oral every 12 hours    MEDICATIONS  (PRN):  acetaminophen   Tablet .. 650 milliGRAM(s) Oral every 6 hours PRN Temp greater or equal to 38C (100.4F)    New X-rays reviewed:                                                                                  ECHO    CXR interpreted by me:  ETT ok; right TLC; og ok;

## 2020-04-28 NOTE — PROGRESS NOTE ADULT - ASSESSMENT
· Assessment		  75 y/o F with PMH of CAD s/p PCI x5, GERD, Rheumatoid arthritis presented from [facility]) for worsening shortness of breath and hypoxia.     IMPRESSION;   COVID 19 with Hypoxemia and CXR with b/l opacities   -elevation of inflammatory markers with cytokine release syndrome  Resolved fevers. No superimposed bacterial PNA  S/p Anakinra with little response 4/19-21  Polymicrobial bacteruria with no pyuria ( of little clinical significance )  Nares ORSA NG  BCx 4/19 NG  ferritin 1705    RECOMMENDATIONS;   Meropenem 1 gm iv q8h, 14 days in all  Remdesivir started 4/21. Little response so far. Might have to hold if Cr worsens  DAILY CBC BMP PT PTT UA

## 2020-04-28 NOTE — PROGRESS NOTE ADULT - SUBJECTIVE AND OBJECTIVE BOX
ABHINAV DREW 74y Female  MRN#: [id number]   Hospital Day: 10d    SUBJECTIVE  Patient is a 74y old Female who presents with a chief complaint of shortness of breath (2020 08:32)  Currently admitted to medicine with the primary diagnosis of COVID-19 virus detected    INTERVAL HPI AND OVERNIGHT EVENTS:  Patient was examined and seen at bedside. This morning she is intubated, off sedation, and off pressors. Febrile overnight.    REVIEW OF SYMPTOMS:  Unable to obtain due to patient's mental status     OBJECTIVE  PAST MEDICAL & SURGICAL HISTORY  Rheumatoid arthritis  GERD (gastroesophageal reflux disease)  MI (myocardial infarction)  Hyperlipidemia  CAD (coronary artery disease)  History of surgery: stent placement    ALLERGIES:  Zocor (Joint Pain)    MEDICATIONS:  STANDING MEDICATIONS  aspirin  chewable 81 milliGRAM(s) Oral daily  atorvastatin 80 milliGRAM(s) Oral at bedtime  chlorhexidine 0.12% Liquid 15 milliLiter(s) Oral Mucosa every 12 hours  chlorhexidine 4% Liquid 1 Application(s) Topical <User Schedule>  enoxaparin Injectable 40 milliGRAM(s) SubCutaneous daily  ergocalciferol 21103 Unit(s) Oral every week  fentaNYL   Infusion. 0.5 MICROgram(s)/kG/Hr IV Continuous <Continuous>  folic acid 1 milliGRAM(s) Oral daily  insulin glargine Injectable (LANTUS) 6 Unit(s) SubCutaneous at bedtime  insulin lispro (HumaLOG) corrective regimen sliding scale   SubCutaneous three times a day before meals  insulin lispro Injectable (HumaLOG) 2 Unit(s) SubCutaneous every 8 hours  methylPREDNISolone sodium succinate Injectable 40 milliGRAM(s) IV Push daily  metoprolol tartrate 25 milliGRAM(s) Oral two times a day  pantoprazole   Suspension 40 milliGRAM(s) Oral daily  Remdesivir 100 milliGRAM(s) 100 milliGRAM(s) IV Intermittent <User Schedule>  senna 2 Tablet(s) Oral at bedtime  sertraline 50 milliGRAM(s) Oral daily  ticagrelor 90 milliGRAM(s) Oral every 12 hours    PRN MEDICATIONS  acetaminophen   Tablet .. 650 milliGRAM(s) Oral every 6 hours PRN      VITAL SIGNS: Last 24 Hours  T(C): 37.2 (2020 00:00), Max: 38.2 (2020 20:00)  T(F): 98.9 (2020 00:00), Max: 100.7 (2020 20:00)  HR: 92 (2020 11:00) (82 - 154)  BP: --  BP(mean): --  RR: 29 (2020 11:00) (22 - 30)  SpO2: 95% (2020 11:00) (86% - 99%)    LABS:                        6.7    30.21 )-----------( 169      ( 2020 08:35 )             21.4     04-28    129<L>  |  93<L>  |  86<HH>  ----------------------------<  153<H>  5.5<H>   |  27  |  1.4    Ca    7.5<L>      2020 08:37  Phos  3.5     04-  Mg     2.4         TPro  3.8<L>  /  Alb  1.9<L>  /  TBili  0.3  /  DBili  x   /  AST  34  /  ALT  18  /  AlkPhos  98  04-28    PT/INR - ( 2020 04:50 )   PT: 15.10 sec;   INR: 1.31 ratio         PTT - ( 2020 04:50 )  PTT:34.0 sec  Urinalysis Basic - ( 2020 04:50 )    Color: Yellow / Appearance: Slightly Turbid / S.021 / pH: x  Gluc: x / Ketone: Negative  / Bili: Negative / Urobili: <2 mg/dL   Blood: x / Protein: 100 mg/dL / Nitrite: Negative   Leuk Esterase: Moderate / RBC: 38 /HPF / WBC 37 /HPF   Sq Epi: x / Non Sq Epi: 9 /HPF / Bacteria: Negative      ABG - ( 2020 04:12 )  pH, Arterial: 7.17  pH, Blood: x     /  pCO2: 78    /  pO2: 62    / HCO3: 29    / Base Excess: -0.2  /  SaO2: 88        Mode: AC/ CMV (Assist Control/ Continuous Mandatory Ventilation)  RR (machine): 28  TV (machine): 400  FiO2: 60  PEEP: 12  ITime: 1  MAP: 20  PIP: 26    RADIOLOGY:  < from: Xray Chest 1 View- PORTABLE-Routine (20 @ 08:11) >  FINDINGS:  Support devices: Endotracheal tube appropriately positioned. Enteric tube seen to the level of the stomach. Right IJ line in the SVC. EKG leads overlie the chest.  Cardiac/mediastinum/hilum: Unchanged.  Lung parenchyma/Pleura: Bilateral opacities, slightly worsened at the right base. Small right pleural effusion.  Skeleton/soft tissues: Unchanged.    IMPRESSION:    1. Support apparatus appropriately positioned as above.  2. Bilateral opacities, slightly worsened at the right base.  < end of copied text >    PHYSICAL EXAM:  CONSTITUTIONAL: No acute distress, well-developed, well-groomed, intubated, off sedation >24hrs  HEAD: Atraumatic, normocephalic  EYES: EOM intact, PERRLA, conjunctiva and sclera clear  ENT: Supple, no masses, no thyromegaly, no bruits, no JVD; moist mucous membranes  PULMONARY: Clear to auscultation bilaterally; no wheezes, rales, or rhonchi  CARDIOVASCULAR: Regular rate and rhythm; no murmurs, rubs, or gallops  GASTROINTESTINAL: Soft, non-tender, non-distended; bowel sounds present  MUSCULOSKELETAL: 2+ peripheral pulses; no clubbing, no cyanosis, no edema  NEUROLOGY: off sedation >24hrs, opens eyes, does not follow commands, withdraws to pain  SKIN: No rashes or lesions; warm and dry    ASSESSMENT & PLAN  Patient is a 73yo female with PMH of CAD s/p PCI x5, GERD, rheumatoid arthritis, and hyperlipidemia who was transferred from [facility] for worsening shortness of breath and hypoxia. Patient was admitted to the [facility] site after having 2 episodes of syncope at home and tested positive for COVID-19. Patient was intubated on 2020 after desaturating while on non-rebreather.    #Acute hypoxemic respiratory failure secondary to COVID-19 pneumonia with possible superinfection  - COVID-19 PCR 2020: positive  - Isolation precautions (contact, droplet, airborne)  - ECG 2020: QTc 510  - Procalcitonin: 0.16->1.95->1.85->0.57->0.22->0.42  - CRP: 4.73->18.39->8.31->8.45->4.49->6.39  - D-dimer: 516->949  - ID consult appreciated  - Completed course of hydroxychloroquine, azithromycin, meropenem, and anakinra  - Continue with methylprednisolone 40mg IV Q24H (Day #11)  - Continue with remdesivir 100mg IV Q24H (Day #8 of 10)  - Follow CBC, CMP, magnesium, PT/PTT, and urinalysis daily while on remdesivir  - Monitor QTc (409 yesterday)  - Increase respiratory rate to 30 and change I:E to 1:1  - Follow repeat Fungitell and D-dimer  - Pan cultures (blood and aspirate)  - Patient has been off sedation for 24hrs without change in mental status    #Acute macrocytic anemia  - Baseline hemoglobin: 12  - Hemoglobin slowly trending down over the past week  - Hemoglobin today: 6.7  - Patient is hemodynamically stable with no acute sign of bleeding  - BUN slowly trending up, but so is creatinine  - Will transfuse 1 unit PRBC  - Keep active type and screen  - Transfuse if hemoglobin <7    #Acute kidney injury, worsening  - Baseline creatinine: 0.7  - Creatinine today: 1.4  - Monitor BUN/creatinine  - Avoid nephrotoxic agents  - Will send urine electrolytes    #Hyperkalemia  - Potassium today: 5.1->5.5  - Follow potassium in AM    #Hyponatremia  - Sodium today: 132->129  - Discontinue free water  - Discontinue IV fluids  - Follow sodium in AM    #Hyperglycemia  - Likely secondary to steroid use  - Continue with insulin glargine 6 units SQ QHS  - Continue with insulin lispro 2 units SQ Q8H  - Insulin sliding scale coverage  - Monitor fingerstick glucose    #Hypertension  - Continue with metoprolol tartrate 25mg PO BID  - Can use PRN pushes of hydralazine    #Diarrhea, improving  - 100-200mL via rectal tube overnight  - C. diff 2020: negative  - Continue with tube feeds    #CAD s/p PCI x5  - Continue with aspirin 81mg PO QD  - Continue with ticagrelor 90mg PO Q12H  - Continue with metoprolol tartrate 12.5mg PO BID  - Continue with atorvastatin 80mg PO QHS    #Asymptomatic bacteruria  - Urine Cx Culture Results: 50,000-99,000 Pseudomonas aeruginosa and 10,000-49,000 ESBL E coli  - Patient currently asymptomatic    #Hyperlipidemia  - Patient takes rosuvastatin 20mg PO QHS at home  - Has allergy to simvastatin  - Continue with atorvastatin 80mg PO QHS    #Rheumatoid arthritis  - Stable    #GERD  - Continue with pantoprazole 40mg PO QD     #Anxiety/Depression  - Continue with sertraline 50mg PO QD    #Suspected Vitamin D and folate deficiencies  - Continue with ergocalciferol 50,000 units Q7D  - Continue with folic acid 1mg PO QD    #Misc  - DVT Prophylaxis: Lovenox 40mg SQ QD   - GI Prophylaxis: pantoprazole 40mg PO QD   - Diet: NPO with tube feeds  - Activity: bedrest  - IV Fluids: not indicated  - Code Status: Full Code    Dispo: MICU monitoring for now

## 2020-04-28 NOTE — PROGRESS NOTE ADULT - SUBJECTIVE AND OBJECTIVE BOX
ABHINAV DREW  74y, Female    All available historical data reviewed    OVERNIGHT EVENTS:  low grade fevers  fio2 60%  no pressors    ROS:  unable to obtain history secondary to patient's mental status and/or sedation   VITALS:  T(F): 98.9, Max: 100.7 (20 @ 20:00)  HR: 92  BP: --  RR: 29Vital Signs Last 24 Hrs  T(C): 37.2 (2020 00:00), Max: 38.2 (2020 20:00)  T(F): 98.9 (2020 00:00), Max: 100.7 (2020 20:00)  HR: 92 (2020 11:00) (82 - 154)  BP: --  BP(mean): --  RR: 29 (2020 11:00) (22 - 30)  SpO2: 95% (2020 11:00) (86% - 98%)    TESTS & MEASUREMENTS:                        6.7    30.21 )-----------( 169      ( 2020 08:35 )             21.4         129<L>  |  93<L>  |  86<HH>  ----------------------------<  153<H>  5.5<H>   |  27  |  1.4    Ca    7.5<L>      2020 08:37  Phos  3.5       Mg     2.4         TPro  3.8<L>  /  Alb  1.9<L>  /  TBili  0.3  /  DBili  x   /  AST  34  /  ALT  18  /  AlkPhos  98  28    LIVER FUNCTIONS - ( 2020 08:37 )  Alb: 1.9 g/dL / Pro: 3.8 g/dL / ALK PHOS: 98 U/L / ALT: 18 U/L / AST: 34 U/L / GGT: x             Urinalysis Basic - ( 2020 04:50 )    Color: Yellow / Appearance: Slightly Turbid / S.021 / pH: x  Gluc: x / Ketone: Negative  / Bili: Negative / Urobili: <2 mg/dL   Blood: x / Protein: 100 mg/dL / Nitrite: Negative   Leuk Esterase: Moderate / RBC: 38 /HPF / WBC 37 /HPF   Sq Epi: x / Non Sq Epi: 9 /HPF / Bacteria: Negative          RADIOLOGY & ADDITIONAL TESTS:  Personal review of radiological diagnostics performed  Echo and EKG results noted when applicable.     MEDICATIONS:  acetaminophen   Tablet .. 650 milliGRAM(s) Oral every 6 hours PRN  atorvastatin 80 milliGRAM(s) Oral at bedtime  caspofungin IVPB 70 milliGRAM(s) IV Intermittent once  caspofungin IVPB      chlorhexidine 0.12% Liquid 15 milliLiter(s) Oral Mucosa every 12 hours  chlorhexidine 4% Liquid 1 Application(s) Topical <User Schedule>  dextrose 5% 1000 milliLiter(s) IV Continuous <Continuous>  ergocalciferol 86285 Unit(s) Oral every week  fentaNYL   Infusion. 0.5 MICROgram(s)/kG/Hr IV Continuous <Continuous>  folic acid 1 milliGRAM(s) Oral daily  insulin glargine Injectable (LANTUS) 6 Unit(s) SubCutaneous at bedtime  insulin lispro (HumaLOG) corrective regimen sliding scale   SubCutaneous three times a day before meals  insulin lispro Injectable (HumaLOG) 2 Unit(s) SubCutaneous every 8 hours  meropenem  IVPB 1000 milliGRAM(s) IV Intermittent every 12 hours  methylPREDNISolone sodium succinate Injectable 40 milliGRAM(s) IV Push daily  metoprolol tartrate 25 milliGRAM(s) Oral two times a day  pantoprazole Infusion 8 mG/Hr IV Continuous <Continuous>  Remdesivir 100 milliGRAM(s) 100 milliGRAM(s) IV Intermittent <User Schedule>  senna 2 Tablet(s) Oral at bedtime  sertraline 50 milliGRAM(s) Oral daily  vancomycin  IVPB 1000 milliGRAM(s) IV Intermittent once      ANTIBIOTICS:  caspofungin IVPB 70 milliGRAM(s) IV Intermittent once  caspofungin IVPB      meropenem  IVPB 1000 milliGRAM(s) IV Intermittent every 12 hours  vancomycin  IVPB 1000 milliGRAM(s) IV Intermittent once

## 2020-04-29 NOTE — PROGRESS NOTE ADULT - ASSESSMENT
IMPRESSION:    Acute hypoxemic respiratory failure now requiring MV  COVID 19  Probable superinfection SP ABX therapy   MYCHAL resolved, now worsening   HO ESBL: UTI     PLAN:    CNS:  Keep off sedation.  Assess MS     HEENT: Oral care    PULMONARY:  HOB @ 45 degrees.  Aspiration precautions. Vent changes  Wean O2.      CARDIOVASCULAR: Keep I=O; Continue NaHCO3.      GI: GI prophylaxis. OG Feeding. bowel regimen     RENAL:  Follow up lytes.  Correct as needed repeat BMP; Shah.  If no retention, trial of Lasix     INFECTIOUS DISEASE: Follow up cultures.   Check fungitel; DTa culture; Recheck Vanc level and dose as needed     HEMATOLOGICAL:  DVT prophylaxis.  Repeat CBC; Target Hb >7     ENDOCRINE:  Follow up FS.  Insulin protocol if needed.  Wean Solumedrol D#12  20 mg Q24     MUSCULOSKELETAL:  Bed chair position     MICU for now

## 2020-04-29 NOTE — PROGRESS NOTE ADULT - SUBJECTIVE AND OBJECTIVE BOX
ABHINAV DREW  74y, Female    All available historical data reviewed    OVERNIGHT EVENTS:  no fevers  Fio2 60%  no pressors    ROS:  unable to obtain history secondary to patient's mental status and/or sedation   VITALS:  T(F): 93.2, Max: 97.1 (-20 @ 20:00)  HR: 78  BP: 116/53  RR: 30Vital Signs Last 24 Hrs  T(C): 34 (2020 05:00), Max: 36.2 (2020 20:00)  T(F): 93.2 (2020 05:00), Max: 97.1 (2020 20:00)  HR: 78 (2020 07:00) (72 - 96)  BP: 116/53 (2020 07:00) (93/52 - 123/49)  BP(mean): 71 (2020 07:00) (63 - 81)  RR: 30 (2020 07:00) (27 - 30)  SpO2: 99% (2020 07:00) (92% - 99%)    TESTS & MEASUREMENTS:                        8.1    30.95 )-----------( 145      ( 2020 05:35 )             24.2     04-29    132<L>  |  93<L>  |  95<HH>  ----------------------------<  130<H>  5.7<H>   |  29  |  1.7<H>    Ca    7.0<L>      2020 05:35  Phos  5.4     29  Mg     2.3         TPro  3.9<L>  /  Alb  1.9<L>  /  TBili  0.6  /  DBili  x   /  AST  37  /  ALT  17  /  AlkPhos  96  04-29    LIVER FUNCTIONS - ( 2020 05:35 )  Alb: 1.9 g/dL / Pro: 3.9 g/dL / ALK PHOS: 96 U/L / ALT: 17 U/L / AST: 37 U/L / GGT: x             Urinalysis Basic - ( 2020 04:50 )    Color: Yellow / Appearance: Slightly Turbid / S.021 / pH: x  Gluc: x / Ketone: Negative  / Bili: Negative / Urobili: <2 mg/dL   Blood: x / Protein: 100 mg/dL / Nitrite: Negative   Leuk Esterase: Moderate / RBC: 38 /HPF / WBC 37 /HPF   Sq Epi: x / Non Sq Epi: 9 /HPF / Bacteria: Negative          RADIOLOGY & ADDITIONAL TESTS:  Personal review of radiological diagnostics performed  Echo and EKG results noted when applicable.     MEDICATIONS:  acetaminophen   Tablet .. 650 milliGRAM(s) Oral every 6 hours PRN  atorvastatin 80 milliGRAM(s) Oral at bedtime  caspofungin IVPB 50 milliGRAM(s) IV Intermittent every 24 hours  caspofungin IVPB      chlorhexidine 0.12% Liquid 15 milliLiter(s) Oral Mucosa every 12 hours  chlorhexidine 4% Liquid 1 Application(s) Topical <User Schedule>  dextrose 5% 1000 milliLiter(s) IV Continuous <Continuous>  ergocalciferol 37771 Unit(s) Oral every week  fentaNYL   Infusion. 0.5 MICROgram(s)/kG/Hr IV Continuous <Continuous>  folic acid 1 milliGRAM(s) Oral daily  furosemide   Injectable 60 milliGRAM(s) IV Push once  insulin glargine Injectable (LANTUS) 6 Unit(s) SubCutaneous at bedtime  insulin lispro (HumaLOG) corrective regimen sliding scale   SubCutaneous three times a day before meals  insulin lispro Injectable (HumaLOG) 2 Unit(s) SubCutaneous every 8 hours  meropenem  IVPB 1000 milliGRAM(s) IV Intermittent every 12 hours  methylPREDNISolone sodium succinate Injectable 20 milliGRAM(s) IV Push daily  metoprolol tartrate 25 milliGRAM(s) Oral two times a day  pantoprazole Infusion 8 mG/Hr IV Continuous <Continuous>  Remdesivir 100 milliGRAM(s) 100 milliGRAM(s) IV Intermittent <User Schedule>  senna 2 Tablet(s) Oral at bedtime  sertraline 50 milliGRAM(s) Oral daily  sodium zirconium cyclosilicate 5 Gram(s) Oral once      ANTIBIOTICS:  caspofungin IVPB 50 milliGRAM(s) IV Intermittent every 24 hours  caspofungin IVPB      meropenem  IVPB 1000 milliGRAM(s) IV Intermittent every 12 hours

## 2020-04-29 NOTE — CHART NOTE - NSCHARTNOTEFT_GEN_A_CORE
As part of the communication team, I spoke w  Zohaib [phone] and provided him a daily update.    Zohaib indicated to me today that his wife has always been very sensitive to sedation and takes much longer to wake up than most people, as when she had minor eye surgery recently.

## 2020-04-29 NOTE — PROGRESS NOTE ADULT - SUBJECTIVE AND OBJECTIVE BOX
Patient is a 74y old  Female who presents with a chief complaint of shortness of breath (2020 13:36)        Over Night Events:  On MV.  Off pressors.  Off sedation for > 24 hours.          ROS:     All ROS are negative except HPI         PHYSICAL EXAM    ICU Vital Signs Last 24 Hrs  T(C): 34 (2020 05:00), Max: 36.2 (2020 20:00)  T(F): 93.2 (2020 05:00), Max: 97.1 (2020 20:00)  HR: 78 (:00) (72 - 96)  BP: 116/53 (:00) (93/52 - 123/49)  BP(mean): 71 (:00) (63 - 81)  ABP: 134/62 (:00) (96/44 - 154/70)  ABP(mean): 88 (:) (58 - 100)  RR: 30 (:) (27 - 30)  SpO2: 99% (:00) (92% - 99%)      CONSTITUTIONAL:   Ill appearing.  Well nourished.    ENT:   Airway patent,   Mouth with normal mucosa.   No thrush    EYES:   Pupils equal,   Round and reactive to light.    CARDIAC:   Normal rate,   Regular rhythm.    No edema      Vascular:  Normal systolic impulse  No Carotid bruits    RESPIRATORY:   No wheezing  Bilateral BS  Normal chest expansion  Not tachypneic,  No use of accessory muscles    GASTROINTESTINAL:  Abdomen soft,   Non-tender,   No guarding,   + BS    GENITOURINARY  normal genitalia for sex  no edema    MUSCULOSKELETAL:   Range of motion is not limited,  No clubbing, cyanosis    NEUROLOGICAL:   + Gag     SKIN:   Skin normal color for race,   Warm and dry and intact.   No evidence of rash.    PSYCHIATRIC:    No apparent risk to self or others.    HEMATOLOGICAL:  No cervical  lymphadenopathy.  no inguinal lymphadenopathy      20 @ 07:01  -  20 @ 07:00  --------------------------------------------------------  IN:    dextrose 5%: 1125 mL    fentaNYL Infusion.: 190.8 mL    IV PiggyBack: 1050 mL    Packed Red Blood Cells: 318 mL    pantoprazole Infusion: 150 mL    Peptamen A.F.: 40 mL  Total IN: 2873.8 mL    OUT:    Nasoenteral Tube: 650 mL    Rectal Tube: 250 mL    Voided: 500 mL  Total OUT: 1400 mL    Total NET: 1473.8 mL          LABS:                            8.1    30.95 )-----------( 145      ( 2020 05:35 )             24.2                    8.1    30.95 )-----------( 145      (  @ 05:35 )             24.2                8.6    26.11 )-----------( 136      (  @ 16:18 )             25.7                6.7    30.21 )-----------( 169      (  @ 08:35 )             21.4                6.7    24.79 )-----------( 153      (  @ 04:50 )             21.1                8.1    20.78 )-----------( 228      (  @ 04:40 )             25.4                                               04-    132<L>  |  93<L>  |  95<HH>  ----------------------------<  130<H>  5.7<H>   |  29  |  1.7<H>    2020 05:35    132<L>  |  93<L>  |  95<HH>  ----------------------------<  130<H>  5.7<H>   |  29     |  1.7<H>  2020 08:37    129<L>  |  93<L>  |  86<HH>  ----------------------------<  153<H>  5.5<H>   |  27     |  1.4      Ca    7.0<L>      2020 05:35  Ca    7.5<L>      2020 08:37  Phos  5.4<H>     2020 05:35  Phos  3.5       2020 04:50  Mg     2.3       2020 05:35  Mg     2.4       2020 04:50    TPro  3.9<L>  /  Alb  1.9<L>  /  TBili  0.6    /  DBili  x      /  AST  37     /  ALT  17     /  AlkPhos  96     2020 05:35  TPro  3.8<L>  /  Alb  1.9<L>  /  TBili  0.3    /  DBili  x      /  AST  34     /  ALT  18     /  AlkPhos  98     2020 08:37      Ca    7.0<L>      2020 05:35  Phos  5.4     04-29  Mg     2.3     04-29    TPro  3.9<L>  /  Alb  1.9<L>  /  TBili  0.6  /  DBili  x   /  AST  37  /  ALT  17  /  AlkPhos  96  04-29      PT/INR - ( 2020 05:35 )   PT: 14.30 sec;   INR: 1.24 ratio         PTT - ( 2020 05:35 )  PTT:32.9 sec                                       Urinalysis Basic - ( 2020 04:50 )    Color: Yellow / Appearance: Slightly Turbid / S.021 / pH: x  Gluc: x / Ketone: Negative  / Bili: Negative / Urobili: <2 mg/dL   Blood: x / Protein: 100 mg/dL / Nitrite: Negative   Leuk Esterase: Moderate / RBC: 38 /HPF / WBC 37 /HPF   Sq Epi: x / Non Sq Epi: 9 /HPF / Bacteria: Negative                                                  LIVER FUNCTIONS - ( 2020 05:35 )  Alb: 1.9 g/dL / Pro: 3.9 g/dL / ALK PHOS: 96 U/L / ALT: 17 U/L / AST: 37 U/L / GGT: x                                                                                               Mode: AC/ CMV (Assist Control/ Continuous Mandatory Ventilation)  RR (machine): 30  TV (machine): 400  FiO2: 60  PEEP: 12  ITime: 1  MAP: 21  PIP: 29                                      ABG - ( 2020 01:27 )  pH, Arterial: 7.26  pH, Blood: x     /  pCO2: 64    /  pO2: 59    / HCO3: 29    / Base Excess: 1.1   /  SaO2: 89    Lac 0.6              MEDICATIONS  (STANDING):  atorvastatin 80 milliGRAM(s) Oral at bedtime  caspofungin IVPB 50 milliGRAM(s) IV Intermittent every 24 hours  caspofungin IVPB      chlorhexidine 0.12% Liquid 15 milliLiter(s) Oral Mucosa every 12 hours  chlorhexidine 4% Liquid 1 Application(s) Topical <User Schedule>  dextrose 5% 1000 milliLiter(s) (75 mL/Hr) IV Continuous <Continuous>  ergocalciferol 18708 Unit(s) Oral every week  fentaNYL   Infusion. 0.5 MICROgram(s)/kG/Hr (2.65 mL/Hr) IV Continuous <Continuous>  folic acid 1 milliGRAM(s) Oral daily  insulin glargine Injectable (LANTUS) 6 Unit(s) SubCutaneous at bedtime  insulin lispro (HumaLOG) corrective regimen sliding scale   SubCutaneous three times a day before meals  insulin lispro Injectable (HumaLOG) 2 Unit(s) SubCutaneous every 8 hours  meropenem  IVPB 1000 milliGRAM(s) IV Intermittent every 12 hours  methylPREDNISolone sodium succinate Injectable 40 milliGRAM(s) IV Push daily  metoprolol tartrate 25 milliGRAM(s) Oral two times a day  pantoprazole Infusion 8 mG/Hr (10 mL/Hr) IV Continuous <Continuous>  Remdesivir 100 milliGRAM(s) 100 milliGRAM(s) IV Intermittent <User Schedule>  senna 2 Tablet(s) Oral at bedtime  sertraline 50 milliGRAM(s) Oral daily    MEDICATIONS  (PRN):  acetaminophen   Tablet .. 650 milliGRAM(s) Oral every 6 hours PRN Temp greater or equal to 38C (100.4F)      New X-rays reviewed:                                                                                  ECHO    CXR interpreted by me:  ET OG OK>  Bibasilar infiltrates

## 2020-04-29 NOTE — PROGRESS NOTE ADULT - ASSESSMENT
· Assessment		  75 y/o F with PMH of CAD s/p PCI x5, GERD, Rheumatoid arthritis presented from [facility] in [place]) for worsening shortness of breath and hypoxia.     IMPRESSION;   COVID 19 with Hypoxemia and CXR with b/l opacities   -elevation of inflammatory markers with cytokine release syndrome  Resolved fevers. No superimposed bacterial PNA  S/p Anakinra with little response 4/19-21  RDV since 4/21  Polymicrobial bacteruria with no pyuria ( of little clinical significance )  Nares ORSA NG  BCx 4/19 NG  ferritin 1705    RECOMMENDATIONS;   Meropenem 1 gm iv q8h ( started 4/18 )  14 days in all  Remdesivir started 4/21. Little response so far. Might have to hold if Cr worsens ( GFR <30, will hold it )  DAILY CBC BMP PT PTT UA

## 2020-04-29 NOTE — PROGRESS NOTE ADULT - SUBJECTIVE AND OBJECTIVE BOX
ABHINAV DREW 74y Female  MRN#: [id number]   Hospital Day: 11d    SUBJECTIVE  Patient is a 74y old Female who presents with a chief complaint of shortness of breath (2020 09:15)  Currently admitted to medicine with the primary diagnosis of COVID-19 virus detected    INTERVAL HPI AND OVERNIGHT EVENTS:  Patient was examined and seen at bedside. This morning she is intubated, off sedation, and off pressors. Positive OG lavage yesterday.    REVIEW OF SYMPTOMS:  Unable to obtain due to patient's mental status     OBJECTIVE  PAST MEDICAL & SURGICAL HISTORY  Rheumatoid arthritis  GERD (gastroesophageal reflux disease)  MI (myocardial infarction)  Hyperlipidemia  CAD (coronary artery disease)  History of surgery: stent placement    ALLERGIES:  Zocor (Joint Pain)    MEDICATIONS:  STANDING MEDICATIONS  atorvastatin 80 milliGRAM(s) Oral at bedtime  caspofungin IVPB      caspofungin IVPB 50 milliGRAM(s) IV Intermittent every 24 hours  chlorhexidine 0.12% Liquid 15 milliLiter(s) Oral Mucosa every 12 hours  chlorhexidine 4% Liquid 1 Application(s) Topical <User Schedule>  dextrose 5% 1000 milliLiter(s) IV Continuous <Continuous>  ergocalciferol 46676 Unit(s) Oral every week  fentaNYL   Infusion. 0.5 MICROgram(s)/kG/Hr IV Continuous <Continuous>  folic acid 1 milliGRAM(s) Oral daily  furosemide Infusion 6 mG/Hr IV Continuous <Continuous>  insulin glargine Injectable (LANTUS) 6 Unit(s) SubCutaneous at bedtime  insulin lispro (HumaLOG) corrective regimen sliding scale   SubCutaneous three times a day before meals  insulin lispro Injectable (HumaLOG) 2 Unit(s) SubCutaneous every 8 hours  meropenem  IVPB 1000 milliGRAM(s) IV Intermittent every 12 hours  methylPREDNISolone sodium succinate Injectable 20 milliGRAM(s) IV Push daily  metoprolol tartrate 25 milliGRAM(s) Oral two times a day  pantoprazole Infusion 8 mG/Hr IV Continuous <Continuous>  Remdesivir 100 milliGRAM(s) 100 milliGRAM(s) IV Intermittent <User Schedule>  senna 2 Tablet(s) Oral at bedtime  sertraline 50 milliGRAM(s) Oral daily    PRN MEDICATIONS  acetaminophen   Tablet .. 650 milliGRAM(s) Oral every 6 hours PRN      VITAL SIGNS: Last 24 Hours  T(C): 35.6 (2020 08:00), Max: 36.2 (2020 20:00)  T(F): 96.1 (2020 08:00), Max: 97.1 (2020 20:00)  HR: 92 (2020 13:00) (72 - 116)  BP: 130/57 (2020 10:00) (93/52 - 133/50)  BP(mean): 80 (2020 10:00) (63 - 83)  RR: 30 (2020 13:00) (26 - 30)  SpO2: 94% (2020 13:00) (94% - 99%)    LABS:                        7.3    31.76 )-----------( 152      ( 2020 16:50 )             22.6     04-29    132<L>  |  93<L>  |  95<HH>  ----------------------------<  130<H>  5.7<H>   |  29  |  1.7<H>    Ca    7.0<L>      2020 05:35  Phos  5.4     04-29  Mg     2.3     04-29    TPro  3.9<L>  /  Alb  1.9<L>  /  TBili  0.6  /  DBili  x   /  AST  37  /  ALT  17  /  AlkPhos  96  04-29    PT/INR - ( 2020 05:35 )   PT: 14.30 sec;   INR: 1.24 ratio         PTT - ( 2020 05:35 )  PTT:32.9 sec  Urinalysis Basic - ( 2020 10:20 )    Color: Yellow / Appearance: Turbid / S.015 / pH: x  Gluc: x / Ketone: Negative  / Bili: Negative / Urobili: <2 mg/dL   Blood: x / Protein: 30 mg/dL / Nitrite: Negative   Leuk Esterase: Large / RBC: 20 /HPF /  /HPF   Sq Epi: x / Non Sq Epi: 7 /HPF / Bacteria: Negative      ABG - ( 2020 01:27 )  pH, Arterial: 7.26  pH, Blood: x     /  pCO2: 64    /  pO2: 59    / HCO3: 29    / Base Excess: 1.1   /  SaO2: 89        Mode: AC/ CMV (Assist Control/ Continuous Mandatory Ventilation)  RR (machine): 30  TV (machine): 400  FiO2: 50  PEEP: 12  ITime: 1  MAP: 21  PIP: 28    RADIOLOGY:  < from: Xray Chest 1 View- PORTABLE-Routine (20 @ 08:27) >  FINDINGS:  Support devices: Endotracheal tube appropriately positioned. Enteric tube visualized to the stomach. Right IJ line in the SVC. EKG leads overlie the chest.  Cardiac/mediastinum/hilum: Unchanged.  Lung parenchyma/Pleura: Diffuse bilateral opacities, not significantly changed.  Skeleton/soft tissues: Unchanged.    IMPRESSION:    1. Support apparatus appropriately positioned as above.  2. Unchanged bilateral opacities.  < end of copied text >    PHYSICAL EXAM:  CONSTITUTIONAL: No acute distress, well-developed, well-groomed, intubated, off sedation >48hrs  HEAD: Atraumatic, normocephalic  EYES: EOM intact, PERRLA, conjunctiva and sclera clear  ENT: Supple, no masses, no thyromegaly, no bruits, no JVD; moist mucous membranes  PULMONARY: Clear to auscultation bilaterally; no wheezes, rales, or rhonchi  CARDIOVASCULAR: Regular rate and rhythm; no murmurs, rubs, or gallops  GASTROINTESTINAL: Soft, non-tender, non-distended; bowel sounds present  MUSCULOSKELETAL: 2+ peripheral pulses; no clubbing, no cyanosis, no edema  NEUROLOGY: off sedation >48hrs, opens eyes, does not follow commands, withdraws to pain  SKIN: No rashes or lesions; warm and dry    ASSESSMENT & PLAN  Patient is a 73yo female with PMH of CAD s/p PCI x5, GERD, rheumatoid arthritis, and hyperlipidemia who was transferred from [facility] for worsening shortness of breath and hypoxia. Patient was admitted to the [facility] site after having 2 episodes of syncope at home and tested positive for COVID-19. Patient was intubated on 2020 after desaturating while on non-rebreather.    #Acute hypoxemic respiratory failure secondary to COVID-19 pneumonia with possible superinfection  - COVID-19 PCR 2020: positive  - Isolation precautions (contact, droplet, airborne)  - ECG 2020: QTc 510  - Procalcitonin: 0.16->1.95->1.85->0.57->0.22->0.42->2.20  - CRP: 4.73->18.39->8.31->8.45->4.49->6.39->17.16  - D-dimer: 516->949  - ID consult appreciated  - Completed course of hydroxychloroquine, azithromycin, meropenem, and anakinra  - Change methylprednisolone to 20mg IV Q24H (Day #12)  - Hold remdesivir given worsening renal function and acute GI bleed  - Pan cultures (blood and aspirate) and Fungitell  - Patient has been off sedation for 24hrs without change in mental status  - Decrease FiO2 by 10%  - Vancomycin level today: 14.2  - Will give vancomycin 1g IV x1 dose today  - Continue with caspofungin 50mg IV Q24H (Day #2)  - Continue with meropenem 1g IV Q12H (Day #2)    #Acute macrocytic anemia secondary to upper GI bleed s/p 1 unit PRBC  - Baseline hemoglobin: 12  - Hemoglobin today: 8.1->7.3  - OG lavage positive on 2020  - Will transfuse 1 unit PRBC  - Keep active type and screen  - Transfuse if hemoglobin <7    #Acute kidney injury, worsening  - Baseline creatinine: 0.7  - Creatinine today: 1.7  - Monitor BUN/creatinine  - Avoid nephrotoxic agents  - Patient urinated 150mL with furosemide 60mg IV x1 dose  - Start furosemide infusion at 6mg/hr  - Strict intakes and outputs    #Hyperkalemia  - Potassium today: 5.7  - Give sodium zirconium 5mg PO x1 dose  - Follow potassium in PM and in AM    #Hyponatremia  - Sodium today: 132  - Follow sodium in AM    #Hyperglycemia  - Likely secondary to steroid use  - Continue with insulin glargine 6 units SQ QHS  - Continue with insulin lispro 2 units SQ Q8H  - Insulin sliding scale coverage  - Monitor fingerstick glucose    #Hypertension  - Continue with metoprolol tartrate 25mg PO BID  - Can use PRN pushes of hydralazine    #Diarrhea, improving  - 100-200mL via rectal tube overnight  - C. diff 2020: negative  - Continue with tube feeds    #CAD s/p PCI x5  - Hold aspirin 81mg PO QD  - Hold ticagrelor 90mg PO Q12H  - Continue with metoprolol tartrate 12.5mg PO BID  - Continue with atorvastatin 80mg PO QHS    #Asymptomatic bacteruria  - Urine Cx Culture Results: 50,000-99,000 Pseudomonas aeruginosa and 10,000-49,000 ESBL E coli  - Patient currently asymptomatic    #Hyperlipidemia  - Patient takes rosuvastatin 20mg PO QHS at home  - Has allergy to simvastatin  - Continue with atorvastatin 80mg PO QHS    #Rheumatoid arthritis  - Stable    #GERD  - Change pantoprazole to drip    #Anxiety/Depression  - Continue with sertraline 50mg PO QD    #Suspected Vitamin D and folate deficiencies  - Continue with ergocalciferol 50,000 units Q7D  - Continue with folic acid 1mg PO QD    #Misc  - DVT Prophylaxis: SCDs  - GI Prophylaxis: pantoprazole drip  - Diet: NPO with tube feeds  - Activity: bedrest  - IV Fluids: D5W + sodium bicarbonate 150mEq at 75mL/hr  - Code Status: Full Code    Dispo: MICU monitoring for now

## 2020-04-30 NOTE — CONSULT NOTE ADULT - CONSULT REQUESTED DATE/TIME
30-Apr-2020 12:00 Patient is a 62y old  Female who presents with a chief complaint of gait instability, ADL impairments and functional impairments s/p CABG 19 (08 Aug 2019 15:41)      HPI:  Pt is a 62 year old Portuguese and English speaking female with PMH of HTN, HLD, DM2 who presented to [facility] ED  with near syncope after arguing with her daughter. Patient also had acute onset nausea, diaphoresis, palpitations, dizziness, and mild chest pressure. NO chest pain, SOB, AGARWAL, PND, orthopnea, increased lower extremity edema, fever/chills, cough. The patient had cath at [facility] which showed 95% left main and 95% RCA lesions and she was transferred to [facility] for CABG on  with Dr. Ramey. Hospital course sig. for chest tube placement (DC'd ), RLE ghassan (DC'd ), small R thigh hematoma () and afebrile leukocytosis to 11 (). Admitted to [facility] for gait instability, ADL impairments and functional impairments s/p CABG. (02 Aug 2019 12:13)      SUBJECTIVE: Patient seen and examined. Overnight noted some slight bleeding from sutured drain site. Also feels some pinching and pulling at the dried glue of her sternal incision site. Denies CP, chest pressure, palpitations, SOB. Overall feels improved.    in Portuguese #[id number]      PAST MEDICAL & SURGICAL HISTORY:  Gall stone pancreatitis: S/P laparoscopic cholecystectomy  Low back pain  Diabetes mellitus, type II  Benign hypertension  History of cholecystectomy: laparoscopic cholecystectomy  H/O:       MEDICATIONS  (STANDING):  ascorbic acid 500 milliGRAM(s) Oral two times a day  aspirin  chewable 81 milliGRAM(s) Oral daily  atorvastatin 40 milliGRAM(s) Oral at bedtime  dextrose 5%. 1000 milliLiter(s) (50 mL/Hr) IV Continuous <Continuous>  dextrose 50% Injectable 12.5 Gram(s) IV Push once  dextrose 50% Injectable 25 Gram(s) IV Push once  dextrose 50% Injectable 25 Gram(s) IV Push once  escitalopram 10 milliGRAM(s) Oral daily  ferrous    sulfate 325 milliGRAM(s) Oral two times a day  heparin  Injectable 5000 Unit(s) SubCutaneous every 8 hours  insulin glargine Injectable (LANTUS) 10 Unit(s) SubCutaneous at bedtime  insulin lispro (HumaLOG) corrective regimen sliding scale   SubCutaneous three times a day before meals  insulin lispro (HumaLOG) corrective regimen sliding scale   SubCutaneous at bedtime  metFORMIN 1000 milliGRAM(s) Oral two times a day  metoprolol tartrate 12.5 milliGRAM(s) Oral <User Schedule>  multivitamin 1 Tablet(s) Oral daily  pantoprazole    Tablet 40 milliGRAM(s) Oral before breakfast    MEDICATIONS  (PRN):  acetaminophen   Tablet .. 650 milliGRAM(s) Oral every 6 hours PRN Temp greater or equal to 38C (100.4F), Mild Pain (1 - 3)  dextrose 40% Gel 15 Gram(s) Oral once PRN Blood Glucose LESS THAN 70 milliGRAM(s)/deciliter  docusate sodium 100 milliGRAM(s) Oral two times a day PRN Constipation  famotidine    Tablet 20 milliGRAM(s) Oral daily PRN reflux  glucagon  Injectable 1 milliGRAM(s) IntraMuscular once PRN Glucose LESS THAN 70 milligrams/deciliter  oxyCODONE    5 mG/acetaminophen 325 mG 1 Tablet(s) Oral every 6 hours PRN Moderate Pain (4 - 6)  oxyCODONE    5 mG/acetaminophen 325 mG 2 Tablet(s) Oral every 6 hours PRN Severe Pain (7 - 10)  polyethylene glycol 3350 17 Gram(s) Oral daily PRN Constipation  senna 2 Tablet(s) Oral at bedtime PRN Constipation      Allergies    metronidazole (Rash)    Intolerances    Norvasc (Swelling)        VITALS  62y  Vital Signs Last 24 Hrs  T(C): 37 (09 Aug 2019 07:35), Max: 37 (09 Aug 2019 07:35)  T(F): 98.6 (09 Aug 2019 07:35), Max: 98.6 (09 Aug 2019 07:35)  HR: 63 (09 Aug 2019 07:35) (63 - 67)  BP: 117/65 (09 Aug 2019 07:35) (111/66 - 117/65)  BP(mean): --  RR: 14 (09 Aug 2019 07:35) (14 - 14)  SpO2: 96% (09 Aug 2019 07:35) (96% - 99%)  Daily     Daily Weight in k.6 (09 Aug 2019 06:34)        RECENT LABS:                          8.8    10.98 )-----------( 385      ( 08 Aug 2019 07:25 )             27.4         136  |  101  |  12  ----------------------------<  180<H>  4.0   |  27  |  0.58    Ca    8.7      08 Aug 2019 07:25  Mg     1.8                     CAPILLARY BLOOD GLUCOSE      POCT Blood Glucose.: 147 mg/dL (09 Aug 2019 11:50)  POCT Blood Glucose.: 192 mg/dL (09 Aug 2019 07:57)  POCT Blood Glucose.: 160 mg/dL (08 Aug 2019 21:38)  POCT Blood Glucose.: 184 mg/dL (08 Aug 2019 16:58) Patient is a 62y old  Female who presents with a chief complaint of gait instability, ADL impairments and functional impairments s/p CABG 19 (08 Aug 2019 15:41)      HPI:  Pt is a 62 year old Icelandic and English speaking female with PMH of HTN, HLD, DM2 who presented to [facility] ED  with near syncope after arguing with her daughter. Patient also had acute onset nausea, diaphoresis, palpitations, dizziness, and mild chest pressure. NO chest pain, SOB, AGARWAL, PND, orthopnea, increased lower extremity edema, fever/chills, cough. The patient had cath at [facility] which showed 95% left main and 95% RCA lesions and she was transferred to [facility] for CABG on  with Dr. Ramey. Hospital course sig. for chest tube placement (DC'd ), RLE ghassan (DC'd ), small R thigh hematoma () and afebrile leukocytosis to 11 (). Admitted to [facility] for gait instability, ADL impairments and functional impairments s/p CABG. (02 Aug 2019 12:13)      SUBJECTIVE: Patient seen and examined. Overnight noted some slight bleeding from sutured drain site. Also feels some pinching and pulling at the dried glue of her sternal incision site. Denies CP, chest pressure, palpitations, SOB. Overall feels improved.    in Icelandic #[id number]      PAST MEDICAL & SURGICAL HISTORY:  Gall stone pancreatitis: S/P laparoscopic cholecystectomy  Low back pain  Diabetes mellitus, type II  Benign hypertension  History of cholecystectomy: laparoscopic cholecystectomy  H/O:       MEDICATIONS  (STANDING):  ascorbic acid 500 milliGRAM(s) Oral two times a day  aspirin  chewable 81 milliGRAM(s) Oral daily  atorvastatin 40 milliGRAM(s) Oral at bedtime  dextrose 5%. 1000 milliLiter(s) (50 mL/Hr) IV Continuous <Continuous>  dextrose 50% Injectable 12.5 Gram(s) IV Push once  dextrose 50% Injectable 25 Gram(s) IV Push once  dextrose 50% Injectable 25 Gram(s) IV Push once  escitalopram 10 milliGRAM(s) Oral daily  ferrous    sulfate 325 milliGRAM(s) Oral two times a day  heparin  Injectable 5000 Unit(s) SubCutaneous every 8 hours  insulin glargine Injectable (LANTUS) 10 Unit(s) SubCutaneous at bedtime  insulin lispro (HumaLOG) corrective regimen sliding scale   SubCutaneous three times a day before meals  insulin lispro (HumaLOG) corrective regimen sliding scale   SubCutaneous at bedtime  metFORMIN 1000 milliGRAM(s) Oral two times a day  metoprolol tartrate 12.5 milliGRAM(s) Oral <User Schedule>  multivitamin 1 Tablet(s) Oral daily  pantoprazole    Tablet 40 milliGRAM(s) Oral before breakfast    MEDICATIONS  (PRN):  acetaminophen   Tablet .. 650 milliGRAM(s) Oral every 6 hours PRN Temp greater or equal to 38C (100.4F), Mild Pain (1 - 3)  dextrose 40% Gel 15 Gram(s) Oral once PRN Blood Glucose LESS THAN 70 milliGRAM(s)/deciliter  docusate sodium 100 milliGRAM(s) Oral two times a day PRN Constipation  famotidine    Tablet 20 milliGRAM(s) Oral daily PRN reflux  glucagon  Injectable 1 milliGRAM(s) IntraMuscular once PRN Glucose LESS THAN 70 milligrams/deciliter  oxyCODONE    5 mG/acetaminophen 325 mG 1 Tablet(s) Oral every 6 hours PRN Moderate Pain (4 - 6)  oxyCODONE    5 mG/acetaminophen 325 mG 2 Tablet(s) Oral every 6 hours PRN Severe Pain (7 - 10)  polyethylene glycol 3350 17 Gram(s) Oral daily PRN Constipation  senna 2 Tablet(s) Oral at bedtime PRN Constipation      Allergies    metronidazole (Rash)    Intolerances    Norvasc (Swelling)        VITALS  62y  Vital Signs Last 24 Hrs  T(C): 37 (09 Aug 2019 07:35), Max: 37 (09 Aug 2019 07:35)  T(F): 98.6 (09 Aug 2019 07:35), Max: 98.6 (09 Aug 2019 07:35)  HR: 63 (09 Aug 2019 07:35) (63 - 67)  BP: 117/65 (09 Aug 2019 07:35) (111/66 - 117/65)  BP(mean): --  RR: 14 (09 Aug 2019 07:35) (14 - 14)  SpO2: 96% (09 Aug 2019 07:35) (96% - 99%)  Daily     Daily Weight in k.6 (09 Aug 2019 06:34)        RECENT LABS:                          8.8    10.98 )-----------( 385      ( 08 Aug 2019 07:25 )             27.4         136  |  101  |  12  ----------------------------<  180<H>  4.0   |  27  |  0.58    Ca    8.7      08 Aug 2019 07:25  Mg     1.8                     CAPILLARY BLOOD GLUCOSE      POCT Blood Glucose.: 147 mg/dL (09 Aug 2019 11:50)  POCT Blood Glucose.: 192 mg/dL (09 Aug 2019 07:57)  POCT Blood Glucose.: 160 mg/dL (08 Aug 2019 21:38)  POCT Blood Glucose.: 184 mg/dL (08 Aug 2019 16:58)

## 2020-04-30 NOTE — PROGRESS NOTE ADULT - ASSESSMENT
IMPRESSION:    Acute hypoxemic respiratory failure  COVID 19  Probable superinfection SP ABX therapy   MYCHAL  worsening   HO ESBL: UTI     PLAN:    CNS:  Keep off sedation.  Assess MS CTH    HEENT: Oral care    PULMONARY:  HOB @ 45 degrees.  Aspiration precautions. Vent changes  : PEEP 14 FiO2 60     CARDIOVASCULAR: Keep I=O; DC NaHCO3.      GI: GI prophylaxis. OG Feeding. bowel regimen     RENAL:  Follow up lytes.  Correct as needed repeat BMP; Shah.     INFECTIOUS DISEASE: Follow up cultures.   Continue ABX>  FU Fungitel.        HEMATOLOGICAL:  DVT prophylaxis.  Repeat CBC; Target Hb >7     ENDOCRINE:  Follow up FS.  Insulin protocol if needed.  DC Solumedrol      MUSCULOSKELETAL:  Bed chair position     MICU for now

## 2020-04-30 NOTE — PROGRESS NOTE ADULT - SUBJECTIVE AND OBJECTIVE BOX
ABHINAV DREW  74y, Female    All available historical data reviewed    OVERNIGHT EVENTS:  no fevers  fio2 60%    ROS:  unable to obtain history secondary to patient's mental status and/or sedation     VITALS:  T(F): 97.1, Max: 98.3 (20 @ 20:00)  HR: 65  BP: 130/57  RR: 29Vital Signs Last 24 Hrs  T(C): 36.2 (2020 08:00), Max: 36.8 (2020 20:00)  T(F): 97.1 (2020 08:00), Max: 98.3 (2020 20:00)  HR: 65 (2020 08:28) (64 - 116)  BP: 130/57 (2020 10:00) (130/57 - 130/57)  BP(mean): 80 (2020 10:00) (80 - 80)  RR: 29 (2020 08:00) (22 - 34)  SpO2: 94% (2020 08:28) (92% - 99%)    TESTS & MEASUREMENTS:                        8.4    25.44 )-----------( 101      ( 2020 05:45 )             23.1     30    130<L>  |  88<L>  |  109<HH>  ----------------------------<  191<H>  4.5   |  33<H>  |  2.0<H>    Ca    6.5<L>      2020 05:35  Phos  5.4       Mg     2.3         TPro  3.3<L>  /  Alb  1.5<L>  /  TBili  0.6  /  DBili  x   /  AST  73<H>  /  ALT  24  /  AlkPhos  138<H>      LIVER FUNCTIONS - ( 2020 05:35 )  Alb: 1.5 g/dL / Pro: 3.3 g/dL / ALK PHOS: 138 U/L / ALT: 24 U/L / AST: 73 U/L / GGT: x             Culture - Sputum (collected 20 @ 16:00)  Source: .Sputum Deep tracheal aspiration  Gram Stain (20 @ 23:55):    Few polymorphonuclear leukocytes per low power field    No Squamous epithelial cells per low power field    Few Yeast like cells per oil power field    Culture - Blood (collected 20 @ 11:00)  Source: .Blood Blood-Venous  Gram Stain (20 @ 09:23):    Growth in aerobic bottle: Yeast like cells  Preliminary Report (20 @ 09:24):    Growth in aerobic bottle: Yeast like cells    "Due to technical problems, Proteus sp. will Not be reported as part of    the BCID panel until further notice"    ***Blood Panel PCR results on this specimen are available    approximately 3 hours after the Gram stain result.***    Gram stain, PCR, and/or culture results may not always    correspond due to difference in methodologies.    ************************************************************    This PCR assay was performed using [employer].    The following targets are tested for: Enterococcus,    vancomycin resistant enterococci, Listeria monocytogenes,    coagulase negative staphylococci, S. aureus,    methicillin resistant S. aureus, Streptococcus agalactiae    (Group B), S. pneumoniae, S. pyogenes (Group A),    Acinetobacter baumannii, Enterobacter cloacae, E. coli,    Klebsiella oxytoca, K. pneumoniae, Proteus sp.,    Serratia marcescens, Haemophilus influenzae,    Neisseria meningitidis, Pseudomonas aeruginosa, Candida    albicans, C. glabrata, C krusei, C parapsilosis,    C. tropicalis and the KPC resistance gene.      Urinalysis Basic - ( 2020 10:20 )    Color: Yellow / Appearance: Turbid / S.015 / pH: x  Gluc: x / Ketone: Negative  / Bili: Negative / Urobili: <2 mg/dL   Blood: x / Protein: 30 mg/dL / Nitrite: Negative   Leuk Esterase: Large / RBC: 20 /HPF /  /HPF   Sq Epi: x / Non Sq Epi: 7 /HPF / Bacteria: Negative          RADIOLOGY & ADDITIONAL TESTS:  Personal review of radiological diagnostics performed  Echo and EKG results noted when applicable.     MEDICATIONS:  acetaminophen   Tablet .. 650 milliGRAM(s) Oral every 6 hours PRN  atorvastatin 80 milliGRAM(s) Oral at bedtime  caspofungin IVPB      caspofungin IVPB 50 milliGRAM(s) IV Intermittent every 24 hours  chlorhexidine 0.12% Liquid 15 milliLiter(s) Oral Mucosa every 12 hours  chlorhexidine 4% Liquid 1 Application(s) Topical <User Schedule>  dexMEDEtomidine Infusion 0.2 MICROgram(s)/kG/Hr IV Continuous <Continuous>  dextrose 5% 1000 milliLiter(s) IV Continuous <Continuous>  ergocalciferol 29748 Unit(s) Oral every week  folic acid 1 milliGRAM(s) Oral daily  furosemide Infusion 6 mG/Hr IV Continuous <Continuous>  insulin glargine Injectable (LANTUS) 6 Unit(s) SubCutaneous at bedtime  insulin lispro (HumaLOG) corrective regimen sliding scale   SubCutaneous three times a day before meals  insulin lispro Injectable (HumaLOG) 2 Unit(s) SubCutaneous every 8 hours  meropenem  IVPB 1000 milliGRAM(s) IV Intermittent every 12 hours  methylPREDNISolone sodium succinate Injectable 20 milliGRAM(s) IV Push daily  metoprolol tartrate 25 milliGRAM(s) Oral two times a day  pantoprazole Infusion 8 mG/Hr IV Continuous <Continuous>  Remdesivir 100 milliGRAM(s) 100 milliGRAM(s) IV Intermittent <User Schedule>  senna 2 Tablet(s) Oral at bedtime  sertraline 50 milliGRAM(s) Oral daily      ANTIBIOTICS:  caspofungin IVPB      caspofungin IVPB 50 milliGRAM(s) IV Intermittent every 24 hours  meropenem  IVPB 1000 milliGRAM(s) IV Intermittent every 12 hours

## 2020-04-30 NOTE — PHARMACOTHERAPY INTERVENTION NOTE - COMMENTS
hydralazine 10mg IVP q24h-d/w medical team to evaluate frequency-will d/c
pt intubated, d/w medical team, recommended changing  -metoprolol ER 25mg q24h to IR 12.5mg q12h  -pantoprazole tab to suspension
Anakinra was approved by dr. Baer.
GFR 45, d/w medical team, recommended adjusting:  - meropenem 1g IV q12h  -vancomycin 750mg IV q24h
d/w medical team, recommended adjusting to fluconazole 400mg IV x1 then 200mg IV q24h

## 2020-04-30 NOTE — PROGRESS NOTE ADULT - ASSESSMENT
· Assessment		  73 y/o F with PMH of CAD s/p PCI x5, GERD, Rheumatoid arthritis presented from [facility]) for worsening shortness of breath and hypoxia.     IMPRESSION;   COVID19 with resp failure, mechanical ventilation with ARDS with FiO2 60%, secondary to Cytokine Release Syndrome  -inflammatory markers significantly elevated  -elevated Ddimer and Ferritin are poor prognostic indicators and differentiate survivors from non survivors   S/p Anakinra with little response 4/19-21  RDV since 4/21. Will hold as GFR 24  Candidemia with Yeast in deep ET cultures. Fungal PNA ? quite unusual  Polymicrobial bacteruria with no pyuria ( of little clinical significance )  Cierra LAUREN NG  BCx 4/28 Yeast    RECOMMENDATIONS;   Remdesivir started 4/21. Hold  Caspofungin 50 mg iv q24h since 4/28  f/u final fungal culture results  ECHO  repeat BCx ( as te duration of therapy is from the first NG BCx). Will avoid opthalmological eval for now

## 2020-04-30 NOTE — CONSULT NOTE ADULT - ASSESSMENT
Patient is a 73 y/o F with PMH of CAD s/p PCI x5, GERD, Rheumatoid arthritis presented for resp distress 2/2 COVID s/p intubation, course c/w gabriela, acute anemia. GI called for coffee ground emesis found during OG lavage     1- Coffee ground emesis in OG tube, OG lavage done today showed clear liquids, brown stools in bag, no signs of active GI bleeding, anemia is likely multifactorial (gabriela, severe spesis, anemia of CKD), active T&S, cbc q12h, keep Hb >8, PPI q12h, no indication for endoscopic intervention at this time, consider CT abd-pelvis with no contrast to r/o retroperitoneal hematoma Patient is a 73 y/o F with PMH of CAD s/p PCI x5, GERD, Rheumatoid arthritis presented for resp distress 2/2 COVID s/p intubation, course c/w gabriela, acute anemia. GI called for coffee ground emesis found during OG lavage     Coffee ground emesis in OG tube  OG lavage done today showed clear liquids, brown stools in bag  no signs of active GI bleeding  anemia is likely multifactorial (gabriela, severe spesis, anemia of CKD)    Rec:  active T&S, cbc q12h, keep Hb >8  PPI q12h  no indication for endoscopic intervention at this time  consider CT abd-pelvis with no contrast to r/o retroperitoneal hematoma

## 2020-04-30 NOTE — PROGRESS NOTE ADULT - SUBJECTIVE AND OBJECTIVE BOX
ABHINAV DREW 74y Female  MRN#: [id number]   Hospital Day: 12d    SUBJECTIVE  Patient is a 74y old Female who presents with a chief complaint of shortness of breath (2020 09:36)  Currently admitted to medicine with the primary diagnosis of COVID-19 virus detected    INTERVAL HPI AND OVERNIGHT EVENTS:  Patient was examined and seen at bedside. This morning she is intubated, sedated (Precedex 0.6), and off pressors.     Overnight, patient became asynchronous with the mechanical ventilation and was started on sedation. Her hemoglobin dropped yesterday from 8.1 in the morning to 7.2 in the evening, so she was transfused 1 unit PRBC.    REVIEW OF SYMPTOMS:  Unable to obtain due to patient's mental status     OBJECTIVE  PAST MEDICAL & SURGICAL HISTORY  Rheumatoid arthritis  GERD (gastroesophageal reflux disease)  MI (myocardial infarction)  Hyperlipidemia  CAD (coronary artery disease)  History of surgery: stent placement    ALLERGIES:  Zocor (Joint Pain)    MEDICATIONS:  STANDING MEDICATIONS  atorvastatin 80 milliGRAM(s) Oral at bedtime  caspofungin IVPB      caspofungin IVPB 50 milliGRAM(s) IV Intermittent every 24 hours  chlorhexidine 0.12% Liquid 15 milliLiter(s) Oral Mucosa every 12 hours  chlorhexidine 4% Liquid 1 Application(s) Topical <User Schedule>  dexMEDEtomidine Infusion 0.2 MICROgram(s)/kG/Hr IV Continuous <Continuous>  dextrose 5% 1000 milliLiter(s) IV Continuous <Continuous>  ergocalciferol 51957 Unit(s) Oral every week  folic acid 1 milliGRAM(s) Oral daily  furosemide Infusion 6 mG/Hr IV Continuous <Continuous>  insulin glargine Injectable (LANTUS) 6 Unit(s) SubCutaneous at bedtime  insulin lispro (HumaLOG) corrective regimen sliding scale   SubCutaneous three times a day before meals  insulin lispro Injectable (HumaLOG) 2 Unit(s) SubCutaneous every 8 hours  meropenem  IVPB 1000 milliGRAM(s) IV Intermittent every 12 hours  methylPREDNISolone sodium succinate Injectable 20 milliGRAM(s) IV Push daily  metoprolol tartrate 25 milliGRAM(s) Oral two times a day  pantoprazole Infusion 8 mG/Hr IV Continuous <Continuous>  Remdesivir 100 milliGRAM(s) 100 milliGRAM(s) IV Intermittent <User Schedule>  senna 2 Tablet(s) Oral at bedtime  sertraline 50 milliGRAM(s) Oral daily    PRN MEDICATIONS  acetaminophen   Tablet .. 650 milliGRAM(s) Oral every 6 hours PRN      VITAL SIGNS: Last 24 Hours  T(C): 36.2 (2020 08:00), Max: 36.8 (2020 20:00)  T(F): 97.1 (2020 08:00), Max: 98.3 (2020 20:00)  HR: 65 (2020 08:28) (64 - 116)  BP: --  BP(mean): --  RR: 29 (2020 08:00) (22 - 34)  SpO2: 94% (2020 08:28) (92% - 99%)    LABS:                        8.4    25.44 )-----------( 101      ( 2020 05:45 )             23.1     04-30    130<L>  |  88<L>  |  109<HH>  ----------------------------<  191<H>  4.5   |  33<H>  |  2.0<H>    Ca    6.5<L>      2020 05:35  Phos  5.4     04-30  Mg     2.3     04-30    TPro  3.3<L>  /  Alb  1.5<L>  /  TBili  0.6  /  DBili  x   /  AST  73<H>  /  ALT  24  /  AlkPhos  138<H>  04-30    PT/INR - ( 2020 05:35 )   PT: 13.60 sec;   INR: 1.18 ratio         PTT - ( 2020 05:35 )  PTT:30.8 sec  Urinalysis Basic - ( 2020 10:20 )    Color: Yellow / Appearance: Turbid / S.015 / pH: x  Gluc: x / Ketone: Negative  / Bili: Negative / Urobili: <2 mg/dL   Blood: x / Protein: 30 mg/dL / Nitrite: Negative   Leuk Esterase: Large / RBC: 20 /HPF /  /HPF   Sq Epi: x / Non Sq Epi: 7 /HPF / Bacteria: Negative      ABG - ( 2020 03:25 )  pH, Arterial: 7.36  pH, Blood: x     /  pCO2: 61    /  pO2: 51    / HCO3: 34    / Base Excess: 7.7   /  SaO2: 87        Mode: AC/ CMV (Assist Control/ Continuous Mandatory Ventilation)  RR (machine): 30  TV (machine): 400  FiO2: 60  PEEP: 14  ITime: 1  MAP: 20  PIP: 28    Culture - Sputum (collected 2020 16:00)  Source: .Sputum Deep tracheal aspiration  Gram Stain (2020 23:55):    Few polymorphonuclear leukocytes per low power field    No Squamous epithelial cells per low power field    Few Yeast like cells per oil power field    Culture - Blood (collected 2020 11:00)  Source: .Blood Blood-Venous  Gram Stain (2020 09:23):    Growth in aerobic bottle: Yeast like cells  Preliminary Report (2020 09:24):    Growth in aerobic bottle: Yeast like cells    "Due to technical problems, Proteus sp. will Not be reported as part of    the BCID panel until further notice"    ***Blood Panel PCR results on this specimen are available    approximately 3 hours after the Gram stain result.***    Gram stain, PCR, and/or culture results may not always    correspond due to difference in methodologies.    ************************************************************    This PCR assay was performed using [email or web address].    The following targets are tested for: Enterococcus,    vancomycin resistant enterococci, Listeria monocytogenes,    coagulase negative staphylococci, S. aureus,    methicillin resistant S. aureus, Streptococcus agalactiae    (Group B), S. pneumoniae, S. pyogenes (Group A),    Acinetobacter baumannii, Enterobacter cloacae, E. coli,    Klebsiella oxytoca, K. pneumoniae, Proteus sp.,    Serratia marcescens, Haemophilus influenzae,    Neisseria meningitidis, Pseudomonas aeruginosa, Candida    albicans, C. glabrata, C krusei, C parapsilosis,    C. tropicalis and the KPC resistance gene.    RADIOLOGY:  < from: Xray Chest 1 View- PORTABLE-Routine (20 @ 04:13) >  FINDINGS:  Support devices: ET tube, enteric tube and right IJ central venous catheter stable.   Cardiac/mediastinum/hilum: Stable.  Lung parenchyma/Pleura: Increased bilateral opacities. There is no pneumothorax.  Skeleton/soft tissues: Stable.    IMPRESSION:   Increased bilateral opacities.  < end of copied text >    PHYSICAL EXAM:  CONSTITUTIONAL: No acute distress, well-developed, well-groomed, intubated, sedated  HEAD: Atraumatic, normocephalic  EYES: EOM intact, PERRLA, conjunctiva and sclera clear  ENT: Supple, no masses, no thyromegaly, no bruits, no JVD; moist mucous membranes  PULMONARY: Clear to auscultation bilaterally; no wheezes, rales, or rhonchi  CARDIOVASCULAR: Regular rate and rhythm; no murmurs, rubs, or gallops  GASTROINTESTINAL: Soft, non-tender, non-distended; bowel sounds present  MUSCULOSKELETAL: 2+ peripheral pulses; no clubbing, no cyanosis, no edema  NEUROLOGY: sedated, opens eyes, does not follow commands, withdraws to pain  SKIN: No rashes or lesions; warm and dry    ASSESSMENT & PLAN  Patient is a 75yo female with PMH of CAD s/p PCI x5, GERD, rheumatoid arthritis, and hyperlipidemia who was transferred from [facility] for worsening shortness of breath and hypoxia. Patient was admitted to the [facility] site after having 2 episodes of syncope at home and tested positive for COVID-19. Patient was intubated on 2020 after desaturating while on non-rebreather.    #Acute hypoxemic respiratory failure secondary to COVID-19 pneumonia with possible superinfection  - COVID-19 PCR 2020: positive  - Isolation precautions (contact, droplet, airborne)  - ECG 2020: QTc 510  - Procalcitonin: 0.16->1.95->1.85->0.57->0.22->0.42->2.20  - CRP: 4.73->18.39->8.31->8.45->4.49->6.39->17.16  - D-dimer: 516->949  - ID consult appreciated  - Completed course of hydroxychloroquine, azithromycin, meropenem, anakinra, and methylprednisolone  - Hold remdesivir given worsening renal function and acute GI bleed  - Blood Cx 2020: yeast like cells  - Discontinue sodium bicarbonate infusion  - Continue with caspofungin 50mg IV Q24H (Day #3)  - Continue with meropenem 1g IV Q12H (Day #3)  - Continue with vancomycin  - Follow vancomycin level  - Daily blood cultures  - Follow cultures for organism and sensitivities  - Follow Fungitell  - Increase PEEP to 14 and FiO2 to 60%  - Advance ET tube 2cm  - CT head  - Follow MRSA nares  - SAT today    #Acute macrocytic anemia secondary to upper GI bleed s/p 2 units PRBC  - Baseline hemoglobin: 12  - Hemoglobin today: 8.4  - OG lavage positive on 2020  - Patient received 1 unit PRBC on 2020 for Hb 6.7  - Patient received 1 unit PRBC on 2020 for Hb 7.2  - Keep active type and screen  - Transfuse if hemoglobin <7  - Pending GI consult    #Acute kidney injury, worsening  - Baseline creatinine: 0.7  - Creatinine today: 2.0  - Monitor BUN/creatinine  - Avoid nephrotoxic agents  - Continue with furosemide infusion at 6mg/hr  - Strict intakes and outputs    #Hyperkalemia  - Potassium today: 4.5  - Follow potassium in AM    #Hyponatremia  - Sodium today: 130  - Follow sodium in AM    #Hyperglycemia  - Likely secondary to steroid use  - Continue with insulin glargine 6 units SQ QHS  - Continue with insulin lispro 2 units SQ Q8H  - Insulin sliding scale coverage  - Monitor fingerstick glucose    #Hypertension  - Continue with metoprolol tartrate 25mg PO BID  - Can use PRN pushes of hydralazine    #Diarrhea, improving  - Continue with tube feeds    #CAD s/p PCI x5  - Hold aspirin 81mg PO QD  - Hold ticagrelor 90mg PO Q12H  - Continue with metoprolol tartrate 12.5mg PO BID  - Continue with atorvastatin 80mg PO QHS    #Asymptomatic bacteruria  - Urine Cx Culture Results: 50,000-99,000 Pseudomonas aeruginosa and 10,000-49,000 ESBL E coli  - Patient currently asymptomatic    #Hyperlipidemia  - Patient takes rosuvastatin 20mg PO QHS at home  - Has allergy to simvastatin  - Continue with atorvastatin 80mg PO QHS    #Rheumatoid arthritis  - Stable    #GERD  - Continue with pantoprazole drip    #Anxiety/Depression  - Continue with sertraline 50mg PO QD    #Suspected Vitamin D and folate deficiencies  - Continue with ergocalciferol 50,000 units Q7D  - Continue with folic acid 1mg PO QD    #Misc  - DVT Prophylaxis: SCDs  - GI Prophylaxis: pantoprazole drip  - Diet: NPO with tube feeds  - Activity: bedrest  - IV Fluids: Not indicated  - Code Status: Full Code    Dispo: MICU monitoring for now

## 2020-04-30 NOTE — PROGRESS NOTE ADULT - SUBJECTIVE AND OBJECTIVE BOX
Patient is a 74y old  Female who presents with a chief complaint of shortness of breath (2020 17:22)        Over Night Events:  On MV.  Off pressors.  Sedated.          ROS:     All ROS are negative except HPI         PHYSICAL EXAM    ICU Vital Signs Last 24 Hrs  T(C): 36.2 (2020 08:00), Max: 36.8 (2020 20:00)  T(F): 97.1 (2020 08:00), Max: 98.3 (2020 20:00)  HR: 64 (2020 08:00) (64 - 116)  BP: 130/57 (2020 10:00) (130/57 - 133/50)  BP(mean): 80 (2020 10:00) (77 - 80)  ABP: 116/54 (2020 08:00) (96/48 - 188/74)  ABP(mean): 74 (2020 08:00) (62 - 120)  RR: 29 (:00) (22 - 34)  SpO2: 94% (2020 08:00) (92% - 99%)      CONSTITUTIONAL:   Ill appearing.  Well nourished.  NAD    ENT:   Airway patent,   Mouth with normal mucosa.   No thrush    EYES:   Pupils equal,   Round and reactive to light.    CARDIAC:   Normal rate,   Regular rhythm.    No edema      Vascular:  Normal systolic impulse  No Carotid bruits    RESPIRATORY:   No wheezing  Bilateral BS  Normal chest expansion  Not tachypneic,  No use of accessory muscles    GASTROINTESTINAL:  Abdomen soft,   Non-tender,   No guarding,   + BS    GENITOURINARY  normal genitalia for sex  no edema    MUSCULOSKELETAL:   Range of motion is not limited,  No clubbing, cyanosis    NEUROLOGICAL:   Withdraws to pain     SKIN:   Skin normal color for race,   Warm and dry and intact.   No evidence of rash.    PSYCHIATRIC:   No apparent risk to self or others.    HEMATOLOGICAL:  No cervical  lymphadenopathy.  no inguinal lymphadenopathy      20 @ 07:01  -  20 @ 07:00  --------------------------------------------------------  IN:    dexmedetomidine Infusion: 60.6 mL    dextrose 5%: 2550 mL    Enteral Tube Flush: 50 mL    furosemide Infusion: 84 mL    IV PiggyBack: 300 mL    Packed Red Blood Cells: 344 mL    pantoprazole Infusion: 240 mL    Peptamen A.F.: 480 mL  Total IN: 4108.6 mL    OUT:    Indwelling Catheter - Urethral: 1515 mL    Rectal Tube: 700 mL  Total OUT: 2215 mL    Total NET: 1893.6 mL      20 @ 07:01  -  20 @ 08:25  --------------------------------------------------------  IN:    dexmedetomidine Infusion: 16 mL    dextrose 5%: 300 mL    furosemide Infusion: 12 mL    pantoprazole Infusion: 20 mL  Total IN: 348 mL    OUT:    Indwelling Catheter - Urethral: 100 mL  Total OUT: 100 mL    Total NET: 248 mL          LABS:                            8.4    25.44 )-----------( 101      ( 2020 05:45 )             23.1                    8.4    25.44 )-----------( 101      (  05:45 )             23.1                8.6    26.28 )-----------( 124      (  @ 23:50 )             24.5                7.2    30.92 )-----------( 141      (  @ 17:40 )             21.0                7.3    31.76 )-----------( 152      (  @ 16:50 )             22.6                8.1    30.95 )-----------( 145      (  @ 05:35 )             24.2                8.6    26.11 )-----------( 136      (  @ 16:18 )             25.7                6.7    30.21 )-----------( 169      (  @ 08:35 )             21.4                6.7    24.79 )-----------( 153      (  @ 04:50 )             21.1                                                 130<L>  |  88<L>  |  109<HH>  ----------------------------<  191<H>  4.5   |  33<H>  |  2.0<H>    2020 05:35    130<L>  |  88<L>  |  109<HH>  ----------------------------<  191<H>  4.5     |  33<H>  |  2.0<H>  2020 23:50    129<L>  |  87<L>  |  96<HH>  ----------------------------<  226<H>  4.7     |  30     |  2.0<H>    Ca    6.5<L>      2020 05:35  Ca    6.7<L>      2020 23:50  Phos  5.4<H>     2020 05:35  Phos  5.4<H>     2020 05:35  Mg     2.3       2020 05:35  Mg     2.3       2020 05:35    TPro  3.3<L>  /  Alb  1.5<L>  /  TBili  0.6    /  DBili  x      /  AST  73<H>  /  ALT  24     /  AlkPhos  138<H>  2020 05:35  TPro  3.9<L>  /  Alb  1.9<L>  /  TBili  0.6    /  DBili  x      /  AST  37     /  ALT  17     /  AlkPhos  96     2020 05:35      Ca    6.5<L>      2020 05:35  Phos  5.4     -30  Mg     2.3         TPro  3.3<L>  /  Alb  1.5<L>  /  TBili  0.6  /  DBili  x   /  AST  73<H>  /  ALT  24  /  AlkPhos  138<H>        PT/INR - ( 2020 05:35 )   PT: 13.60 sec;   INR: 1.18 ratio         PTT - ( 2020 05:35 )  PTT:30.8 sec                                       Urinalysis Basic - ( 2020 10:20 )    Color: Yellow / Appearance: Turbid / S.015 / pH: x  Gluc: x / Ketone: Negative  / Bili: Negative / Urobili: <2 mg/dL   Blood: x / Protein: 30 mg/dL / Nitrite: Negative   Leuk Esterase: Large / RBC: 20 /HPF /  /HPF   Sq Epi: x / Non Sq Epi: 7 /HPF / Bacteria: Negative                                                  LIVER FUNCTIONS - ( 2020 05:35 )  Alb: 1.5 g/dL / Pro: 3.3 g/dL / ALK PHOS: 138 U/L / ALT: 24 U/L / AST: 73 U/L / GGT: x                                                  Culture - Sputum (collected 2020 16:00)  Source: .Sputum Deep tracheal aspiration  Gram Stain (2020 23:55):    Few polymorphonuclear leukocytes per low power field    No Squamous epithelial cells per low power field    Few Yeast like cells per oil power field    Culture - Blood (collected 2020 11:00)  Source: .Blood Blood-Venous  Preliminary Report (2020 22:01):    No growth to date.                                                   Mode: AC/ CMV (Assist Control/ Continuous Mandatory Ventilation)  RR (machine): 30  TV (machine): 400  FiO2: 50  PEEP: 12  ITime: 1  MAP: 20  PIP: 28                                      ABG - ( 2020 03:25 )  pH, Arterial: 7.36  pH, Blood: x     /  pCO2: 61    /  pO2: 51    / HCO3: 34    / Base Excess: 7.7   /  SaO2: 87    Lac 0.9.  PPL 18               MEDICATIONS  (STANDING):  atorvastatin 80 milliGRAM(s) Oral at bedtime  caspofungin IVPB      caspofungin IVPB 50 milliGRAM(s) IV Intermittent every 24 hours  chlorhexidine 0.12% Liquid 15 milliLiter(s) Oral Mucosa every 12 hours  chlorhexidine 4% Liquid 1 Application(s) Topical <User Schedule>  dexMEDEtomidine Infusion 0.2 MICROgram(s)/kG/Hr (2.65 mL/Hr) IV Continuous <Continuous>  dextrose 5% 1000 milliLiter(s) (150 mL/Hr) IV Continuous <Continuous>  ergocalciferol 39453 Unit(s) Oral every week  folic acid 1 milliGRAM(s) Oral daily  furosemide Infusion 6 mG/Hr (3 mL/Hr) IV Continuous <Continuous>  insulin glargine Injectable (LANTUS) 6 Unit(s) SubCutaneous at bedtime  insulin lispro (HumaLOG) corrective regimen sliding scale   SubCutaneous three times a day before meals  insulin lispro Injectable (HumaLOG) 2 Unit(s) SubCutaneous every 8 hours  meropenem  IVPB 1000 milliGRAM(s) IV Intermittent every 12 hours  methylPREDNISolone sodium succinate Injectable 20 milliGRAM(s) IV Push daily  metoprolol tartrate 25 milliGRAM(s) Oral two times a day  pantoprazole Infusion 8 mG/Hr (10 mL/Hr) IV Continuous <Continuous>  Remdesivir 100 milliGRAM(s) 100 milliGRAM(s) IV Intermittent <User Schedule>  senna 2 Tablet(s) Oral at bedtime  sertraline 50 milliGRAM(s) Oral daily    MEDICATIONS  (PRN):  acetaminophen   Tablet .. 650 milliGRAM(s) Oral every 6 hours PRN Temp greater or equal to 38C (100.4F)      New X-rays reviewed:                                                                                  ECHO    CXR interpreted by me:  ET high.  OG OK>  Bilateral infiltrates

## 2020-04-30 NOTE — CHART NOTE - NSCHARTNOTEFT_GEN_A_CORE
Spoke with , Zohaib, [phone]. Updated him on patient's status. Answered questions, addressed concerns.

## 2020-05-01 NOTE — CHART NOTE - NSCHARTNOTEFT_GEN_A_CORE
Registered Dietitian Follow-Up     Patient Profile Reviewed                           Yes [x]   No []     Nutrition History Previously Obtained        Yes []  No [x]       Pertinent Subjective Information: Unable to obtain nutrition hx at this time - pt remains intubated, on isolation precautions currently.     Pertinent Medical Interventions: Acute hypoxemic respiratory failure secondary to COVID-19 pneumonia with possible superinfection. Acute macrocytic anemia secondary to upper GI bleed s/p 2 units PRBC, improving per latest progress notes. s/p GI consult 4/30 for coffee ground emesis. Emesis found during OG lavage. Notes no signs of active GI bleeding. GI noted anemia is likely multifactorial (MYCHAL, severe spesis, anemia of CKD). Per latest MICU progress notes, can restart tube feed. MYCHAL noted worsening. Tmax 24 hours 36.3 C. Latest /74 mmHg, mean 98 mmHg (5/1 17:00).     Diet order: Nepro noted at 2404 mL q6hrs (intended to be ordered at 240 mL q6hrs - to provide 1728 kcal, 78 g protein, 701 mL free H2O. Per flowsheets, pt continues to receive Peptamen AF at 240 mL per infusion.     Anthropometrics:  - Ht. 152.4 cm.  - Wt. 75.1 kg (5/1)  - wt change: wt has ranged from 53 kg (4/18) to 75.1 kg (5/1). Previous wt noted 66.4 kg (4/27) & 67.4 kg (4/28). ? etiology of significant wt fluctuations. Will continue to estimate BMI using dosing wt 53 kg. Edema noted this admit.  - BMI 22.8  - IBW 45.4 kg.     Pertinent Lab Data: 5/1: RBC-2.90, H/H-9.2/26.3, POCT gluc-75 (16:33) vs. 105 (10:36) vs. 139 (5:06) vs. 138 (1:33), Na-130, K-4.2 (WDL), BUN-105, creat-2.1, gluc-140, corrected Ca 7.76, Mg-2.2 (WDL), PO4-6.0; 4/26 FkxT7L-7.5%     Pertinent Meds: insulin glargine, insulin lispro, protonix, senna, metoprolol, atorvastatin, ergocalciferol (suspected Vit D deficiency noted), folic acid     Physical Findings:  - Appearance: 3+ generalized edema. Intubated.  - GI function: +distention found 4/30 per GI evaluation. Latest MICU progress notes report abd non-distended at this time. Last BM 5/1 (loose)  - Tubes: OG tube  - Oral/Mouth cavity: NPO  - Skin: ecchymosis noted.     Nutrition Requirements  Weight Used: 53 kg ABW dosing wt per 4/28 RD assessment     Estimated Energy Needs    Continue [x]  Adjust []  1323 kcal/day ([id number])        Estimated Protein Needs    Continue [x]  Adjust []  64-80 gms/day (1.2 - 1.5 gm/kg ABW)        Estimated Fluid Needs        Continue [x]  Adjust []  per LIP     Nutrient Intake: Current EN regimen at goal to provide 131% kcal & 100% of protein needs at goal.        [x] Previous Nutrition Diagnosis: Inadequate protein-energy intake (ongoing) - was not receiving EN previously d/t coffee ground emesis. Re-initiated at this time per RN; current EN regimen at goal will exceed estimated energy needs.    Nutrition Intervention: Enteral Nutrition vs. Coordination of Care      Goal/Expected Outcome: Pt to demonstrate tolerance to EN regimen, meeting >85% & <105% of estimated nutrient needs over next 4 days.     Indicator/Monitoring: Energy intake, diet order, glucose profile, renal profile, nutrition focused physical findings, body composition.    Recommendation: Change EN formula to Osmolite 1.5 at 30 mL/hr + order 2 packets Prosource TF 2 times daily. Regimen at goal to provide 1160 kcal, 67 g protein, 547 mL free H2O. Flushes per LIP. If unable to infuse via continuous infusion method, same goal rate can be achieved via Osmolite 1.5 at 240 mL q8hrs + 2 packets Prosource TF daily. Monitor for signs of EN intolerance (abd distention, emesis), consider nutrition support team consult if these symptoms re-emerge. Reviewed with LIP via spectra.

## 2020-05-01 NOTE — PROGRESS NOTE ADULT - SUBJECTIVE AND OBJECTIVE BOX
ABHINAV DREW  74y, Female    All available historical data reviewed    OVERNIGHT EVENTS:  no fevers, no pressors  Fio2 60%  non responsive    ROS:  unable to obtain history secondary to patient's mental status and/or sedation     VITALS:  T(F): 97.3, Max: 97.3 (20 @ 08:00)  HR: 86  BP: 148/67  RR: 31Vital Signs Last 24 Hrs  T(C): 36.3 (01 May 2020 08:00), Max: 36.3 (01 May 2020 08:00)  T(F): 97.3 (01 May 2020 08:00), Max: 97.3 (01 May 2020 08:00)  HR: 86 (01 May 2020 09:00) (68 - 92)  BP: 148/67 (01 May 2020 09:00) (68/34 - 160/80)  BP(mean): 105 (01 May 2020 09:00) (43 - 105)  RR: 31 (01 May 2020 09:00) (24 - 31)  SpO2: 99% (01 May 2020 09:00) (96% - 99%)    TESTS & MEASUREMENTS:                        9.2    32.77 )-----------( 118      ( 01 May 2020 04:08 )             26.3     05-01    130<L>  |  83<L>  |  105<HH>  ----------------------------<  140<H>  4.2   |  36<H>  |  2.1<H>    Ca    6.0<L>      01 May 2020 04:08  Phos  6.0     05-  Mg     2.2     05-    TPro  3.7<L>  /  Alb  1.8<L>  /  TBili  0.6  /  DBili  x   /  AST  233<H>  /  ALT  56<H>  /  AlkPhos  175<H>  05    LIVER FUNCTIONS - ( 01 May 2020 04:08 )  Alb: 1.8 g/dL / Pro: 3.7 g/dL / ALK PHOS: 175 U/L / ALT: 56 U/L / AST: 233 U/L / GGT: x             Culture - Sputum (collected 20 @ 16:00)  Source: .Sputum Deep tracheal aspiration  Gram Stain (20 @ 23:55):    Few polymorphonuclear leukocytes per low power field    No Squamous epithelial cells per low power field    Few Yeast like cells per oil power field  Preliminary Report (20 @ 16:41):    Normal Respiratory Giovana present    Culture - Blood (collected 20 @ 11:00)  Source: .Blood Blood-Venous  Gram Stain (20 @ 09:23):    Growth in aerobic bottle: Yeast like cells  Preliminary Report (20 @ 09:24):    Growth in aerobic bottle: Yeast like cells    "Due to technical problems, Proteus sp. will Not be reported as part of    the BCID panel until further notice"    ***Blood Panel PCR results on this specimen are available    approximately 3 hours after the Gram stain result.***    Gram stain, PCR, and/or culture results may not always    correspond due to difference in methodologies.    ************************************************************    This PCR assay was performed using [employer].    The following targets are tested for: Enterococcus,    vancomycin resistant enterococci, Listeria monocytogenes,    coagulase negative staphylococci, S. aureus,    methicillin resistant S. aureus, Streptococcus agalactiae    (Group B), S. pneumoniae, S. pyogenes (Group A),    Acinetobacter baumannii, Enterobacter cloacae, E. coli,    Klebsiella oxytoca, K. pneumoniae, Proteus sp.,    Serratia marcescens, Haemophilus influenzae,    Neisseria meningitidis, Pseudomonas aeruginosa, Candida    albicans, C. glabrata, C krusei, C parapsilosis,    C. tropicalis and the KPC resistance gene.  Organism: Blood Culture PCR (20 @ 11:16)  Organism: Blood Culture PCR (20 @ 11:16)      -  Candida albicans: Detec      Method Type: PCR      Urinalysis Basic - ( 2020 16:56 )    Color: Light Yellow / Appearance: Slightly Turbid / S.010 / pH: x  Gluc: x / Ketone: Negative  / Bili: Negative / Urobili: <2 mg/dL   Blood: x / Protein: Trace / Nitrite: Negative   Leuk Esterase: Large / RBC: 39 /HPF /  /HPF   Sq Epi: x / Non Sq Epi: 0 /HPF / Bacteria: Negative          RADIOLOGY & ADDITIONAL TESTS:  Personal review of radiological diagnostics performed  Echo and EKG results noted when applicable.     MEDICATIONS:  acetaminophen   Tablet .. 650 milliGRAM(s) Oral every 6 hours PRN  atorvastatin 80 milliGRAM(s) Oral at bedtime  chlorhexidine 0.12% Liquid 15 milliLiter(s) Oral Mucosa every 12 hours  chlorhexidine 4% Liquid 1 Application(s) Topical <User Schedule>  dexMEDEtomidine Infusion 0.2 MICROgram(s)/kG/Hr IV Continuous <Continuous>  ergocalciferol 48801 Unit(s) Oral every week  fluconAZOLE IVPB      fluconAZOLE IVPB 200 milliGRAM(s) IV Intermittent every 24 hours  folic acid 1 milliGRAM(s) Oral daily  insulin glargine Injectable (LANTUS) 6 Unit(s) SubCutaneous at bedtime  insulin lispro (HumaLOG) corrective regimen sliding scale   SubCutaneous three times a day before meals  insulin lispro Injectable (HumaLOG) 2 Unit(s) SubCutaneous every 8 hours  metoprolol tartrate 25 milliGRAM(s) Oral two times a day  pantoprazole  Injectable 40 milliGRAM(s) IV Push two times a day  Remdesivir 100 milliGRAM(s) 100 milliGRAM(s) IV Intermittent <User Schedule>  senna 2 Tablet(s) Oral at bedtime  sertraline 50 milliGRAM(s) Oral daily      ANTIBIOTICS:  fluconAZOLE IVPB      fluconAZOLE IVPB 200 milliGRAM(s) IV Intermittent every 24 hours

## 2020-05-01 NOTE — PROGRESS NOTE ADULT - SUBJECTIVE AND OBJECTIVE BOX
We are following the patient for drop in Hg      GI HPI Today  Pt off pressors   Has Brown color BM on dignishield   No Blood on OG tube lavage yesterday   Hg stable     PAST MEDICAL & SURGICAL HISTORY  Rheumatoid arthritis  GERD (gastroesophageal reflux disease)  MI (myocardial infarction)  Hyperlipidemia  CAD (coronary artery disease)  History of surgery: stent placement      ALLERGIES:  Zocor (Joint Pain)      MEDICATIONS:  MEDICATIONS  (STANDING):  atorvastatin 80 milliGRAM(s) Oral at bedtime  chlorhexidine 0.12% Liquid 15 milliLiter(s) Oral Mucosa every 12 hours  chlorhexidine 4% Liquid 1 Application(s) Topical <User Schedule>  dexMEDEtomidine Infusion 0.2 MICROgram(s)/kG/Hr (2.65 mL/Hr) IV Continuous <Continuous>  dextrose 5% 1000 milliLiter(s) (150 mL/Hr) IV Continuous <Continuous>  ergocalciferol 14840 Unit(s) Oral every week  fluconAZOLE IVPB      fluconAZOLE IVPB 200 milliGRAM(s) IV Intermittent every 24 hours  folic acid 1 milliGRAM(s) Oral daily  furosemide Infusion 6 mG/Hr (3 mL/Hr) IV Continuous <Continuous>  insulin glargine Injectable (LANTUS) 6 Unit(s) SubCutaneous at bedtime  insulin lispro (HumaLOG) corrective regimen sliding scale   SubCutaneous three times a day before meals  insulin lispro Injectable (HumaLOG) 2 Unit(s) SubCutaneous every 8 hours  meropenem  IVPB 1000 milliGRAM(s) IV Intermittent every 12 hours  metoprolol tartrate 25 milliGRAM(s) Oral two times a day  pantoprazole Infusion 8 mG/Hr (10 mL/Hr) IV Continuous <Continuous>  Remdesivir 100 milliGRAM(s) 100 milliGRAM(s) IV Intermittent <User Schedule>  senna 2 Tablet(s) Oral at bedtime  se         VITALS:   T(F): 97.3 (05-01 @ 08:00), Max: 98.3 (04-29 @ 20:00)  HR: 81 (05-01 @ 08:28) (62 - 116)  BP: 131/60 (05-01 @ 07:00) (68/34 - 160/80)  BP(mean): 85 (05-01 @ 07:00) (43 - 102)  RR: 29 (05-01 @ 07:00) (22 - 34)  SpO2: 99% (05-01 @ 08:28) (86% - 99%)        PHYSICAL EXAM:  GENERAL:  intubated and sedated   HEENT:  Conjunctivae Anicteric   CHEST:  vented   HEART:  NS1, S2,   ABDOMEN:  Soft, , ++distended  EXTEREMITIES:  no edema  SKIN:  No rash  NEURO:  intubated and sedated         Blood Work :                        9.2    32.77 )-----------( 118      ( 01 May 2020 04:08 )             26.3     PT/INR - ( 30 Apr 2020 05:35 )  INR: 1.18          PTT - ( 30 Apr 2020 05:35 )  PTT:30.8   05-01    130<L>  |  83<L>  |  105<HH>  ----------------------------<  140<H>  4.2   |  36<H>  |  2.1<H>    Ca    6.0<L>      01 May 2020 04:08  Phos  6.0     05-01  Mg     2.2     05-01      CBC -  ( 01 May 2020 04:08 )  Hemoglobin : 9.2    CBC -  ( 30 Apr 2020 16:56 )  Hemoglobin : 10.2    CBC -  ( 30 Apr 2020 05:45 )  Hemoglobin : 8.4    CBC -  ( 29 Apr 2020 23:50 )  Hemoglobin : 8.6    CBC -  ( 29 Apr 2020 17:40 )  Hemoglobin : 7.2    CBC -  ( 29 Apr 2020 16:50 )  Hemoglobin : 7.3    CBC -  ( 29 Apr 2020 05:35 )  Hemoglobin : 8.1    CBC -  ( 28 Apr 2020 16:18 )  Hemoglobin : 8.6    CBC -  ( 28 Apr 2020 08:35 )  Hemoglobin : 6.7    CBC -  ( 28 Apr 2020 04:50 )  Hemoglobin : 6.7      LIVER FUNCTIONS - ( 01 May 2020 04:08 )  Alb: 1.8 [3.5 - 5.2] / Pro: 3.7 [6.0 - 8.0] / ALK PHOS: 175 [30 - 115] / ALT: 56 [0 - 41] / AST: 233 [0 - 41] / GGT: x     LIVER FUNCTIONS - ( 30 Apr 2020 05:35 )  Alb: 1.5 [3.5 - 5.2] / Pro: 3.3 [6.0 - 8.0] / ALK PHOS: 138 [30 - 115] / ALT: 24 [0 - 41] / AST: 73 [0 - 41] / GGT: x     LIVER FUNCTIONS - ( 29 Apr 2020 05:35 )  Alb: 1.9 [3.5 - 5.2] / Pro: 3.9 [6.0 - 8.0] / ALK PHOS: 96 [30 - 115] / ALT: 17 [0 - 41] / AST: 37 [0 - 41] / GGT: x     LIVER FUNCTIONS - ( 28 Apr 2020 08:37 )  Alb: 1.9 [3.5 - 5.2] / Pro: 3.8 [6.0 - 8.0] / ALK PHOS: 98 [30 - 115] / ALT: 18 [0 - 41] / AST: 34 [0 - 41] / GGT: x     LIVER FUNCTIONS - ( 28 Apr 2020 04:50 )  Alb: 2.0 [3.5 - 5.2] / Pro: 3.6 [6.0 - 8.0] / ALK PHOS: 87 [30 - 115] / ALT: 16 [0 - 41] / AST: 29 [0 - 41] / GGT: x     LIVER FUNCTIONS - ( 27 Apr 2020 04:40 )  Alb: 2.4 [3.5 - 5.2] / Pro: 4.5 [6.0 - 8.0] / ALK PHOS: 89 [30 - 115] / ALT: 15 [0 - 41] / AST: 20 [0 - 41] / GGT: x     LIVER FUNCTIONS - ( 26 Apr 2020 04:30 )  Alb: 2.3 [3.5 - 5.2] / Pro: 4.6 [6.0 - 8.0] / ALK PHOS: 101 [30 - 115] / ALT: 16 [0 - 41] / AST: 21 [0 - 41] / GGT: x     LIVER FUNCTIONS - ( 25 Apr 2020 03:20 )  Alb: 2.2 [3.5 - 5.2] / Pro: 4.3 [6.0 - 8.0] / ALK PHOS: 86 [30 - 115] / ALT: 15 [0 - 41] / AST: 22 [0 - 41] / GGT: x

## 2020-05-01 NOTE — PROGRESS NOTE ADULT - ASSESSMENT
· Assessment		  75 y/o F with PMH of CAD s/p PCI x5, GERD, Rheumatoid arthritis presented from [facility]) for worsening shortness of breath and hypoxia.     IMPRESSION;   COVID19 with resp failure, mechanical ventilation with ARDS with FiO2 60%, secondary to Cytokine Release Syndrome  -inflammatory markers elevated  -elevated Ddimer and Ferritin are poor prognostic indicators and differentiate survivors from non survivors   S/p Anakinra with little response 4/19-21  RDV since 4/21. Held 4/30 secondary to GFR  Candidemia with C albicans  with sputa being unremarkable. Lungs not the focus of candidemia  Possible line related/GI/ focus   Nares ORSA NG  BCx 4/28 C albicans    RECOMMENDATIONS;   Remdesivir started 4/21-4/30  Diflucan 200 mg iv q24h since 4/28  ECHO  repeat BCx ( as the duration of therapy is from the first NG BCx). Will avoid opthalmological eval for now  Prognosis is very poor given no response to therapy and fungal superinfection

## 2020-05-01 NOTE — PROGRESS NOTE ADULT - ASSESSMENT
IMPRESSION:    Acute hypoxemic respiratory failure  COVID 19  Probable superinfection SP ABX therapy   Candidemia   MYCHAL  non oliguric   Small SAH   HO ESBL: UTI     PLAN:    CNS:  Keep off sedation.  FU MS.  MRI NC     HEENT: Oral care    PULMONARY:  HOB @ 45 degrees.  Aspiration precautions. No Vent changes;       CARDIOVASCULAR: Keep I=O; DC NaHCO3.  DC Lasix drip     GI: GI prophylaxis. OG Feeding. bowel regimen.  Protonix BID     RENAL:  Follow up lytes.  Correct as needed repeat BMP; Shah.     INFECTIOUS DISEASE: Follow up cultures.   Continue Diflucan     HEMATOLOGICAL:  DVT prophylaxis.  Repeat CBC; Target Hb >7     ENDOCRINE:  Follow up FS.  Insulin protocol if needed.     MUSCULOSKELETAL:  Bed chair position     MICU for now

## 2020-05-01 NOTE — PROGRESS NOTE ADULT - SUBJECTIVE AND OBJECTIVE BOX
Patient is a 74y old  Female who presents with a chief complaint of shortness of breath (2020 11:59)        Over Night Events:  On MV.  Off pressors.  Off sedation 24 hours.          ROS:     All ROS are negative except HPI         PHYSICAL EXAM    ICU Vital Signs Last 24 Hrs  T(C): 36.3 (01 May 2020 08:00), Max: 36.3 (01 May 2020 08:00)  T(F): 97.3 (01 May 2020 08:00), Max: 97.3 (01 May 2020 08:00)  HR: 76 (01 May 2020 07:00) (62 - 92)  BP: 131/60 (01 May 2020 07:00) (68/34 - 160/80)  BP(mean): 85 (01 May 2020 07:00) (43 - 102)  ABP: 182/72 (01 May 2020 07:00) (96/50 - 192/72)  ABP(mean): 110 (01 May 2020 07:00) (62 - 116)  RR: 29 (01 May 2020 07:00) (24 - 30)  SpO2: 99% (01 May 2020 07:00) (94% - 99%)      CONSTITUTIONAL:   Ill appearing.  Well nourished.  NAD    ENT:   Airway patent,   Mouth with normal mucosa.   No thrush    EYES:   Pupils equal,   Round and reactive to light.    CARDIAC:   Tachycardic   Regular rhythm.    No edema      Vascular:  Normal systolic impulse  No Carotid bruits    RESPIRATORY:   No wheezing  Bilateral BS  Normal chest expansion  Not tachypneic,  No use of accessory muscles    GASTROINTESTINAL:  Abdomen soft,   Non-tender,   No guarding,   + BS    GENITOURINARY  normal genitalia for sex  no edema    MUSCULOSKELETAL:   Range of motion is not limited,  No clubbing, cyanosis    NEUROLOGICAL:   Pupillary   No response to pain     SKIN:   Skin normal color for race,   Warm and dry   No evidence of rash.    PSYCHIATRIC:   No apparent risk to self or others.    HEMATOLOGICAL:  No cervical  lymphadenopathy.  no inguinal lymphadenopathy      20 @ 07:01  -  20 @ 07:00  --------------------------------------------------------  IN:    dexmedetomidine Infusion: 16 mL    dextrose 5%: 2850 mL    Enteral Tube Flush: 50 mL    furosemide Infusion: 105 mL    IV PiggyBack: 550 mL    pantoprazole Infusion: 240 mL  Total IN: 3811 mL    OUT:    Indwelling Catheter - Urethral: 1615 mL    Rectal Tube: 600 mL  Total OUT: 2215 mL    Total NET: 1596 mL      20 @ 07:01  -  20 @ 08:25  --------------------------------------------------------  IN:    dextrose 5%: 150 mL    furosemide Infusion: 3 mL    pantoprazole Infusion: 10 mL  Total IN: 163 mL    OUT:  Total OUT: 0 mL    Total NET: 163 mL          LABS:                            9.2    32.77 )-----------( 118      ( 01 May 2020 04:08 )             26.3                                               05-    130<L>  |  83<L>  |  105<HH>  ----------------------------<  140<H>  4.2   |  36<H>  |  2.1<H>    01 May 2020 04:08    130<L>  |  83<L>  |  105<HH>  ----------------------------<  140<H>  4.2     |  36<H>  |  2.1<H>  2020 05:35    130<L>  |  88<L>  |  109<HH>  ----------------------------<  191<H>  4.5     |  33<H>  |  2.0<H>    Ca    6.0<L>      01 May 2020 04:08  Ca    6.5<L>      2020 05:35  Phos  6.0<H>     01 May 2020 04:08  Phos  5.4<H>     2020 05:35  Mg     2.2       01 May 2020 04:08  Mg     2.3       2020 05:35    TPro  3.7<L>  /  Alb  1.8<L>  /  TBili  0.6    /  DBili  x      /  AST  233<H>  /  ALT  56<H>  /  AlkPhos  175<H>  01 May 2020 04:08  TPro  3.3<L>  /  Alb  1.5<L>  /  TBili  0.6    /  DBili  x      /  AST  73<H>  /  ALT  24     /  AlkPhos  138<H>  2020 05:35      Ca    6.0<L>      01 May 2020 04:08  Phos  6.0     05-  Mg     2.2     05-    TPro  3.7<L>  /  Alb  1.8<L>  /  TBili  0.6  /  DBili  x   /  AST  233<H>  /  ALT  56<H>  /  AlkPhos  175<H>  05-      PT/INR - ( 2020 05:35 )   PT: 13.60 sec;   INR: 1.18 ratio         PTT - ( 2020 05:35 )  PTT:30.8 sec                                       Urinalysis Basic - ( 2020 16:56 )    Color: Light Yellow / Appearance: Slightly Turbid / S.010 / pH: x  Gluc: x / Ketone: Negative  / Bili: Negative / Urobili: <2 mg/dL   Blood: x / Protein: Trace / Nitrite: Negative   Leuk Esterase: Large / RBC: 39 /HPF /  /HPF   Sq Epi: x / Non Sq Epi: 0 /HPF / Bacteria: Negative                                                  LIVER FUNCTIONS - ( 01 May 2020 04:08 )  Alb: 1.8 g/dL / Pro: 3.7 g/dL / ALK PHOS: 175 U/L / ALT: 56 U/L / AST: 233 U/L / GGT: x                                                  Culture - Sputum (collected 2020 16:00)  Source: .Sputum Deep tracheal aspiration  Gram Stain (2020 23:55):    Few polymorphonuclear leukocytes per low power field    No Squamous epithelial cells per low power field    Few Yeast like cells per oil power field  Preliminary Report (2020 16:41):    Normal Respiratory Giovana present    Culture - Blood (collected 2020 11:00)  Source: .Blood Blood-Venous  Gram Stain (2020 09:23):    Growth in aerobic bottle: Yeast like cells  Preliminary Report (2020 09:24):    Growth in aerobic bottle: Yeast like cells    "Due to technical problems, Proteus sp. will Not be reported as part of    the BCID panel until further notice"    ***Blood Panel PCR results on this specimen are available    approximately 3 hours after the Gram stain result.***    Gram stain, PCR, and/or culture results may not always    correspond due to difference in methodologies.    ************************************************************    This PCR assay was performed using [employer].    The following targets are tested for: Enterococcus,    vancomycin resistant enterococci, Listeria monocytogenes,    coagulase negative staphylococci, S. aureus,    methicillin resistant S. aureus, Streptococcus agalactiae    (Group B), S. pneumoniae, S. pyogenes (Group A),    Acinetobacter baumannii, Enterobacter cloacae, E. coli,    Klebsiella oxytoca, K. pneumoniae, Proteus sp.,    Serratia marcescens, Haemophilus influenzae,    Neisseria meningitidis, Pseudomonas aeruginosa, Candida    albicans, C. glabrata, C krusei, C parapsilosis,    C. tropicalis and the KPC resistance gene.  Organism: Blood Culture PCR (2020 11:16)  Organism: Blood Culture PCR (2020 11:16)                                                   Mode: AC/ CMV (Assist Control/ Continuous Mandatory Ventilation)  RR (machine): 30  TV (machine): 400  FiO2: 60  PEEP: 14  ITime: 1  MAP: 25  PIP: 34                                      ABG - ( 01 May 2020 03:21 )  pH, Arterial: 7.42  pH, Blood: x     /  pCO2: 60    /  pO2: 68    / HCO3: 39    / Base Excess: 13.1  /  SaO2: 94                  MEDICATIONS  (STANDING):  atorvastatin 80 milliGRAM(s) Oral at bedtime  chlorhexidine 0.12% Liquid 15 milliLiter(s) Oral Mucosa every 12 hours  chlorhexidine 4% Liquid 1 Application(s) Topical <User Schedule>  dexMEDEtomidine Infusion 0.2 MICROgram(s)/kG/Hr (2.65 mL/Hr) IV Continuous <Continuous>  dextrose 5% 1000 milliLiter(s) (150 mL/Hr) IV Continuous <Continuous>  ergocalciferol 19476 Unit(s) Oral every week  fluconAZOLE IVPB      fluconAZOLE IVPB 200 milliGRAM(s) IV Intermittent every 24 hours  folic acid 1 milliGRAM(s) Oral daily  furosemide Infusion 6 mG/Hr (3 mL/Hr) IV Continuous <Continuous>  insulin glargine Injectable (LANTUS) 6 Unit(s) SubCutaneous at bedtime  insulin lispro (HumaLOG) corrective regimen sliding scale   SubCutaneous three times a day before meals  insulin lispro Injectable (HumaLOG) 2 Unit(s) SubCutaneous every 8 hours  meropenem  IVPB 1000 milliGRAM(s) IV Intermittent every 12 hours  metoprolol tartrate 25 milliGRAM(s) Oral two times a day  pantoprazole Infusion 8 mG/Hr (10 mL/Hr) IV Continuous <Continuous>  Remdesivir 100 milliGRAM(s) 100 milliGRAM(s) IV Intermittent <User Schedule>  senna 2 Tablet(s) Oral at bedtime  sertraline 50 milliGRAM(s) Oral daily    MEDICATIONS  (PRN):  acetaminophen   Tablet .. 650 milliGRAM(s) Oral every 6 hours PRN Temp greater or equal to 38C (100.4F)      New X-rays reviewed:                                                                                  ECHO    CXR interpreted by me:  ET OG OK>  bilateral infiltrates

## 2020-05-01 NOTE — CHART NOTE - NSCHARTNOTEFT_GEN_A_CORE
Spoke with , Zohaib, [phone]. Updated him on patient's status. Answered questions, addressed concerns.

## 2020-05-01 NOTE — PROGRESS NOTE ADULT - ASSESSMENT
Patient is a 75 y/o F with PMH of CAD s/p PCI x5, GERD, Rheumatoid arthritis presented for resp distress 2/2 COVID s/p intubation, course c/w gabriela, acute anemia. GI called for coffee ground emesis found during OG lavage     #Coffee ground emesis in OG tube, OG lavage done yesterday  showed clear liquids, brown stools in bag, no signs of active GI bleeding  Anemia is likely multifactorial (gabriela, severe spesis, anemia of CKD)  Hg stable now (SP total 2 U PRBC on 28/29)  Rec:   Active T&S  cbc q12h and keep Hb >8  can dc protonix drip and switch to PPI q12h,  no indication for endoscopic intervention at this time  Can start tube feeds from GI standpoint     # Sepsis/ Acute respiratory failure secondary to COVID and rule out superimposed infection   Still intubated   Off pressors   On [employer] abx and antifungal

## 2020-05-01 NOTE — PROGRESS NOTE ADULT - SUBJECTIVE AND OBJECTIVE BOX
ABHINAV DREW 74y Female  MRN#: [id number]   Hospital Day: 13d    SUBJECTIVE  Patient is a 74y old Female who presents with a chief complaint of shortness of breath (01 May 2020 09:17)  Currently admitted to medicine with the primary diagnosis of COVID-19 virus detected    INTERVAL HPI AND OVERNIGHT EVENTS:  Patient was examined and seen at bedside. This morning she is intubated, off sedation, and off pressors.    REVIEW OF SYMPTOMS:  Unable to obtain due to patient's mental status     OBJECTIVE  PAST MEDICAL & SURGICAL HISTORY  Rheumatoid arthritis  GERD (gastroesophageal reflux disease)  MI (myocardial infarction)  Hyperlipidemia  CAD (coronary artery disease)  History of surgery: stent placement    ALLERGIES:  Zocor (Joint Pain)    MEDICATIONS:  STANDING MEDICATIONS  atorvastatin 80 milliGRAM(s) Oral at bedtime  chlorhexidine 0.12% Liquid 15 milliLiter(s) Oral Mucosa every 12 hours  chlorhexidine 4% Liquid 1 Application(s) Topical <User Schedule>  dexMEDEtomidine Infusion 0.2 MICROgram(s)/kG/Hr IV Continuous <Continuous>  ergocalciferol 12584 Unit(s) Oral every week  fluconAZOLE IVPB      fluconAZOLE IVPB 200 milliGRAM(s) IV Intermittent every 24 hours  folic acid 1 milliGRAM(s) Oral daily  insulin glargine Injectable (LANTUS) 6 Unit(s) SubCutaneous at bedtime  insulin lispro (HumaLOG) corrective regimen sliding scale   SubCutaneous three times a day before meals  insulin lispro Injectable (HumaLOG) 2 Unit(s) SubCutaneous every 8 hours  metoprolol tartrate 25 milliGRAM(s) Oral two times a day  pantoprazole  Injectable 40 milliGRAM(s) IV Push two times a day  Remdesivir 100 milliGRAM(s) 100 milliGRAM(s) IV Intermittent <User Schedule>  senna 2 Tablet(s) Oral at bedtime  sertraline 50 milliGRAM(s) Oral daily    PRN MEDICATIONS  acetaminophen   Tablet .. 650 milliGRAM(s) Oral every 6 hours PRN      VITAL SIGNS: Last 24 Hours  T(C): 36.3 (01 May 2020 08:00), Max: 36.3 (01 May 2020 08:00)  T(F): 97.3 (01 May 2020 08:00), Max: 97.3 (01 May 2020 08:00)  HR: 92 (01 May 2020 11:00) (72 - 92)  BP: 131/53 (01 May 2020 11:00) (68/34 - 160/80)  BP(mean): 79 (01 May 2020 11:00) (43 - 105)  RR: 30 (01 May 2020 11:00) (24 - 31)  SpO2: 100% (01 May 2020 11:00) (96% - 100%)    LABS:                        9.2    32.77 )-----------( 118      ( 01 May 2020 04:08 )             26.3     05-01    130<L>  |  83<L>  |  105<HH>  ----------------------------<  140<H>  4.2   |  36<H>  |  2.1<H>    Ca    6.0<L>      01 May 2020 04:08  Phos  6.0     05-  Mg     2.2     05-01    TPro  3.7<L>  /  Alb  1.8<L>  /  TBili  0.6  /  DBili  x   /  AST  233<H>  /  ALT  56<H>  /  AlkPhos  175<H>  05-    PT/INR - ( 2020 05:35 )   PT: 13.60 sec;   INR: 1.18 ratio         PTT - ( 2020 05:35 )  PTT:30.8 sec  Urinalysis Basic - ( 2020 16:56 )    Color: Light Yellow / Appearance: Slightly Turbid / S.010 / pH: x  Gluc: x / Ketone: Negative  / Bili: Negative / Urobili: <2 mg/dL   Blood: x / Protein: Trace / Nitrite: Negative   Leuk Esterase: Large / RBC: 39 /HPF /  /HPF   Sq Epi: x / Non Sq Epi: 0 /HPF / Bacteria: Negative      ABG - ( 01 May 2020 03:21 )  pH, Arterial: 7.42  pH, Blood: x     /  pCO2: 60    /  pO2: 68    / HCO3: 39    / Base Excess: 13.1  /  SaO2: 94        Mode: AC/ CMV (Assist Control/ Continuous Mandatory Ventilation)  RR (machine): 30  TV (machine): 400  FiO2: 60  PEEP: 14  ITime: 1  MAP: 16  PIP: 30    Culture - Sputum (collected 2020 16:00)  Source: .Sputum Deep tracheal aspiration  Gram Stain (2020 23:55):    Few polymorphonuclear leukocytes per low power field    No Squamous epithelial cells per low power field    Few Yeast like cells per oil power field  Preliminary Report (2020 16:41):    Normal Respiratory Giovana present    RADIOLOGY:  < from: Xray Chest 1 View- PORTABLE-Routine (20 @ 07:49) >  Findings:  Support devices: Stable and satisfactory position  Cardiac/mediastinum/hilum: Stable.  Lung parenchyma/Pleura: Left basilar opacity appears decreased; otherwise no significant change in bilateral opacities. No definite pneumothorax. No evidence of effusion.  Skeleton/soft tissues: Stable    Impression:    Left basilar opacity appears decreased; otherwise no significant change in bilateral opacities. No definite pneumothorax. No evidence of effusion.  Lines and support devices in stable and satisfactory position.  < end of copied text >    < from: CT Head No Cont (20 @ 11:19) >  Findings:  There is small amount of acute subarachnoid hemorrhage in the right parietal region.    The ventricles and cortical sulci are normal in size and configuration.   There is no mass effect or midline shift.  Gray-white matter differentiation is maintained.     There is bilateral mastoid and middle ear opacification. The visualized paranasal sinuses are clear. Endotracheal and orogastric tubes are present.    IMPRESSION:   Small subarachnoid hemorrhage in the right parietal region.  < end of copied text >      PHYSICAL EXAM:  CONSTITUTIONAL: No acute distress, well-developed, well-groomed, intubated, sedated  HEAD: Atraumatic, normocephalic  EYES: EOM intact, PERRLA, conjunctiva and sclera clear  ENT: Supple, no masses, no thyromegaly, no bruits, no JVD; moist mucous membranes  PULMONARY: Clear to auscultation bilaterally; no wheezes, rales, or rhonchi  CARDIOVASCULAR: Regular rate and rhythm; no murmurs, rubs, or gallops  GASTROINTESTINAL: Soft, non-tender, non-distended; bowel sounds present  MUSCULOSKELETAL: 2+ peripheral pulses; no clubbing, no cyanosis, no edema  NEUROLOGY: sedated, opens eyes, does not follow commands, withdraws to pain  SKIN: No rashes or lesions; warm and dry    ASSESSMENT & PLAN  Patient is a 73yo female with PMH of CAD s/p PCI x5, GERD, rheumatoid arthritis, and hyperlipidemia who was transferred from [facility] for worsening shortness of breath and hypoxia. Patient was admitted to the [facility] site after having 2 episodes of syncope at home and tested positive for COVID-19. Patient was intubated on 2020 after desaturating while on non-rebreather.    #Acute hypoxemic respiratory failure secondary to COVID-19 pneumonia with possible superinfection  - COVID-19 PCR 2020: positive  - Isolation precautions (contact, droplet, airborne)  - ECG 2020: QTc 510  - Procalcitonin: 0.16->1.95->1.85->0.57->0.22->0.42->2.20  - CRP: 4.73->18.39->8.31->8.45->4.49->6.39->17.16  - D-dimer: 516->949  - ID consult appreciated  - Completed course of hydroxychloroquine, azithromycin, meropenem, anakinra, and methylprednisolone  - Hold remdesivir given worsening renal function and acute GI bleed  - Blood Cx 2020: yeast like cells  - Discontinue sodium bicarbonate infusion  - Continue with caspofungin 50mg IV Q24H (Day #3)  - Continue with meropenem 1g IV Q12H (Day #3)  - Continue with vancomycin  - Follow vancomycin level  - Daily blood cultures  - Follow cultures for organism and sensitivities  - Follow Fungitell  - Increase PEEP to 14 and FiO2 to 60%  - Advance ET tube 2cm  - CT head  - Follow MRSA nares  - SAT today    #Acute macrocytic anemia secondary to upper GI bleed s/p 2 units PRBC  - Baseline hemoglobin: 12  - Hemoglobin today: 8.4  - OG lavage positive on 2020  - Patient received 1 unit PRBC on 2020 for Hb 6.7  - Patient received 1 unit PRBC on 2020 for Hb 7.2  - Keep active type and screen  - Transfuse if hemoglobin <7  - Pending GI consult    #Acute kidney injury, worsening  - Baseline creatinine: 0.7  - Creatinine today: 2.0  - Monitor BUN/creatinine  - Avoid nephrotoxic agents  - Continue with furosemide infusion at 6mg/hr  - Strict intakes and outputs    #Hyperkalemia  - Potassium today: 4.5  - Follow potassium in AM    #Hyponatremia  - Sodium today: 130  - Follow sodium in AM    #Hyperglycemia  - Likely secondary to steroid use  - Continue with insulin glargine 6 units SQ QHS  - Continue with insulin lispro 2 units SQ Q8H  - Insulin sliding scale coverage  - Monitor fingerstick glucose    #Hypertension  - Continue with metoprolol tartrate 25mg PO BID  - Can use PRN pushes of hydralazine    #Diarrhea, improving  - Continue with tube feeds    #CAD s/p PCI x5  - Hold aspirin 81mg PO QD  - Hold ticagrelor 90mg PO Q12H  - Continue with metoprolol tartrate 12.5mg PO BID  - Continue with atorvastatin 80mg PO QHS    #Asymptomatic bacteruria  - Urine Cx Culture Results: 50,000-99,000 Pseudomonas aeruginosa and 10,000-49,000 ESBL E coli  - Patient currently asymptomatic    #Hyperlipidemia  - Patient takes rosuvastatin 20mg PO QHS at home  - Has allergy to simvastatin  - Continue with atorvastatin 80mg PO QHS    #Rheumatoid arthritis  - Stable    #GERD  - Continue with pantoprazole drip    #Anxiety/Depression  - Continue with sertraline 50mg PO QD    #Suspected Vitamin D and folate deficiencies  - Continue with ergocalciferol 50,000 units Q7D  - Continue with folic acid 1mg PO QD    #Misc  - DVT Prophylaxis: SCDs  - GI Prophylaxis: pantoprazole drip  - Diet: NPO with tube feeds  - Activity: bedrest  - IV Fluids: Not indicated  - Code Status: Full Code    Dispo: MICU monitoring for now ABHINAV DREW 74y Female  MRN#: [id number]   Hospital Day: 13d    SUBJECTIVE  Patient is a 74y old Female who presents with a chief complaint of shortness of breath (01 May 2020 09:17)  Currently admitted to medicine with the primary diagnosis of COVID-19 virus detected    INTERVAL HPI AND OVERNIGHT EVENTS:  Patient was examined and seen at bedside. This morning she is intubated, off sedation, and off pressors.    REVIEW OF SYMPTOMS:  Unable to obtain due to patient's mental status     OBJECTIVE  PAST MEDICAL & SURGICAL HISTORY  Rheumatoid arthritis  GERD (gastroesophageal reflux disease)  MI (myocardial infarction)  Hyperlipidemia  CAD (coronary artery disease)  History of surgery: stent placement    ALLERGIES:  Zocor (Joint Pain)    MEDICATIONS:  STANDING MEDICATIONS  atorvastatin 80 milliGRAM(s) Oral at bedtime  chlorhexidine 0.12% Liquid 15 milliLiter(s) Oral Mucosa every 12 hours  chlorhexidine 4% Liquid 1 Application(s) Topical <User Schedule>  dexMEDEtomidine Infusion 0.2 MICROgram(s)/kG/Hr IV Continuous <Continuous>  ergocalciferol 60283 Unit(s) Oral every week  fluconAZOLE IVPB      fluconAZOLE IVPB 200 milliGRAM(s) IV Intermittent every 24 hours  folic acid 1 milliGRAM(s) Oral daily  insulin glargine Injectable (LANTUS) 6 Unit(s) SubCutaneous at bedtime  insulin lispro (HumaLOG) corrective regimen sliding scale   SubCutaneous three times a day before meals  insulin lispro Injectable (HumaLOG) 2 Unit(s) SubCutaneous every 8 hours  metoprolol tartrate 25 milliGRAM(s) Oral two times a day  pantoprazole  Injectable 40 milliGRAM(s) IV Push two times a day  Remdesivir 100 milliGRAM(s) 100 milliGRAM(s) IV Intermittent <User Schedule>  senna 2 Tablet(s) Oral at bedtime  sertraline 50 milliGRAM(s) Oral daily    PRN MEDICATIONS  acetaminophen   Tablet .. 650 milliGRAM(s) Oral every 6 hours PRN      VITAL SIGNS: Last 24 Hours  T(C): 36.3 (01 May 2020 08:00), Max: 36.3 (01 May 2020 08:00)  T(F): 97.3 (01 May 2020 08:00), Max: 97.3 (01 May 2020 08:00)  HR: 92 (01 May 2020 11:00) (72 - 92)  BP: 131/53 (01 May 2020 11:00) (68/34 - 160/80)  BP(mean): 79 (01 May 2020 11:00) (43 - 105)  RR: 30 (01 May 2020 11:00) (24 - 31)  SpO2: 100% (01 May 2020 11:00) (96% - 100%)    LABS:                        9.2    32.77 )-----------( 118      ( 01 May 2020 04:08 )             26.3     05-01    130<L>  |  83<L>  |  105<HH>  ----------------------------<  140<H>  4.2   |  36<H>  |  2.1<H>    Ca    6.0<L>      01 May 2020 04:08  Phos  6.0     05-  Mg     2.2     05-01    TPro  3.7<L>  /  Alb  1.8<L>  /  TBili  0.6  /  DBili  x   /  AST  233<H>  /  ALT  56<H>  /  AlkPhos  175<H>  05-    PT/INR - ( 2020 05:35 )   PT: 13.60 sec;   INR: 1.18 ratio         PTT - ( 2020 05:35 )  PTT:30.8 sec  Urinalysis Basic - ( 2020 16:56 )    Color: Light Yellow / Appearance: Slightly Turbid / S.010 / pH: x  Gluc: x / Ketone: Negative  / Bili: Negative / Urobili: <2 mg/dL   Blood: x / Protein: Trace / Nitrite: Negative   Leuk Esterase: Large / RBC: 39 /HPF /  /HPF   Sq Epi: x / Non Sq Epi: 0 /HPF / Bacteria: Negative      ABG - ( 01 May 2020 03:21 )  pH, Arterial: 7.42  pH, Blood: x     /  pCO2: 60    /  pO2: 68    / HCO3: 39    / Base Excess: 13.1  /  SaO2: 94        Mode: AC/ CMV (Assist Control/ Continuous Mandatory Ventilation)  RR (machine): 30  TV (machine): 400  FiO2: 60  PEEP: 14  ITime: 1  MAP: 16  PIP: 30    Culture - Sputum (collected 2020 16:00)  Source: .Sputum Deep tracheal aspiration  Gram Stain (2020 23:55):    Few polymorphonuclear leukocytes per low power field    No Squamous epithelial cells per low power field    Few Yeast like cells per oil power field  Preliminary Report (2020 16:41):    Normal Respiratory Giovana present    RADIOLOGY:  < from: Xray Chest 1 View- PORTABLE-Routine (20 @ 07:49) >  Findings:  Support devices: Stable and satisfactory position  Cardiac/mediastinum/hilum: Stable.  Lung parenchyma/Pleura: Left basilar opacity appears decreased; otherwise no significant change in bilateral opacities. No definite pneumothorax. No evidence of effusion.  Skeleton/soft tissues: Stable    Impression:    Left basilar opacity appears decreased; otherwise no significant change in bilateral opacities. No definite pneumothorax. No evidence of effusion.  Lines and support devices in stable and satisfactory position.  < end of copied text >    < from: CT Head No Cont (20 @ 11:19) >  Findings:  There is small amount of acute subarachnoid hemorrhage in the right parietal region.    The ventricles and cortical sulci are normal in size and configuration.   There is no mass effect or midline shift.  Gray-white matter differentiation is maintained.     There is bilateral mastoid and middle ear opacification. The visualized paranasal sinuses are clear. Endotracheal and orogastric tubes are present.    IMPRESSION:   Small subarachnoid hemorrhage in the right parietal region.  < end of copied text >      PHYSICAL EXAM:  CONSTITUTIONAL: No acute distress, well-developed, well-groomed, intubated, off sedation 24hrs  HEAD: Atraumatic, normocephalic  EYES: EOM intact, PERRLA but sluggish, conjunctiva and sclera clear  ENT: Supple, no masses, no thyromegaly, no bruits, no JVD; moist mucous membranes  PULMONARY: Clear to auscultation bilaterally; no wheezes, rales, or rhonchi  CARDIOVASCULAR: Regular rate and rhythm; no murmurs, rubs, or gallops  GASTROINTESTINAL: Soft, non-tender, non-distended; bowel sounds present  MUSCULOSKELETAL: 2+ peripheral pulses; no clubbing, no cyanosis, no edema  NEUROLOGY: off sedation 24hrs, opens eyes, does not follow commands, no gag reflex, no corneal reflex, sluggish pupils  SKIN: No rashes or lesions; warm and dry    ASSESSMENT & PLAN  Patient is a 75yo female with PMH of CAD s/p PCI x5, GERD, rheumatoid arthritis, and hyperlipidemia who was transferred from [facility] for worsening shortness of breath and hypoxia. Patient was admitted to the [facility] site after having 2 episodes of syncope at home and tested positive for COVID-19. Patient was intubated on 2020 after desaturating while on non-rebreather.    #Acute hypoxemic respiratory failure secondary to COVID-19 pneumonia with possible superinfection  - COVID-19 PCR 2020: positive  - Isolation precautions (contact, droplet, airborne)  - ECG 2020: QTc 510  - Procalcitonin: 0.16->1.95->1.85->0.57->0.22->0.42->2.20  - CRP: 4.73->18.39->8.31->8.45->4.49->6.39->17.16  - D-dimer: 516->949  - ID consult appreciated  - Completed course of hydroxychloroquine, azithromycin, meropenem, anakinra, and methylprednisolone  - Hold remdesivir given worsening renal function and acute GI bleed  - Blood Cx 2020: yeast like cells  - Discontinue sodium bicarbonate infusion  - Continue with caspofungin 50mg IV Q24H (Day #3)  - Continue with meropenem 1g IV Q12H (Day #3)  - Continue with vancomycin  - Follow vancomycin level  - Daily blood cultures  - Follow cultures for organism and sensitivities  - Follow Fungitell  - Increase PEEP to 14 and FiO2 to 60%  - Advance ET tube 2cm  - CT head  - Follow MRSA nares  - SAT today    #Acute macrocytic anemia secondary to upper GI bleed s/p 2 units PRBC  - Baseline hemoglobin: 12  - Hemoglobin today: 8.4  - OG lavage positive on 2020  - Patient received 1 unit PRBC on 2020 for Hb 6.7  - Patient received 1 unit PRBC on 2020 for Hb 7.2  - Keep active type and screen  - Transfuse if hemoglobin <7  - Pending GI consult    #Acute kidney injury, worsening  - Baseline creatinine: 0.7  - Creatinine today: 2.0  - Monitor BUN/creatinine  - Avoid nephrotoxic agents  - Continue with furosemide infusion at 6mg/hr  - Strict intakes and outputs    #Hyperkalemia  - Potassium today: 4.5  - Follow potassium in AM    #Hyponatremia  - Sodium today: 130  - Follow sodium in AM    #Hyperglycemia  - Likely secondary to steroid use  - Continue with insulin glargine 6 units SQ QHS  - Continue with insulin lispro 2 units SQ Q8H  - Insulin sliding scale coverage  - Monitor fingerstick glucose    #Hypertension  - Continue with metoprolol tartrate 25mg PO BID  - Can use PRN pushes of hydralazine    #Diarrhea, improving  - Continue with tube feeds    #CAD s/p PCI x5  - Hold aspirin 81mg PO QD  - Hold ticagrelor 90mg PO Q12H  - Continue with metoprolol tartrate 12.5mg PO BID  - Continue with atorvastatin 80mg PO QHS    #Asymptomatic bacteruria  - Urine Cx Culture Results: 50,000-99,000 Pseudomonas aeruginosa and 10,000-49,000 ESBL E coli  - Patient currently asymptomatic    #Hyperlipidemia  - Patient takes rosuvastatin 20mg PO QHS at home  - Has allergy to simvastatin  - Continue with atorvastatin 80mg PO QHS    #Rheumatoid arthritis  - Stable    #GERD  - Continue with pantoprazole drip    #Anxiety/Depression  - Continue with sertraline 50mg PO QD    #Suspected Vitamin D and folate deficiencies  - Continue with ergocalciferol 50,000 units Q7D  - Continue with folic acid 1mg PO QD    #Misc  - DVT Prophylaxis: SCDs  - GI Prophylaxis: pantoprazole drip  - Diet: NPO with tube feeds  - Activity: bedrest  - IV Fluids: Not indicated  - Code Status: Full Code    Dispo: MICU monitoring for now ABHINAV DREW 74y Female  MRN#: [id number]   Hospital Day: 13d    SUBJECTIVE  Patient is a 74y old Female who presents with a chief complaint of shortness of breath (01 May 2020 09:17)  Currently admitted to medicine with the primary diagnosis of COVID-19 virus detected    INTERVAL HPI AND OVERNIGHT EVENTS:  Patient was examined and seen at bedside. This morning she is intubated, off sedation, and off pressors.    REVIEW OF SYMPTOMS:  Unable to obtain due to patient's mental status     OBJECTIVE  PAST MEDICAL & SURGICAL HISTORY  Rheumatoid arthritis  GERD (gastroesophageal reflux disease)  MI (myocardial infarction)  Hyperlipidemia  CAD (coronary artery disease)  History of surgery: stent placement    ALLERGIES:  Zocor (Joint Pain)    MEDICATIONS:  STANDING MEDICATIONS  atorvastatin 80 milliGRAM(s) Oral at bedtime  chlorhexidine 0.12% Liquid 15 milliLiter(s) Oral Mucosa every 12 hours  chlorhexidine 4% Liquid 1 Application(s) Topical <User Schedule>  dexMEDEtomidine Infusion 0.2 MICROgram(s)/kG/Hr IV Continuous <Continuous>  ergocalciferol 66682 Unit(s) Oral every week  fluconAZOLE IVPB      fluconAZOLE IVPB 200 milliGRAM(s) IV Intermittent every 24 hours  folic acid 1 milliGRAM(s) Oral daily  insulin glargine Injectable (LANTUS) 6 Unit(s) SubCutaneous at bedtime  insulin lispro (HumaLOG) corrective regimen sliding scale   SubCutaneous three times a day before meals  insulin lispro Injectable (HumaLOG) 2 Unit(s) SubCutaneous every 8 hours  metoprolol tartrate 25 milliGRAM(s) Oral two times a day  pantoprazole  Injectable 40 milliGRAM(s) IV Push two times a day  Remdesivir 100 milliGRAM(s) 100 milliGRAM(s) IV Intermittent <User Schedule>  senna 2 Tablet(s) Oral at bedtime  sertraline 50 milliGRAM(s) Oral daily    PRN MEDICATIONS  acetaminophen   Tablet .. 650 milliGRAM(s) Oral every 6 hours PRN      VITAL SIGNS: Last 24 Hours  T(C): 36.3 (01 May 2020 08:00), Max: 36.3 (01 May 2020 08:00)  T(F): 97.3 (01 May 2020 08:00), Max: 97.3 (01 May 2020 08:00)  HR: 92 (01 May 2020 11:00) (72 - 92)  BP: 131/53 (01 May 2020 11:00) (68/34 - 160/80)  BP(mean): 79 (01 May 2020 11:00) (43 - 105)  RR: 30 (01 May 2020 11:00) (24 - 31)  SpO2: 100% (01 May 2020 11:00) (96% - 100%)    LABS:                        9.2    32.77 )-----------( 118      ( 01 May 2020 04:08 )             26.3     05-01    130<L>  |  83<L>  |  105<HH>  ----------------------------<  140<H>  4.2   |  36<H>  |  2.1<H>    Ca    6.0<L>      01 May 2020 04:08  Phos  6.0     05-  Mg     2.2     05-01    TPro  3.7<L>  /  Alb  1.8<L>  /  TBili  0.6  /  DBili  x   /  AST  233<H>  /  ALT  56<H>  /  AlkPhos  175<H>  05-    PT/INR - ( 2020 05:35 )   PT: 13.60 sec;   INR: 1.18 ratio         PTT - ( 2020 05:35 )  PTT:30.8 sec  Urinalysis Basic - ( 2020 16:56 )    Color: Light Yellow / Appearance: Slightly Turbid / S.010 / pH: x  Gluc: x / Ketone: Negative  / Bili: Negative / Urobili: <2 mg/dL   Blood: x / Protein: Trace / Nitrite: Negative   Leuk Esterase: Large / RBC: 39 /HPF /  /HPF   Sq Epi: x / Non Sq Epi: 0 /HPF / Bacteria: Negative      ABG - ( 01 May 2020 03:21 )  pH, Arterial: 7.42  pH, Blood: x     /  pCO2: 60    /  pO2: 68    / HCO3: 39    / Base Excess: 13.1  /  SaO2: 94        Mode: AC/ CMV (Assist Control/ Continuous Mandatory Ventilation)  RR (machine): 30  TV (machine): 400  FiO2: 60  PEEP: 14  ITime: 1  MAP: 16  PIP: 30    Culture - Sputum (collected 2020 16:00)  Source: .Sputum Deep tracheal aspiration  Gram Stain (2020 23:55):    Few polymorphonuclear leukocytes per low power field    No Squamous epithelial cells per low power field    Few Yeast like cells per oil power field  Preliminary Report (2020 16:41):    Normal Respiratory Giovana present    RADIOLOGY:  < from: Xray Chest 1 View- PORTABLE-Routine (20 @ 07:49) >  Findings:  Support devices: Stable and satisfactory position  Cardiac/mediastinum/hilum: Stable.  Lung parenchyma/Pleura: Left basilar opacity appears decreased; otherwise no significant change in bilateral opacities. No definite pneumothorax. No evidence of effusion.  Skeleton/soft tissues: Stable    Impression:    Left basilar opacity appears decreased; otherwise no significant change in bilateral opacities. No definite pneumothorax. No evidence of effusion.  Lines and support devices in stable and satisfactory position.  < end of copied text >    < from: CT Head No Cont (20 @ 11:19) >  Findings:  There is small amount of acute subarachnoid hemorrhage in the right parietal region.    The ventricles and cortical sulci are normal in size and configuration.   There is no mass effect or midline shift.  Gray-white matter differentiation is maintained.     There is bilateral mastoid and middle ear opacification. The visualized paranasal sinuses are clear. Endotracheal and orogastric tubes are present.    IMPRESSION:   Small subarachnoid hemorrhage in the right parietal region.  < end of copied text >      PHYSICAL EXAM:  CONSTITUTIONAL: No acute distress, well-developed, well-groomed, intubated, off sedation 24hrs  HEAD: Atraumatic, normocephalic  EYES: EOM intact, PERRLA but sluggish, conjunctiva and sclera clear  ENT: Supple, no masses, no thyromegaly, no bruits, no JVD; moist mucous membranes  PULMONARY: Clear to auscultation bilaterally; no wheezes, rales, or rhonchi  CARDIOVASCULAR: Regular rate and rhythm; no murmurs, rubs, or gallops  GASTROINTESTINAL: Soft, non-tender, non-distended; bowel sounds present  MUSCULOSKELETAL: 2+ peripheral pulses; no clubbing, no cyanosis, no edema  NEUROLOGY: off sedation 24hrs, opens eyes, does not follow commands, no gag reflex, no corneal reflex, sluggish pupils  SKIN: No rashes or lesions; warm and dry    ASSESSMENT & PLAN  Patient is a 75yo female with PMH of CAD s/p PCI x5, GERD, rheumatoid arthritis, and hyperlipidemia who was transferred from [facility] for worsening shortness of breath and hypoxia. Patient was admitted to the [facility] site after having 2 episodes of syncope at home and tested positive for COVID-19. Patient was intubated on 2020 after desaturating while on non-rebreather.    #Acute hypoxemic respiratory failure secondary to COVID-19 pneumonia with possible superinfection  - COVID-19 PCR 2020: positive  - Isolation precautions (contact, droplet, airborne)  - ECG 2020: QTc 510  - Procalcitonin: 0.16->1.95->1.85->0.57->0.22->0.42->2.20  - CRP: 4.73->18.39->8.31->8.45->4.49->6.39->17.16  - D-dimer: 516->949  - ID consult appreciated  - Completed course of hydroxychloroquine, azithromycin, meropenem, anakinra, and methylprednisolone  - Hold remdesivir given worsening renal function and acute GI bleed  - Blood Cx 2020: Candida albicans  - MRSA nares negative  - Discontinue sodium bicarbonate infusion  - Change caspofungin to fluconazole 200mg IV Q24H (Day #2)  - Discontinue meropenem and vancomycin  - Daily blood cultures  - Follow cultures for sensitivities  - Follow Fungitell  - CT head: small right subarachnoid parietal hemorrhage  - Follow EEG and MRI head  - Keep intake = output  - If patient becomes fluid positive, can give furosemide 40mg IV x1 dose    #Acute macrocytic anemia secondary to upper GI bleed s/p 2 units PRBC, improving  - Baseline hemoglobin: 12  - Hemoglobin today: 9.2  - OG lavage positive on 2020  - Patient received 1 unit PRBC on 2020 for Hb 6.7  - Patient received 1 unit PRBC on 2020 for Hb 7.2  - GI consult appreciated  - Change pantoprazole drip to pantoprazole 40mg IV BID  - Can restart tube feeds  - Keep active type and screen  - Transfuse if hemoglobin <7    #Acute kidney injury, worsening  - Baseline creatinine: 0.7  - Creatinine today: 2.1  - Monitor BUN/creatinine  - Avoid nephrotoxic agents  - Discontinue furosemide drip and bicarbonate infusion  - Strict intakes and outputs    #Hyperkalemia, resolved  - Potassium today: 4.2  - Follow potassium in AM    #Hyponatremia  - Sodium today: 130  - Follow sodium in AM    #Hyperglycemia  - Likely secondary to steroid use  - Continue with insulin glargine 6 units SQ QHS  - Continue with insulin lispro 2 units SQ Q8H  - Insulin sliding scale coverage  - Monitor fingerstick glucose    #Hypertension  - Continue with metoprolol tartrate 25mg PO BID  - Can use PRN pushes of hydralazine    #Diarrhea, improving  - Continue with tube feeds    #CAD s/p PCI x5  - Hold aspirin 81mg PO QD  - Hold ticagrelor 90mg PO Q12H  - Continue with metoprolol tartrate 12.5mg PO BID  - Continue with atorvastatin 80mg PO QHS    #Asymptomatic bacteruria  - Urine Cx Culture Results: 50,000-99,000 Pseudomonas aeruginosa and 10,000-49,000 ESBL E coli  - Patient currently asymptomatic    #Hyperlipidemia  - Patient takes rosuvastatin 20mg PO QHS at home  - Has allergy to simvastatin  - Continue with atorvastatin 80mg PO QHS    #Rheumatoid arthritis  - Stable    #GERD  - Change pantoprazole drip to pantoprazole 40mg IV BID    #Anxiety/Depression  - Continue with sertraline 50mg PO QD    #Suspected Vitamin D and folate deficiencies  - Continue with ergocalciferol 50,000 units Q7D  - Continue with folic acid 1mg PO QD    #Misc  - DVT Prophylaxis: SCDs  - GI Prophylaxis: pantoprazole 40mg IV BID  - Diet: NPO with tube feeds  - Activity: bedrest  - IV Fluids: Not indicated  - Code Status: Full Code    Dispo: MICU monitoring for now

## 2020-05-02 NOTE — PROGRESS NOTE ADULT - SUBJECTIVE AND OBJECTIVE BOX
Patient is a 74y old  Female who presents with a chief complaint of shortness of breath (01 May 2020 11:48)        Over Night Events:  On MV.  Off pressors.  On Versed.  SP MRI         ROS:     All ROS are negative except HPI         PHYSICAL EXAM    ICU Vital Signs Last 24 Hrs  T(C): 36.4 (02 May 2020 08:00), Max: 36.5 (02 May 2020 06:00)  T(F): 97.5 (02 May 2020 08:00), Max: 97.7 (02 May 2020 06:00)  HR: 94 (02 May 2020 08:00) (82 - 98)  BP: 107/60 (01 May 2020 13:00) (107/60 - 148/67)  BP(mean): 89 (01 May 2020 13:00) (79 - 105)  ABP: 108/62 (02 May 2020 08:00) (88/50 - 188/74)  ABP(mean): 78 (02 May 2020 08:00) (62 - 114)  RR: 28 (02 May 2020 08:00) (15 - 31)  SpO2: 100% (02 May 2020 08:00) (91% - 100%)      CONSTITUTIONAL:   Ill appearing.  Well nourished.  NAD    ENT:   Airway patent,   Mouth with normal mucosa.   No thrush    EYES:   Pupils equal,   Round and reactive to light.    CARDIAC:   Normal rate,   Regular rhythm.    No edema      Vascular:  Normal systolic impulse  No Carotid bruits    RESPIRATORY:   No wheezing  Bilateral BS  Normal chest expansion  Not tachypneic,  No use of accessory muscles    GASTROINTESTINAL:  Abdomen soft,   Non-tender,   No guarding,   + BS    GENITOURINARY  normal genitalia for sex  no edema    MUSCULOSKELETAL:   Range of motion is not limited,  No clubbing, cyanosis    NEUROLOGICAL:   Sedated     SKIN:   Skin normal color for race,   Warm and dry and intact.   No evidence of rash.    PSYCHIATRIC:   No apparent risk to self or others.    HEMATOLOGICAL:  No cervical  lymphadenopathy.  no inguinal lymphadenopathy      20 @ 07:01  -  20 @ 07:00  --------------------------------------------------------  IN:    dextrose 5%: 150 mL    furosemide Infusion: 3 mL    IV PiggyBack: 300 mL    midazolam Infusion.: 42 mL    pantoprazole Infusion: 10 mL    Peptamen A.F.: 960 mL  Total IN: 1465 mL    OUT:    Indwelling Catheter - Urethral: 1151 mL    Rectal Tube: 1000 mL  Total OUT: 2151 mL    Total NET: -686 mL      20 @ 07:01  -  20 @ 08:36  --------------------------------------------------------  IN:    midazolam Infusion.: 3 mL  Total IN: 3 mL    OUT:  Total OUT: 0 mL    Total NET: 3 mL          LABS:                            9.8    38.87 )-----------( 103      ( 02 May 2020 04:00 )             27.3                       9.8    38.87 )-----------( 103      (  @ 04:00 )             27.3                9.2    32.77 )-----------( 118      (  @ 04:08 )             26.3                10.2   35.30 )-----------( 137      (  @ 16:56 )             28.3                8.4    25.44 )-----------( 101      (  @ 05:45 )             23.1                8.6    26.28 )-----------( 124      (  @ 23:50 )             24.5                7.2    30.92 )-----------( 141      (  @ 17:40 )             21.0                7.3    31.76 )-----------( 152      (  @ 16:50 )             22.6                                            05    131<L>  |  83<L>  |  111<HH>  ----------------------------<  98  4.4   |  34<H>  |  2.3<H>    02 May 2020 04:00    131<L>  |  83<L>  |  111<HH>  ----------------------------<  98     4.4     |  34<H>  |  2.3<H>  01 May 2020 04:08    130<L>  |  83<L>  |  105<HH>  ----------------------------<  140<H>  4.2     |  36<H>  |  2.1<H>    Ca    6.4<L>      02 May 2020 04:00  Ca    6.0<L>      01 May 2020 04:08  Phos  7.5<H>     02 May 2020 04:00  Phos  6.0<H>     01 May 2020 04:08  Mg     2.4       02 May 2020 04:00  Mg     2.2       01 May 2020 04:08    TPro  3.8<L>  /  Alb  1.7<L>  /  TBili  0.5    /  DBili  x      /  AST  479<H>  /  ALT  85<H>  /  AlkPhos  212<H>  02 May 2020 04:00  TPro  3.7<L>  /  Alb  1.8<L>  /  TBili  0.6    /  DBili  x      /  AST  233<H>  /  ALT  56<H>  /  AlkPhos  175<H>  01 May 2020 04:08      Ca    6.4<L>      02 May 2020 04:00  Phos  7.5     05-02  Mg     2.4     05-02    TPro  3.8<L>  /  Alb  1.7<L>  /  TBili  0.5  /  DBili  x   /  AST  479<H>  /  ALT  85<H>  /  AlkPhos  212<H>  05-02      PT/INR - ( 02 May 2020 04:00 )   PT: 12.50 sec;   INR: 1.09 ratio         PTT - ( 02 May 2020 04:00 )  PTT:28.7 sec                                       Urinalysis Basic - ( 02 May 2020 04:30 )    Color: Yellow / Appearance: Slightly Turbid / S.016 / pH: x  Gluc: x / Ketone: Negative  / Bili: Negative / Urobili: 3 mg/dL   Blood: x / Protein: 100 mg/dL / Nitrite: Negative   Leuk Esterase: Large / RBC: 20 /HPF / WBC 88 /HPF   Sq Epi: x / Non Sq Epi: 1 /HPF / Bacteria: Few                                                  LIVER FUNCTIONS - ( 02 May 2020 04:00 )  Alb: 1.7 g/dL / Pro: 3.8 g/dL / ALK PHOS: 212 U/L / ALT: 85 U/L / AST: 479 U/L / GGT: x                                                  Culture - Blood (collected 2020 17:34)  Source: .Blood None  Preliminary Report (01 May 2020 22:01):    No growth to date.    Culture - Sputum (collected 2020 16:00)  Source: .Sputum Deep tracheal aspiration  Gram Stain (2020 23:55):    Few polymorphonuclear leukocytes per low power field    No Squamous epithelial cells per low power field    Few Yeast like cells per oil power field  Final Report (01 May 2020 17:30):    Normal Respiratory Giovana present                                                   Mode: AC/ CMV (Assist Control/ Continuous Mandatory Ventilation)  RR (machine): 30  TV (machine): 400  FiO2: 100  PEEP: 14  ITime: 1  MAP: 22  PIP: 34                                      ABG - ( 02 May 2020 02:07 )  pH, Arterial: 7.44  pH, Blood: x     /  pCO2: 57    /  pO2: 106   / HCO3: 38    / Base Excess:              MEDICATIONS  (STANDING):  atorvastatin 80 milliGRAM(s) Oral at bedtime  chlorhexidine 0.12% Liquid 15 milliLiter(s) Oral Mucosa every 12 hours  chlorhexidine 4% Liquid 1 Application(s) Topical <User Schedule>  dexMEDEtomidine Infusion 0.2 MICROgram(s)/kG/Hr (2.65 mL/Hr) IV Continuous <Continuous>  ergocalciferol 16183 Unit(s) Oral every week  fluconAZOLE IVPB      fluconAZOLE IVPB 200 milliGRAM(s) IV Intermittent every 24 hours  folic acid 1 milliGRAM(s) Oral daily  insulin glargine Injectable (LANTUS) 6 Unit(s) SubCutaneous at bedtime  insulin lispro (HumaLOG) corrective regimen sliding scale   SubCutaneous three times a day before meals  insulin lispro Injectable (HumaLOG) 2 Unit(s) SubCutaneous every 8 hours  levETIRAcetam  IVPB 1000 milliGRAM(s) IV Intermittent every 12 hours  metoprolol tartrate 25 milliGRAM(s) Oral two times a day  midazolam Infusion. 0.05 mG/kG/Hr (2.65 mL/Hr) IV Continuous <Continuous>  pantoprazole  Injectable 40 milliGRAM(s) IV Push two times a day  Remdesivir 100 milliGRAM(s) 100 milliGRAM(s) IV Intermittent <User Schedule>  senna 2 Tablet(s) Oral at bedtime  sertraline 50 milliGRAM(s) Oral daily    MEDICATIONS  (PRN):  acetaminophen   Tablet .. 650 milliGRAM(s) Oral every 6 hours PRN Temp greater or equal to 38C (100.4F)      New X-rays reviewed:                                                                                  ECHO    CXR interpreted by me:  ET OG OK>  Bibasilar infiltrates

## 2020-05-02 NOTE — PROGRESS NOTE ADULT - SUBJECTIVE AND OBJECTIVE BOX
ABHINAV DREW  74y, Female  Allergy: Zocor (Joint Pain)      LOS  14d    CHIEF COMPLAINT: shortness of breath (02 May 2020 11:35)      INTERVAL EVENTS/HPI  - T(F): , Max: 97.8 (20 @ 12:00)  - WBC Count: 38.87 (20 @ 04:00)  WBC Count: 32.77 (20 @ 04:08)  - Creatinine, Serum: 2.3 (20 @ 04:00)  Creatinine, Serum: 2.1 (20 @ 04:08)     -   -   -     ROS  cannot obtain secondary to patient's sedation and/or mental status    VITALS:  T(F): 97.8, Max: 97.8 (20 @ 12:00)  HR: 98  BP: --  RR: 30Vital Signs Last 24 Hrs  T(C): 36.6 (02 May 2020 12:00), Max: 36.6 (02 May 2020 12:00)  T(F): 97.8 (02 May 2020 12:00), Max: 97.8 (02 May 2020 12:00)  HR: 98 (02 May 2020 12:00) (82 - 100)  BP: --  BP(mean): --  RR: 30 (02 May 2020 12:00) (15 - 30)  SpO2: 96% (02 May 2020 12:00) (91% - 100%)    PHYSICAL EXAM:  Gen: Intubated  CV: RRR  Lungs: Bilateral chest expansion  Neuro: Sedated  Lines    FH: Non-contributory  Social Hx: Non-contributory    TESTS & MEASUREMENTS:                        9.8    38.87 )-----------( 103      ( 02 May 2020 04:00 )             27.3     05-02    131<L>  |  83<L>  |  111<HH>  ----------------------------<  98  4.4   |  34<H>  |  2.3<H>    Ca    6.4<L>      02 May 2020 04:00  Phos  7.5     05-  Mg     2.4     05-    TPro  3.8<L>  /  Alb  1.7<L>  /  TBili  0.5  /  DBili  x   /  AST  479<H>  /  ALT  85<H>  /  AlkPhos  212<H>      eGFR if Non African American: 21 mL/min/1.73M2 (20 @ 04:00)  eGFR if African American: 24 mL/min/1.73M2 (20 @ 04:00)    LIVER FUNCTIONS - ( 02 May 2020 04:00 )  Alb: 1.7 g/dL / Pro: 3.8 g/dL / ALK PHOS: 212 U/L / ALT: 85 U/L / AST: 479 U/L / GGT: x           Urinalysis Basic - ( 02 May 2020 04:30 )    Color: Yellow / Appearance: Slightly Turbid / S.016 / pH: x  Gluc: x / Ketone: Negative  / Bili: Negative / Urobili: 3 mg/dL   Blood: x / Protein: 100 mg/dL / Nitrite: Negative   Leuk Esterase: Large / RBC: 20 /HPF / WBC 88 /HPF   Sq Epi: x / Non Sq Epi: 1 /HPF / Bacteria: Few        Culture - Blood (collected 20 @ 17:34)  Source: .Blood None  Preliminary Report (20 @ 22:01):    No growth to date.    Culture - Sputum (collected 20 @ 16:00)  Source: .Sputum Deep tracheal aspiration  Gram Stain (20 @ 23:55):    Few polymorphonuclear leukocytes per low power field    No Squamous epithelial cells per low power field    Few Yeast like cells per oil power field  Final Report (20 @ 17:30):    Normal Respiratory Ailyn present    Culture - Blood (collected 20 @ 11:00)  Source: .Blood Blood-Venous  Gram Stain (20 @ 09:23):    Growth in aerobic bottle: Yeast like cells  Final Report (20 @ 12:56):    Growth in aerobic bottle: Candida albicans    "Due to technical problems, Proteus sp. will Not be reported as part of    the BCID panel until further notice"    ***Blood Panel PCR results on this specimen are available    approximately 3 hours after the Gram stain result.***    Gram stain, PCR, and/or culture results may not always    correspond due to difference in methodologies.    ************************************************************    This PCR assay was performed using [employer].    The following targets are tested for: Enterococcus,    vancomycin resistant enterococci, Listeria monocytogenes,    coagulase negative staphylococci, S. aureus,    methicillin resistant S. aureus, Streptococcus agalactiae    (Group B), S. pneumoniae, S. pyogenes (Group A),    Acinetobacter baumannii, Enterobacter cloacae, E. coli,    Klebsiella oxytoca, K. pneumoniae, Proteus sp.,    Serratia marcescens, Haemophilus influenzae,    Neisseria meningitidis, Pseudomonas aeruginosa, Candida    albicans, C. glabrata, C krusei, C parapsilosis,    C. tropicalis and the KPC resistance gene.  Organism: Blood Culture PCR  Candida albicans (20 @ 12:56)  Organism: Candida albicans (20 @ 12:56)      -  Fluconazole: S 0.25 Fluconazole = Data established for mucosal disease, and is generally applicable for invasive disease.  Susceptibility is dependent on achieving the maximal possible blood level.  Doses of 400 MG/day or more may be required in adults with normal renalfunction and body habitus.      -  Interpretation: See note This test method is not approved by the FDA and is for research use only.  The performance characteristics of this assay may have been determined by [employer] and [facility], [place], [place]                            N/I - No interpretation available                                                         SDD – Sensitive Dose Dependent      Method Type: YSTMIC  Organism: Blood Culture PCR (20 @ 12:56)      -  Candida albicans: Detec      Method Type: PCR    Culture - Blood (collected 20 @ 20:00)  Source: .Blood Blood  Final Report (20 @ 01:00):    No Growth Final    Culture - Urine (collected 20 @ 09:31)  Source: .Urine Clean Catch (Midstream)  Final Report (20 @ 16:56):    50,000 - 99,000 CFU/mL Pseudomonas aeruginosa    10,000 - 49,000 CFU/mL Escherichia coli ESBL    <10,000 CFU/ml Normal Urogenital ailyn present  Organism: Pseudomonas aeruginosa  Escherichia coli ESBL (20 @ 16:56)  Organism: Escherichia coli ESBL (20 @ 16:56)      -  Amikacin: S <=16      -  Ampicillin: R >16 These ampicillin results predict results for amoxicillin      -  Ampicillin/Sulbactam: R 16/8 Enterobacter, Citrobacter, and Serratia may develop resistance during prolonged therapy (3-4 days)      -  Aztreonam: R >16      -  Cefazolin: R >16 (MIC_CL_[id number]) For uncomplicated UTI with K. pneumoniae, E. coli, or P. mirablis: HERBER <=16 is sensitive and HERBER >=32 is resistant. This also predicts results for oral agents cefaclor, cefdinir, cefpodoxime, cefprozil, cefuroxime axetil, cephalexin and locarbef for uncomplicated UTI. Note that some isolates may be susceptible to these agents while testing resistant to cefazolin.      -  Cefepime: R >16      -  Cefoxitin: S <=8      -  Ceftriaxone: R >32 Enterobacter, Citrobacter, and Serratia may develop resistance during prolonged therapy      -  Ciprofloxacin: R >2      -  Ertapenem: S <=1      -  Gentamicin: R >8      -  Imipenem: S <=1      -  Levofloxacin: R >4      -  Meropenem: S <=1      -  Nitrofurantoin: S <=32 Should not be used to treat pyelonephritis      -  Piperacillin/Tazobactam: R <=16      -  Tigecycline: S <=2      -  Tobramycin: R >8      -  Trimethoprim/Sulfamethoxazole: S <=2/38      Method Type: HERBER  Organism: Pseudomonas aeruginosa (20 @ 16:56)      -  Amikacin: S <=16      -  Aztreonam: S <=4      -  Cefepime: S <=4      -  Ceftazidime: S <=1      -  Ciprofloxacin: S <=1      -  Gentamicin: S <=4      -  Imipenem: S <=1      -  Levofloxacin: S <=2      -  Meropenem: S <=1      -  Piperacillin/Tazobactam: S <=16      -  Tobramycin: S <=4      Method Type: HERBER    Culture - Blood (collected 20 @ 19:37)  Source: .Blood Blood-Peripheral  Final Report (20 @ 03:01):    No Growth Final    Culture - Blood (collected 20 @ 19:37)  Source: .Blood Blood-Peripheral  Final Report (20 @ 03:01):    No Growth Final            INFECTIOUS DISEASES TESTING  Procalcitonin, Serum: 0.82 (20 @ 04:08)  MRSA PCR Result.: Negative (20 @ 16:56)  Procalcitonin, Serum: 2.20 (20 @ 05:35)  Fungitell: >500 (20 @ 05:35)  Procalcitonin, Serum: 0.42 (20 @ 16:20)  Procalcitonin, Serum: 0.22 (20 @ 04:40)  Procalcitonin, Serum: 0.57 (20 @ 04:00)  Procalcitonin, Serum: 1.85 (20 @ 05:10)  MRSA PCR Result.: Negative (20 @ 05:10)  Procalcitonin, Serum: 1.95 (20 @ 05:05)  COVID-19 PCR: Detected (20 @ 20:42)  Procalcitonin, Serum: 0.16 (20 @ 19:37)  MRSA PCR Result.: Negative (19 @ 13:49)  Rapid RVP Result: NotDetec (19 @ 11:42)  RSV Result: Negative (19 @ 11:42)  Flu A Result: Negative (19 @ 11:42)  Flu B Result: Negative (19 @ 11:42)      INFLAMMATORY MARKERS  C-Reactive Protein, Serum: 6.65 mg/dL (20 @ 04:08)  C-Reactive Protein, Serum: 17.16 mg/dL (20 @ 05:35)  C-Reactive Protein, Serum: 6.39 mg/dL (20 @ 16:20)  C-Reactive Protein, Serum: 4.49 mg/dL (20 @ 04:40)      RADIOLOGY & ADDITIONAL TESTS:  I have personally reviewed the last available Chest xray  CXR  Xray Chest 1 View- PORTABLE-Urgent:   EXAM:  XR CHEST PORTABLE URGENT 1V            PROCEDURE DATE:  2020            INTERPRETATION:  Clinical History / Reason for exam: ET tube adjustment.    Comparison : Chest radiograph chest x-ray from earlier the same day 3:35 AM    Technique/Positioning: Single frontal portable view.    Findings:    Support devices: ET tube with distal tip 2.5 cm above the megan. Enteric tube projecting below the left hemidiaphragm. Right-sided IJ catheter overlying the SVC. Multiple EKG leads overlying the chest wall.    Cardiac/mediastinum/hilum: Stable    Lung parenchyma/Pleura: Bilateral pulmonary opacities, not significantly changed.    Skeleton/soft tissues: Unremarkable.    Impression:      ET tube with distal tip about 2.5 cm above the megan. Additional tubes and lines as above.    Bilateral pulmonary opacities, stable since this morning but progressively increasing over several days.                  JULIA ZEPEDA M.D., ATTENDING RADIOLOGIST  This document has been electronically signed. 2020  2:23PM             (20 @ 14:13)      CT      CARDIOLOGY TESTING  12 Lead ECG:   Systolic BP 90 mmHg    Diastolic BP 62 mmHg    Ventricular Rate 156 BPM    Atrial Rate 156 BPM    P-R Interval 411 ms    QRS Duration 79 ms    Q-T Interval 263 ms    QTC Calculation(Bezet) 424 ms    R Axis 78 degrees    T Axis -66 degrees    Diagnosis Line no V6 recorded  Supraventricular tachycardia  Nonspecific ST and T wave abnormality  Abnormal ECG    Confirmed by JOSELUIS PARDO MD ([id number]) on 2020 10:17:49 AM (20 @ 08:23)  12 Lead ECG:   Systolic  mmHg    Diastolic BP 64 mmHg    Ventricular Rate 100 BPM    Atrial Rate 100 BPM    P-R Interval 100 ms    QRS Duration 88 ms    Q-T Interval 317 ms    QTC Calculation(Bezet) 409 ms    P Axis 79 degrees    R Axis 74 degrees    T Axis73 degrees    Diagnosis Line No lead V6 recorded  Normal sinus rhythm with short SD  Nonspecific ST and T wave abnormality minor      Confirmed by JOSELUIS PARDO MD ([id number]) on 2020 10:04:12 AM (20 @ 06:44)      MEDICATIONS  atorvastatin 80  chlorhexidine 0.12% Liquid 15  chlorhexidine 4% Liquid 1  ergocalciferol 91989  fluconAZOLE IVPB   fluconAZOLE IVPB 200  folic acid 1  furosemide Infusion 8  insulin lispro (HumaLOG) corrective regimen sliding scale   levETIRAcetam  IVPB 1000  metoprolol tartrate 25  pantoprazole  Injectable 40  propofol Infusion. 20  Remdesivir 100 milliGRAM(s) 100  senna 2  sertraline 50      WEIGHT  Weight (kg): 53 (20 @ 03:00)  Creatinine, Serum: 2.3 mg/dL (20 @ 04:00)      ANTIBIOTICS:  fluconAZOLE IVPB      fluconAZOLE IVPB 200 milliGRAM(s) IV Intermittent every 24 hours      All available historical records have been reviewed

## 2020-05-02 NOTE — PROGRESS NOTE ADULT - ASSESSMENT
IMPRESSION:    Acute hypoxemic respiratory failure  COVID 19  Probable superinfection SP ABX therapy   Candidemia   MYCHAL  oliguric   SAH   HO ESBL: UTI     PLAN:    CNS:  Change sedation to Propofol.  EEG.  Continue Keppra. FU MRI results     HEENT: Oral care    PULMONARY:  HOB @ 45 degrees.  Aspiration precautions.  Vent changes:  Wean O2        CARDIOVASCULAR: Keep I=O;  restart Lasix     GI: GI prophylaxis. OG Feeding. bowel regimen.  Protonix BID     RENAL:  Follow up lytes.  Correct as needed repeat BMP; Shah.     INFECTIOUS DISEASE: Follow up cultures.   Continue Diflucan     HEMATOLOGICAL:  DVT prophylaxis.  Repeat CBC; Target Hb >7     ENDOCRINE:  Follow up FS.  Insulin protocol if needed.     MUSCULOSKELETAL:  Bed chair position     MICU for now     Prognosis

## 2020-05-02 NOTE — PROGRESS NOTE ADULT - SUBJECTIVE AND OBJECTIVE BOX
ABHINAV DREW 74y Female  MRN#: [id number]   Hospital Day: 14d    SUBJECTIVE  Patient is a 74y old Female who presents with a chief complaint of shortness of breath (02 May 2020 08:33)  Currently admitted to medicine with the primary diagnosis of COVID-19 virus detected    INTERVAL HPI AND OVERNIGHT EVENTS:  Patient was examined and seen at bedside. This morning she is intubated and off pressors. Patient is on midazolam infusion for possible seizures.    REVIEW OF SYMPTOMS:  Unable to obtain due to patient's mental status     OBJECTIVE  PAST MEDICAL & SURGICAL HISTORY  Rheumatoid arthritis  GERD (gastroesophageal reflux disease)  MI (myocardial infarction)  Hyperlipidemia  CAD (coronary artery disease)  History of surgery: stent placement    ALLERGIES:  Zocor (Joint Pain)    MEDICATIONS:  STANDING MEDICATIONS  atorvastatin 80 milliGRAM(s) Oral at bedtime  chlorhexidine 0.12% Liquid 15 milliLiter(s) Oral Mucosa every 12 hours  chlorhexidine 4% Liquid 1 Application(s) Topical <User Schedule>  ergocalciferol 71872 Unit(s) Oral every week  fluconAZOLE IVPB      fluconAZOLE IVPB 200 milliGRAM(s) IV Intermittent every 24 hours  folic acid 1 milliGRAM(s) Oral daily  furosemide Infusion 8 mG/Hr IV Continuous <Continuous>  insulin glargine Injectable (LANTUS) 6 Unit(s) SubCutaneous at bedtime  insulin lispro (HumaLOG) corrective regimen sliding scale   SubCutaneous three times a day before meals  insulin lispro Injectable (HumaLOG) 2 Unit(s) SubCutaneous every 8 hours  levETIRAcetam  IVPB 1000 milliGRAM(s) IV Intermittent every 12 hours  metoprolol tartrate 25 milliGRAM(s) Oral two times a day  pantoprazole  Injectable 40 milliGRAM(s) IV Push two times a day  propofol Infusion. 20 MICROgram(s)/kG/Min IV Continuous <Continuous>  Remdesivir 100 milliGRAM(s) 100 milliGRAM(s) IV Intermittent <User Schedule>  senna 2 Tablet(s) Oral at bedtime  sertraline 50 milliGRAM(s) Oral daily    PRN MEDICATIONS  acetaminophen   Tablet .. 650 milliGRAM(s) Oral every 6 hours PRN      VITAL SIGNS: Last 24 Hours  T(C): 36.4 (02 May 2020 08:00), Max: 36.5 (02 May 2020 06:00)  T(F): 97.5 (02 May 2020 08:00), Max: 97.7 (02 May 2020 06:00)  HR: 100 (02 May 2020 10:00) (82 - 100)  BP: 107/60 (01 May 2020 13:00) (107/60 - 136/85)  BP(mean): 89 (01 May 2020 13:00) (79 - 89)  RR: 28 (02 May 2020 10:00) (15 - 30)  SpO2: 97% (02 May 2020 10:00) (91% - 100%)    LABS:                        9.8    38.87 )-----------( 103      ( 02 May 2020 04:00 )             27.3     05-02    131<L>  |  83<L>  |  111<HH>  ----------------------------<  98  4.4   |  34<H>  |  2.3<H>    Ca    6.4<L>      02 May 2020 04:00  Phos  7.5     05-02  Mg     2.4     05-02    TPro  3.8<L>  /  Alb  1.7<L>  /  TBili  0.5  /  DBili  x   /  AST  479<H>  /  ALT  85<H>  /  AlkPhos  212<H>  05-02    PT/INR - ( 02 May 2020 04:00 )   PT: 12.50 sec;   INR: 1.09 ratio         PTT - ( 02 May 2020 04:00 )  PTT:28.7 sec  Urinalysis Basic - ( 02 May 2020 04:30 )    Color: Yellow / Appearance: Slightly Turbid / S.016 / pH: x  Gluc: x / Ketone: Negative  / Bili: Negative / Urobili: 3 mg/dL   Blood: x / Protein: 100 mg/dL / Nitrite: Negative   Leuk Esterase: Large / RBC: 20 /HPF / WBC 88 /HPF   Sq Epi: x / Non Sq Epi: 1 /HPF / Bacteria: Few      ABG - ( 02 May 2020 02:07 )  pH, Arterial: 7.44  pH, Blood: x     /  pCO2: 57    /  pO2: 106   / HCO3: 38    / Base Excess: 12.2  /  SaO2: 98        Mode: AC/ CMV (Assist Control/ Continuous Mandatory Ventilation)  RR (machine): 30  TV (machine): 400  FiO2: 80  PEEP: 14  ITime: 1  MAP: 22  PIP: 27    Culture - Blood (collected 2020 17:34)  Source: .Blood None  Preliminary Report (01 May 2020 22:01):    No growth to date.    Culture - Sputum (collected 2020 16:00)  Source: .Sputum Deep tracheal aspiration  Gram Stain (2020 23:55):    Few polymorphonuclear leukocytes per low power field    No Squamous epithelial cells per low power field    Few Yeast like cells per oil power field  Final Report (01 May 2020 17:30):    Normal Respiratory Giovana present    RADIOLOGY:  < from: Xray Chest 1 View- PORTABLE-Routine (20 @ 05:11) >  FINDINGS:  Support devices: ET tube, enteric tube and right IJ central venous catheter stable.   Cardiac/mediastinum/hilum: Stable.  Lung parenchyma/Pleura: Bilateral opacities improving in the left upper lung field.. There is no pneumothorax.  Skeleton/soft tissues: Stable.    IMPRESSION:   Bilateral opacities including the left upper lung.  < end of copied text >    < from: MR Head No Cont (20 @ 14:59) >  Findings:  Ventricles and sulci are prominent in keeping with age appropriate cerebral volume loss.  There is restricted diffusion and sulcal FLAIR signal abnormality in the right occipital sulci compatible with known subarachnoid hemorrhage. There is no midline shift. There is hyperintense signal on the B 1000 sequence in the left thalamocapsular region with corresponding T2/FLAIR hyperintensity withno corresponding dark signal on the ADC map. There is no susceptibility artifact in this region and therefore, this is presumably a subacute infarct. Recommend follow-up MRI of the brain to assure expected evolution.  There is linear hyperintense signal on the FLAIR sequence, presumably in sulcal regions of the cerebellar folia which are likely related to subarachnoid hemorrhage. This is presumably from redistribution of known subarachnoid hemorrhage.  On the gradient echo sequence, there is susceptibility effect in the left subinsular region and in the left posterior temporal region consistent with blood products/hemosiderin staining. No corresponding hyperdense hemorrhage is seen in this region on the previous CT of the head referenced above.    There are multiple patchy deep white matter and confluent periventricular hyperintensities compatible with microangiopathy. The sella and parasellar structures are intact. The visualized vascular flow voids at the skull base are preserved.Cerebellar tonsils are normal in position. The sella is not expanded.  No gross orbital mass. Bilateral lens replacements. Opacification of bilateral mastoid air cells. Mild mucosal thickening in the left frontal sinus and bilateral ethmoid air cells. The bone marrow signal is within normal limits.    IMPRESSION:  1. Signal abnormality in the left thalamocapsular region may be a subacute infarct. Other etiologies are less likely. Recommend follow-up MRI of the brain to assure expected evolution.  2. Scattered subarachnoid hemorrhage with  presumed partial redistribution of known hemorrhage.  3. Susceptibility effect in the subinsular and left temporal region, consistent with blood products/hemosiderin staining. No corresponding hyperdense hemorrhage on the previous CT head dated 2020.  3. No significant mass effect or midline shift.  < end of copied text >    PHYSICAL EXAM:  CONSTITUTIONAL: No acute distress, well-developed, well-groomed, intubated  HEAD: Atraumatic, normocephalic  EYES: EOM intact, PERRLA but sluggish, conjunctiva and sclera clear  ENT: Supple, no masses, no thyromegaly, no bruits, no JVD; moist mucous membranes  PULMONARY: Clear to auscultation bilaterally; no wheezes, rales, or rhonchi  CARDIOVASCULAR: Regular rate and rhythm; no murmurs, rubs, or gallops  GASTROINTESTINAL: Soft, non-tender, non-distended; bowel sounds present  MUSCULOSKELETAL: 2+ peripheral pulses; no clubbing, no cyanosis, no edema  NEUROLOGY: does not follow commands, no gag reflex, no corneal reflex, sluggish pupils  SKIN: No rashes or lesions; warm and dry    ASSESSMENT & PLAN  Patient is a 73yo female with PMH of CAD s/p PCI x5, GERD, rheumatoid arthritis, and hyperlipidemia who was transferred from [facility] for worsening shortness of breath and hypoxia. Patient was admitted to the [facility] site after having 2 episodes of syncope at home and tested positive for COVID-19. Patient was intubated on 2020 after desaturating while on non-rebreather.    #Acute hypoxemic respiratory failure secondary to COVID-19 pneumonia with Candicemia  - COVID-19 PCR 2020: positive  - Isolation precautions (contact, droplet, airborne)  - ECG 2020: QTc 510  - Procalcitonin: 0.16->1.95->1.85->0.57->0.22->0.42->2.20->0.82  - CRP: 4.73->18.39->8.31->8.45->4.49->6.39->17.16->6.65  - D-dimer: 516->949  - ID consult appreciated  - Completed course of hydroxychloroquine, azithromycin, meropenem, anakinra, and methylprednisolone  - Hold remdesivir given worsening renal function and acute GI bleed  - Fungitell 2020: >500  - Blood Cx 2020: Candida albicans  - Blood Cx 2020: no growth to date  - MRSA nares negative  - Continue with fluconazole 200mg IV Q24H (Day #3)  - Daily blood cultures  - Follow cultures for sensitivities  - Change midazolam to propofol    #Possible underlying seizures with subarachnoid hemorrhage  - No improvement in mental status over past few days  - CT head 2020: small right subarachnoid parietal hemorrhage  - MRI head 2020: scattered subarachnoid hemorrhage, subacute infarct in the left thalamocapsular region  - While down in MRI, patient noted to be frothing at the mouth  - Continue with levetiracetam 1000mg IV Q12H  - Change midazolam to propofol  - Neurology consult pending  - Follow EEG    #Acute macrocytic anemia secondary to upper GI bleed s/p 2 units PRBC, improving  - Baseline hemoglobin: 12  - Hemoglobin today: 9.8  - OG lavage positive on 2020  - Patient received 1 unit PRBC on 2020 for Hb 6.7  - Patient received 1 unit PRBC on 2020 for Hb 7.2  - GI consult appreciated  - Continue with pantoprazole 40mg IV BID  - Keep active type and screen  - Transfuse if hemoglobin <7    #Acute kidney injury, worsening  - Baseline creatinine: 0.7  - Creatinine today: 2.3  - Patient was anuric overnight  - Monitor BUN/creatinine  - Avoid nephrotoxic agents  - Start furosemide drip at 6mg/hr  - Strict intakes and outputs  - Pending nephrology consult    #Hyperkalemia, resolved  - Potassium today: 4.4  - Follow potassium in AM    #Hyponatremia  - Sodium today: 131  - Follow sodium in AM    #Hyperglycemia with newly diagnosed diabetes mellitus  - Likely secondary to steroid use  - Hemoglobin A1c 2020: 6.5  - Discontinue insulin glargine and insulin lispro  - Insulin sliding scale coverage  - Monitor fingerstick glucose    #Hypertension  - Continue with metoprolol tartrate 25mg PO BID  - Can use PRN pushes of hydralazine    #Diarrhea, improving  - Continue with tube feeds    #CAD s/p PCI x5  - Hold aspirin 81mg PO QD  - Hold ticagrelor 90mg PO Q12H  - Continue with metoprolol tartrate 12.5mg PO BID  - Continue with atorvastatin 80mg PO QHS    #Asymptomatic bacteruria with history of ESBLE E. coli  - Urine Cx Culture Results: 50,000-99,000 Pseudomonas aeruginosa and 10,000-49,000 ESBL E coli  - Patient currently asymptomatic    #Hyperlipidemia  - Patient takes rosuvastatin 20mg PO QHS at home  - Has allergy to simvastatin  - Continue with atorvastatin 80mg PO QHS    #Rheumatoid arthritis  - Stable    #GERD  - Continue with pantoprazole 40mg IV BID    #Anxiety/Depression  - Continue with sertraline 50mg PO QD    #Suspected Vitamin D and folate deficiencies  - Continue with ergocalciferol 50,000 units Q7D  - Continue with folic acid 1mg PO QD    #Misc  - DVT Prophylaxis: SCDs  - GI Prophylaxis: pantoprazole 40mg IV BID  - Diet: NPO with tube feeds  - Activity: bedrest  - IV Fluids: Not indicated  - Code Status: Full Code    Dispo: MICU monitoring for now

## 2020-05-02 NOTE — CHART NOTE - NSCHARTNOTEFT_GEN_A_CORE
Spoke with , Zohaib, [phone]. Updated him on patient's status. Answered questions, addressed concerns.

## 2020-05-02 NOTE — CONSULT NOTE ADULT - ASSESSMENT
73 y/o F with PMH of CAD s/p PCI x5, GERD, Rheumatoid arthritis currently in ICU service for COVID19 with resp failure, mechanical ventilation with ARDS with FiO2 60%, secondary to Cytokine Release Syndrome, anemia and MYCHAL. Neurology consulted for seizure like activity and continues altered mental status. CT head on 4/30 showed SAH and Steven MRI showed subacute left thalamocrural infarct.   Active neurological issues:   1- Encephalopathy; Multifactorial: toxic metabolic, COVID para infection, CVA, SAH and possible seizure   2- Subarachnoid hemorrhage   3- Subacute left thalamo capsular infarct: A-A thromboembolism or para COVID infectious hypercoagulable state.   4- Seizure: secondary to SAH or parainfectious.     - CT angiogram of head and neck  - SBP<140   - Continue Keppra 1000 mg BID  - Video EEG monitoring   - Neurosurgery eval   - ASA 81 and Lipitor 80   Please check the following:  D Dimer, Fibrinogen, Fibrin split product, CRP, Ferritin, Complete hypercoagulability profile: Antiphospholipid antibody, dilute Adonay viper venom test, anti cardiolipin, beta2 glycoprotein, Clotting factors: Pr C, Activated Pr C resistance, Pr S, Antithrombin

## 2020-05-02 NOTE — PROGRESS NOTE ADULT - ASSESSMENT
· Assessment		  73 y/o F with PMH of CAD s/p PCI x5, GERD, Rheumatoid arthritis presented from [facility]) for worsening shortness of breath and hypoxia.     IMPRESSION;   #COVID19 with resp failure, mechanical ventilation with ARDS with FiO2 60%, secondary to Cytokine Release Syndrome  -inflammatory markers elevated  -elevated Ddimer and Ferritin are poor prognostic indicators and differentiate survivors from non survivors   S/p Anakinra with little response 4/19-21  RDV since 4/21. Held 4/30 secondary to GFR  #Candidemia with C albicans  with sputa being unremarkable. Lungs not the focus of candidemia    4/28 BCX +     4/30 BCX NGTD   Possible line related/GI/ focus   Nares ORSA NG  BCx 4/28 C albicans    RECOMMENDATIONS;   Remdesivir started 4/21-4/30. Holding given CrCl   Diflucan 200 mg iv q24h since 4/28, 14 days from cleared Cx  If hemodynamic compromise, start nicolas 1g q24h IV   ECHO  Will avoid opthalmological eval for now  Prognosis is very poor given no response to therapy and fungal superinfection

## 2020-05-03 NOTE — PROGRESS NOTE ADULT - NSHPATTENDINGPLANDISCUSS_GEN_ALL_CORE
ICU team
care team

## 2020-05-03 NOTE — PROGRESS NOTE ADULT - ATTENDING COMMENTS
I have personally seen and examined this patient.  I have fully participated in the care of this patient.  I have reviewed all pertinent clinical information, including history, physical exam, plan and note. VEEG overnight showed no seizure no epileptiform activity. Continue Keppra, Taper down sedation. CT angiogram of head and neck for acute infarct. I have reviewed all pertinent clinical information and reviewed all relevant imaging and diagnostic studies personally.  Recommendations as above.  Agree with above assessment except as noted.

## 2020-05-03 NOTE — PROGRESS NOTE ADULT - SUBJECTIVE AND OBJECTIVE BOX
Patient is a 74y old  Female who presents with a chief complaint of shortness of breath (03 May 2020 02:22)        Over Night Events:  On MV.  Sedated.  On EEG.  Sedated.  No overt seizure         ROS:     All ROS are negative except HPI         PHYSICAL EXAM    ICU Vital Signs Last 24 Hrs  T(C): 36.1 (03 May 2020 00:00), Max: 36.8 (02 May 2020 20:00)  T(F): 97 (03 May 2020 00:00), Max: 98.2 (02 May 2020 20:00)  HR: 127 (03 May 2020 07:59) (88 - 127)  BP: --  BP(mean): --  ABP: 120/72 (03 May 2020 07:00) (72/46 - 148/76)  ABP(mean): 86 (03 May 2020 07:00) (56 - 100)  RR: 30 (03 May 2020 07:00) (23 - 30)  SpO2: 95% (03 May 2020 07:59) (93% - 99%)      CONSTITUTIONAL:   Ill appearing.  Well nourished.  NAD    ENT:   Airway patent,   Mouth with normal mucosa.   No thrush    EYES:   Pupils equal,   Round and reactive to light.    CARDIAC:   Tachycardic   Regular rhythm.    No edema      Vascular:  Normal systolic impulse  No Carotid bruits    RESPIRATORY:   No wheezing  Bilateral BS  Normal chest expansion  Not tachypneic,  No use of accessory muscles    GASTROINTESTINAL:  Abdomen soft,   Non-tender,   No guarding,   + BS    GENITOURINARY  normal genitalia for sex  no edema    MUSCULOSKELETAL:   Range of motion is not limited,  No clubbing, cyanosis    NEUROLOGICAL:   Sedated    SKIN:   Skin normal color for race,   Warm and dry    No evidence of rash.    PSYCHIATRIC:   No apparent risk to self or others.    HEMATOLOGICAL:  No cervical  lymphadenopathy.  no inguinal lymphadenopathy      20 @ 07:01  -  20 @ 07:00  --------------------------------------------------------  IN:    furosemide Infusion: 84 mL    IV PiggyBack: 100 mL    midazolam Infusion.: 6 mL    norepinephrine Infusion: 2.5 mL    Peptamen A.F.: 720 mL    propofol Infusion: 158.4 mL    vasopressin Infusion: 19.2 mL  Total IN: 1090.1 mL    OUT:    Indwelling Catheter - Urethral: 60 mL    Rectal Tube: 1100 mL  Total OUT: 1160 mL    Total NET: -69.9 mL          LABS:                            9.5    37.53 )-----------( 93       ( 03 May 2020 06:00 )             27.1                         9.5    37.53 )-----------( 93       (  @ 06:00 )             27.1                9.8    38.87 )-----------( 103      (  @ 04:00 )             27.3                9.2    32.77 )-----------( 118      (  @ 04:08 )             26.3                10.2   35.30 )-----------( 137      (  @ 16:56 )             28.3                                          05-03    132<L>  |  84<L>  |  122<HH>  ----------------------------<  78  5.1<H>   |  32  |  3.0<H>    Ca    6.5<L>      03 May 2020 06:00  Phos  9.4     05-  Mg     2.5     05-    TPro  4.0<L>  /  Alb  1.5<L>  /  TBili  0.5  /  DBili  x   /  AST  591<H>  /  ALT  100<H>  /  AlkPhos  194<H>  05-03      PT/INR - ( 03 May 2020 06:00 )   PT: 11.60 sec;   INR: 1.01 ratio         PTT - ( 03 May 2020 06:00 )  PTT:26.8 sec                                       Urinalysis Basic - ( 03 May 2020 06:00 )    Color: Roxy / Appearance: Slightly Turbid / S.021 / pH: x  Gluc: x / Ketone: Negative  / Bili: Negative / Urobili: 6 mg/dL   Blood: x / Protein: 100 mg/dL / Nitrite: Negative   Leuk Esterase: Moderate / RBC: 67 /HPF / WBC 3 /HPF   Sq Epi: x / Non Sq Epi: 1 /HPF / Bacteria: Negative                                                  LIVER FUNCTIONS - ( 03 May 2020 06:00 )  Alb: 1.5 g/dL / Pro: 4.0 g/dL / ALK PHOS: 194 U/L / ALT: 100 U/L / AST: 591 U/L / GGT: x                                                  Culture - Blood (collected 2020 17:34)  Source: .Blood None  Preliminary Report (01 May 2020 22:01):    No growth to date.                                                   Mode: AC/ CMV (Assist Control/ Continuous Mandatory Ventilation)  RR (machine): 30  TV (machine): 400  FiO2: 80  PEEP: 14  ITime: 1  MAP: 22  PIP: 29                                      ABG - ( 03 May 2020 02:06 )  pH, Arterial: 7.42  pH, Blood: x     /  pCO2: 55    /  pO2: 75    / HCO3: 36    / Base Excess: 9.7   /  SaO2: 94    Lac 1.3              MEDICATIONS  (STANDING):  atorvastatin 80 milliGRAM(s) Oral at bedtime  chlorhexidine 0.12% Liquid 15 milliLiter(s) Oral Mucosa every 12 hours  chlorhexidine 4% Liquid 1 Application(s) Topical <User Schedule>  ergocalciferol 47008 Unit(s) Oral every week  fluconAZOLE IVPB      fluconAZOLE IVPB 200 milliGRAM(s) IV Intermittent every 24 hours  folic acid 1 milliGRAM(s) Oral daily  furosemide Infusion 8 mG/Hr (4 mL/Hr) IV Continuous <Continuous>  insulin lispro (HumaLOG) corrective regimen sliding scale   SubCutaneous three times a day before meals  levETIRAcetam  IVPB 1000 milliGRAM(s) IV Intermittent every 12 hours  metoprolol tartrate 25 milliGRAM(s) Oral two times a day  norepinephrine Infusion 0.05 MICROgram(s)/kG/Min (2.48 mL/Hr) IV Continuous <Continuous>  pantoprazole  Injectable 40 milliGRAM(s) IV Push two times a day  propofol Infusion. 20 MICROgram(s)/kG/Min (6.36 mL/Hr) IV Continuous <Continuous>  Remdesivir 100 milliGRAM(s) 100 milliGRAM(s) IV Intermittent <User Schedule>  senna 2 Tablet(s) Oral at bedtime  sertraline 50 milliGRAM(s) Oral daily  vasopressin Infusion 0.04 Unit(s)/Min (2.4 mL/Hr) IV Continuous <Continuous>    MEDICATIONS  (PRN):  acetaminophen   Tablet .. 650 milliGRAM(s) Oral every 6 hours PRN Temp greater or equal to 38C (100.4F)      New X-rays reviewed:                                                                                  ECHO    CXR interpreted by me:  ET OG OK>  Bibasilar infiltrates

## 2020-05-03 NOTE — PROGRESS NOTE ADULT - SUBJECTIVE AND OBJECTIVE BOX
ABHINAV DREW  74y, Female  Allergy: Zocor (Joint Pain)      LOS  15d    CHIEF COMPLAINT: shortness of breath (03 May 2020 12:46)      INTERVAL EVENTS/HPI  - T(F): , Max: 98.8 (20 @ 08:00)  - WBC Count: 37.53 (20 @ 06:00)  WBC Count: 38.87 (20 @ 04:00)  - Creatinine, Serum: 3.0 (20 @ 06:00) uptrending   Creatinine, Serum: 2.3 (20 @ 04:00)     -   -   -     ROS  cannot obtain secondary to patient's sedation and/or mental status    VITALS:  T(F): 98.8, Max: 98.8 (20 @ 08:00)  HR: 86  BP: --  RR: 30Vital Signs Last 24 Hrs  T(C): 37.1 (03 May 2020 08:00), Max: 37.1 (03 May 2020 08:00)  T(F): 98.8 (03 May 2020 08:00), Max: 98.8 (03 May 2020 08:00)  HR: 86 (03 May 2020 13:00) (86 - 127)  BP: --  BP(mean): --  RR: 30 (03 May 2020 13:00) (20 - 31)  SpO2: 98% (03 May 2020 13:00) (93% - 100%)    PHYSICAL EXAM:  Gen: Intubated  CV: RRR  Lungs: Bilateral chest expansion  Neuro: Sedated  Lines    FH: Non-contributory  Social Hx: Non-contributory    TESTS & MEASUREMENTS:                        9.5    37.53 )-----------( 93       ( 03 May 2020 06:00 )             27.1     05-03    132<L>  |  84<L>  |  122<HH>  ----------------------------<  78  5.1<H>   |  32  |  3.0<H>    Ca    6.5<L>      03 May 2020 06:00  Phos  9.4     05-03  Mg     2.5     05-03    TPro  4.0<L>  /  Alb  1.5<L>  /  TBili  0.5  /  DBili  x   /  AST  591<H>  /  ALT  100<H>  /  AlkPhos  194<H>      eGFR if Non African American: 15 mL/min/1.73M2 (20 @ 06:00)  eGFR if African American: 17 mL/min/1.73M2 (20 @ 06:00)    LIVER FUNCTIONS - ( 03 May 2020 06:00 )  Alb: 1.5 g/dL / Pro: 4.0 g/dL / ALK PHOS: 194 U/L / ALT: 100 U/L / AST: 591 U/L / GGT: x           Urinalysis Basic - ( 03 May 2020 06:00 )    Color: Roxy / Appearance: Slightly Turbid / S.021 / pH: x  Gluc: x / Ketone: Negative  / Bili: Negative / Urobili: 6 mg/dL   Blood: x / Protein: 100 mg/dL / Nitrite: Negative   Leuk Esterase: Moderate / RBC: 67 /HPF / WBC 3 /HPF   Sq Epi: x / Non Sq Epi: 1 /HPF / Bacteria: Negative        Culture - Blood (collected 20 @ 17:34)  Source: .Blood None  Preliminary Report (20 @ 22:01):    No growth to date.    Culture - Sputum (collected 20 @ 16:00)  Source: .Sputum Deep tracheal aspiration  Gram Stain (20 @ 23:55):    Few polymorphonuclear leukocytes per low power field    No Squamous epithelial cells per low power field    Few Yeast like cells per oil power field  Final Report (20 @ 17:30):    Normal Respiratory Ailyn present    Culture - Blood (collected 20 @ 11:00)  Source: .Blood Blood-Venous  Gram Stain (20 @ 09:23):    Growth in aerobic bottle: Yeast like cells  Final Report (20 @ 12:56):    Growth in aerobic bottle: Candida albicans    "Due to technical problems, Proteus sp. will Not be reported as part of    the BCID panel until further notice"    ***Blood Panel PCR results on this specimen are available    approximately 3 hours after the Gram stain result.***    Gram stain, PCR, and/or culture results may not always    correspond due to difference in methodologies.    ************************************************************    This PCR assay was performed using [employer].    The following targets are tested for: Enterococcus,    vancomycin resistant enterococci, Listeria monocytogenes,    coagulase negative staphylococci, S. aureus,    methicillin resistant S. aureus, Streptococcus agalactiae    (Group B), S. pneumoniae, S. pyogenes (Group A),    Acinetobacter baumannii, Enterobacter cloacae, E. coli,    Klebsiella oxytoca, K. pneumoniae, Proteus sp.,    Serratia marcescens, Haemophilus influenzae,    Neisseria meningitidis, Pseudomonas aeruginosa, Candida    albicans, C. glabrata, C krusei, C parapsilosis,    C. tropicalis and the KPC resistance gene.  Organism: Blood Culture PCR  Candida albicans (20 @ 12:56)  Organism: Candida albicans (20 @ 12:56)      -  Fluconazole: S 0.25 Fluconazole = Data established for mucosal disease, and is generally applicable for invasive disease.  Susceptibility is dependent on achieving the maximal possible blood level.  Doses of 400 MG/day or more may be required in adults with normal renalfunction and body habitus.      -  Interpretation: See note This test method is not approved by the FDA and is for research use only.  The performance characteristics of this assay may have been determined by [employer] and [facility], [place], [place]                            N/I - No interpretation available                                                         SDD – Sensitive Dose Dependent      Method Type: YSTMIC  Organism: Blood Culture PCR (20 @ 12:56)      -  Candida albicans: Detec      Method Type: PCR    Culture - Blood (collected 20 @ 20:00)  Source: .Blood Blood  Final Report (20 @ 01:00):    No Growth Final    Culture - Urine (collected 20 @ 09:31)  Source: .Urine Clean Catch (Midstream)  Final Report (20 @ 16:56):    50,000 - 99,000 CFU/mL Pseudomonas aeruginosa    10,000 - 49,000 CFU/mL Escherichia coli ESBL    <10,000 CFU/ml Normal Urogenital ailyn present  Organism: Pseudomonas aeruginosa  Escherichia coli ESBL (20 @ 16:56)  Organism: Escherichia coli ESBL (20 @ 16:56)      -  Amikacin: S <=16      -  Ampicillin: R >16 These ampicillin results predict results for amoxicillin      -  Ampicillin/Sulbactam: R 16/8 Enterobacter, Citrobacter, and Serratia may develop resistance during prolonged therapy (3-4 days)      -  Aztreonam: R >16      -  Cefazolin: R >16 ([id number]) For uncomplicated UTI with K. pneumoniae, E. coli, or P. mirablis: HERBER <=16 is sensitive and HERBER >=32 is resistant. This also predicts results for oral agents cefaclor, cefdinir, cefpodoxime, cefprozil, cefuroxime axetil, cephalexin and locarbef for uncomplicated UTI. Note that some isolates may be susceptible to these agents while testing resistant to cefazolin.      -  Cefepime: R >16      -  Cefoxitin: S <=8      -  Ceftriaxone: R >32 Enterobacter, Citrobacter, and Serratia may develop resistance during prolonged therapy      -  Ciprofloxacin: R >2      -  Ertapenem: S <=1      -  Gentamicin: R >8      -  Imipenem: S <=1      -  Levofloxacin: R >4      -  Meropenem: S <=1      -  Nitrofurantoin: S <=32 Should not be used to treat pyelonephritis      -  Piperacillin/Tazobactam: R <=16      -  Tigecycline: S <=2      -  Tobramycin: R >8      -  Trimethoprim/Sulfamethoxazole: S <=2/38      Method Type: HERBER  Organism: Pseudomonas aeruginosa (20 @ 16:56)      -  Amikacin: S <=16      -  Aztreonam: S <=4      -  Cefepime: S <=4      -  Ceftazidime: S <=1      -  Ciprofloxacin: S <=1      -  Gentamicin: S <=4      -  Imipenem: S <=1      -  Levofloxacin: S <=2      -  Meropenem: S <=1      -  Piperacillin/Tazobactam: S <=16      -  Tobramycin: S <=4      Method Type: HERBER    Culture - Blood (collected 20 @ 19:37)  Source: .Blood Blood-Peripheral  Final Report (20 @ 03:01):    No Growth Final    Culture - Blood (collected 20 @ 19:37)  Source: .Blood Blood-Peripheral  Final Report (20 @ 03:01):    No Growth Final            INFECTIOUS DISEASES TESTING  Procalcitonin, Serum: 0.82 (20 @ 04:08)  MRSA PCR Result.: Negative (20 @ 16:56)  Procalcitonin, Serum: 2.20 (20 @ 05:35)  Fungitell: >500 (20 @ 05:35)  Procalcitonin, Serum: 0.42 (20 @ 16:20)  Procalcitonin, Serum: 0.22 (20 @ 04:40)  Procalcitonin, Serum: 0.57 (20 @ 04:00)  Procalcitonin, Serum: 1.85 (20 @ 05:10)  MRSA PCR Result.: Negative (20 @ 05:10)  Procalcitonin, Serum: 1.95 (20 @ 05:05)  COVID-19 PCR: Detected (20 @ 20:42)  Procalcitonin, Serum: 0.16 (20 @ 19:37)  MRSA PCR Result.: Negative (19 @ 13:49)  Rapid RVP Result: NotDetec (19 @ 11:42)  RSV Result: Negative (19 @ 11:42)  Flu A Result: Negative (19 @ 11:42)  Flu B Result: Negative (19 @ 11:42)      INFLAMMATORY MARKERS  C-Reactive Protein, Serum: 6.65 mg/dL (20 @ 04:08)  C-Reactive Protein, Serum: 17.16 mg/dL (20 @ 05:35)  C-Reactive Protein, Serum: 6.39 mg/dL (20 @ 16:20)  C-Reactive Protein, Serum: 4.49 mg/dL (20 @ 04:40)      RADIOLOGY & ADDITIONAL TESTS:  I have personally reviewed the last available Chest xray  CXR      CT      CARDIOLOGY TESTING  12 Lead ECG:   Systolic BP 90 mmHg    Diastolic BP 62 mmHg    Ventricular Rate 156 BPM    Atrial Rate 156 BPM    P-R Interval 411 ms    QRS Duration 79 ms    Q-T Interval 263 ms    QTC Calculation(Bezet) 424 ms    R Axis 78 degrees    T Axis -66 degrees    Diagnosis Line no V6 recorded  Supraventricular tachycardia  Nonspecific ST and T wave abnormality  Abnormal ECG    Confirmed by JOSELUIS PARDO MD ([id number]) on 2020 10:17:49 AM (20 @ 08:23)  12 Lead ECG:   Systolic  mmHg    Diastolic BP 64 mmHg    Ventricular Rate 100 BPM    Atrial Rate 100 BPM    P-R Interval 100 ms    QRS Duration 88 ms    Q-T Interval 317 ms    QTC Calculation(Bezet) 409 ms    P Axis 79 degrees    R Axis 74 degrees    T Axis73 degrees    Diagnosis Line No lead V6 recorded  Normal sinus rhythm with short WA  Nonspecific ST and T wave abnormality minor      Confirmed by JOSELUIS PARDO MD ([id number]) on 2020 10:04:12 AM (20 @ 06:44)      MEDICATIONS  atorvastatin 80  chlorhexidine 0.12% Liquid 15  chlorhexidine 4% Liquid 1  ergocalciferol 53167  fluconAZOLE IVPB   fluconAZOLE IVPB 200  folic acid 1  insulin lispro (HumaLOG) corrective regimen sliding scale   levETIRAcetam  IVPB 1000  metoprolol tartrate 25  norepinephrine Infusion 0.05  pantoprazole  Injectable 40  propofol Infusion. 20  Remdesivir 100 milliGRAM(s) 100  senna 2  sertraline 50  vasopressin Infusion 0.04      WEIGHT  Weight (kg): 53 (20 @ 03:00)  Creatinine, Serum: 3.0 mg/dL (20 @ 06:00)      ANTIBIOTICS:  fluconAZOLE IVPB      fluconAZOLE IVPB 200 milliGRAM(s) IV Intermittent every 24 hours      All available historical records have been reviewed

## 2020-05-03 NOTE — PROGRESS NOTE ADULT - REASON FOR ADMISSION

## 2020-05-03 NOTE — PROGRESS NOTE ADULT - SUBJECTIVE AND OBJECTIVE BOX
Neuro critical care progress note      Presentation: Sob, and 2 episodes of syncope        HPI:  Patient is a 75 y/o F with PMH of CAD s/p PCI x5, GERD, Rheumatoid arthritis presented from [facility]) for worsening shortness of breath and hypoxia. Patient was admitted to [facility] on  after she had 2 episodes of syncope at home and was tested positive for COVID-19. Patient denied any symptoms related to COVID-19 until then except for the fall. Patient was on 2L NC until yesterday but today she was hypoxic on NC and tachypneic. She was started on nonrebreather and sent to [facility] for further management. Also c/o severe myalgias and generalized weakness. No c/o cough, nausea, vomiting. Had diarrhea at [place]. In ED, /75mm Hg, /min, 102.3F, saturating at 81% on RA. Labs showed d-dimer 516, WBC 15K with lymphopenia, creatinine 1.2. Chest x ray showed new bilateral opacities. Currently saturating at 98% on NRB.      Acute Problems:  -Rheumatoid arthritis  -GERD (gastroesophageal reflux disease)  -MI (myocardial infarction)  -Hyperlipidemia  -CAD (coronary artery disease)        History:  PMHx:   Rheumatoid arthritis  GERD (gastroesophageal reflux disease)  MI (myocardial infarction)  Hyperlipidemia  CAD (coronary artery disease)      Yesterday's Plan:  - CT angiogram of head and neck  - SBP<140   - Continue Keppra 1000 mg BID  - Video EEG monitoring   - Neurosurgery eval   - ASA 81 and Lipitor 80mg q 24  -Please check the following:  D Dimer, Fibrinogen, Fibrin split product, CRP, Ferritin, Complete hypercoagulability profile: Antiphospholipid antibody, dilute Adonay viper venom test, anti cardiolipin, beta2 glycoprotein, Clotting factors: Pr C, Activated Pr C resistance, Pr S, Antithrombin      Active Neurological Issues  1  Encephalopathy; Multifactorial: toxic metabolic, COVID para infection, CVA, SAH and possible seizure   2- Subarachnoid hemorrhage   3- Subacute left thalamo capsular infarct: A-A thromboembolism or para COVID infectious hypercoagulable state.   4- Seizure: secondary to SAH or parainfectious.         Last 24 hour updates:  -VEEG in progress  -Intubated, sedated on propofol drip  -Continues to be encephalopathic  -No clinical seizures        Medications  atorvastatin 80 milliGRAM(s) Oral at bedtime  chlorhexidine 0.12% Liquid 15 milliLiter(s) Oral Mucosa every 12 hours  chlorhexidine 4% Liquid 1 Application(s) Topical <User Schedule>  ergocalciferol 95238 Unit(s) Oral every week  fluconAZOLE IVPB      fluconAZOLE IVPB 200 milliGRAM(s) IV Intermittent every 24 hours  folic acid 1 milliGRAM(s) Oral daily  furosemide Infusion 8 mG/Hr IV Continuous <Continuous>  insulin lispro (HumaLOG) corrective regimen sliding scale   SubCutaneous three times a day before meals  levETIRAcetam  IVPB 1000 milliGRAM(s) IV Intermittent every 12 hours  metoprolol tartrate 25 milliGRAM(s) Oral two times a day  norepinephrine Infusion 0.05 MICROgram(s)/kG/Min IV Continuous <Continuous>  pantoprazole  Injectable 40 milliGRAM(s) IV Push two times a day  propofol Infusion. 20 MICROgram(s)/kG/Min IV Continuous <Continuous>  Remdesivir 100 milliGRAM(s) 100 milliGRAM(s) IV Intermittent <User Schedule>  senna 2 Tablet(s) Oral at bedtime  sertraline 50 milliGRAM(s) Oral daily  vasopressin Infusion 0.04 Unit(s)/Min IV Continuous <Continuous>        Vital signs  T(F): 97.4 (20 @ 16:00), Max: 97.8 (20 @ 12:00)  HR: 94 (20 @ 18:00) (94 - 102)  BP: --  RR: 29 (20 @ 18:00) (22 - 30)  SpO2: 96% (20 @ 18:00) (95% - 100%)                LABS              9.8    38.87 )-----------( 103      ( 02 May 2020 04:00 )             27.3     05-    131<L>  |  83<L>  |  111<HH>  ----------------------------<  98  4.4   |  34<H>  |  2.3<H>    Ca    6.4<L>      02 May 2020 04:00  Phos  7.5     05-  Mg     2.4     05-    TPro  3.8<L>  /  Alb  1.7<L>  /  TBili  0.5  /  DBili  x   /  AST  479<H>  /  ALT  85<H>  /  AlkPhos  212<H>  05-02    PT/INR - ( 02 May 2020 04:00 )   PT: 12.50 sec;   INR: 1.09 ratio         PTT - ( 02 May 2020 04:00 )  PTT:28.7 sec  Urinalysis Basic - ( 02 May 2020 04:30 )    Color: Yellow / Appearance: Slightly Turbid / S.016 / pH: x  Gluc: x / Ketone: Negative  / Bili: Negative / Urobili: 3 mg/dL   Blood: x / Protein: 100 mg/dL / Nitrite: Negative   Leuk Esterase: Large / RBC: 20 /HPF / WBC 88 /HPF   Sq Epi: x / Non Sq Epi: 1 /HPF / Bacteria: Few      LIVER FUNCTIONS - ( 02 May 2020 04:00 )  Alb: 1.7 g/dL / Pro: 3.8 g/dL / ALK PHOS: 212 U/L / ALT: 85 U/L / AST: 479 U/L / GGT: x               I&O's Detail    01 May 2020 07:01  -  02 May 2020 07:00  --------------------------------------------------------  IN:    dextrose 5%: 150 mL    furosemide Infusion: 3 mL    IV PiggyBack: 300 mL    midazolam Infusion.: 42 mL    pantoprazole Infusion: 10 mL    Peptamen A.F.: 960 mL  Total IN: 1465 mL    OUT:    Indwelling Catheter - Urethral: 1151 mL    Rectal Tube: 1000 mL  Total OUT: 2151 mL    Total NET: -686 mL      02 May 2020 07:01  -  03 May 2020 02:24  --------------------------------------------------------  IN:    furosemide Infusion: 36 mL    midazolam Infusion.: 6 mL    Peptamen A.F.: 480 mL    propofol Infusion: 72 mL  Total IN: 594 mL    OUT:    Indwelling Catheter - Urethral: 10 mL    Rectal Tube: 1000 mL  Total OUT: 1010 mL    Total NET: -416 mL            Ancillary Mangement:  Chest PT[]  Head of bed >35 [x]  Out of bed to chair []  PT/OT/ST []  Spirometry[]  DVT prophalaxis[X]        Neuro Exam:  Awake [x]no[]spontaneously[]occasionally[]to stimuli  AI []y[x]n   Following commands:[]y[x]n  Oriented:[x]0[]1[]2[]3    Tracking:[]y[x]n  Language: intubated sedated  Time off sedation for exam:  Pupils:  Right 2 > 1       Left 2   >  1            Corneal:( -)      Gag reflex: +       EOMI: Dysconjugate gaze         NIHSS:  LOC: 3     Questions:  2     Commands:  2  VF:  2       Gaze:  2     Facial:  0      RUE: 4  RLE: 4     LUE:  4   LLE:4  Ataxia:  0              Sensory:0  Language: 3           Dysarthria:2  Extinction: 0        Last CTH:  < from: CT Head No Cont (20 @ 11:19) >  Findings:    There is small amount of acute subarachnoid hemorrhage in the right parietal region.    The ventricles and cortical sulci are normal in size and configuration.   There is no mass effect or midline shift.  Gray-white matter differentiation is maintained.     There is bilateral mastoid and middle ear opacification. The visualized paranasal sinuses are clear. Endotracheal and orogastric tubes are present.    IMPRESSION:     Small subarachnoid hemorrhage in the right parietal region.    < end of copied text >        Last CTA/MRA: N/A    Last CTP: N/A    Last MRI: < from: MR Head No Cont (20 @ 14:59) >  IMPRESSION:    1. Signal abnormality in the left thalamocapsular region may be a subacute infarct. Other etiologies are less likely. Recommend follow-up MRI of the brain to assure expected evolution.    2. Scattered subarachnoid hemorrhage with  presumed partial redistribution of known hemorrhage.    3. Susceptibility effect in the subinsular and left temporal region, consistent with blood products/hemosiderin staining. No corresponding hyperdense hemorrhage on the previous CT head dated 2020.      < end of copied text >      Last TCD:    Last EEG: VEEG monitoring in progress    Last Echo: pending     ICHs:          Tobar&Cooper:         m-Corrigan:          Respiratory  ABG:ABG - ( 02 May 2020 02:07 )  pH, Arterial: 7.44  pH, Blood: x     /  pCO2: 57    /  pO2: 106   / HCO3: 38    / Base Excess: 12.2  /  SaO2: 98        Mode: AC/ CMV (Assist Control/ Continuous Mandatory Ventilation)  RR (machine): 30  TV (machine): 400  FiO2: 80  PEEP: 14  ITime: 1  MAP: 22  PIP: 27        Prophalaxis:      GI: Protonix 40 mg IVPB       DVT: B/L sequential stockings Neuro critical care progress note      Presentation: Sob, and 2 episodes of syncope        HPI:  Patient is a 73 y/o F with PMH of CAD s/p PCI x5, GERD, Rheumatoid arthritis presented from [facility]) for worsening shortness of breath and hypoxia. Patient was admitted to [place] on  after she had 2 episodes of syncope at home and was tested positive for COVID-19. Patient denied any symptoms related to COVID-19 until then except for the fall. Patient was on 2L NC until yesterday but today she was hypoxic on NC and tachypneic. She was started on nonrebreather and sent to [facility] for further management. Also c/o severe myalgias and generalized weakness. No c/o cough, nausea, vomiting. Had diarrhea at [place]. In ED, /75mm Hg, /min, 102.3F, saturating at 81% on RA. Labs showed d-dimer 516, WBC 15K with lymphopenia, creatinine 1.2. Chest x ray showed new bilateral opacities. Currently saturating at 98% on NRB.      Acute Problems:  -Rheumatoid arthritis  -GERD (gastroesophageal reflux disease)  -MI (myocardial infarction)  -Hyperlipidemia  -CAD (coronary artery disease)        History:  PMHx:   Rheumatoid arthritis  GERD (gastroesophageal reflux disease)  MI (myocardial infarction)  Hyperlipidemia  CAD (coronary artery disease)      Yesterday's Plan:  - CT angiogram of head and neck  - SBP<140   - Continue Keppra 1000 mg BID  - Video EEG monitoring   - Neurosurgery eval   - ASA 81 and Lipitor 80mg q 24  -Please check the following:  D Dimer, Fibrinogen, Fibrin split product, CRP, Ferritin, Complete hypercoagulability profile: Antiphospholipid antibody, dilute Adonay viper venom test, anti cardiolipin, beta2 glycoprotein, Clotting factors: Pr C, Activated Pr C resistance, Pr S, Antithrombin      Active Neurological Issues  1  Encephalopathy; Multifactorial: toxic metabolic, COVID para infection, CVA, SAH and possible seizure   2- Subarachnoid hemorrhage   3- Subacute left thalamo capsular infarct: A-A thromboembolism or para COVID infectious hypercoagulable state.   4- Seizure: secondary to SAH or parainfectious.         Last 24 hour updates:  -VEEG in progress  -Intubated, sedated on propofol drip  -Continues to be encephalopathic  -No clinical seizures        Medications  atorvastatin 80 milliGRAM(s) Oral at bedtime  chlorhexidine 0.12% Liquid 15 milliLiter(s) Oral Mucosa every 12 hours  chlorhexidine 4% Liquid 1 Application(s) Topical <User Schedule>  ergocalciferol 91653 Unit(s) Oral every week  fluconAZOLE IVPB      fluconAZOLE IVPB 200 milliGRAM(s) IV Intermittent every 24 hours  folic acid 1 milliGRAM(s) Oral daily  furosemide Infusion 8 mG/Hr IV Continuous <Continuous>  insulin lispro (HumaLOG) corrective regimen sliding scale   SubCutaneous three times a day before meals  levETIRAcetam  IVPB 1000 milliGRAM(s) IV Intermittent every 12 hours  metoprolol tartrate 25 milliGRAM(s) Oral two times a day  norepinephrine Infusion 0.05 MICROgram(s)/kG/Min IV Continuous <Continuous>  pantoprazole  Injectable 40 milliGRAM(s) IV Push two times a day  propofol Infusion. 20 MICROgram(s)/kG/Min IV Continuous <Continuous>  Remdesivir 100 milliGRAM(s) 100 milliGRAM(s) IV Intermittent <User Schedule>  senna 2 Tablet(s) Oral at bedtime  sertraline 50 milliGRAM(s) Oral daily  vasopressin Infusion 0.04 Unit(s)/Min IV Continuous <Continuous>        Vital signs  T(F): 97.4 (20 @ 16:00), Max: 97.8 (20 @ 12:00)  HR: 94 (20 @ 18:00) (94 - 102)  BP: --  RR: 29 (20 @ 18:00) (22 - 30)  SpO2: 96% (20 @ 18:00) (95% - 100%)                LABS              9.8    38.87 )-----------( 103      ( 02 May 2020 04:00 )             27.3     05-    131<L>  |  83<L>  |  111<HH>  ----------------------------<  98  4.4   |  34<H>  |  2.3<H>    Ca    6.4<L>      02 May 2020 04:00  Phos  7.5     05-  Mg     2.4     05-    TPro  3.8<L>  /  Alb  1.7<L>  /  TBili  0.5  /  DBili  x   /  AST  479<H>  /  ALT  85<H>  /  AlkPhos  212<H>  05-02    PT/INR - ( 02 May 2020 04:00 )   PT: 12.50 sec;   INR: 1.09 ratio         PTT - ( 02 May 2020 04:00 )  PTT:28.7 sec  Urinalysis Basic - ( 02 May 2020 04:30 )    Color: Yellow / Appearance: Slightly Turbid / S.016 / pH: x  Gluc: x / Ketone: Negative  / Bili: Negative / Urobili: 3 mg/dL   Blood: x / Protein: 100 mg/dL / Nitrite: Negative   Leuk Esterase: Large / RBC: 20 /HPF / WBC 88 /HPF   Sq Epi: x / Non Sq Epi: 1 /HPF / Bacteria: Few      LIVER FUNCTIONS - ( 02 May 2020 04:00 )  Alb: 1.7 g/dL / Pro: 3.8 g/dL / ALK PHOS: 212 U/L / ALT: 85 U/L / AST: 479 U/L / GGT: x               I&O's Detail    01 May 2020 07:01  -  02 May 2020 07:00  --------------------------------------------------------  IN:    dextrose 5%: 150 mL    furosemide Infusion: 3 mL    IV PiggyBack: 300 mL    midazolam Infusion.: 42 mL    pantoprazole Infusion: 10 mL    Peptamen A.F.: 960 mL  Total IN: 1465 mL    OUT:    Indwelling Catheter - Urethral: 1151 mL    Rectal Tube: 1000 mL  Total OUT: 2151 mL    Total NET: -686 mL      02 May 2020 07:01  -  03 May 2020 02:24  --------------------------------------------------------  IN:    furosemide Infusion: 36 mL    midazolam Infusion.: 6 mL    Peptamen A.F.: 480 mL    propofol Infusion: 72 mL  Total IN: 594 mL    OUT:    Indwelling Catheter - Urethral: 10 mL    Rectal Tube: 1000 mL  Total OUT: 1010 mL    Total NET: -416 mL            Ancillary Mangement:  Chest PT[]  Head of bed >35 [x]  Out of bed to chair []  PT/OT/ST []  Spirometry[]  DVT prophalaxis[X]        Neuro Exam:  Awake [x]no[]spontaneously[]occasionally[]to stimuli  AI []y[x]n   Following commands:[]y[x]n  Oriented:[x]0[]1[]2[]3    Tracking:[]y[x]n  Language: intubated sedated  Time off sedation for exam:  Pupils:  Right 2 > 1       Left 2   >  1            Corneal:( -)      Gag reflex: +       EOMI: Dysconjugate gaze         NIHSS:  LOC: 3     Questions:  2     Commands:  2  VF:  2       Gaze:  2     Facial:  0      RUE: 4  RLE: 4     LUE:  4   LLE:4  Ataxia:  0              Sensory:0  Language: 3           Dysarthria:2  Extinction: 0      mRS 5  0 No symptoms at all  1 No significant disability despite symptoms; able to carry out all usual duties and activities without assistance  2 Slight disability; unable to carry out all previous activities, but able to look after own affairs  3 Moderate disability; requiring some help, but able to walk without assistance  4 Moderately severe disability; unable to walk without assistance and unable to attend to own bodily needs without assistance  5 Severe disability; bedridden, incontinent and requiring constant nursing care and attention  6 Dead      Last CTH:  < from: CT Head No Cont (20 @ 11:19) >  Findings:    There is small amount of acute subarachnoid hemorrhage in the right parietal region.    The ventricles and cortical sulci are normal in size and configuration.   There is no mass effect or midline shift.  Gray-white matter differentiation is maintained.     There is bilateral mastoid and middle ear opacification. The visualized paranasal sinuses are clear. Endotracheal and orogastric tubes are present.    IMPRESSION:     Small subarachnoid hemorrhage in the right parietal region.    < end of copied text >        Last CTA/MRA: N/A    Last CTP: N/A    Last MRI: < from: MR Head No Cont (20 @ 14:59) >  IMPRESSION:    1. Signal abnormality in the left thalamocapsular region may be a subacute infarct. Other etiologies are less likely. Recommend follow-up MRI of the brain to assure expected evolution.    2. Scattered subarachnoid hemorrhage with  presumed partial redistribution of known hemorrhage.    3. Susceptibility effect in the subinsular and left temporal region, consistent with blood products/hemosiderin staining. No corresponding hyperdense hemorrhage on the previous CT head dated 2020.      < end of copied text >      Last TCD:    Last EEG: VEEG monitoring in progress    Last Echo: pending     ICHs:          Hunt&Cooper:     Grade I = Asymptomatic, mild headache, slight nuchal rigidity  Grade II = Moderate to severe headache, nuchal rigidity, no neurological deficit other than cranial nerve palsy  Grade III = Drowiness, confusion, mild focal neurological deficit  Grade IV = Stupor, moderate to severe hemiparesis  Grade V = Coma, decerebrate posturing    m-Corrigan:          Respiratory  ABG:ABG - ( 02 May 2020 02:07 )  pH, Arterial: 7.44  pH, Blood: x     /  pCO2: 57    /  pO2: 106   / HCO3: 38    / Base Excess: 12.2  /  SaO2: 98        Mode: AC/ CMV (Assist Control/ Continuous Mandatory Ventilation)  RR (machine): 30  TV (machine): 400  FiO2: 80  PEEP: 14  ITime: 1  MAP: 22  PIP: 27        Prophalaxis:      GI: Protonix 40 mg IVPB       DVT: B/L sequential stockings

## 2020-05-03 NOTE — GOALS OF CARE CONVERSATION - ADVANCED CARE PLANNING - CONVERSATION DETAILS
Spoke to pt's  and eldest daughter (Vandana) at length.  Palliative Care services introduced. Clinical condition reviewed. Grave prognosis understood.   Introduced care options to initiate DNR/no escalation of care and/or comfort measures with vent withdrawal.  Family wish to initiate DNR status and withdraw vent support today. Family aware of likely rapid progression to resp failure, further hypotension, cardiac arrest and death upon withdrawal.  All questions answered in detail  Support provided

## 2020-05-03 NOTE — CONSULT NOTE ADULT - SUBJECTIVE AND OBJECTIVE BOX
ABHINAV DREW  74y, Female  Allergy: Zocor (Joint Pain)      All historical available data reviewed.    HPI:  Patient is a 75 y/o F with PMH of CAD s/p PCI x5, GERD, Rheumatoid arthritis presented from [facility] for worsening shortness of breath and hypoxia. Patient was admitted to [place] on  after she had 2 episodes of syncope at home and was tested positive for COVID-19. Patient denied any symptoms related to COVID-19 until then except for the fall. Patient was on 2L NC until yesterday but today she was hypoxic on NC and tachypneic. She was started on nonrebreather and sent to [facility] for further management. Also c/o severe myalgias and generalized weakness. No c/o cough, nausea, vomiting. Had diarrhea at [place].   In ED, /75mm Hg, /min, 102.3F, saturating at 81% on RA. Labs showed d-dimer 516, WBC 15K with lymphopenia, creatinine 1.2. Chest x ray showed new bilateral opacities. Currently saturating at 98% on NRB. (2020 01:44)    FAMILY HISTORY:  Family history of heart disease (Father): MI at age 84,   Family history of heart attack (Mother): MI at age 60    PAST MEDICAL & SURGICAL HISTORY:  Rheumatoid arthritis  GERD (gastroesophageal reflux disease)  MI (myocardial infarction)  Hyperlipidemia  CAD (coronary artery disease)  History of surgery: stent placement        VITALS:  T(F): 97, Max: 102.3 (20 @ 23:31)  HR: 84  BP: 112/54  RR: 25Vital Signs Last 24 Hrs  T(C): 36.1 (2020 08:00), Max: 39.4 (2020 16:12)  T(F): 97 (2020 08:00), Max: 103 (2020 16:12)  HR: 84 (2020 08:00) (80 - 133)  BP: 112/54 (2020 08:00) (95/43 - 120/75)  BP(mean): 75 (2020 08:00) (57 - 77)  RR: 25 (2020 08:00) (18 - 25)  SpO2: 95% (2020 08:54) (81% - 99%)    TESTS & MEASUREMENTS:                        11.3   13.69 )-----------( 131      ( 2020 05:05 )             33.0     04-18    136  |  101  |  21<H>  ----------------------------<  81  4.0   |  20  |  1.0    Ca    7.7<L>      2020 05:05  Mg     2.2     18    TPro  5.5<L>  /  Alb  3.1<L>  /  TBili  0.5  /  DBili  x   /  AST  75<H>  /  ALT  19  /  AlkPhos  49  18    LIVER FUNCTIONS - ( 2020 05:05 )  Alb: 3.1 g/dL / Pro: 5.5 g/dL / ALK PHOS: 49 U/L / ALT: 19 U/L / AST: 75 U/L / GGT: x             Culture - Urine (collected 20 @ 09:31)  Source: .Urine Clean Catch (Midstream)  Final Report (20 @ 16:56):    50,000 - 99,000 CFU/mL Pseudomonas aeruginosa    10,000 - 49,000 CFU/mL Escherichia coli ESBL    <10,000 CFU/ml Normal Urogenital ailyn present  Organism: Pseudomonas aeruginosa  Escherichia coli ESBL (20 @ 16:56)  Organism: Escherichia coli ESBL (20 @ 16:56)      -  Amikacin: S <=16      -  Ampicillin: R >16 These ampicillin results predict results for amoxicillin      -  Ampicillin/Sulbactam: R 16/8 Enterobacter, Citrobacter, and Serratia may develop resistance during prolonged therapy (3-4 days)      -  Aztreonam: R >16      -  Cefazolin: R >16 ([id number]) For uncomplicated UTI with K. pneumoniae, E. coli, or P. mirablis: HERBER <=16 is sensitive and HERBER >=32 is resistant. This also predicts results for oral agents cefaclor, cefdinir, cefpodoxime, cefprozil, cefuroxime axetil, cephalexin and locarbef for uncomplicated UTI. Note that some isolates may be susceptible to these agents while testing resistant to cefazolin.      -  Cefepime: R >16      -  Cefoxitin: S <=8      -  Ceftriaxone: R >32 Enterobacter, Citrobacter, and Serratia may develop resistance during prolonged therapy      -  Ciprofloxacin: R >2      -  Ertapenem: S <=1      -  Gentamicin: R >8      -  Imipenem: S <=1      -  Levofloxacin: R >4      -  Meropenem: S <=1      -  Nitrofurantoin: S <=32 Should not be used to treat pyelonephritis      -  Piperacillin/Tazobactam: R <=16      -  Tigecycline: S <=2      -  Tobramycin: R >8      -  Trimethoprim/Sulfamethoxazole: S <=2/38      Method Type: HERBER  Organism: Pseudomonas aeruginosa (20 @ 16:56)      -  Amikacin: S <=16      -  Aztreonam: S <=4      -  Cefepime: S <=4      -  Ceftazidime: S <=1      -  Ciprofloxacin: S <=1      -  Gentamicin: S <=4      -  Imipenem: S <=1      -  Levofloxacin: S <=2      -  Meropenem: S <=1      -  Piperacillin/Tazobactam: S <=16      -  Tobramycin: S <=4      Method Type: HERBER    Culture - Blood (collected 20 @ 19:37)  Source: .Blood Blood-Peripheral  Preliminary Report (04-15-20 @ 03:01):    No growth to date.    Culture - Blood (collected 20 @ 19:37)  Source: .Blood Blood-Peripheral  Preliminary Report (04-15-20 @ 03:01):      No growth to date.            RADIOLOGY & ADDITIONAL TESTS:  Personal review of radiological diagnostics performed  Echo and EKG results noted when applicable.     MEDICATIONS:  acetaminophen   Tablet .. 650 milliGRAM(s) Oral every 6 hours PRN  ALPRAZolam 1 milliGRAM(s) Oral three times a day PRN  aspirin  chewable 81 milliGRAM(s) Oral daily  chlorhexidine 4% Liquid 1 Application(s) Topical <User Schedule>  enoxaparin Injectable 40 milliGRAM(s) SubCutaneous daily  ergocalciferol 41162 Unit(s) Oral every week  folic acid 1 milliGRAM(s) Oral daily  hydroxychloroquine 400 milliGRAM(s) Oral daily  meropenem  IVPB 1000 milliGRAM(s) IV Intermittent every 8 hours  methylPREDNISolone sodium succinate Injectable 60 milliGRAM(s) IV Push every 12 hours  metoprolol succinate ER 25 milliGRAM(s) Oral daily  pantoprazole    Tablet 40 milliGRAM(s) Oral before breakfast  sertraline 50 milliGRAM(s) Oral daily  sodium chloride 0.9%. 1000 milliLiter(s) IV Continuous <Continuous>  ticagrelor 90 milliGRAM(s) Oral every 12 hours  vancomycin  IVPB 750 milliGRAM(s) IV Intermittent every 12 hours      ANTIBIOTICS:  hydroxychloroquine 400 milliGRAM(s) Oral daily  meropenem  IVPB 1000 milliGRAM(s) IV Intermittent every 8 hours  vancomycin  IVPB 750 milliGRAM(s) IV Intermittent every 12 hours
NEPHROLOGY CONSULTATION NOTE  75 y/o F with PMH of CAD s/p PCI x5, GERD, Rheumatoid arthritis presented from [facility]) for worsening shortness of breath and hypoxia. Patient was admitted to [place] on  after she had 2 episodes of syncope at home and was tested positive for COVID-19. Patient denied any symptoms related to COVID-19 until then except for the fall. Patient was on 2L NC until yesterday but today she was hypoxic on NC and tachypneic. She was started on nonrebreather and sent to [facility] for further management. Also c/o severe myalgias and generalized weakness. No c/o cough, nausea, vomiting. Had diarrhea at [place].   In ED, /75mm Hg, /min, 102.3F, saturating at 81% on RA. Labs showed d-dimer 516, WBC 15K with lymphopenia, creatinine 1.2. Chest x ray showed new bilateral opacities. Currently saturating at 98% on NRB. (2020 01:44).     Patient is currently under ICU service for COVID19 with resp failure, mechanical ventilation with ARDS with FiO2 60%, secondary to Cytokine Release Syndrome, anemia and MYCHAL. s    . CT head on  showed SAH and Steven MRI showed subacute left thalamocrural infarct.       Cr rising UOP dropped   PAST MEDICAL & SURGICAL HISTORY:  Rheumatoid arthritis  GERD (gastroesophageal reflux disease)  MI (myocardial infarction)  Hyperlipidemia  CAD (coronary artery disease)  History of surgery: stent placement    Allergies:  Zocor (Joint Pain)    Home Medications Reviewed  Hospital Medications:   MEDICATIONS  (STANDING):  atorvastatin 80 milliGRAM(s) Oral at bedtime  chlorhexidine 0.12% Liquid 15 milliLiter(s) Oral Mucosa every 12 hours  chlorhexidine 4% Liquid 1 Application(s) Topical <User Schedule>  ergocalciferol 10177 Unit(s) Oral every week  fluconAZOLE IVPB      fluconAZOLE IVPB 200 milliGRAM(s) IV Intermittent every 24 hours  folic acid 1 milliGRAM(s) Oral daily  insulin lispro (HumaLOG) corrective regimen sliding scale   SubCutaneous three times a day before meals  levETIRAcetam  IVPB 1000 milliGRAM(s) IV Intermittent every 12 hours  metoprolol tartrate 25 milliGRAM(s) Oral two times a day  norepinephrine Infusion 0.05 MICROgram(s)/kG/Min (2.48 mL/Hr) IV Continuous <Continuous>  pantoprazole  Injectable 40 milliGRAM(s) IV Push two times a day  propofol Infusion. 20 MICROgram(s)/kG/Min (6.36 mL/Hr) IV Continuous <Continuous>  Remdesivir 100 milliGRAM(s) 100 milliGRAM(s) IV Intermittent <User Schedule>  senna 2 Tablet(s) Oral at bedtime  sertraline 50 milliGRAM(s) Oral daily  vasopressin Infusion 0.04 Unit(s)/Min (2.4 mL/Hr) IV Continuous <Continuous>      SOCIAL HISTORY:  Denies ETOH,Smoking,   FAMILY HISTORY:  Family history of heart disease (Father): MI at age 84,   Family history of heart attack (Mother): MI at age 60        REVIEW OF SYSTEMS:  unabel to obtain    VITALS:  T(F): 97 (20 @ 00:00), Max: 98.2 (20 @ 20:00)  HR: 127 (20 @ 07:59)  BP: --  RR: 30 (20 @ 07:00)  SpO2: 95% (20 @ 07:59)     07:01  -   @ 07:00  --------------------------------------------------------  IN: 1090.1 mL / OUT: 1160 mL / NET: -69.9 mL          20 @ 07:01  -  20 @ 07:00  --------------------------------------------------------  IN: 0 mL / OUT: 60 mL / NET: -60 mL      I&O's Detail    02 May 2020 07:01  -  03 May 2020 07:00  --------------------------------------------------------  IN:    furosemide Infusion: 84 mL    IV PiggyBack: 100 mL    midazolam Infusion.: 6 mL    norepinephrine Infusion: 2.5 mL    Peptamen A.F.: 720 mL    propofol Infusion: 158.4 mL    vasopressin Infusion: 19.2 mL  Total IN: 1090.1 mL    OUT:    Indwelling Catheter - Urethral: 60 mL    Rectal Tube: 1100 mL  Total OUT: 1160 mL    Total NET: -69.9 mL            PHYSICAL EXAM:  Constitutional:  intubated   HEENT: anicteric sclera, oropharynx clear, MMM    Neurological: sedated     :  +hall.       LABS:      132<L>  |  84<L>  |  122<HH>  ----------------------------<  78  5.1<H>   |  32  |  3.0<H>    Ca    6.5<L>      03 May 2020 06:00  Phos  9.4     05  Mg     2.5         TPro  4.0<L>  /  Alb  1.5<L>  /  TBili  0.5  /  DBili      /  AST  591<H>  /  ALT  100<H>  /  AlkPhos  194<H>      Creatinine Trend: 3.0 <--, 2.3 <--, 2.1 <--, 2.0 <--, 2.0 <--, 1.8 <--, 1.7 <--, 1.4 <--, 1.3 <--, 0.7 <--, 0.9 <--, 0.9 <--, 0.9 <--, 0.9 <--, 1.0 <--, 1.1 <--, 1.1 <--, 1.3 <--, 1.2 <--, 1.2 <--, 0.6 <--, 1.0 <--, 1.1 <--, 0.7 <--, 0.7 <--, 1.0 <--                        9.5    37.53 )-----------( 93       ( 03 May 2020 06:00 )             27.1     Urine Studies:  Urinalysis Basic - ( 03 May 2020 06:00 )    Color: Roxy / Appearance: Slightly Turbid / S.021 / pH:   Gluc:  / Ketone: Negative  / Bili: Negative / Urobili: 6 mg/dL   Blood:  / Protein: 100 mg/dL / Nitrite: Negative   Leuk Esterase: Moderate / RBC: 67 /HPF / WBC 3 /HPF   Sq Epi:  / Non Sq Epi: 1 /HPF / Bacteria: Negative                RADIOLOGY & ADDITIONAL STUDIES:
Neurology  Consult Note  05-02-20    Name:  ABHINAV DREW; 74y (1945)    Reason for Admission: COVID 19 VIRUS DETECTED      Chief Complaint:  HPI:  Patient is a 73 y/o F with PMH of CAD s/p PCI x5, GERD, Rheumatoid arthritis presented from [facility]) for worsening shortness of breath and hypoxia. Patient was admitted to [place] on 4/13 after she had 2 episodes of syncope at home and was tested positive for COVID-19. Patient denied any symptoms related to COVID-19 until then except for the fall. Patient was on 2L NC until yesterday but today she was hypoxic on NC and tachypneic. She was started on nonrebreather and sent to [facility] for further management. Also c/o severe myalgias and generalized weakness. No c/o cough, nausea, vomiting. Had diarrhea at [place].   In ED, /75mm Hg, /min, 102.3F, saturating at 81% on RA. Labs showed d-dimer 516, WBC 15K with lymphopenia, creatinine 1.2. Chest x ray showed new bilateral opacities. Currently saturating at 98% on NRB. (18 Apr 2020 01:44).     Patient is currently under ICU service for COVID19 with resp failure, mechanical ventilation with ARDS with FiO2 60%, secondary to Cytokine Release Syndrome, anemia and MYCHAL. Neurology consulted for seizure like activity and continues altered mental status. CT head on 4/30 showed SAH and Steven MRI showed subacute left thalamocrural infarct.     Review of Systems:  Unable to obtain     Zocor (Joint Pain)      PMHx:   Rheumatoid arthritis  GERD (gastroesophageal reflux disease)  MI (myocardial infarction)  Hyperlipidemia  CAD (coronary artery disease)    PFHx:   Family history of heart disease (Father)  Family history of heart attack (Mother)    PSuHx:   History of surgery  No significant past surgical history    PSoHx:   No illicit drug.         Medications:  MEDICATIONS  (STANDING):  atorvastatin 80 milliGRAM(s) Oral at bedtime  chlorhexidine 0.12% Liquid 15 milliLiter(s) Oral Mucosa every 12 hours  chlorhexidine 4% Liquid 1 Application(s) Topical <User Schedule>  ergocalciferol 94044 Unit(s) Oral every week  fluconAZOLE IVPB      fluconAZOLE IVPB 200 milliGRAM(s) IV Intermittent every 24 hours  folic acid 1 milliGRAM(s) Oral daily  furosemide Infusion 8 mG/Hr (4 mL/Hr) IV Continuous <Continuous>  insulin lispro (HumaLOG) corrective regimen sliding scale   SubCutaneous three times a day before meals  levETIRAcetam  IVPB 1000 milliGRAM(s) IV Intermittent every 12 hours  metoprolol tartrate 25 milliGRAM(s) Oral two times a day  pantoprazole  Injectable 40 milliGRAM(s) IV Push two times a day  propofol Infusion. 20 MICROgram(s)/kG/Min (6.36 mL/Hr) IV Continuous <Continuous>  Remdesivir 100 milliGRAM(s) 100 milliGRAM(s) IV Intermittent <User Schedule>  senna 2 Tablet(s) Oral at bedtime  sertraline 50 milliGRAM(s) Oral daily    MEDICATIONS  (PRN):  acetaminophen   Tablet .. 650 milliGRAM(s) Oral every 6 hours PRN Temp greater or equal to 38C (100.4F)    Vitals:  T(C): 36.3 (05-02-20 @ 16:00), Max: 36.6 (05-02-20 @ 12:00)  HR: 96 (05-02-20 @ 16:00) (82 - 102)  BP: --  RR: 23 (05-02-20 @ 16:00) (15 - 30)  SpO2: 97% (05-02-20 @ 16:00) (93% - 100%)    Labs:                        9.8    38.87 )-----------( 103      ( 02 May 2020 04:00 )             27.3     05-02    131<L>  |  83<L>  |  111<HH>  ----------------------------<  98  4.4   |  34<H>  |  2.3<H>    Ca    6.4<L>      02 May 2020 04:00  Phos  7.5     05-02  Mg     2.4     05-02    TPro  3.8<L>  /  Alb  1.7<L>  /  TBili  0.5  /  DBili  x   /  AST  479<H>  /  ALT  85<H>  /  AlkPhos  212<H>  05-02    CAPILLARY BLOOD GLUCOSE      POCT Blood Glucose.: 130 mg/dL (02 May 2020 15:50)    LIVER FUNCTIONS - ( 02 May 2020 04:00 )  Alb: 1.7 g/dL / Pro: 3.8 g/dL / ALK PHOS: 212 U/L / ALT: 85 U/L / AST: 479 U/L / GGT: x             Culture - Blood (collected 30 Apr 2020 17:34)  Source: .Blood None  Preliminary Report (01 May 2020 22:01):    No growth to date.      PT/INR - ( 02 May 2020 04:00 )   PT: 12.50 sec;   INR: 1.09 ratio         PTT - ( 02 May 2020 04:00 )  PTT:28.7 sec  CSF:      PHYSICAL EXAMINATION:  General: Intubated and sedated w Propofol.   Eyes: Conjunctiva and sclera clear.  Neurologic:  - Mental Status: Ventilated and sedated   Pupils are 3 mm OU and subtle reaction   VOR intact  No cornea   Weak gag  No spontaneous movement   No response to noxious     Radiology:  MR Head No Cont:  (01 May 2020 14:59)  CT Head No Cont:  (30 Apr 2020 11:19)  - CT head 4/30/2020: small right subarachnoid parietal hemorrhage  - MRI head 5/1/2020: scattered subarachnoid hemorrhage, subacute infarct in the left thalamocapsular region
Patient is a 74y old  Female who presents with a chief complaint of shortness of breath      HPI:  Patient is a 75 y/o F with PMH of CAD s/p PCI x5, GERD, Rheumatoid arthritis presented for resp distress 2/2 COVID s/p intubation, course c/w gabriela, acute anemia. GI called for coffee ground emesis found during OG lavage         PAST MEDICAL & SURGICAL HISTORY:  Rheumatoid arthritis  GERD (gastroesophageal reflux disease)  MI (myocardial infarction)  Hyperlipidemia  CAD (coronary artery disease)  History of surgery: stent placement      Home Medications:  aspirin 81 mg oral tablet, chewable: 1 tab(s) orally once a day ()  Crestor 20 mg oral tablet: 1 tab(s) orally once a day ()  folic acid 1 mg oral tablet: 1 tab(s) orally once a day ()  guaifenesin-dextromethorphan 100 mg-10 mg/5 mL oral liquid: 10 milliliter(s) orally every 4 hours, As needed, Cough ()  methotrexate 2.5 mg oral tablet: 6 tab(s) orally once a week ()  pantoprazole 40 mg oral delayed release tablet: 1 tab(s) orally once a day ()  predniSONE 10 mg oral tablet: 1 tab(s) orally once a day ()  Protonix 40 mg oral delayed release tablet: 1 tab(s) orally once a day (:)  sertraline 50 mg oral tablet: 1 tab(s) orally once a day (at bedtime) ()  Toprol-XL 25 mg oral tablet, extended release: 1 tab(s) orally once a day (:)  Vitamin D2 50,000 intl units (1.25 mg) oral capsule: 1 cap(s) orally once a week ()  Xanax 1 mg oral tablet: 1 tab(s) orally 3 times a day, As Needed ()      MEDICATIONS  (STANDING):  atorvastatin 80 milliGRAM(s) Oral at bedtime  caspofungin IVPB      caspofungin IVPB 50 milliGRAM(s) IV Intermittent every 24 hours  chlorhexidine 0.12% Liquid 15 milliLiter(s) Oral Mucosa every 12 hours  chlorhexidine 4% Liquid 1 Application(s) Topical <User Schedule>  dexMEDEtomidine Infusion 0.2 MICROgram(s)/kG/Hr (2.65 mL/Hr) IV Continuous <Continuous>  dextrose 5% 1000 milliLiter(s) (150 mL/Hr) IV Continuous <Continuous>  ergocalciferol 10474 Unit(s) Oral every week  folic acid 1 milliGRAM(s) Oral daily  furosemide Infusion 6 mG/Hr (3 mL/Hr) IV Continuous <Continuous>  insulin glargine Injectable (LANTUS) 6 Unit(s) SubCutaneous at bedtime  insulin lispro (HumaLOG) corrective regimen sliding scale   SubCutaneous three times a day before meals  insulin lispro Injectable (HumaLOG) 2 Unit(s) SubCutaneous every 8 hours  meropenem  IVPB 1000 milliGRAM(s) IV Intermittent every 12 hours  methylPREDNISolone sodium succinate Injectable 20 milliGRAM(s) IV Push daily  metoprolol tartrate 25 milliGRAM(s) Oral two times a day  pantoprazole Infusion 8 mG/Hr (10 mL/Hr) IV Continuous <Continuous>  Remdesivir 100 milliGRAM(s) 100 milliGRAM(s) IV Intermittent <User Schedule>  senna 2 Tablet(s) Oral at bedtime  sertraline 50 milliGRAM(s) Oral daily    MEDICATIONS  (PRN):  acetaminophen   Tablet .. 650 milliGRAM(s) Oral every 6 hours PRN Temp greater or equal to 38C (100.4F)      Allergies    Zocor (Joint Pain)    Intolerances        FAMILY HISTORY:  Family history of heart disease (Father): MI at age 84,   Family history of heart attack (Mother): MI at age 60      SOCIAL    REVIEW OF SYSTEMS  UTO    Vital Signs Last 24 Hrs  T(C): 36.2 (2020 08:00), Max: 36.8 (2020 20:00)  T(F): 97.1 (2020 08:00), Max: 98.3 (2020 20:00)  HR: 74 (2020 11:00) (62 - 100)  BP: --  BP(mean): --  RR: 30 (2020 11:00) (22 - 34)  SpO2: 98% (2020 11:00) (92% - 98%)    GENERAL:  no distress  SKIN: no new changes   HEENT:  NC/AT,  anicteric  CHEST:   breath sounds decreased  HEART:  Regular rhythm  ABDOMEN:  Soft, positive bowel sounds, non-tender, + distension, + ecchymosis, no rigidity, rebound, guarding, ascites   EXTREMITIES:  no new cyanosis  PSYCHIATRIC: sedated       CBC Full  -  ( 2020 05:45 )  WBC Count : 25.44 K/uL  RBC Count : 2.52 M/uL  Hemoglobin : 8.4 g/dL  Hematocrit : 23.1 %  Platelet Count - Automated : 101 K/uL  Mean Cell Volume : 91.7 fL  Mean Cell Hemoglobin : 33.3 pg  Mean Cell Hemoglobin Concentration : 36.4 g/dL  Auto Neutrophil # : 23.02 K/uL  Auto Lymphocyte # : 0.49 K/uL  Auto Monocyte # : 1.22 K/uL  Auto Eosinophil # : 0.00 K/uL  Auto Basophil # : 0.06 K/uL  Auto Neutrophil % : 90.5 %  Auto Lymphocyte % : 1.9 %  Auto Monocyte % : 4.8 %  Auto Eosinophil % : 0.0 %  Auto Basophil % : 0.2 %      Hemoglobin: 8.4 g/dL (20 @ 05:45)  Bilirubin Total, Serum: 0.6 mg/dL (20 @ 05:35)  Aspartate Aminotransferase (AST/SGOT): 73 U/L (20 @ 05:35)  Alanine Aminotransferase (ALT/SGPT): 24 U/L (20 @ 05:35)  Alkaline Phosphatase, Serum: 138 U/L (20 @ 05:35)  INR: 1.18 ratio (20 @ 05:35)  Hemoglobin: 8.6 g/dL (20 @ 23:50)  Hemoglobin: 7.2 g/dL (20 @ 17:40)  Hemoglobin: 7.3 g/dL (20 @ 16:50)  Hemoglobin: 8.1 g/dL (20 @ 05:35)  Alkaline Phosphatase, Serum: 96 U/L (20 @ 05:35)  Bilirubin Total, Serum: 0.6 mg/dL (20 @ 05:35)  Aspartate Aminotransferase (AST/SGOT): 37 U/L (20 @ 05:35)  Alanine Aminotransferase (ALT/SGPT): 17 U/L (20 @ 05:35)  INR: 1.24 ratio (20 @ 05:35)  Hemoglobin: 8.6 g/dL (20 @ 16:18)  Alkaline Phosphatase, Serum: 98 U/L (20 @ 08:37)  Bilirubin Total, Serum: 0.3 mg/dL (20 @ 08:37)  Aspartate Aminotransferase (AST/SGOT): 34 U/L (20 @ 08:37)  Alanine Aminotransferase (ALT/SGPT): 18 U/L (20 @ 08:37)  Hemoglobin: 6.7 g/dL (20 @ 08:35)  Hemoglobin: 6.7 g/dL (20 @ 04:50)  Aspartate Aminotransferase (AST/SGOT): 29 U/L (20 @ 04:50)  Alanine Aminotransferase (ALT/SGPT): 16 U/L (20 @ 04:50)  Alkaline Phosphatase, Serum: 87 U/L (20 @ 04:50)  Bilirubin Total, Serum: 0.5 mg/dL (20 @ 04:50)  INR: 1.31 ratio (20 @ 04:50)      PT/INR - ( 2020 05:35 )   PT: 13.60 sec;   INR: 1.18 ratio         PTT - ( 2020 05:35 )  PTT:30.8 sec        130<L>  |  88<L>  |  109<HH>  ----------------------------<  191<H>  4.5   |  33<H>  |  2.0<H>    Ca    6.5<L>      2020 05:35  Phos  5.4       Mg     2.3         TPro  3.3<L>  /  Alb  1.5<L>  /  TBili  0.6  /  DBili  x   /  AST  73<H>  /  ALT  24  /  AlkPhos  138<H>                RADIOLOGY
Patient is a 74y old  Female who presents with a chief complaint of shortness of breath (2020 01:44)      HPI:  Patient is a 73 y/o F with PMH of CAD s/p PCI x5, GERD, Rheumatoid arthritis presented from [facility]) for worsening shortness of breath and hypoxia. Patient was admitted to [place] on  after she had 2 episodes of syncope at home and was tested positive for COVID-19. Patient denied any symptoms related to COVID-19 until then except for the fall. Patient was on 2L NC until yesterday but today she was hypoxic on NC and tachypneic. She was started on nonrebreather and sent to [facility] for further management. Also c/o severe myalgias and generalized weakness. No c/o cough, nausea, vomiting. Had diarrhea at [place].   In ED, /75mm Hg, /min, 102.3F, saturating at 81% on RA. Labs showed d-dimer 516, WBC 15K with lymphopenia, creatinine 1.2. Chest x ray showed new bilateral opacities. Currently saturating at 98% on NRB. (2020 01:44)      PAST MEDICAL & SURGICAL HISTORY:  Rheumatoid arthritis  GERD (gastroesophageal reflux disease)  MI (myocardial infarction)  Hyperlipidemia  CAD (coronary artery disease)  History of surgery: stent placement      SOCIAL HX:   Smoking  UTO                        ETOH                            Other    FAMILY HISTORY:  Family history of heart disease (Father): MI at age 84,   Family history of heart attack (Mother): MI at age 60  :  No known cardiovacular family hisotry     Review Of Systems:     All ROS are negative except per HPI       Allergies    Zocor (Joint Pain)    Intolerances          PHYSICAL EXAM    ICU Vital Signs Last 24 Hrs  T(C): 36.1 (2020 08:00), Max: 39.4 (2020 16:12)  T(F): 97 (2020 08:00), Max: 103 (2020 16:12)  HR: 84 (2020 08:00) (80 - 133)  BP: 112/54 (2020 08:00) (95/43 - 120/75)  BP(mean): 75 (2020 08:00) (57 - 77)  ABP: --  ABP(mean): --  RR: 25 (2020 08:00) (18 - 25)  SpO2: 95% (2020 08:00) (81% - 99%)      CONSTITUTIONAL:   Well nourished.  NAD    ENT:   Airway patent,   Mouth with normal mucosa.   No thrush    EYES:   pupils equal,   round and reactive to light.    CARDIAC:   Normal rate,   Regular rhythm.    Heart sounds S1, S2.   No edema      Vascular:   normal systolic impulse  no bruits    RESPIRATORY:   No wheezing   Normal chest expansion  No use of accessory muscles    GASTROINTESTINAL:  Abdomen soft   Non-tender,   No guarding,   + BS    GENITOURINARY  normal genitalia for sex  no edema    MUSCULOSKELETAL:   Range of motion is not limited,  Nno clubbing, cyanosis    NEUROLOGICAL:   Alert and oriented   No motor or sensory deficits.    SKIN:   Skin normal color for race,   Warm and dry  No evidence of rash.    PSYCHIATRIC:   No apparent risk to self or others.    HEME LYMPH:   No cervical  lymphadenopathy.  No inguinal lymphadenopathy            20 @ 07:01  -  20 @ 07:00  --------------------------------------------------------  IN:    sodium chloride 0.9%.: 375 mL  Total IN: 375 mL    OUT:    Indwelling Catheter - Urethral: 610 mL  Total OUT: 610 mL    Total NET: -235 mL      20 @ 07:01  -  20 @ 08:54  --------------------------------------------------------  IN:    sodium chloride 0.9%.: 150 mL  Total IN: 150 mL    OUT:  Total OUT: 0 mL    Total NET: 150 mL          LABS:                          11.3   13.69 )-----------( 131      ( 2020 05:05 )             33.0                                                   136  |  101  |  21<H>  ----------------------------<  81  4.0   |  20  |  1.0    Ca    7.7<L>      2020 05:05  Mg     2.2     04-18    TPro  5.5<L>  /  Alb  3.1<L>  /  TBili  0.5  /  DBili  x   /  AST  75<H>  /  ALT  19  /  AlkPhos  49  04-18                                                                                           LIVER FUNCTIONS - ( 2020 05:05 )  Alb: 3.1 g/dL / Pro: 5.5 g/dL / ALK PHOS: 49 U/L / ALT: 19 U/L / AST: 75 U/L / GGT: x                                                                                                                                       X-Rays reviewed:                                                                                    ECHO    CXR interpreted by me:    MEDICATIONS  (STANDING):  aspirin  chewable 81 milliGRAM(s) Oral daily  cefTRIAXone   IVPB 1000 milliGRAM(s) IV Intermittent every 24 hours  chlorhexidine 4% Liquid 1 Application(s) Topical <User Schedule>  enoxaparin Injectable 40 milliGRAM(s) SubCutaneous daily  ergocalciferol 73198 Unit(s) Oral every week  folic acid 1 milliGRAM(s) Oral daily  hydroxychloroquine 400 milliGRAM(s) Oral daily  metoprolol succinate ER 25 milliGRAM(s) Oral daily  pantoprazole    Tablet 40 milliGRAM(s) Oral before breakfast  sertraline 50 milliGRAM(s) Oral daily  sodium chloride 0.9%. 1000 milliLiter(s) (75 mL/Hr) IV Continuous <Continuous>  ticagrelor 90 milliGRAM(s) Oral every 12 hours    MEDICATIONS  (PRN):  acetaminophen   Tablet .. 650 milliGRAM(s) Oral every 6 hours PRN Temp greater or equal to 38C (100.4F)  ALPRAZolam 1 milliGRAM(s) Oral three times a day PRN anxiety
REQUESTED OF: DR Moody    CLINICAL ISSUE TO BE EVALUATED BY CONSULTANT: Goals of care        ABHINAV DREW 74yFemale  HPI:  Patient is a 73 y/o F with PMH of CAD s/p PCI x5, GERD, Rheumatoid arthritis presented from [facility] for worsening shortness of breath and hypoxia. Patient was admitted to [place] on 4/13 after she had 2 episodes of syncope at home and was tested positive for COVID-19. Patient denied any symptoms related to COVID-19 until then except for the fall. Patient was on 2L NC until yesterday but today she was hypoxic on NC and tachypneic. She was started on nonrebreather and sent to [facility] for further management. Also c/o severe myalgias and generalized weakness. No c/o cough, nausea, vomiting. Had diarrhea at [place].   In ED, /75mm Hg, /min, 102.3F, saturating at 81% on RA. Labs showed d-dimer 516, WBC 15K with lymphopenia, creatinine 1.2. Chest x ray showed new bilateral opacities. Currently saturating at 98% on NRB. (18 Apr 2020 01:44)        PAST MEDICAL & SURGICAL HISTORY:  Rheumatoid arthritis  GERD (gastroesophageal reflux disease)  MI (myocardial infarction)  Hyperlipidemia  CAD (coronary artery disease)  History of surgery: stent placement        PHYSICAL EXAM:  DEFERRED        T(C): 37.1, Max: 37.1 (08:00)  HR: 86 (86 - 127)  BP: --  RR: 30 (20 - 31)  SpO2: 98% (93% - 100%)      LABS/STUDIES:  05-03    132<L>  |  84<L>  |  122<HH>  ----------------------------<  78  5.1<H>   |  32  |  3.0<H>    Ca    6.5<L>      03 May 2020 06:00  Phos  9.4     05-03  Mg     2.5     05-03    TPro  4.0<L>  /  Alb  1.5<L>  /  TBili  0.5  /  DBili  x   /  AST  591<H>  /  ALT  100<H>  /  AlkPhos  194<H>  05-03                            9.5    37.53 )-----------( 93       ( 03 May 2020 06:00 )             27.1       MEDICATIONS  (STANDING):  atorvastatin 80 milliGRAM(s) Oral at bedtime  chlorhexidine 0.12% Liquid 15 milliLiter(s) Oral Mucosa every 12 hours  chlorhexidine 4% Liquid 1 Application(s) Topical <User Schedule>  ergocalciferol 11705 Unit(s) Oral every week  fluconAZOLE IVPB      fluconAZOLE IVPB 200 milliGRAM(s) IV Intermittent every 24 hours  folic acid 1 milliGRAM(s) Oral daily  insulin lispro (HumaLOG) corrective regimen sliding scale   SubCutaneous three times a day before meals  levETIRAcetam  IVPB 1000 milliGRAM(s) IV Intermittent every 12 hours  metoprolol tartrate 25 milliGRAM(s) Oral two times a day  norepinephrine Infusion 0.05 MICROgram(s)/kG/Min (2.48 mL/Hr) IV Continuous <Continuous>  pantoprazole  Injectable 40 milliGRAM(s) IV Push two times a day  propofol Infusion. 20 MICROgram(s)/kG/Min (6.36 mL/Hr) IV Continuous <Continuous>  Remdesivir 100 milliGRAM(s) 100 milliGRAM(s) IV Intermittent <User Schedule>  senna 2 Tablet(s) Oral at bedtime  sertraline 50 milliGRAM(s) Oral daily  vasopressin Infusion 0.04 Unit(s)/Min (2.4 mL/Hr) IV Continuous <Continuous>    MEDICATIONS  (PRN):  acetaminophen   Tablet .. 650 milliGRAM(s) Oral every 6 hours PRN Temp greater or equal to 38C (100.4F)        [place] Symptom Assesment Scale      PPS (Palliative Performance Scale): 10

## 2020-05-03 NOTE — CONSULT NOTE ADULT - ASSESSMENT
74yFemale being evaluated for          Recommendations:  Initiate DNR status, no escalation of care 74yFemale being evaluated for goals of care in setting of CAD s/p PCI x5, GERD, Rheumatoid arthritis currently in ICU w COVID19+ respiratory  failure, mechanical ventilation with ARDS with FiO2 60%, secondary to Cytokine Release Syndrome, anemia and MYCHAL. CT head on 4/30 showed SAH and Steven MRI showed subacute left thalamocrural infarct. Patient remains encephalopathic and is currently on VEEG monitoring.     Labs and imaging reviewed, case d/w Intensivist.  Spoke to pt's  and eldest daughter (Vandana) at length.  Palliative Care services introduced. Clinical condition reviewed. Grave prognosis understood.   Introduced care options to initiate DNR/no escalation of care and/or comfort measures with vent withdrawal.  Family wish to initiate DNR/no escalation of care. Plan to withdraw vent support         Recommendations:  Initiate DNR status, no escalation of care 74yFemale being evaluated for goals of care in setting of CAD s/p PCI x5, GERD, Rheumatoid arthritis currently in ICU w COVID19+ respiratory  failure, mechanical ventilation with ARDS with FiO2 60%, secondary to Cytokine Release Syndrome, anemia and MYCHAL. CT head on 4/30 showed SAH and Steven MRI showed subacute left thalamocrural infarct. Patient remains encephalopathic and is currently on VEEG monitoring.     Labs and imaging reviewed, case d/w Intensivist.  Spoke to pt's  and eldest daughter (Vandana) at length.  Palliative Care services introduced. Clinical condition reviewed. Grave prognosis understood.   Introduced care options to initiate DNR/no escalation of care and/or comfort measures with vent withdrawal.  Family wish to initiate DNR status and withdraw vent support today. Family aware of likely rapid progression to resp failure, further hypotension, cardiac arrest and death upon withdrawal.  All questions answered in detail  Support provided    70 minutes spent discussing advanced care planning      Recommendations:  dc tube feeds  Initiate DNR/DNI/CMO  prior to vent withdrawal: morphine 10mg IVP 10 minutes prior and infusion @ 10mg/hr,    morphine 10mg IVP q 15min for refractory resp distress  ativan 1mg IVP prior to withdrawal and q15min PRN for agitation   dc pressors and all other medication not contributing to pt's comfort  no further blood draws, no artificial hydration/nutrition, no pulse ox checking, no facemasks  End of life care        Death will likely be imminent upon withdrawal of vent and pressor support  We will follow  [phone]

## 2020-05-03 NOTE — GOALS OF CARE CONVERSATION - ADVANCED CARE PLANNING - TREATMENT GUIDELINES
Comfort measures only/No blood draws/No antibiotics/DNR Order/No artificial nutrition/no further vasopressors, DNI/No IV fluids

## 2020-05-03 NOTE — PROGRESS NOTE ADULT - ASSESSMENT
73 y/o F with PMH of CAD s/p PCI x5, GERD, Rheumatoid arthritis currently in ICU service for COVID19 with respiratory  failure, mechanical ventilation with ARDS with FiO2 60%, secondary to Cytokine Release Syndrome, anemia and MYCHAL. CT head on 4/30 showed SAH and Steven MRI showed subacute left thalamocrural infarct. Neurology consulted for seizure like activity and altered mental status. Patient remains encephalopathic and is currently on VEEG monitoring.       Plan  -continue VEEG monitoring  -Q1 Neuro checks  -Keep HOB <35degrees  -Obtain CT angiogram of head and neck  -SBP<140   -Continue Keppra 1000 mg BID  -continue Video EEG monitoring   -Neurosurgery follow up  -ASA 81 and Lipitor 80mg q 24   -Please check the following:  D Dimer, Fibrinogen, Fibrin split product, CRP, Ferritin, Complete hypercoagulability profile: Antiphospholipid antibody, dilute Adonay viper venom test, anti cardiolipin, beta2 glycoprotein, Clotting factors: Pr C, Activated Pr C resistance, Pr S, Antithrombin 73 y/o F with PMH of CAD s/p PCI x5, GERD, Rheumatoid arthritis currently in ICU service for COVID19 with respiratory  failure, mechanical ventilation with ARDS with FiO2 60%, secondary to Cytokine Release Syndrome, anemia and MYCHAL. CT head on 4/30 showed SAH and Steven MRI showed subacute left thalamocrural infarct. Neurology consulted for seizure like activity and altered mental status. Patient remains encephalopathic and is currently on VEEG monitoring.       Plan  -continue VEEG monitoring  -Q1 Neuro checks  -Keep HOB <35degrees  -Obtain CT angiogram of head and neck  -SBP<140   -correct electrolytes  -Check lipid profile and hbaic  -Continue Keppra 1000 mg BID  -continue Video EEG monitoring   -Neurosurgery follow up  -ASA 81 and Lipitor 80mg q 24   -Please check the following:  D Dimer, Fibrinogen, Fibrin split product, CRP, Ferritin, Complete hypercoagulability profile: Antiphospholipid antibody, dilute Adonay viper venom test, anti cardiolipin, beta2 glycoprotein, Clotting factors: Pr C, Activated Pr C resistance, Pr S, Antithrombin 73 y/o F with PMH of CAD s/p PCI x5, GERD, Rheumatoid arthritis currently in ICU service for COVID19 with respiratory  failure, mechanical ventilation with ARDS with FiO2 60%, secondary to Cytokine Release Syndrome, anemia and MYCHAL. CT head on 4/30 showed SAH and Steven MRI showed subacute left thalamocrural infarct. Neurology consulted for seizure like activity and altered mental status. Patient remains encephalopathic and is currently on VEEG monitoring.       Plan  -continue VEEG monitoring: showed deep sedation but no epileptiform discharges. Continue to monitor for another 24 hours.   -Q1 Neuro checks  -Keep HOB <35degrees  -Obtain CT angiogram of head and neck  -SBP<140   -correct electrolytes  -Check lipid profile and hbaic  -Continue Keppra 1000 mg BID  -continue Video EEG monitoring  - Taper down sedation    -Neurosurgery follow up  -ASA 81 and Lipitor 80mg q 24   -Please check the following:  D Dimer, Fibrinogen, Fibrin split product, CRP, Ferritin, Complete hypercoagulability profile: Antiphospholipid antibody, dilute Adonay viper venom test, anti cardiolipin, beta2 glycoprotein, Clotting factors: Pr C, Activated Pr C resistance, Pr S, Antithrombin

## 2020-05-03 NOTE — CHART NOTE - NSCHARTNOTEFT_GEN_A_CORE
Spoke with , Zohaib, [phone]. Updated him on patient's status. Answered questions, addressed concerns.

## 2020-05-03 NOTE — PROGRESS NOTE ADULT - NSREFPHYEXREFTO_GEN_ALL_CORE
Inpatient Physical Exam

## 2020-05-03 NOTE — PROGRESS NOTE ADULT - ASSESSMENT
· Assessment		  75 y/o F with PMH of CAD s/p PCI x5, GERD, Rheumatoid arthritis presented from [facility]) for worsening shortness of breath and hypoxia.     IMPRESSION;   #COVID19 with resp failure, mechanical ventilation with ARDS with FiO2 60%, secondary to Cytokine Release Syndrome  -inflammatory markers elevated  -elevated Ddimer and Ferritin are poor prognostic indicators and differentiate survivors from non survivors   S/p Anakinra with little response 4/19-21  RDV since 4/21. Held 4/30 secondary to GFR  #Candidemia with C albicans  with sputa being unremarkable. Lungs not the focus of candidemia    4/28 BCX +     4/30 BCX NGTD   Possible line related/GI/ focus   Nares ORSA NG  BCx 4/28 C albicans  Intracranial hemorrhage    RECOMMENDATIONS;   Remdesivir started 4/21-4/30. Holding given CrCl   Diflucan 200 mg iv q24h since 4/28, 14 days from cleared Cx  If hemodynamic compromise, start nicolas 1g q24h IV   ECHO   Will avoid opthalmological eval for now  Prognosis is very poor given no response to therapy and fungal superinfection

## 2020-05-03 NOTE — DISCHARGE NOTE FOR THE EXPIRED PATIENT - HOSPITAL COURSE
75yo female with PMH of CAD s/p PCI x5, GERD, rheumatoid arthritis, and hyperlipidemia who was transferred from [facility] for worsening shortness of breath and hypoxia. Patient was admitted 2020 to the [facility] site after having 2 episodes of syncope at home and tested positive for COVID-19. Patient was intubated on 2020 after desaturating while on non-rebreather. Patient required multiple pressors, needed to be sedated, her hospital stay was complicated by candicemia, right subarachnoid parietal hemorrhage, subacute infarct in the left thalamocapsular region, Acute macrocytic anemia secondary to upper GI bleed s/p 2 units PRBC, and acute kidney injury.     Palliative was consulted because of poor prognosis. Family decided to go with palliative extubation, patient  5/3/2020 4:53PM.

## 2020-05-03 NOTE — PROGRESS NOTE ADULT - ASSESSMENT
IMPRESSION:    Acute hypoxemic respiratory failure  COVID 19  Probable superinfection SP ABX therapy   Candidemia   MYCHAL  oliguric   SAH   CVA  HO ESBL: UTI     PLAN:    CNS:  Change sedation to Propofol.  FU EEG.  Continue Keppra.     HEENT: Oral care.  Ophtalmo eval     PULMONARY:  HOB @ 45 degrees.  Aspiration precautions.  Vent changes:  Wean O2        CARDIOVASCULAR: Keep I=O;  DC Lasix.      GI: GI prophylaxis. OG Feeding. bowel regimen.  Protonix BID     RENAL:  Follow up lytes.  Correct as needed repeat BMP; Shah. FU with renal     INFECTIOUS DISEASE: Follow up cultures.   Continue Diflucan     HEMATOLOGICAL:  DVT prophylaxis.  Repeat CBC; Target Hb >7     ENDOCRINE:  Follow up FS.  Insulin protocol if needed.     MUSCULOSKELETAL:  Bed chair position     MICU for now     Palliative care evaluation     Prognosis very poor

## 2020-05-03 NOTE — CONSULT NOTE ADULT - ASSESSMENT
73 y/o F with PMH of CAD s/p PCI x5, GERD, Rheumatoid arthritis currently in ICU service for COVID19 with respiratory  failure, mechanical ventilation with ARDS with FiO2 60%, secondary to Cytokine Release Syndrome, anemia and MYCHAL.    complciated by AMS/seizures / CT w/ SAH    - Cr rising UOP dropped not much improvement w/ lasix  No urgent need for RRT but given acute rise in CR no response to lasix will liekly need RRT in coming day or so  Mukesh lfollow   Keep MAP > 65   dose meds for eGFR <10

## 2020-05-05 LAB
CULTURE RESULTS: SIGNIFICANT CHANGE UP
SPECIMEN SOURCE: SIGNIFICANT CHANGE UP

## 2020-05-08 LAB
CULTURE RESULTS: SIGNIFICANT CHANGE UP
SPECIMEN SOURCE: SIGNIFICANT CHANGE UP

## 2020-05-08 NOTE — CDI QUERY NOTE - NSCDIOTHERTXTBX_GEN_ALL_CORE_HH
Pt. w/ COVID 19 +  here from [facility] w/ worsening SOB and hypoxia.    In ED:  Temp:  102.3,  , Cr. 1.2.  WBC 15.79,  Neutrophil% 98.3,  Lymphocyte% 4.2  CXR:  "New bilateral opacities suspicious for a viral pneumonia."  Pt. started on:  IVF, Paquenil,  Vancomycin 1G IV, Meropenem 1G q 8hrs.    4/22:  Attending Progress note:  "probable superinfection"    4/27:  ID Progress note:  " COVID 19 with Hypoxemia and CXR with b/l opacities which seem to have increased -elevation of inflammatory markers with cytokine release syndrome"    5/1:    GI Progress note:  "# Sepsis/ Acute respiratory failure secondary to COVID and rule out superimposed infection"    Please document your determination on the status of sepsis:  -   Sepsis present on admission  -   Sepsis developed during admission  -   Sepsis ruled out  -   Other (please specify)  -   Unable to determine      Thank you for your assistance in this matter.

## 2020-05-22 NOTE — DISCHARGE NOTE ADULT - NSNARCANGIVEN_NEU_A_CORE
Behavioral Health IP Nursing Progress Note    Subjective: Patient reported feeling more hopeful today, discussed plans to go to apartment with police to get belongings after discharge and then go to [facility]. Denies any ongoing symptoms of withdrawal today and reports anxiety to be manageable using various coping strategies. Reports prayer to be most effective at reducing feelings of anxiety. Denies thoughts of harming self or others.     Objective:   Mental Health: Patient observed to be up ad danika on the unit, social with peers and open/expressive with writer regarding thoughts and feelings. Brighter affect today with decreased lability and anxiety. Attended groups and participated appropriately.     Medical:   • None this shift     Assessment / Actions:   PRN Medications given?   No     Provided education regarding new medication Buspirone. Patient declined initial dose stating she feels she is taking enough medications already and feels able to manage her anxiety adequately with current coping strategies, does not want to add another medication to her current regimen.     Plan:   Treatment Plan reviewed and updated.      no

## 2020-08-29 NOTE — ED PROVIDER NOTE - CARE PLAN
no chills/no fever/no dysuria/no hematuria Principal Discharge DX:	COVID-19 virus detected  Secondary Diagnosis:	SOB (shortness of breath)

## 2020-12-17 NOTE — PROGRESS NOTE ADULT - ATTENDING COMMENTS
LOV 12/11/20 Telemed visit  1. Pneumonia due to COVID-19 virus  2. Hypoxia  3. Generalized weakness  4. Risk for falls  Patient currently on oxygen 3 L nasal cannula. He is weak and at risk for falling. Home health to assess and treat.   Patient needs whe 73yo F with pmh CAD presented to hospital after fall at home; was admitted to [facility] for COVID19 infection on 4/13/2020. She was transferred to [facility] on 4/16/2020 for continued care. Patient had urine culture which grew E.coli sensitive to Bactrim but resistant to Ceftriaxone and Ciprofloxacin. Blood cultures were negative. Patient's hospital course was complicated by intermittent fevers and poor physical function (may need placement to SNF). Patient reports feeling well, although fatigued and not wanting to do much movement.     O: Physical exam  Gen: Not in acute distress  CV: S1s2 present, RRR  Resp: Clear to auscultation bilaterally    A/P:  1) COVID19 infection  - Continue oxygen by nasal cannula at 2L/min. Wean off oxygen as tolerated. Goal SaO2 >90%  - Monitor for fevers and signs of respiratory distress  - Plaquenil started 4/16/2020, to be completed by 4/20/2020  - Ascorbic acid and zinc    2) UTI with E.coli and P. aeruginosa  - Treat with Bactrim (started 4/15/2020, to be completed by 4/21/2020)  - Continue to monitor for symptoms  - Encourage hydration and PO intake    3) Rheumatoid arthritis  - Prednisone 10mg daily  -Methotrexate 7.5g weekly (folic acid supplement as well)    4) CAD  - Metoprolol succinate 25mg daily  - Atorvastatin 80mg QHS  - Aspirin and Brillinta    5) DVT/GI ppx  - Heparin 5000 units BID    6) Deconditioning  - PT/OT consult to evaluate need for placement to SNF

## 2021-01-27 NOTE — PROGRESS NOTE ADULT - ASSESSMENT
# Syncope, Urinary incontinence, Possible covid19 infection     - Suspect vasovagal, getting IV fluids  - pt with recent sick contact, f/u covid PCR  - f/u UA and Ucx  - f/u Bloodcx   - O2 sat of 93% on room air  - CRP - 4.73, ferritin 169, Procal 0.16  - PT consulted, they recommend SNF    # CAD (coronary artery disease with stents)  - continue home meds.        # Rheumatoid arthritis.    - continue home meds.     # Hyperlipidemia.    - c/w Lipitor.       # Folic acid deficiency.  -supplementing    # Vitamin D deficiency.   -supplementing    Dispo: To SNF after cultures return  Full Code Breath sounds clear and equal bilaterally.

## 2021-08-05 NOTE — PROGRESS NOTE ADULT - ASSESSMENT
IMPRESSION:    Acute hypoxemic respiratory failure now requiring MV  COVID 19  Probable superinfection  MYCHAL improving   HO ESBL: UTI     PLAN:    CNS:  Adequate sedation     HEENT: Oral care    PULMONARY:  HOB @ 45 degrees.  Aspiration precautions.   Wean O2.  PEEP 14     CARDIOVASCULAR: I=O  LR 50 cc per hour     GI: GI prophylaxis. OG Feeding.     RENAL:  Follow up lytes.  Correct as needed    INFECTIOUS DISEASE: Follow up cultures. Finish ABX course.  C DIFF     HEMATOLOGICAL:  DVT prophylaxis.    ENDOCRINE:  Follow up FS.  Insulin protocol if needed.  Solumedrol D#7 Q24     MUSCULOSKELETAL:  Bed chair position     MICU for now 49.8

## 2021-09-01 NOTE — DISCHARGE NOTE PROVIDER - NSDCCONDITION_GEN_ALL_CORE
Surgery  Follow up  Procedure: RIH and ventral hernia repair with mesh  OR date:  8/4/2021  Path:  sac    S I feel fine, no diarrhea    Visit Vitals  BP (!) 144/73 (BP 1 Location: Left upper arm, BP Patient Position: Sitting, BP Cuff Size: Adult)   Pulse (!) 55   Temp 97.3 °F (36.3 °C) (Temporal)   Resp 16   Ht 5' 7\" (1.702 m)   Wt 79.4 kg (175 lb)   SpO2 98%   BMI 27.41 kg/m²       O Incisions healing well without infection   No signs of hernia    A/P Doing well   Had post op urinary retention but better now   No lifting for another 4 weeks   RTC prn    Colby Gates MD FACS Stable

## 2021-12-29 NOTE — PROCEDURAL SAFETY CHECKLIST WITH OR WITHOUT SEDATION - NS2PTIDENTIFIERS_GEN_ALL_CORE
Care Suites Discharge Nursing Note    Patient Information  Name: Roger Bonilla  Age: 68 year old    Discharge Education:  Discharge instructions reviewed: YES  Additional education/resources provided: copy of AVS given  Patient/patient representative verbalizes understanding: YES  Patient discharging on new medications: No  Medication education completed: YES    Discharge Plans:   Discharge location: home  Discharge ride contacted: YES  Approximate discharge time: 1300    Discharge Criteria:  Discharge criteria met and vital signs stable:  YES    Patient Belongs:  Patient belongings returned to patient: YES    Graciela Narayanan RN        done

## 2023-04-11 NOTE — ED PROVIDER NOTE - PROGRESS NOTE DETAILS
Health Care Proxy Pt arrived to ED on NRB satting 90-92 . Pt was positioned prone with improved sats. Will admit to medicine.

## 2023-11-24 NOTE — DISCHARGE NOTE NURSING/CASE MANAGEMENT/SOCIAL WORK - NSDCVIVACCINE_GEN_ALL_CORE_FT
Cuffless trach placed 9/17, transitioned to cuffed on 10/3. Oxygen requirements not improving. For mental health patient is on buspirone, quetiapine, and sertraline. For pain, gabapentin, oxycodone scheduled and prn, prn oxycodone mainly utilized for increased pain during chemo and after a bronch. On Guaifenesin, Atrovent and Xopenex for airway maintenance. No improvement in bilateral consolidation or atelectasis and groundglass opacities on repeat CT and chest x-rays. Bronchial cx with 3 colonies CoNS. CTCAP 10/12:  indicates additional groundglass opacity with paraseptal emphysema, which may be contributing to inability to remain off vent. Bronchoscopy performed and unremarkable. Samples sent to lab for culture. Patient was placed back on vent s/p procedure. Now on high flow. Completed 7 day course of cefepime and Vancomycin. Bronchial culture and gram stain negative. Micro negative for CMV, AFB, Fungitell, Pneumocystis jiroveci (carinii), Histoplasma, Aspergillus. Trach secretions sent for sputum culture shows 2+ polys, 1+ gram-positive cocci in pairs, chains and clusters and 1+ gram-negative rods. 11/14 Discussion regarding possibilities of discharge done with family. Family shows understanding of patient's condition. 11/15 Trach is capped and patient is placed on 3 L of NC briefly for 2 days. Patient is back on trach collar at high flow and NC. Cuffless trach was placed 9/17. Replaced with a cuffed Shiley XLT #7 10/3  Pt was on BiPAP via vent for the past 2 nights, tolerating it well   Currently on HFNC on 50 L of oxygen     ECHO 11/21/23 - EF 65%, no patent foramen ovale      11/22 - As per plan, pt was on trach collar with FiO2 at 50%, however pt 2 episodes of hypoxia without any trigger. The episode was associated with tachypnea and dyspnea, SpO2 dropping as low as 50% for 20 mins.  Suctioning, positional changes, coughing, were done in attempt to increase SpO2 however had no effect on the pt's condition. FiO2 was increased from 50% to 70% and eventually to 100%, which increased the SpO2 to 85%. Pt is currently on non breather mask. 11/23 - Pt had one episode of hypoxia associated with tachypnea and dyspnea last night secondary to off target BiPAP settings. SpO2 were dropped as low as 69%. When BiPAP settings were corrected (EPAP 8, PEEP 8, PSV 8, FiO2 70) pt's SpO2 improved in the 90s. Pt is currently on 15 L mid flow via nasal canula. CT chest showed Infiltrates in the lingula and left lower lobe. Improved from prior study     11/24 - Pt had no episodes of hypoxia overnight. Saturated well in 90s on BiPAP via vent. Plan:  Continue to titrate O2 to maintain SpO2 >85%  Aggressive pulmonary toilet   Incentive Spirometry   Suctioning via trach if patient is desatting and unable to expectorate phlegm.   Continue inhalation treatment with Pulmicort and formoterol q12 , duonebs QID, atrovent and xopenex   Regular diet  Daily PT/OT No Vaccines Administered.

## 2024-03-25 NOTE — CHART NOTE - NSCHARTNOTESELECT_GEN_ALL_CORE
----- Message from Barbara Boo sent at 3/25/2024  9:47 AM CDT -----  Regarding: Med mgmt  SANGEETHA PT       Pt is requesting a refill on [employer].   Cristofer Dimas in [facility]   Pt is requesting a call once it has been sent @ [phone]      Event Note

## 2024-07-15 NOTE — ED PROVIDER NOTE - MDM PATIENT STATEMENT FOR ADDL TREATMENT
How Severe Is Your Skin Lesion?: mild Have Your Skin Lesions Been Treated?: not been treated Is This A New Presentation, Or A Follow-Up?: Skin Lesions Patient with one or more new problems requiring additional work-up/treatment.

## 2024-08-05 NOTE — PATIENT PROFILE ADULT - FUNCTIONAL SCREEN CURRENT LEVEL: SWALLOWING (IF SCORE 2 OR MORE FOR ANY ITEM, CONSULT REHAB SERVICES), MLM)
Anesthesia Pre Eval Note    Anesthesia ROS/Med Hx    Overall Review:  EKG was reviewed and Echo was reviewed     Anesthetic Complication History:    Patient does not have a history of anesthetic complications      Pulmonary Review:    Positive for COPD   Positive for asthma    Neuro/Psych Review:  Patient does not have a neuro/psych history         Cardiovascular Review:   Comments: FINDINGS:     LEFT VENTRICLE:   The left ventricle is normal in size.   There is no left ventricular hypertrophy.   Left ventricular systolic function is normal. EF = 57% (3D)   Global LV myocardial strain is normal at -18.05 %.   Left ventricular diastolic function is consistent with abnormal relaxation (stage I). Left Atrial pressures are normal. Mitral annular lateral e': 11.9 cm/s. Mitral annular lateral E/e': 4.6. Mitral annular septal e': 12.5 cm/s. Mitral annular septal E/e': 4.4. The average Mitral E/e' ratio is 4.5.     LV Wall Motion:   Left ventricular wall motion is normal.   All scored segments are normal.       RIGHT VENTRICLE:   The right ventricle is normal in size. Right ventricular systolic function is normal.   The right atrial pressure is 3 mmHg.     LEFT ATRIUM:   The left atrial size is normal. The LA volume is 31.6 ml, 18.7 ml/m² when indexed.     RIGHT ATRIUM:   The right atrial cavity is normal in size.     MITRAL VALVE:   There is no mitral stenosis. The mitral valve mean gradient is 2 mmHg at a heart rate of 101 bpm. There is no mitral valve regurgitation.     TRICUSPID VALVE:   There is no tricuspid stenosis. There is trivial tricuspid valve regurgitation.     AORTIC VALVE:   The aortic valve is tricuspid. There is no aortic valve stenosis. The peak gradient is 6 mmHg (peak velocity = 1.2 m/s). The mean gradient is 3 mmHg. AV area is 1.66 cm² (Indexed area: 0.99 cm²/m²). The dimensionless valve index is 0.63. The LVOT diameter is 1.8 cm. The LVOT stroke volume index is 21.0 ml/m². There is no aortic valve  regurgitation.     PULMONIC VALVE   The pulmonic valve is not well visualized, but grossly normal. There is no pulmonic stenosis. There is no pulmonic valve regurgitation.     AORTA:   The visualized aorta is normal in size.   Measurements - Sinus 2.8 cm. Sinotubular junction 2.5 cm.   PERICARDIUM:   There is no pericardial effusion.     CONCLUSIONS:   --Study quality: fair.   -Two-dimensional transthoracic echocardiography was performed using standard views & projections with M-mode and Doppler (continuous, pulsed wave, spectral & color flow). Three-dimensional imaging was performed. Strain imaging was performed.   -The left ventricle is normal in size. There is no left ventricular hypertrophy. Left ventricular systolic function is normal. EF = 57% (3D) All scored segments are normal. Global LV myocardial strain is normal at -18.05 %. Left ventricular diastolic function is consistent with abnormal relaxation (stage I).   -The right ventricle is normal in size. Right ventricular systolic function is normal. The right atrial pressure is 3 mmHg.         Positive for hypertension  Positive for hyperlipidemia    GI/HEPATIC/RENAL Review:  Patient does not have a GI/hepatic/renalhistory       End/Other Review:    Positive for arthritis  Positive for chronic pain  Positive for cancer    Overall Review of Systems Comments:  Asthma (CMD)  Date Unknown  Bladder cancer  (CMD)  Date Unknown  Gout               SUBJECTIVE:     Patient is a 67 year old male with PMHx significant for urothelial carcinoma, COPD, and HTN who presents to the ED with urinary retention for one day duration. Patient reports that urinary retention began Friday morning, with intermittent difficulty voiding. Per ED evaluation, patient most recently voided at 6pm today. He states that he noticed blood in his urine, but that this is chronic for him, with urine hue ranging from pink to bright red. Denies dysuria, frequency, fever, chills, nausea, vomiting,  abdominal pain, or flank pain.      Patient reports a history of urothelial cancer, for which he follows with Dr. Rutherford, with most recent visit occurring a month ago. Patient has undergone both radiation and chemotherapy for treatment. Per chart review, patient was recently hospitalized at [facility] for decreased appetite, fatigue, and confusion. CT brain demonstrated multiple new brain mets with signs of vasogenic edema. Staging CT CAP performed by primary oncologist showed mets in lungs, liver, and osseous involvement. At that hospitalization, plan was made for home palliative arrangements with eventual plan to transition to home hospice.           Additional Results:  EKG:  Encounter Date: 08/03/24  -Electrocardiogram 12-Lead:        Result                      Value                           Ventricular Rate EKG/M*     120                             Atrial Rate (BPM)           120                             SD-Interval (MSEC)          128                             QRS-Interval (MSEC)         68                              QT-Interval (MSEC)          296                             QTc                         418                             P Axis (Degrees)            73                              R Axis (Degrees)            77                              T Axis (Degrees)            61                              REPORT TEXT                                             Sinus tachycardia   Otherwise normal ECG   No previous ECGs available    Echo:  No results found for: \"EF\"   ALLERGIES:   -- Omeprazole -- HIVES   -- Penicillins -- HIVES   Last Labs        Component                Value               Date/Time                  WBC                      7.8                 08/05/2024 1454            RBC                      2.46 (L)            08/05/2024 1454            HGB                      6.8 (LL)            08/05/2024 1454            HCT                      21.4 (L)            08/05/2024  1454            MCV                      87.0                08/05/2024 1454            MCH                      27.6                08/05/2024 1454            MCHC                     31.8 (L)            08/05/2024 1454            RDW-CV                   18.7 (H)            08/05/2024 1454            Sodium                   139                 08/05/2024 0649            Potassium                3.4                 08/05/2024 0649            Chloride                 106                 08/05/2024 0649            Carbon Dioxide           18 (L)              08/05/2024 0649            Glucose                  77                  08/05/2024 0649            BUN                      24 (H)              08/05/2024 0649            Creatinine               1.02                08/05/2024 0649            Glomerular Filtrati*     82                  08/05/2024 0649            Calcium                  7.9 (L)             08/05/2024 0649            PLT                      96 (L)              08/05/2024 1454        Past Medical History:  No date: Asthma (CMD)  No date: Bladder cancer  (CMD)  No date: Gout  No past surgical history on file.   Prior to Admission medications :  Medication budesonide-formoterol (SYMBICORT) 160-4.5 MCG/ACT inhaler, Sig Inhale 2 puffs into the lungs in the morning and 2 puffs in the evening., Start Date 7/29/24, End Date , Taking? Yes, Authorizing Provider Provider, Outside    Medication Vitamin D, Ergocalciferol, 1.25 mg (50,000 units) capsule, Sig Take 1.25 mg by mouth 1 day a week., Start Date 6/21/24, End Date , Taking? Yes, Authorizing Provider Provider, Outside    Medication lidocaine (LIDODERM) 5 % patch, Sig APPLY 1 PATCH ONTO SKIN ONCE DAILY. REMOVE AND DISCARD PATCH WITHIN 12 HOURS OR AS DIRECTED, Start Date , End Date , Taking? Yes, Authorizing Provider Provider, Outside    Medication megestrol (MEGACE) 40 mg/mL suspension, Sig Take 200 mg by mouth daily., Start Date 7/29/24, End Date ,  Taking? Yes, Authorizing Provider Provider, Outside    Medication umeclidinium bromide (INCRUSE ELLIPTA) 62.5 MCG/ACT inhaler, Sig Inhale 1 puff into the lungs daily., Start Date 7/29/24, End Date , Taking? Yes, Authorizing Provider Provider, Outside    Medication gabapentin (NEURONTIN) 300 MG capsule, Sig Take 1 capsule by mouth nightly., Start Date 3/21/24, End Date , Taking? Yes, Authorizing Provider Provider, Outside    Medication NIFEdipine CC (ADALAT CC) 30 MG 24 hr tablet, Sig Take 30 mg by mouth daily., Start Date 7/29/24, End Date 10/27/24, Taking? Yes, Authorizing Provider Provider, Outside    Medication polyethylene glycol (MIRALAX) 17 g packet, Sig Take 17 g by mouth daily., Start Date 7/19/24, End Date , Taking? Yes, Authorizing Provider Provider, Outside    Medication oxyCODONE, IMM REL, (ROXICODONE) 5 MG immediate release tablet, Sig Take 5 mg by mouth every 4 hours as needed for Pain., Start Date , End Date , Taking? Yes, Authorizing Provider Provider, Outside    Medication senna (SENOKOT) 8.6 MG tablet, Sig Take 2 tablets by mouth nightly as needed for Constipation., Start Date 7/18/24, End Date , Taking? Yes, Authorizing Provider Provider, Outside    Medication dexAMETHasone (DECADRON) 1 MG tablet, Sig Take it one daily x 7 days prn if he develops confusion, disorientation, severe headache, Start Date 7/29/24, End Date , Taking? Yes, Authorizing Provider Provider, Outside    Medication albuterol 108 (90 Base) MCG/ACT inhaler, Sig Inhale 2 puffs into the lungs every 4 hours as needed for Shortness of Breath or Wheezing., Start Date 7/29/24, End Date 7/29/25, Taking? Yes, Authorizing Provider Provider, Outside    Medication atorvastatin (LIPITOR) 20 MG tablet, Sig Take 20 mg by mouth daily., Start Date 7/29/24, End Date 7/29/25, Taking? Yes, Authorizing Provider Provider, Outside    Medication memantine (NAMENDA) 5 MG tablet, Sig Take 5 mg by mouth in the morning and 5 mg in the evening., Start Date  7/19/24, End Date 10/27/24, Taking? Yes, Authorizing Provider Provider, Outside    Medication OLANZapine (ZyPREXA) 2.5 MG tablet, Sig Take 1 tablet by mouth nightly., Start Date 7/18/24, End Date , Taking? Yes, Authorizing Provider Provider, Outside    Medication oxybutynin (DITROPAN-XL) 5 MG 24 hr tablet, Sig Take 1 tablet by mouth nightly., Start Date 5/15/24, End Date , Taking? Yes, Authorizing Provider Provider, Outside    Medication omeprazole (PrilOSEC) 20 MG capsule, Sig Take 20 mg by mouth every morning., Start Date 8/2/24, End Date , Taking? Yes, Authorizing Provider Provider, Outside         Patient Vitals for the past 24 hrs:   BP Temp Temp src Pulse Resp SpO2   08/05/24 1430 112/69 36.8 °C (98.2 °F) -- 95 (!) 12 --   08/05/24 1315 104/69 -- -- -- -- --   08/05/24 1219 (!) 90/60 -- -- -- -- --   08/05/24 1130 (!) 87/56 -- -- -- -- --   08/05/24 1115 (!) 89/60 36.8 °C (98.2 °F) Oral (!) 110 20 --   08/05/24 1057 103/68 36.8 °C (98.2 °F) -- (!) 100 20 --   08/05/24 0801 -- -- -- -- -- 98 %   08/05/24 0505 111/59 37.1 °C (98.8 °F) Oral (!) 102 -- 97 %   08/04/24 2014 110/59 37.5 °C (99.5 °F) Oral (!) 111 -- 94 %   08/04/24 1859 -- -- -- -- (!) 28 --       Social history reviewed:  Social History     Tobacco Use   Smoking Status Never    Passive exposure: Never   Smokeless Tobacco Never        E-Cigarette/Vaping Substances & Devices    E-Cigarette/Vaping Use Never Used     Nicotine No     THC No     CBD No     Flavoring No     Disposable No     Pre-filled or Refillable Cartridge No     Refillable Tank No     Pre-filled Pod No        Social History     Substance and Sexual Activity   Alcohol Use Not Currently           Relevant Problems   No relevant active problems       Physical Exam     Airway   Mallampati: II  TM Distance: >3 FB  Neck ROM: Full  Neck: Non-tender and Able to place in sniff position  TMJ Mobility: Good    Cardiovascular  Cardiovascular exam normal  Cardio Rhythm: Regular  Cardio Rate:  Normal    Head Assessment  Head assessment: Normocephalic and Atraumatic    General Assessment  General Assessment: Alert and oriented and No acute distress    Dental Exam  Dental exam normal  Patient has:  Denied broken/chipped/loose teeth    Pulmonary Exam  Pulmonary exam normal  Patient Demonstrates:  Non-labored Breathing    Abdominal Exam  Abdominal exam normal      Anesthesia Plan:    ASA Status: 3  Anesthesia Type: General    Induction: Intravenous  Preferred Airway Type: ETT  Maintenance: Inhalational  Premedication: IV      Post-op Pain Management: Per Surgeon      Checklist  Reviewed: NPO Status, Allergies, Medications, Problem list, Past Med History, Lab Results, EKG, Consultations and Nursing Notes  Consent/Risks Discussed Statement:  The proposed anesthetic plan, including its risks and benefits, have been discussed with the Patient along with the risks and benefits of alternatives. Questions were encouraged and answered and the patient and/or representative understands and agrees to proceed.    I have discussed elements of the patient's history or examination, as noted above and/or as follows, that place the patient at higher risk of complications; age, heart disease and pulmonary disease.    I discussed with the patient (and/or patient's legal representative) the risks and benefits of the proposed anesthesia plan, General, which may include services performed by other anesthesia providers.    Alternative anesthesia plans, if available, were reviewed with the patient (and/or patient's legal representative). Discussion has been held with the patient (and/or patient's legal representative) regarding risks of anesthesia, which include Nausea, Vomiting, Headache, Sore Throat, Dental Injury, Hypotension, Allergic Reaction, Need for Blood Transfusion, Post-op Intubation, ICU Admit, Nerve Injury, Aspiration, Intra-operative Awareness, Emergence Delirium, allergic reaction, anxiety, aspiration, depressed  breathing, hypotension, ICU admit, intra-operative awareness, need for blood transfusion and nerve injury and emergent situations that may require change in anesthesia plan.    The patient (and/or patient's legal representative) has indicated understanding, his/her questions have been answered, and he/she wishes to proceed with the planned anesthetic.    Informed Consent for Blood: Consented  Blood Products: Not Anticipated     0 = swallows foods/liquids without difficulty

## 2024-10-28 NOTE — PATIENT PROFILE ADULT - NSPROMUTPARTICIPCAREFT_GEN_A_NUR
Isotretinoin Pregnancy And Lactation Text: This medication is Pregnancy Category X and is considered extremely dangerous during pregnancy. It is unknown if it is excreted in breast milk. Minocycline Counseling: Patient advised regarding possible photosensitivity and discoloration of the teeth, skin, lips, tongue and gums.  Patient instructed to avoid sunlight, if possible.  When exposed to sunlight, patients should wear protective clothing, sunglasses, and sunscreen.  The patient was instructed to call the office immediately if the following severe adverse effects occur:  hearing changes, easy bruising/bleeding, severe headache, or vision changes.  The patient verbalized understanding of the proper use and possible adverse effects of minocycline.  All of the patient's questions and concerns were addressed. Sarecycline Counseling: Patient advised regarding possible photosensitivity and discoloration of the teeth, skin, lips, tongue and gums.  Patient instructed to avoid sunlight, if possible.  When exposed to sunlight, patients should wear protective clothing, sunglasses, and sunscreen.  The patient was instructed to call the office immediately if the following severe adverse effects occur:  hearing changes, easy bruising/bleeding, severe headache, or vision changes.  The patient verbalized understanding of the proper use and possible adverse effects of sarecycline.  All of the patient's questions and concerns were addressed. Topical Retinoid counseling:  Patient advised to apply a pea-sized amount only at bedtime and wait 30 minutes after washing their face before applying.  If too drying, patient may add a non-comedogenic moisturizer. The patient verbalized understanding of the proper use and possible adverse effects of retinoids.  All of the patient's questions and concerns were addressed. Tetracycline Pregnancy And Lactation Text: This medication is Pregnancy Category D and not consider safe during pregnancy. It is also excreted in breast milk. Aklief Pregnancy And Lactation Text: It is unknown if this medication is safe to use during pregnancy.  It is unknown if this medication is excreted in breast milk.  Breastfeeding women should use the topical cream on the smallest area of the skin for the shortest time needed while breastfeeding.  Do not apply to nipple and areola. Azelaic Acid Counseling: Patient counseled that medicine may cause skin irritation and to avoid applying near the eyes.  In the event of skin irritation, the patient was advised to reduce the amount of the drug applied or use it less frequently.   The patient verbalized understanding of the proper use and possible adverse effects of azelaic acid.  All of the patient's questions and concerns were addressed. Isotretinoin Counseling: Patient should get monthly blood tests, not donate blood, not drive at night if vision affected, not share medication, and not undergo elective surgery for 6 months after tx completed. Side effects reviewed, pt to contact office should one occur. Detail Level: Zone Topical Sulfur Applications Counseling: Topical Sulfur Counseling: Patient counseled that this medication may cause skin irritation or allergic reactions.  In the event of skin irritation, the patient was advised to reduce the amount of the drug applied or use it less frequently.   The patient verbalized understanding of the proper use and possible adverse effects of topical sulfur application.  All of the patient's questions and concerns were addressed. Bactrim Counseling:  I discussed with the patient the risks of sulfa antibiotics including but not limited to GI upset, allergic reaction, drug rash, diarrhea, dizziness, photosensitivity, and yeast infections.  Rarely, more serious reactions can occur including but not limited to aplastic anemia, agranulocytosis, methemoglobinemia, blood dyscrasias, liver or kidney failure, lung infiltrates or desquamative/blistering drug rashes. High Dose Vitamin A Counseling: Side effects reviewed, pt to contact office should one occur. Azelaic Acid Pregnancy And Lactation Text: This medication is considered safe during pregnancy and breast feeding. Erythromycin Pregnancy And Lactation Text: This medication is Pregnancy Category B and is considered safe during pregnancy. It is also excreted in breast milk. Doxycycline Pregnancy And Lactation Text: This medication is Pregnancy Category D and not consider safe during pregnancy. It is also excreted in breast milk but is considered safe for shorter treatment courses. Spironolactone Counseling: Patient advised regarding risks of diarrhea, abdominal pain, hyperkalemia, birth defects (for female patients), liver toxicity and renal toxicity. The patient may need blood work to monitor liver and kidney function and potassium levels while on therapy. The patient verbalized understanding of the proper use and possible adverse effects of spironolactone.  All of the patient's questions and concerns were addressed. Azithromycin Counseling:  I discussed with the patient the risks of azithromycin including but not limited to GI upset, allergic reaction, drug rash, diarrhea, and yeast infections. Use Enhanced Medication Counseling?: No Winlevi Pregnancy And Lactation Text: This medication is considered safe during pregnancy and breastfeeding. Azithromycin Pregnancy And Lactation Text: This medication is considered safe during pregnancy and is also secreted in breast milk. Dapsone Pregnancy And Lactation Text: This medication is Pregnancy Category C and is not considered safe during pregnancy or breast feeding. Winlevi Counseling:  I discussed with the patient the risks of topical clascoterone including but not limited to erythema, scaling, itching, and stinging. Patient voiced their understanding. Topical Clindamycin Pregnancy And Lactation Text: This medication is Pregnancy Category B and is considered safe during pregnancy. It is unknown if it is excreted in breast milk. Bactrim Pregnancy And Lactation Text: This medication is Pregnancy Category D and is known to cause fetal risk.  It is also excreted in breast milk. Tazorac Pregnancy And Lactation Text: This medication is not safe during pregnancy. It is unknown if this medication is excreted in breast milk. High Dose Vitamin A Pregnancy And Lactation Text: High dose vitamin A therapy is contraindicated during pregnancy and breast feeding. Doxycycline Counseling:  Patient counseled regarding possible photosensitivity and increased risk for sunburn.  Patient instructed to avoid sunlight, if possible.  When exposed to sunlight, patients should wear protective clothing, sunglasses, and sunscreen.  The patient was instructed to call the office immediately if the following severe adverse effects occur:  hearing changes, easy bruising/bleeding, severe headache, or vision changes.  The patient verbalized understanding of the proper use and possible adverse effects of doxycycline.  All of the patient's questions and concerns were addressed. Dapsone Counseling: I discussed with the patient the risks of dapsone including but not limited to hemolytic anemia, agranulocytosis, rashes, methemoglobinemia, kidney failure, peripheral neuropathy, headaches, GI upset, and liver toxicity.  Patients who start dapsone require monitoring including baseline LFTs and weekly CBCs for the first month, then every month thereafter.  The patient verbalized understanding of the proper use and possible adverse effects of dapsone.  All of the patient's questions and concerns were addressed. Topical Sulfur Applications Pregnancy And Lactation Text: This medication is Pregnancy Category C and has an unknown safety profile during pregnancy. It is unknown if this topical medication is excreted in breast milk. Benzoyl Peroxide Pregnancy And Lactation Text: This medication is Pregnancy Category C. It is unknown if benzoyl peroxide is excreted in breast milk. Tetracycline Counseling: Patient counseled regarding possible photosensitivity and increased risk for sunburn.  Patient instructed to avoid sunlight, if possible.  When exposed to sunlight, patients should wear protective clothing, sunglasses, and sunscreen.  The patient was instructed to call the office immediately if the following severe adverse effects occur:  hearing changes, easy bruising/bleeding, severe headache, or vision changes.  The patient verbalized understanding of the proper use and possible adverse effects of tetracycline.  All of the patient's questions and concerns were addressed. Patient understands to avoid pregnancy while on therapy due to potential birth defects. Topical Retinoid Pregnancy And Lactation Text: This medication is Pregnancy Category C. It is unknown if this medication is excreted in breast milk. Benzoyl Peroxide Counseling: Patient counseled that medicine may cause skin irritation and bleach clothing.  In the event of skin irritation, the patient was advised to reduce the amount of the drug applied or use it less frequently.   The patient verbalized understanding of the proper use and possible adverse effects of benzoyl peroxide.  All of the patient's questions and concerns were addressed. Spironolactone Pregnancy And Lactation Text: This medication can cause feminization of the male fetus and should be avoided during pregnancy. The active metabolite is also found in breast milk. Aklief counseling:  Patient advised to apply a pea-sized amount only at bedtime and wait 30 minutes after washing their face before applying.  If too drying, patient may add a non-comedogenic moisturizer.  The most commonly reported side effects including irritation, redness, scaling, dryness, stinging, burning, itching, and increased risk of sunburn.  The patient verbalized understanding of the proper use and possible adverse effects of retinoids.  All of the patient's questions and concerns were addressed. Tazorac Counseling:  Patient advised that medication is irritating and drying.  Patient may need to apply sparingly and wash off after an hour before eventually leaving it on overnight.  The patient verbalized understanding of the proper use and possible adverse effects of tazorac.  All of the patient's questions and concerns were addressed. Topical Clindamycin Counseling: Patient counseled that this medication may cause skin irritation or allergic reactions.  In the event of skin irritation, the patient was advised to reduce the amount of the drug applied or use it less frequently.   The patient verbalized understanding of the proper use and possible adverse effects of clindamycin.  All of the patient's questions and concerns were addressed. Birth Control Pills Pregnancy And Lactation Text: This medication should be avoided if pregnant and for the first 30 days post-partum. Birth Control Pills Counseling: Birth Control Pill Counseling: I discussed with the patient the potential side effects of OCPs including but not limited to increased risk of stroke, heart attack, thrombophlebitis, deep venous thrombosis, hepatic adenomas, breast changes, GI upset, headaches, and depression.  The patient verbalized understanding of the proper use and possible adverse effects of OCPs. All of the patient's questions and concerns were addressed. Erythromycin Counseling:  I discussed with the patient the risks of erythromycin including but not limited to GI upset, allergic reaction, drug rash, diarrhea, increase in liver enzymes, and yeast infections. At bedside

## 2025-03-22 NOTE — ED PROVIDER NOTE - PHYSICAL EXAMINATION
VITAL SIGNS: I have reviewed nursing notes and confirm.  CONSTITUTIONAL: non-toxic  SKIN: no rash, no petechiae.  EYES: PERRL, EOMI, pink conjunctiva, anicteric  ENT: tongue midline, no exudates, MMM  NECK: Supple; no meningismus, no JVD  CARD: RRR, no murmurs, equal radial pulses bilaterally 2+  RESP:decr breath sounds b/l decr air movement b/l  ABD: Soft, non-tender, non-distended, no peritoneal signs, no HSM, no CVA tenderness  EXT: Normal ROM x4. No edema. No calves tenderness  NEURO: Alert, oriented. CN2-12 intact, equal strength bilaterally, nl gait.  PSYCH: Cooperative, appropriate. headaches